# Patient Record
Sex: FEMALE | Race: WHITE
[De-identification: names, ages, dates, MRNs, and addresses within clinical notes are randomized per-mention and may not be internally consistent; named-entity substitution may affect disease eponyms.]

---

## 2017-07-23 ENCOUNTER — TRANSCRIPTION ENCOUNTER (OUTPATIENT)
Age: 77
End: 2017-07-23

## 2017-10-01 ENCOUNTER — EMERGENCY (EMERGENCY)
Facility: HOSPITAL | Age: 77
LOS: 0 days | Discharge: HOME | End: 2017-10-01

## 2017-10-01 DIAGNOSIS — S30.1XXA CONTUSION OF ABDOMINAL WALL, INITIAL ENCOUNTER: ICD-10-CM

## 2017-10-01 DIAGNOSIS — Z79.899 OTHER LONG TERM (CURRENT) DRUG THERAPY: ICD-10-CM

## 2017-10-01 DIAGNOSIS — S70.01XA CONTUSION OF RIGHT HIP, INITIAL ENCOUNTER: ICD-10-CM

## 2017-10-01 DIAGNOSIS — S20.211A CONTUSION OF RIGHT FRONT WALL OF THORAX, INITIAL ENCOUNTER: ICD-10-CM
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
## 2017-10-01 DIAGNOSIS — I10 ESSENTIAL (PRIMARY) HYPERTENSION: ICD-10-CM

## 2017-10-01 DIAGNOSIS — K41.30 UNILATERAL FEMORAL HERNIA, WITH OBSTRUCTION, WITHOUT GANGRENE, NOT SPECIFIED AS RECURRENT: ICD-10-CM
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
## 2017-10-01 DIAGNOSIS — R07.81 PLEURODYNIA: ICD-10-CM
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
## 2018-10-29 ENCOUNTER — EMERGENCY (EMERGENCY)
Facility: HOSPITAL | Age: 78
LOS: 0 days | Discharge: HOME | End: 2018-10-29
Attending: EMERGENCY MEDICINE | Admitting: EMERGENCY MEDICINE

## 2018-10-29 VITALS
WEIGHT: 207.01 LBS | SYSTOLIC BLOOD PRESSURE: 165 MMHG | DIASTOLIC BLOOD PRESSURE: 79 MMHG | OXYGEN SATURATION: 94 % | HEIGHT: 68 IN | HEART RATE: 83 BPM | TEMPERATURE: 97 F | RESPIRATION RATE: 18 BRPM

## 2018-10-29 DIAGNOSIS — K57.92 DIVERTICULITIS OF INTESTINE, PART UNSPECIFIED, WITHOUT PERFORATION OR ABSCESS WITHOUT BLEEDING: ICD-10-CM

## 2018-10-29 DIAGNOSIS — Z90.710 ACQUIRED ABSENCE OF BOTH CERVIX AND UTERUS: ICD-10-CM

## 2018-10-29 DIAGNOSIS — N39.0 URINARY TRACT INFECTION, SITE NOT SPECIFIED: ICD-10-CM

## 2018-10-29 DIAGNOSIS — I10 ESSENTIAL (PRIMARY) HYPERTENSION: ICD-10-CM

## 2018-10-29 DIAGNOSIS — F41.9 ANXIETY DISORDER, UNSPECIFIED: ICD-10-CM

## 2018-10-29 DIAGNOSIS — Z98.890 OTHER SPECIFIED POSTPROCEDURAL STATES: ICD-10-CM

## 2018-10-29 DIAGNOSIS — R10.32 LEFT LOWER QUADRANT PAIN: ICD-10-CM

## 2018-10-29 DIAGNOSIS — Z90.49 ACQUIRED ABSENCE OF OTHER SPECIFIED PARTS OF DIGESTIVE TRACT: ICD-10-CM

## 2018-10-29 LAB
ALBUMIN SERPL ELPH-MCNC: 4 G/DL — SIGNIFICANT CHANGE UP (ref 3.5–5.2)
ALP SERPL-CCNC: 81 U/L — SIGNIFICANT CHANGE UP (ref 30–115)
ALT FLD-CCNC: 14 U/L — SIGNIFICANT CHANGE UP (ref 0–41)
ANION GAP SERPL CALC-SCNC: 11 MMOL/L — SIGNIFICANT CHANGE UP (ref 7–14)
APPEARANCE UR: ABNORMAL
APTT BLD: 32.5 SEC — SIGNIFICANT CHANGE UP (ref 27–39.2)
AST SERPL-CCNC: 26 U/L — SIGNIFICANT CHANGE UP (ref 0–41)
BASOPHILS # BLD AUTO: 0.07 K/UL — SIGNIFICANT CHANGE UP (ref 0–0.2)
BASOPHILS NFR BLD AUTO: 0.7 % — SIGNIFICANT CHANGE UP (ref 0–1)
BILIRUB SERPL-MCNC: 0.5 MG/DL — SIGNIFICANT CHANGE UP (ref 0.2–1.2)
BILIRUB UR-MCNC: NEGATIVE — SIGNIFICANT CHANGE UP
BUN SERPL-MCNC: 16 MG/DL — SIGNIFICANT CHANGE UP (ref 10–20)
CALCIUM SERPL-MCNC: 9.7 MG/DL — SIGNIFICANT CHANGE UP (ref 8.5–10.1)
CHLORIDE SERPL-SCNC: 94 MMOL/L — LOW (ref 98–110)
CO2 SERPL-SCNC: 28 MMOL/L — SIGNIFICANT CHANGE UP (ref 17–32)
COLOR SPEC: YELLOW — SIGNIFICANT CHANGE UP
CREAT SERPL-MCNC: 0.9 MG/DL — SIGNIFICANT CHANGE UP (ref 0.7–1.5)
DIFF PNL FLD: NEGATIVE — SIGNIFICANT CHANGE UP
EOSINOPHIL # BLD AUTO: 0.08 K/UL — SIGNIFICANT CHANGE UP (ref 0–0.7)
EOSINOPHIL NFR BLD AUTO: 0.8 % — SIGNIFICANT CHANGE UP (ref 0–8)
GLUCOSE SERPL-MCNC: 128 MG/DL — HIGH (ref 70–99)
GLUCOSE UR QL: NEGATIVE MG/DL — SIGNIFICANT CHANGE UP
HCT VFR BLD CALC: 50 % — HIGH (ref 37–47)
HGB BLD-MCNC: 16.6 G/DL — HIGH (ref 12–16)
IMM GRANULOCYTES NFR BLD AUTO: 0.6 % — HIGH (ref 0.1–0.3)
INR BLD: 1.09 RATIO — SIGNIFICANT CHANGE UP (ref 0.65–1.3)
KETONES UR-MCNC: NEGATIVE — SIGNIFICANT CHANGE UP
LEUKOCYTE ESTERASE UR-ACNC: ABNORMAL
LIDOCAIN IGE QN: 19 U/L — SIGNIFICANT CHANGE UP (ref 7–60)
LYMPHOCYTES # BLD AUTO: 1.14 K/UL — LOW (ref 1.2–3.4)
LYMPHOCYTES # BLD AUTO: 11 % — LOW (ref 20.5–51.1)
MCHC RBC-ENTMCNC: 30 PG — SIGNIFICANT CHANGE UP (ref 27–31)
MCHC RBC-ENTMCNC: 33.2 G/DL — SIGNIFICANT CHANGE UP (ref 32–37)
MCV RBC AUTO: 90.4 FL — SIGNIFICANT CHANGE UP (ref 81–99)
MONOCYTES # BLD AUTO: 0.67 K/UL — HIGH (ref 0.1–0.6)
MONOCYTES NFR BLD AUTO: 6.4 % — SIGNIFICANT CHANGE UP (ref 1.7–9.3)
NEUTROPHILS # BLD AUTO: 8.38 K/UL — HIGH (ref 1.4–6.5)
NEUTROPHILS NFR BLD AUTO: 80.5 % — HIGH (ref 42.2–75.2)
NITRITE UR-MCNC: POSITIVE
PH UR: 8 — SIGNIFICANT CHANGE UP (ref 5–8)
PLATELET # BLD AUTO: 324 K/UL — SIGNIFICANT CHANGE UP (ref 130–400)
POTASSIUM SERPL-MCNC: 3.9 MMOL/L — SIGNIFICANT CHANGE UP (ref 3.5–5)
POTASSIUM SERPL-SCNC: 3.9 MMOL/L — SIGNIFICANT CHANGE UP (ref 3.5–5)
PROT SERPL-MCNC: 7.3 G/DL — SIGNIFICANT CHANGE UP (ref 6–8)
PROT UR-MCNC: ABNORMAL MG/DL
PROTHROM AB SERPL-ACNC: 12.5 SEC — SIGNIFICANT CHANGE UP (ref 9.95–12.87)
RBC # BLD: 5.53 M/UL — HIGH (ref 4.2–5.4)
RBC # FLD: 13.5 % — SIGNIFICANT CHANGE UP (ref 11.5–14.5)
SODIUM SERPL-SCNC: 133 MMOL/L — LOW (ref 135–146)
SP GR SPEC: 1.01 — SIGNIFICANT CHANGE UP (ref 1.01–1.03)
TYPE + AB SCN PNL BLD: SIGNIFICANT CHANGE UP
UROBILINOGEN FLD QL: 0.2 MG/DL — SIGNIFICANT CHANGE UP (ref 0.2–0.2)
WBC # BLD: 10.4 K/UL — SIGNIFICANT CHANGE UP (ref 4.8–10.8)
WBC # FLD AUTO: 10.4 K/UL — SIGNIFICANT CHANGE UP (ref 4.8–10.8)

## 2018-10-29 RX ORDER — METRONIDAZOLE 500 MG
1 TABLET ORAL
Qty: 21 | Refills: 0
Start: 2018-10-29 | End: 2018-11-04

## 2018-10-29 RX ORDER — HYDROCORTISONE 1 %
1 OINTMENT (GRAM) TOPICAL
Qty: 30 | Refills: 0
Start: 2018-10-29 | End: 2018-11-02

## 2018-10-29 RX ORDER — MOXIFLOXACIN HYDROCHLORIDE TABLETS, 400 MG 400 MG/1
1 TABLET, FILM COATED ORAL
Qty: 14 | Refills: 0
Start: 2018-10-29 | End: 2018-11-04

## 2018-10-29 NOTE — ED ADULT NURSE NOTE - OBJECTIVE STATEMENT
Patient present to ED with complains of lower abdomen pain with rectal bleeding for about 1 week, patient has hx of hemorrhoids, patient states that bleeding is worse yesterday, denies nausea, vomiting, chest pain, and SOB.

## 2018-10-29 NOTE — ED PROVIDER NOTE - PHYSICAL EXAMINATION
Vital signs reviewed  GENERAL: Patient well appearing, NAD  HEAD: NCAT  EYES: Anicteric  ENT: MMM  NECK: Supple, non tender  RESPIRATORY: Normal respiratory effort. CTA B/L. No wheezing, rales, rhonchi  CARDIOVASCULAR: Regular rate and rhythm. Normal S1/S2. No murmurs, rubs or gallops  ABDOMEN: Soft. Nondistended. Mild LLQ TTP. No guarding or rebound. No CVA tenderness  RECTAL: External hemorrhoids, mild tenderness, nonthrombosed. Dark red intermixed with brown stool, no melena.   MUSCULOSKELETAL/EXTREMITIES: Brisk cap refill. 2+ radial pulses. No leg edema  SKIN:  Warm and dry  NEURO: AAOx3. No gross FND.  PSYCHIATRIC: Cooperative. Affect appropriate

## 2018-10-29 NOTE — ED PROVIDER NOTE - CONDUCTED A DETAILED DISCUSSION WITH PATIENT AND/OR GUARDIAN REGARDING, MDM
radiology results/need for outpatient follow-up/lab results radiology results/need for outpatient follow-up/return to ED if symptoms worsen, persist or questions arise/lab results

## 2018-10-29 NOTE — ED PROVIDER NOTE - OBJECTIVE STATEMENT
76yo F with PMHx hemorrhoids, HTN, arthritis, anxiety, appendectomy, hysterectomy, B/L hernia repairs, p/w rectal bleeding wirsening over several weeks and intermittent cramping lower abdominal pain x1 week. Pt states initially began as small amount of blood in toilet paper but now having blood in toilet bowl. 76yo F with PMHx hemorrhoids, HTN, arthritis, anxiety, appendectomy, hysterectomy, B/L hernia repairs, p/w rectal bleeding/pain worsening over several weeks and intermittent cramping LLQ abdominal pain x1 week. Pt states initially began as small amount of blood in toilet paper but now having blood in toilet bowl. +increased straining. Assoc with nausea. Denies fever, vomiting, diarrhea. Denies dysuria, hematuria, urinary frequency/urgency. Denies SOB/lightheadedness. Denies headache, CP, cough, leg swelling.

## 2018-10-29 NOTE — ED PROVIDER NOTE - ATTENDING CONTRIBUTION TO CARE
78 yo F hxof htn, hemorroids, now with rectal pain from hemorroids noted blood today.  additionally, has suprapubic pressure. no assoc n/v/fever.  vss, nontoxic, well appearing, pink conj, anicteric, MMM, neck supple, CTAB, RRR, equal radial pulses bilat, abd soft/nt/nd, no cva tend. no calves tend, no edema, no fnd. no rashes.  a/p: labs, imaging, reassess

## 2018-10-29 NOTE — ED PROVIDER NOTE - CARE PROVIDER_API CALL
Malcolm Dawson), Gastroenterology; Internal Medicine  [address] [place] [address]  Phone: [phone]  Fax: [phone]    Francisco Javier Bryant), Gastroenterology; Internal Medicine  [address] [place] [id number]  Phone: [phone]  Fax: [phone]

## 2018-10-29 NOTE — ED ADULT TRIAGE NOTE - CCCP TRG CHIEF CMPLNT
January 26, 2018    Patient: Gerson Skaggs   Date of Visit: 1/26/2018       To Whom It May Concern:    Carlita High was seen and treated in our emergency department on 1/26/2018. He should not return to school until 1/29/18.     If you have any questio bloody stools/abdominal pain

## 2018-10-29 NOTE — SBIRT NOTE. - NSSBIRTSERVICES_GEN_A_ED_FT
Provided SBIRT services: Full Screen Negative    Positive reinforcement provided given patient currently within healthy guidelines. Education materials reviewed and given to patient.    AUDIT Score: 4   DAST-10 Score: 0  Duration = 5 Minutes

## 2018-10-29 NOTE — ED PROVIDER NOTE - NS ED ROS FT
Constitutional: No fever  Eyes:  No visual changes  ENMT:  No sore throat, neck pain  Cardiac:  No chest pain  Respiratory:  No cough, SOB  GI:  No vomiting, diarrhea. +nausea, abdominal pain, rectal bleeding/pain  :  No dysuria, hematuria, frequency, urgency  MS:  No back pain  Neuro:  No headache or lightheadedness  Skin:  No skin rash  Endocrine: No history of thyroid disease or diabetes  Except as documented in the HPI,  all other systems are negative.

## 2019-08-07 ENCOUNTER — TRANSCRIPTION ENCOUNTER (OUTPATIENT)
Age: 79
End: 2019-08-07

## 2020-07-20 ENCOUNTER — TRANSCRIPTION ENCOUNTER (OUTPATIENT)
Age: 80
End: 2020-07-20

## 2020-07-27 PROBLEM — M19.90 UNSPECIFIED OSTEOARTHRITIS, UNSPECIFIED SITE: Chronic | Status: ACTIVE | Noted: 2018-10-29

## 2020-07-27 PROBLEM — F41.9 ANXIETY DISORDER, UNSPECIFIED: Chronic | Status: ACTIVE | Noted: 2018-10-29

## 2020-07-28 ENCOUNTER — APPOINTMENT (OUTPATIENT)
Dept: NEUROLOGY | Facility: CLINIC | Age: 80
End: 2020-07-28

## 2020-07-28 ENCOUNTER — TRANSCRIPTION ENCOUNTER (OUTPATIENT)
Age: 80
End: 2020-07-28

## 2020-07-28 NOTE — HISTORY OF PRESENT ILLNESS
[Time Spent: ___ minutes] : I have spent [unfilled] minutes with the patient on the telephone [FreeTextEntry1] : ***THIS PATIENT WAS SCHEDULED YESTERDAY\par PATIENT DID NOT LOG ON FOR TELEHEALTH VISIT AFTER MULTIPLE EMAIL ATTEMPTS\par IN ADDITION THE PHONE NUMBER LISTED WAS INCORRECT AND THEREFORE HAD NO WAY TO CONTACT HER OTHER THAN THE GIVEN EMAIL. ANOTHER EMAIL WAS ALSO SENT TO HER ALERTING HER TO HRE APPT. AFTER 3 PM TODAY. NO RESPONSE WAS RECEIVED.\par \par THIS VISIT WILL BE CANCELLED AND RESCHEDULED

## 2020-09-26 ENCOUNTER — TRANSCRIPTION ENCOUNTER (OUTPATIENT)
Age: 80
End: 2020-09-26

## 2020-12-02 ENCOUNTER — APPOINTMENT (OUTPATIENT)
Dept: NEUROLOGY | Facility: CLINIC | Age: 80
End: 2020-12-02

## 2020-12-28 ENCOUNTER — LABORATORY RESULT (OUTPATIENT)
Age: 80
End: 2020-12-28

## 2020-12-28 ENCOUNTER — APPOINTMENT (OUTPATIENT)
Dept: UROGYNECOLOGY | Facility: CLINIC | Age: 80
End: 2020-12-28
Payer: MEDICARE

## 2020-12-28 VITALS
DIASTOLIC BLOOD PRESSURE: 84 MMHG | HEIGHT: 68 IN | HEART RATE: 93 BPM | SYSTOLIC BLOOD PRESSURE: 155 MMHG | WEIGHT: 175 LBS | BODY MASS INDEX: 26.52 KG/M2

## 2020-12-28 DIAGNOSIS — Z87.440 PERSONAL HISTORY OF URINARY (TRACT) INFECTIONS: ICD-10-CM

## 2020-12-28 DIAGNOSIS — Z82.49 FAMILY HISTORY OF ISCHEMIC HEART DISEASE AND OTHER DISEASES OF THE CIRCULATORY SYSTEM: ICD-10-CM

## 2020-12-28 DIAGNOSIS — F41.9 ANXIETY DISORDER, UNSPECIFIED: ICD-10-CM

## 2020-12-28 DIAGNOSIS — Z78.9 OTHER SPECIFIED HEALTH STATUS: ICD-10-CM

## 2020-12-28 DIAGNOSIS — N39.490 OVERFLOW INCONTINENCE: ICD-10-CM

## 2020-12-28 DIAGNOSIS — M19.90 UNSPECIFIED OSTEOARTHRITIS, UNSPECIFIED SITE: ICD-10-CM

## 2020-12-28 LAB
BILIRUB UR QL STRIP: NEGATIVE
CLARITY UR: CLEAR
COLLECTION METHOD: NORMAL
GLUCOSE UR-MCNC: NEGATIVE
HCG UR QL: 2 EU/DL
HGB UR QL STRIP.AUTO: 50
KETONES UR-MCNC: NEGATIVE
LEUKOCYTE ESTERASE UR QL STRIP: 75
NITRITE UR QL STRIP: NEGATIVE
PH UR STRIP: 6
PROT UR STRIP-MCNC: 100
SP GR UR STRIP: 1.02

## 2020-12-28 PROCEDURE — 51701 INSERT BLADDER CATHETER: CPT

## 2020-12-28 PROCEDURE — 81003 URINALYSIS AUTO W/O SCOPE: CPT | Mod: QW

## 2020-12-28 PROCEDURE — 99205 OFFICE O/P NEW HI 60 MIN: CPT

## 2020-12-28 RX ORDER — AZELASTINE HYDROCHLORIDE 137 UG/1
0.1 SPRAY, METERED NASAL
Qty: 30 | Refills: 0 | Status: ACTIVE | COMMUNITY
Start: 2020-02-12

## 2020-12-28 RX ORDER — MELOXICAM 15 MG/1
15 TABLET ORAL
Qty: 30 | Refills: 0 | Status: ACTIVE | COMMUNITY
Start: 2020-06-06

## 2020-12-28 RX ORDER — AMLODIPINE BESYLATE 10 MG/1
10 TABLET ORAL
Qty: 30 | Refills: 0 | Status: ACTIVE | COMMUNITY
Start: 2019-10-06

## 2020-12-28 RX ORDER — CONJUGATED ESTROGENS 0.62 MG/G
0.62 CREAM VAGINAL
Qty: 30 | Refills: 0 | Status: ACTIVE | COMMUNITY
Start: 2019-12-10

## 2020-12-28 RX ORDER — HYDROCHLOROTHIAZIDE 25 MG/1
25 TABLET ORAL
Qty: 30 | Refills: 0 | Status: ACTIVE | COMMUNITY
Start: 2020-07-10

## 2020-12-28 RX ORDER — DIAZEPAM 10 MG/1
10 TABLET ORAL
Qty: 90 | Refills: 0 | Status: ACTIVE | COMMUNITY
Start: 2020-07-22

## 2020-12-29 LAB
APPEARANCE: ABNORMAL
BILIRUBIN URINE: NEGATIVE
BLOOD URINE: NORMAL
COLOR: YELLOW
GLUCOSE QUALITATIVE U: NEGATIVE
KETONES URINE: NEGATIVE
LEUKOCYTE ESTERASE URINE: ABNORMAL
NITRITE URINE: NEGATIVE
PH URINE: 6.5
PROTEIN URINE: ABNORMAL
SPECIFIC GRAVITY URINE: 1.02
UROBILINOGEN URINE: ABNORMAL

## 2021-01-03 LAB — BACTERIA UR CULT: ABNORMAL

## 2021-01-05 ENCOUNTER — NON-APPOINTMENT (OUTPATIENT)
Age: 81
End: 2021-01-05

## 2021-01-06 ENCOUNTER — NON-APPOINTMENT (OUTPATIENT)
Age: 81
End: 2021-01-06

## 2021-01-09 NOTE — HISTORY OF PRESENT ILLNESS
[FreeTextEntry1] : \par Pt with pelvic floor dysfunction here for urogynecologic evaluation. She describes: \par Referring provider: Dr Vicki Young\par PCP: Dr Karly Thomason\par \par Chief PFD: urinary frequency, dysuria\par \par UTI symptoms: dysuria, no change in urination, no hematuria, increase of confusion, increase of hand tremor and lack of balance\par 9/26/20: klebsiella R amp R ceftriaxone I macrobid R bactrim R tobramycin\par 8/7/19 e coli pansensitive\par \par Records reviewed from gyn:\par 10/16/20: urinalysis: nitrate negative, wbc >60/hpf,rbc 3-10/hpf,moderate bacteria, no urine culture\par \par Pelvic organ prolapse: s/p ovarian cystectomy, s/p MARSHA//BSO, s/p appendectomy, s/p femoral hernia repair x2, +bulge, for a couple of months, non worsening, no splinting\par Stress urinary incontinence: sometimes\par Overactive bladder syndrome: frequency, urgency, urge incontinence daily, incontinence without warning daily, past 4 years. has a tremor, but does not want to see a neurologist, no prior treatment. No glaucoma.\par Voiding dysfunction: yes Incomplete bladder emptying, yes hesitancy \par Lower urinary tract/vaginal symptoms: as above UTIs per year, no hematuria, yes dysuria\par Fecal incontinence: no\par Defecatory dysfunction: sausage stools\par Sexual dysfunction: not sexually active\par Pelvic pain: no\par Vaginal dryness: using premarin cream for the past year, uses 3 times a week\par \par Her pelvic floor symptoms are significantly bothersome and negatively impacting her quality of life. \par \par

## 2021-01-09 NOTE — DISCUSSION/SUMMARY
[FreeTextEntry1] : \par History of  UTI-\par Advised the patient that recurrent UTIs are defined as having 3 or more positive urine culture in 1 year or 2 or more in 6 months, which she has not had. Advised to call the office if she feels like she has an infection so that we can arrange testing of her urine. If she keeps getting infections then I will recommend a workup of further evaluation of her kidneys and bladder and further prevention treatment including estrogen vaginal cream and daily antibiotic suppression. The patient voiced understanding and agrees with the plan.\par \par Incomplete bladder emptying-\par Advised the patient that this can be due to an acute UTI or prolapse or other etiologies. Will send her urine to rule out infectious etiology and perform a pessary fitting for prolapse management. If she still does not empty well then I will recommend further workup with urodynamics (with reduction). Discussed with the patient that we are concerned about incomplete bladder emptying because it can cause recurrent UTI and can cause kidney damage. The patient voiced understanding.\par \par Overflow incontinence-\par Advised that the urinary incontinence can be secondary to incomplete bladder emptying. We will discuss further management options for urinary incontinence If the patient continue to have incontinence after resolvement of incomplete bladder emptying.\par \par \par \par

## 2021-01-09 NOTE — PHYSICAL EXAM
[Chaperone Present] : A chaperone was present in the examining room during all aspects of the physical examination [FreeTextEntry1] : Void: 50  cc\par PVR: 130  cc\par Urethra was prepped in sterile fashion and then a sterile catheter was used by me to drain the bladder.\par \par GH: 3.5    pB: 3.5  TVL: 6.5   C: -6   D: N/A  Aa: +1 Ba: +1  Ap: -2  Bp: -2\par  \par Well healed incision:  inguina bilaterally, Pfannenstiel\par normal perineal sensation\par normal perineal reflexes\par negative cough stress test\par positive atrophy\par bilateral levator ani spasm, no tenderness\par no urethral tenderness\par no bladder tenderness\par no cuff tenderness\par surgically absent uterus and cervix\par

## 2021-01-09 NOTE — COUNSELING
[FreeTextEntry1] : \par Please start the cipro (antibiotics) twice a day for 7 days\par \par We will notify you of the urine results. Once you are done with the antibiotics we will schedule you for a follow up visit to see if you empty your bladder better without an infection and to do the pessary fitting\par \par We called your gynecology's office to get the urine results. Once I review it, I will then determine if you need further evaluation of your kidneys and bladder\par \par Please apply the desitin cream to the outside labia twice a day for the irritation\par \par Please continue to apply the premarin cream to the vagina\par \par Please call the office if you feel like  you have an infection so that we can arrange testing of your urine\par \par We will schedule the follow up based on the urine results

## 2021-01-22 ENCOUNTER — NON-APPOINTMENT (OUTPATIENT)
Age: 81
End: 2021-01-22

## 2021-01-22 RX ORDER — CIPROFLOXACIN HYDROCHLORIDE 500 MG/1
500 TABLET, FILM COATED ORAL
Qty: 14 | Refills: 0 | Status: DISCONTINUED | COMMUNITY
Start: 2020-12-28 | End: 2021-01-22

## 2021-02-01 ENCOUNTER — NON-APPOINTMENT (OUTPATIENT)
Age: 81
End: 2021-02-01

## 2021-02-01 ENCOUNTER — APPOINTMENT (OUTPATIENT)
Dept: UROGYNECOLOGY | Facility: CLINIC | Age: 81
End: 2021-02-01

## 2021-02-11 ENCOUNTER — APPOINTMENT (OUTPATIENT)
Dept: UROGYNECOLOGY | Facility: CLINIC | Age: 81
End: 2021-02-11
Payer: MEDICARE

## 2021-02-11 ENCOUNTER — LABORATORY RESULT (OUTPATIENT)
Age: 81
End: 2021-02-11

## 2021-02-11 ENCOUNTER — APPOINTMENT (OUTPATIENT)
Dept: UROGYNECOLOGY | Facility: CLINIC | Age: 81
End: 2021-02-11

## 2021-02-11 ENCOUNTER — RESULT CHARGE (OUTPATIENT)
Age: 81
End: 2021-02-11

## 2021-02-11 VITALS
HEIGHT: 68 IN | DIASTOLIC BLOOD PRESSURE: 72 MMHG | WEIGHT: 175 LBS | SYSTOLIC BLOOD PRESSURE: 125 MMHG | BODY MASS INDEX: 26.52 KG/M2 | HEART RATE: 82 BPM

## 2021-02-11 PROCEDURE — 99213 OFFICE O/P EST LOW 20 MIN: CPT | Mod: 25

## 2021-02-11 PROCEDURE — 57160 INSERT PESSARY/OTHER DEVICE: CPT

## 2021-02-11 NOTE — PHYSICAL EXAM
[Chaperone Present] : A chaperone was present in the examining room during all aspects of the physical examination [No Acute Distress] : in no acute distress [Well developed] : well developed [Well Nourished] : ~L well nourished [FreeTextEntry1] : Urethra was prepped in sterile fashion and then a sterile catheter was used by me to drain the bladder.\par void: 150cc\par PVR: 140cc

## 2021-02-11 NOTE — COUNSELING
[FreeTextEntry1] : We will contact you if the urine results are abnormal.\par \par If you have another urine infection , we will treat it and repeat post void residual after completion of the treatment. \par \par If there's no infection, we will schedule for you to return and repeat post void residual with the pessary in place (ring and support #3)\par \par Please follow up for your scheduled cystoscopy with Dr Gudino on 3/11/21

## 2021-02-11 NOTE — HISTORY OF PRESENT ILLNESS
[FreeTextEntry1] : Patient is here for pessary fitting. GH:3.5  TVL:6.5\par Last seen 12/28/2020 as a new pt with urinary frequency, dysuria.\par \par \par UTI symptoms: dysuria, no change in urination, no hematuria, increase of confusion, increase of hand tremor and lack of balance\par 9/26/20: klebsiella R amp R ceftriaxone I macrobid R bactrim R tobramycin\par 8/7/19 e coli pansensitive\par \par Records reviewed from gyn:\par 10/16/20: urinalysis: nitrate negative, wbc >60/hpf,rbc 3-10/hpf,moderate bacteria, no urine culture\par \par s/p ovarian cystectomy, s/p MARSHA//BSO, s/p appendectomy, s/p femoral hernia repair x2\par no glaucoma\par Has a tumor but did not want to see neurologist\par \par Occasional stress incontinence\par Not sexually active\par history of incomplete bladder emptying without reduction. PVR: 130cc\par \par Premarin cream for atrophy\par \par Today pt is here with her daughter. Has occasional burning with urination. Feels the bulge in the vaginal area, sometimes it bothers her. Feels that she's not emptying the bladder well. \par

## 2021-02-11 NOTE — DISCUSSION/SUMMARY
[FreeTextEntry1] : Recurrent UTIs\par Urine culture obtained.\par Will follow up.\par Will treat accordingly if necessary\par If +UTI, will treat and repeat PVR. If no UTI, will come back and do PVR with pessary in place. \par \par Pessary fitting done today \par Cystocele\par Ring and support #3 placed without difficulty. Remained in place with Valsalva, coughing, and ambulating. \par The patient tolerated all fittings well.\par Will order if the pessary is needed to NUSRAT\par \par Incomplete blader emptying\par Will repeat PVR after urine culture is back\par \par Atrophic Vaginitis:\par Advised to continue to apply a pea size amount of the cream to the opening of the vagina three nights per week.\par

## 2021-02-12 LAB
APPEARANCE: ABNORMAL
BILIRUB UR QL STRIP: NEGATIVE
BILIRUBIN URINE: NEGATIVE
BLOOD URINE: ABNORMAL
CLARITY UR: CLEAR
COLLECTION METHOD: NORMAL
COLOR: YELLOW
GLUCOSE QUALITATIVE U: NEGATIVE
GLUCOSE UR-MCNC: NEGATIVE
HCG UR QL: 0.2 EU/DL
HGB UR QL STRIP.AUTO: NORMAL
KETONES UR-MCNC: NEGATIVE
KETONES URINE: NEGATIVE
LEUKOCYTE ESTERASE UR QL STRIP: NORMAL
LEUKOCYTE ESTERASE URINE: ABNORMAL
NITRITE UR QL STRIP: NEGATIVE
NITRITE URINE: NEGATIVE
PH UR STRIP: 6
PH URINE: 6
PROT UR STRIP-MCNC: NORMAL
PROTEIN URINE: NORMAL
SP GR UR STRIP: 1.02
SPECIFIC GRAVITY URINE: 1.02
UROBILINOGEN URINE: NORMAL

## 2021-02-13 LAB — BACTERIA UR CULT: NORMAL

## 2021-02-17 ENCOUNTER — OUTPATIENT (OUTPATIENT)
Dept: OUTPATIENT SERVICES | Facility: HOSPITAL | Age: 81
LOS: 1 days | Discharge: HOME | End: 2021-02-17

## 2021-02-17 ENCOUNTER — APPOINTMENT (OUTPATIENT)
Dept: UROGYNECOLOGY | Facility: CLINIC | Age: 81
End: 2021-02-17
Payer: MEDICARE

## 2021-02-17 VITALS
HEIGHT: 68 IN | DIASTOLIC BLOOD PRESSURE: 82 MMHG | SYSTOLIC BLOOD PRESSURE: 148 MMHG | WEIGHT: 175 LBS | BODY MASS INDEX: 26.52 KG/M2

## 2021-02-17 DIAGNOSIS — N39.0 URINARY TRACT INFECTION, SITE NOT SPECIFIED: ICD-10-CM

## 2021-02-17 DIAGNOSIS — N81.11 CYSTOCELE, MIDLINE: ICD-10-CM

## 2021-02-17 DIAGNOSIS — R33.9 RETENTION OF URINE, UNSPECIFIED: ICD-10-CM

## 2021-02-17 DIAGNOSIS — N95.2 POSTMENOPAUSAL ATROPHIC VAGINITIS: ICD-10-CM

## 2021-02-17 PROCEDURE — 51701 INSERT BLADDER CATHETER: CPT

## 2021-02-17 PROCEDURE — 99215 OFFICE O/P EST HI 40 MIN: CPT | Mod: 25

## 2021-02-17 PROCEDURE — G2212 PROLONG OUTPT/OFFICE VIS: CPT

## 2021-02-17 NOTE — HISTORY OF PRESENT ILLNESS
[FreeTextEntry1] : Patient is here for pessary placement for cystocele and PVR check for NUSRAT\par Last seen 2/11 for pessary fitting. # ring and support #3\par \par UTI symptoms: dysuria, no change in urination, no hematuria, increase of confusion, increase of hand tremor and lack of balance\par 9/26/20: klebsiella R amp R ceftriaxone I macrobid R bactrim R tobramycin\par 8/7/19 e coli pansensitive\par \par Records reviewed from gyn:\par 10/16/20: urinalysis: nitrate negative, wbc >60/hpf,rbc 3-10/hpf,moderate bacteria, no urine culture\par \par s/p ovarian cystectomy, s/p MARSHA//BSO, s/p appendectomy, s/p femoral hernia repair x2\par no glaucoma\par Has a tumor but did not want to see neurologist\par \par Occasional stress incontinence\par Not sexually active\par history of incomplete bladder emptying without reduction. PVR: 130cc\par \par +Recurrent UTIs (diagnosed 12/28/2020)\par 12/28/20: +UTI, >100K E Coli, >100K Enterococcus faecalis, pan sensitive, treated with Cipro. \par On Macrobid 100mg QD suppression x 3 months\par 1/19/21: Cr: 1.17. Confirmed with pt's PCP Dr Germán Brandt that it is stable. \par 1/22/21: CT scan: shows mild wall thickening in the left dome, referred to Dr Gudino for further evaluation and cysto. appt 3/11/21\par \par Premarin cream for atrophy\par \par Patient is doing well and has no complaints today. \par

## 2021-02-17 NOTE — PHYSICAL EXAM
[Chaperone Present] : A chaperone was present in the examining room during all aspects of the physical examination [No Acute Distress] : in no acute distress [Well developed] : well developed [Well Nourished] : ~L well nourished [FreeTextEntry1] : Urethra was prepped in sterile fashion and then a sterile catheter was used by me to drain the bladder.\par void: 100cc\par PVR: 170cc (with pessary)

## 2021-02-17 NOTE — COUNSELING
[FreeTextEntry1] : Please call the office if you have any issues with vaginal pain, vaginal bleeding, difficulty urinating or having a bowel movement or if the pessary falls out so that we can arrange another size pessary.\par \par Please continue to apply a pea size amount of the cream to the opening of the vagina three nights per week.\par \par Please follow up for pessary maintenance in 3 weeks for pessary check and PVR check with JESUS Ridley\par

## 2021-02-17 NOTE — DISCUSSION/SUMMARY
[FreeTextEntry1] : Cystocele\par Ring and support #3  placed. The patient tolerated this well. PVR performed with the pessary, PVR: 170cc\par \par Incomplete bladder emptying\par Will repeat PVR check at next visit in 3 weeks with the pessary in place for few weeks. If still not emptying well, will recommend UDS with reduction to find etiology of NUSRAT. UDS d/w pt and daughter, written material provided for review\par \par Spoke to pt and daughter at length the importance of doing UDS to find out the etiology so that it can be adressed and treated. Advised that if pt continues to have NUSRAT, it can lead to continued UTIs and kidney damage. Advised that if they decide to do nothing, we will recommend kidney sonogram eery 6 months to rule out hydronephrosis. Pt and daughter verbalized understanding and will think about UDS.\par \par Atrophic Vaginitis:\par Advised to continue to apply a pea size amount of the cream to the opening of the vagina three nights per week.\par \par Recurrent UTIs\par Cont Macrobid 100mg\par \par F/u 3 weeks for pessary check and PVR check

## 2021-02-19 ENCOUNTER — EMERGENCY (EMERGENCY)
Facility: HOSPITAL | Age: 81
LOS: 0 days | Discharge: HOME | End: 2021-02-19
Attending: EMERGENCY MEDICINE | Admitting: EMERGENCY MEDICINE
Payer: MEDICARE

## 2021-02-19 VITALS
WEIGHT: 169.98 LBS | DIASTOLIC BLOOD PRESSURE: 79 MMHG | SYSTOLIC BLOOD PRESSURE: 138 MMHG | RESPIRATION RATE: 20 BRPM | HEART RATE: 106 BPM | TEMPERATURE: 98 F | OXYGEN SATURATION: 95 % | HEIGHT: 68 IN

## 2021-02-19 DIAGNOSIS — F41.9 ANXIETY DISORDER, UNSPECIFIED: ICD-10-CM

## 2021-02-19 DIAGNOSIS — S81.812A LACERATION WITHOUT FOREIGN BODY, LEFT LOWER LEG, INITIAL ENCOUNTER: ICD-10-CM
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
## 2021-02-19 DIAGNOSIS — Z79.899 OTHER LONG TERM (CURRENT) DRUG THERAPY: ICD-10-CM

## 2021-02-19 DIAGNOSIS — I10 ESSENTIAL (PRIMARY) HYPERTENSION: ICD-10-CM

## 2021-02-19 PROCEDURE — 99283 EMERGENCY DEPT VISIT LOW MDM: CPT | Mod: 25,GC

## 2021-02-19 PROCEDURE — 12002 RPR S/N/AX/GEN/TRNK2.6-7.5CM: CPT | Mod: GC

## 2021-02-19 PROCEDURE — 73590 X-RAY EXAM OF LOWER LEG: CPT | Mod: 26,LT

## 2021-02-19 NOTE — ED PROVIDER NOTE - PHYSICAL EXAMINATION
Vital Signs: I have reviewed the initial vital signs.  Constitutional: well-nourished, appears stated age, no acute distress.  HEENT: Airway patent, moist MM, no erythema/swelling/deformity of oral structures. EOMI, PERRLA.  CV: regular rate, regular rhythm, well-perfused extremities, 2+ b/l DP and radial pulses equal.  Lungs: BCTA, no increased WOB.  ABD: NTND, no guarding or rebound, no pulsatile mass, no hernias.   MSK: Left leg demonstrates bruising over the anterior lower leg with a 7 cm semi-circular laceration. Distal pulses are palpable and intact, strength and sensation intact bilaterally.   INTEG: Skin warm, dry, no rash.  NEURO: A&Ox3, moving all extremities, normal speech  PSYCH: Calm, cooperative, normal affect and interaction.

## 2021-02-19 NOTE — ED PROVIDER NOTE - NSFOLLOWUPINSTRUCTIONS_ED_ALL_ED_FT
Please keep the wound area dry for the first 2-3 days, and cover with vaseline or bacitracin once a day. Please return to a doctor in 14 days to have the stitches removed.     Laceration    A laceration is a cut that goes through all of the layers of the skin and into the tissue that is right under the skin. Some lacerations heal on their own. Others need to be closed with skin adhesive strips, skin glue, stitches (sutures), or staples. Proper laceration care minimizes the risk of infection and helps the laceration to heal better.  If non-absorbable stitches or staples have been placed, they must be taken out within the time frame instructed by your healthcare provider.    SEEK IMMEDIATE MEDICAL CARE IF YOU HAVE ANY OF THE FOLLOWING SYMPTOMS: swelling around the wound, worsening pain, drainage from the wound, red streaking going away from your wound, inability to move finger or toe near the laceration, or discoloration of skin near the laceration.

## 2021-02-19 NOTE — ED ADULT NURSE NOTE - NSIMPLEMENTINTERV_GEN_ALL_ED
Implemented All Fall with Harm Risk Interventions:  [place] to call system. Call bell, personal items and telephone within reach. Instruct patient to call for assistance. Room bathroom lighting operational. Non-slip footwear when patient is off stretcher. Physically safe environment: no spills, clutter or unnecessary equipment. Stretcher in lowest position, wheels locked, appropriate side rails in place. Provide visual cue, wrist band, yellow gown, etc. Monitor gait and stability. Monitor for mental status changes and reorient to person, place, and time. Review medications for side effects contributing to fall risk. Reinforce activity limits and safety measures with patient and family. Provide visual clues: red socks.

## 2021-02-19 NOTE — ED PROVIDER NOTE - PATIENT PORTAL LINK FT
You can access the FollowMyHealth Patient Portal offered by [facility] by registering at the following website: [email or web address]. By joining [facility]’s FollowMyHealth portal, you will also be able to view your health information using other applications (apps) compatible with our system.

## 2021-02-19 NOTE — ED PROVIDER NOTE - OBJECTIVE STATEMENT
80F presents with fall from bed. patient notes she was trying to get into bed with a stepstool, missed her footing and then fell onto the ground, denies head trauma/loc, now has left lower leg pain and a laceration to the leg. No A/C, tetanus is uptodate. Patient noted to be on cipro for UTI.

## 2021-02-19 NOTE — ED PROVIDER NOTE - NSFOLLOWUPCLINICS_GEN_ALL_ED_FT
[facility]  Medicine  [address] [place], [place]   Phone: [phone]  Fax:   Follow Up Time: Routine

## 2021-02-19 NOTE — ED PROVIDER NOTE - ATTENDING CONTRIBUTION TO CARE
I personally evaluated patient. I agree with the findings and plan with all documentation on chart except as documented  in my note.    81 y/o F PMHx HTN, Arthritis, Anxiety , Mild Dementia who presents with leg laceration with trying to get into bed. Patient using a step tool to get in bed and fell and her leg got caught. + Laceration to leg. No fall to the gorund or head trauma. patient denies any otyher pain or LOC. SOn reports patient has been acting completely normally past few days and only recent change was she had a pessary placed (which fell out pretty immediately) and is on PO antibiotics for UTI.    On exam, VS reviewed. GCS at baseline (14-15). Gross neuro exam normal and gait at baseline.  Patient n/v intact with Full ROM and full motor strength with no signs of any serious injury. No signs of tendon injury on direct examination. Tetanus up-to-date.  Laceration repaired as described.  Wound care discussed in detail. Signs of infection discussed. Timing and return for suture removal discussed. Medications administered and prescribed/OTC home meds discussed.  All questions and concerns from patient or family addressed. Understanding of instructions verbalized.

## 2021-02-19 NOTE — ED ADULT NURSE NOTE - OBJECTIVE STATEMENT
pt presents with left lower extremity laceration s/p a trip and fall over a step stool getting on to the bed . Fall was unwitnessed. pt denies head trauma or loc. Bilateral lower extremities swelling notes. pt states legs always been swollen. pt denies any other complaint at this time

## 2021-02-19 NOTE — ED PROVIDER NOTE - CLINICAL SUMMARY MEDICAL DECISION MAKING FREE TEXT BOX
81 y/o F PMHx HTN, Arthritis, Anxiety , Mild Dementia who presents with leg laceration with trying to get into bed. Patient using a step tool to get in bed and fell and her leg got caught. + Laceration to leg. No fall to the gorund or head trauma. patient denies any otyher pain or LOC. SOn reports patient has been acting completely normally past few days and only recent change was she had a pessary placed (which fell out pretty immediately) and is on PO antibiotics for UTI.    On exam, VS reviewed. GCS at baseline (14-15). Gross neuro exam normal and gait at baseline.  Patient n/v intact with Full ROM and full motor strength with no signs of any serious injury. No signs of tendon injury on direct examination. Tetanus up-to-date.  Laceration repaired as described.  Wound care discussed in detail. Signs of infection discussed. Timing and return for suture removal discussed. Medications administered and prescribed/OTC home meds discussed.  All questions and concerns from patient or family addressed. Understanding of instructions verbalized.

## 2021-02-24 DIAGNOSIS — R33.9 RETENTION OF URINE, UNSPECIFIED: ICD-10-CM

## 2021-02-24 DIAGNOSIS — N95.2 POSTMENOPAUSAL ATROPHIC VAGINITIS: ICD-10-CM

## 2021-02-24 DIAGNOSIS — N81.11 CYSTOCELE, MIDLINE: ICD-10-CM

## 2021-03-03 ENCOUNTER — TRANSCRIPTION ENCOUNTER (OUTPATIENT)
Age: 81
End: 2021-03-03

## 2021-03-18 ENCOUNTER — APPOINTMENT (OUTPATIENT)
Dept: UROGYNECOLOGY | Facility: CLINIC | Age: 81
End: 2021-03-18

## 2021-03-24 ENCOUNTER — APPOINTMENT (OUTPATIENT)
Dept: UROGYNECOLOGY | Facility: CLINIC | Age: 81
End: 2021-03-24

## 2021-04-01 ENCOUNTER — RX RENEWAL (OUTPATIENT)
Age: 81
End: 2021-04-01

## 2021-04-13 ENCOUNTER — APPOINTMENT (OUTPATIENT)
Dept: UROLOGY | Facility: CLINIC | Age: 81
End: 2021-04-13

## 2021-04-29 ENCOUNTER — RX RENEWAL (OUTPATIENT)
Age: 81
End: 2021-04-29

## 2021-05-17 RX ORDER — NITROFURANTOIN (MONOHYDRATE/MACROCRYSTALS) 25; 75 MG/1; MG/1
100 CAPSULE ORAL
Qty: 30 | Refills: 2 | Status: ACTIVE | COMMUNITY
Start: 2021-01-05 | End: 1900-01-01

## 2021-07-05 ENCOUNTER — TRANSCRIPTION ENCOUNTER (OUTPATIENT)
Age: 81
End: 2021-07-05

## 2021-08-01 ENCOUNTER — EMERGENCY (EMERGENCY)
Facility: HOSPITAL | Age: 81
LOS: 0 days | Discharge: HOME | End: 2021-08-01
Attending: EMERGENCY MEDICINE | Admitting: EMERGENCY MEDICINE
Payer: MEDICARE

## 2021-08-01 ENCOUNTER — TRANSCRIPTION ENCOUNTER (OUTPATIENT)
Age: 81
End: 2021-08-01

## 2021-08-01 VITALS
HEART RATE: 89 BPM | OXYGEN SATURATION: 95 % | TEMPERATURE: 97 F | RESPIRATION RATE: 18 BRPM | SYSTOLIC BLOOD PRESSURE: 176 MMHG | WEIGHT: 169.98 LBS | DIASTOLIC BLOOD PRESSURE: 82 MMHG | HEIGHT: 68 IN

## 2021-08-01 DIAGNOSIS — Z87.39 PERSONAL HISTORY OF OTHER DISEASES OF THE MUSCULOSKELETAL SYSTEM AND CONNECTIVE TISSUE: ICD-10-CM

## 2021-08-01 DIAGNOSIS — F41.9 ANXIETY DISORDER, UNSPECIFIED: ICD-10-CM

## 2021-08-01 DIAGNOSIS — R60.0 LOCALIZED EDEMA: ICD-10-CM

## 2021-08-01 DIAGNOSIS — L03.116 CELLULITIS OF LEFT LOWER LIMB: ICD-10-CM

## 2021-08-01 DIAGNOSIS — I10 ESSENTIAL (PRIMARY) HYPERTENSION: ICD-10-CM

## 2021-08-01 DIAGNOSIS — M79.89 OTHER SPECIFIED SOFT TISSUE DISORDERS: ICD-10-CM

## 2021-08-01 LAB
ALBUMIN SERPL ELPH-MCNC: 4.7 G/DL — SIGNIFICANT CHANGE UP (ref 3.5–5.2)
ALP SERPL-CCNC: 93 U/L — SIGNIFICANT CHANGE UP (ref 30–115)
ALT FLD-CCNC: 10 U/L — SIGNIFICANT CHANGE UP (ref 0–41)
ANION GAP SERPL CALC-SCNC: 14 MMOL/L — SIGNIFICANT CHANGE UP (ref 7–14)
AST SERPL-CCNC: 20 U/L — SIGNIFICANT CHANGE UP (ref 0–41)
BASOPHILS # BLD AUTO: 0.08 K/UL — SIGNIFICANT CHANGE UP (ref 0–0.2)
BASOPHILS NFR BLD AUTO: 0.6 % — SIGNIFICANT CHANGE UP (ref 0–1)
BILIRUB SERPL-MCNC: 0.7 MG/DL — SIGNIFICANT CHANGE UP (ref 0.2–1.2)
BUN SERPL-MCNC: 23 MG/DL — HIGH (ref 10–20)
CALCIUM SERPL-MCNC: 10.2 MG/DL — HIGH (ref 8.5–10.1)
CHLORIDE SERPL-SCNC: 99 MMOL/L — SIGNIFICANT CHANGE UP (ref 98–110)
CO2 SERPL-SCNC: 27 MMOL/L — SIGNIFICANT CHANGE UP (ref 17–32)
CREAT SERPL-MCNC: 0.9 MG/DL — SIGNIFICANT CHANGE UP (ref 0.7–1.5)
EOSINOPHIL # BLD AUTO: 0.04 K/UL — SIGNIFICANT CHANGE UP (ref 0–0.7)
EOSINOPHIL NFR BLD AUTO: 0.3 % — SIGNIFICANT CHANGE UP (ref 0–8)
GLUCOSE SERPL-MCNC: 113 MG/DL — HIGH (ref 70–99)
HCT VFR BLD CALC: 52 % — HIGH (ref 37–47)
HGB BLD-MCNC: 17.7 G/DL — HIGH (ref 12–16)
IMM GRANULOCYTES NFR BLD AUTO: 0.7 % — HIGH (ref 0.1–0.3)
LYMPHOCYTES # BLD AUTO: 1.15 K/UL — LOW (ref 1.2–3.4)
LYMPHOCYTES # BLD AUTO: 8.2 % — LOW (ref 20.5–51.1)
MCHC RBC-ENTMCNC: 31.9 PG — HIGH (ref 27–31)
MCHC RBC-ENTMCNC: 34 G/DL — SIGNIFICANT CHANGE UP (ref 32–37)
MCV RBC AUTO: 93.7 FL — SIGNIFICANT CHANGE UP (ref 81–99)
MONOCYTES # BLD AUTO: 0.86 K/UL — HIGH (ref 0.1–0.6)
MONOCYTES NFR BLD AUTO: 6.2 % — SIGNIFICANT CHANGE UP (ref 1.7–9.3)
NEUTROPHILS # BLD AUTO: 11.75 K/UL — HIGH (ref 1.4–6.5)
NEUTROPHILS NFR BLD AUTO: 84 % — HIGH (ref 42.2–75.2)
NRBC # BLD: 0 /100 WBCS — SIGNIFICANT CHANGE UP (ref 0–0)
PLATELET # BLD AUTO: 293 K/UL — SIGNIFICANT CHANGE UP (ref 130–400)
POTASSIUM SERPL-MCNC: 3.4 MMOL/L — LOW (ref 3.5–5)
POTASSIUM SERPL-SCNC: 3.4 MMOL/L — LOW (ref 3.5–5)
PROT SERPL-MCNC: 7.3 G/DL — SIGNIFICANT CHANGE UP (ref 6–8)
RBC # BLD: 5.55 M/UL — HIGH (ref 4.2–5.4)
RBC # FLD: 13.2 % — SIGNIFICANT CHANGE UP (ref 11.5–14.5)
SODIUM SERPL-SCNC: 140 MMOL/L — SIGNIFICANT CHANGE UP (ref 135–146)
WBC # BLD: 13.98 K/UL — HIGH (ref 4.8–10.8)
WBC # FLD AUTO: 13.98 K/UL — HIGH (ref 4.8–10.8)

## 2021-08-01 PROCEDURE — 93971 EXTREMITY STUDY: CPT | Mod: 26,LT

## 2021-08-01 PROCEDURE — 99284 EMERGENCY DEPT VISIT MOD MDM: CPT

## 2021-08-01 RX ORDER — CEPHALEXIN 500 MG
1 CAPSULE ORAL
Qty: 40 | Refills: 0
Start: 2021-08-01 | End: 2021-08-10

## 2021-08-01 NOTE — ED PROVIDER NOTE - ATTENDING CONTRIBUTION TO CARE
80yoF with h/o HTN, anxiety, edema to b/l legs, presents with worsening edema to the LLE since May after she had a fall and lac repair in February, but now with dorsal foot discoloration. Denies fever, chills, CP, SOB, cough, trauma, and all other symptoms. On exam, afebrile, hemodynamically stable, saturating well, NAD, well appearing, sitting comfortably in bed, no WOB, head NCAT, EOMI grossly, anicteric, MMM, no JVD, RRR, nml S1/S2, no m/r/g, lungs CTAB, no w/r/r, abd soft, NT, ND, nml BS, no rebound or guarding, AAO, CN's 3-12 grossly intact, HICKS spontaneously, LLE 1+ pitting edema, erythema to L dorsal foot with no induration/fluctuance/crepitance/POOP, skin warm, well perfused. No e/o PAD or compartment syndrome. Character low suspicion for DVT and US ________. Pt appears to have had edema ever since lac to area. Is u/l and no e/o systemic fluid overload. Appearance of cellulitis, no e/o nec fasc. 80yoF with h/o HTN, anxiety, edema to b/l legs, presents with worsening edema to the LLE since May after she had a fall and lac repair in February, but now with dorsal foot discoloration. Denies fever, chills, CP, SOB, cough, trauma, and all other symptoms. On exam, afebrile, hemodynamically stable, saturating well, NAD, well appearing, sitting comfortably in bed, no WOB, head NCAT, EOMI grossly, anicteric, MMM, no JVD, RRR, nml S1/S2, no m/r/g, lungs CTAB, no w/r/r, abd soft, NT, ND, nml BS, no rebound or guarding, AAO, CN's 3-12 grossly intact, HICKS spontaneously, LLE 1+ pitting edema, erythema to L dorsal foot with no induration/fluctuance/crepitance/POOP, skin warm, well perfused. No e/o PAD or compartment syndrome. Character low suspicion for DVT and US negative per vascular tech. Pt appears to have had edema ever since lac to area. Is u/l and no e/o systemic fluid overload. Appearance of cellulitis, no e/o nec fasc. Patient is well appearing, NAD, afebrile, hemodynamically stable. Any available tests and studies were discussed with patient and daughter. Discharged with abx, instructions in further symptomatic care, return precautions, and need for PMD f/u.

## 2021-08-01 NOTE — ED PROVIDER NOTE - PATIENT PORTAL LINK FT
You can access the FollowMyHealth Patient Portal offered by [facility] by registering at the following website: [email or web address]. By joining [facility]’s FollowMyHealth portal, you will also be able to view your health information using other applications (apps) compatible with our system.

## 2021-08-01 NOTE — ED PROVIDER NOTE - OBJECTIVE STATEMENT
81 yo female, pmh of brittney, htn, presents to ed for left lower leg swelling, mild, redness, several weeks, no specific pain or radiation. denies fever, chills, cp, sob, numbness, tingling, trauma.

## 2021-08-01 NOTE — ED ADULT NURSE NOTE - NSIMPLEMENTINTERV_GEN_ALL_ED
Implemented All Fall with Harm Risk Interventions:  [place] to call system. Call bell, personal items and telephone within reach. Instruct patient to call for assistance. Room bathroom lighting operational. Non-slip footwear when patient is off stretcher. Physically safe environment: no spills, clutter or unnecessary equipment. Stretcher in lowest position, wheels locked, appropriate side rails in place. Provide visual cue, wrist band, yellow gown, etc. Monitor gait and stability. Monitor for mental status changes and reorient to person, place, and time. Review medications for side effects contributing to fall risk. Reinforce activity limits and safety measures with patient and family. Provide visual clues: red socks.

## 2021-08-01 NOTE — ED ADULT NURSE NOTE - CHIEF COMPLAINT QUOTE
Sent from [facility] for Sonogram of left lower extremity to R/O DVT. As per family's left lower leg started swelling 2 days ago, worsened today. Pt denies pain. (+) difficulty ambulating.

## 2021-08-01 NOTE — ED PROVIDER NOTE - PHYSICAL EXAMINATION
Physical Exam    Vital Signs: I have reviewed the initial vital signs.  Constitutional: well-nourished, appears stated age, no acute distress  Eyes: Conjunctiva pink, Sclera clear  Cardiovascular: S1 and S2, regular rate, regular rhythm, well-perfused extremities, radial pulses equal and 2+, pedal pulses 2+ and equal   Respiratory: unlabored respiratory effort, clear to auscultation bilaterally no wheezing, rales and rhonchi  Gastrointestinal: soft, non-tender abdomen, no pulsatile mass, normal bowl sounds  Musculoskeletal: supple neck, no lower extremity edema, no midline tenderness, left lower leg with mild swelling and redness to isolated anterior tibia   Integumentary: warm, dry, no rash  Neurologic: awake, alert, nvi

## 2021-08-01 NOTE — ED ADULT TRIAGE NOTE - CHIEF COMPLAINT QUOTE
Sent from [facility] for Sonogram of left lower extremity to R/O DVT. As per family's left lower leg started swelling 2 days ago, worsened today. Pt denies pain. Sent from [facility] for Sonogram of left lower extremity to R/O DVT. As per family's left lower leg started swelling 2 days ago, worsened today. Pt denies pain. (+) difficulty ambulating.

## 2021-08-01 NOTE — ED PROVIDER NOTE - CLINICAL SUMMARY MEDICAL DECISION MAKING FREE TEXT BOX
80yoF with h/o HTN, anxiety, edema to b/l legs, presents with worsening edema to the LLE since May after she had a fall and lac repair in February, but now with dorsal foot discoloration. Denies fever, chills, CP, SOB, cough, trauma, and all other symptoms. On exam, afebrile, hemodynamically stable, saturating well, NAD, well appearing, sitting comfortably in bed, no WOB, head NCAT, EOMI grossly, anicteric, MMM, no JVD, RRR, nml S1/S2, no m/r/g, lungs CTAB, no w/r/r, abd soft, NT, ND, nml BS, no rebound or guarding, AAO, CN's 3-12 grossly intact, HICKS spontaneously, LLE 1+ pitting edema, erythema to L dorsal foot with no induration/fluctuance/crepitance/POOP, skin warm, well perfused. No e/o PAD or compartment syndrome. Character low suspicion for DVT and US negative per vascular tech. Pt appears to have had edema ever since lac to area. Is u/l and no e/o systemic fluid overload. Appearance of cellulitis, no e/o nec fasc. Patient is well appearing, NAD, afebrile, hemodynamically stable. Any available tests and studies were discussed with patient and daughter. Discharged with abx, instructions in further symptomatic care, return precautions, and need for PMD f/u.

## 2021-08-09 ENCOUNTER — RX RENEWAL (OUTPATIENT)
Age: 81
End: 2021-08-09

## 2021-11-02 ENCOUNTER — TRANSCRIPTION ENCOUNTER (OUTPATIENT)
Age: 81
End: 2021-11-02

## 2021-12-14 ENCOUNTER — TRANSCRIPTION ENCOUNTER (OUTPATIENT)
Age: 81
End: 2021-12-14

## 2021-12-15 ENCOUNTER — OUTPATIENT (OUTPATIENT)
Dept: INPATIENT UNIT | Facility: HOSPITAL | Age: 81
LOS: 1 days | Discharge: HOME | End: 2021-12-15

## 2021-12-15 ENCOUNTER — APPOINTMENT (OUTPATIENT)
Age: 81
End: 2021-12-15

## 2021-12-15 VITALS
HEART RATE: 62 BPM | DIASTOLIC BLOOD PRESSURE: 84 MMHG | SYSTOLIC BLOOD PRESSURE: 144 MMHG | OXYGEN SATURATION: 98 % | TEMPERATURE: 99 F | RESPIRATION RATE: 18 BRPM

## 2021-12-15 VITALS
SYSTOLIC BLOOD PRESSURE: 153 MMHG | HEART RATE: 71 BPM | DIASTOLIC BLOOD PRESSURE: 84 MMHG | TEMPERATURE: 98 F | OXYGEN SATURATION: 95 % | RESPIRATION RATE: 18 BRPM

## 2021-12-15 DIAGNOSIS — U07.1 COVID-19: ICD-10-CM

## 2021-12-15 RX ORDER — SODIUM CHLORIDE 9 MG/ML
250 INJECTION INTRAMUSCULAR; INTRAVENOUS; SUBCUTANEOUS
Refills: 0 | Status: COMPLETED | OUTPATIENT
Start: 2021-12-15 | End: 2021-12-15

## 2021-12-15 RX ADMIN — SODIUM CHLORIDE 310 MILLILITER(S): 9 INJECTION INTRAMUSCULAR; INTRAVENOUS; SUBCUTANEOUS at 14:30

## 2021-12-15 NOTE — CHART NOTE - NSCHARTNOTEFT_GEN_A_CORE
Medicine Progress Note    Patient is a 81y old  Female who presents with a chief complaint of covid 19 infection and to receive monoclonal antibody infusion casirivimab/Imdevimab. pt tested positive 2 days ago and has been having cough, fevers, and generalized weakness. pt has not been vaccinated for covid.    PAST MEDICAL & SURGICAL HISTORY:  HTN (hypertension)  DM  HLD  Anxiety  Arthritis    Home Medications:  unknown    MEDICATIONS  (STANDING):  casirivimab/imdevimab in sodium chloride 0.9% (EUA) IVPB 100 milliLiter(s) IV Intermittent once  sodium chloride 0.9%. 250 milliLiter(s) (310 mL/Hr) IV Continuous <Continuous>    PHYSICAL EXAM:  Vital Signs Last 24 Hrs  T(C): --  T(F): --  HR: --  BP: --  BP(mean): --  RR: --  SpO2: --    CONSTITUTIONAL: NAD, well-developed, well-groomed  RESPIRATORY: Normal respiratory effort; lungs are clear to auscultation bilaterally  CARDIOVASCULAR: Regular rate and rhythm, normal S1 and S2, no murmur/rub/gallop; No lower extremity edema; Peripheral pulses are 2+ bilaterally  PSYCH: A+O to person, place, and time; affect appropriate  NEUROLOGY: CN 2-12 are intact and symmetric; no gross sensory deficits   SKIN: No rashes; no palpable lesions    Plan:  I have reviewed the Casirivimab/Imdevimab Emergency Use Authorization (EUA) and I have provided the patient or patient's caregiver with the following information:  1. FDA has authorized emergency use of Casirivimab/Imdevimab which is not an FDA approved biological agent.  2. The patient or patient’s caregiver has the option to accept or refuse administration of Casirivimab/Imdevimab.  3. The significant known and potential risks and benefits of Casirivimab/Imdevimab and the extent to which such risks and benefits are unknown.  4. Information on available alternative treatments and risks and benefits of those alternatives.  Informed consent was obtained. Answered all of patient's questions and concerns to their satisfaction. Patient verbalized understanding.  Monitor VS per protocol.  Insert peripheral IV.  Infuse NSS 25/mL IV continuously.  Administer casirivimab/imdevimab IV over 1 hour.  Observe patient for 1 hour post infusion.    Disposition:  Patient tolerated infusion well, denies complaints of chest pain, shortness of breath, dizziness or palpitations. Discharge instructions given and fact sheet included.  Instructed patient to contact the call center or their PMD if they develop side effects at home.  Instructed the patient to call 911 and go to the emergency room if they develop symptoms of a severe allergic reaction. Patient verbalized understanding.  RN educated patient on the proper use of the incentive spirometer and the patient displayed return demonstration.  VSS and RN removed IV successfully.  Patient was discharged home in [facility]. Medicine Progress Note    Patient is a 81y old  Female who presents with a chief complaint of covid 19 infection and to receive monoclonal antibody infusion casirivimab/Imdevimab. pt tested positive 5 days ago and has been having cough, congestion and generalized weakness. pt has been vaccinated against covid with Moderna in feb 2021.    PAST MEDICAL & SURGICAL HISTORY:  HTN (hypertension)  DM  HLD  Anxiety  Arthritis  early dementia    Home Medications:  norvac  diazepam  gabapentin    MEDICATIONS  (STANDING):  casirivimab/imdevimab in sodium chloride 0.9% (EUA) IVPB 100 milliLiter(s) IV Intermittent once  sodium chloride 0.9%. 250 milliLiter(s) (310 mL/Hr) IV Continuous <Continuous>    PHYSICAL EXAM:  Vital Signs Last 24 Hrs  T(C): --  T(F): --  HR: --  BP: --  BP(mean): --  RR: --  SpO2: --    CONSTITUTIONAL: NAD, well-developed, well-groomed  RESPIRATORY: Normal respiratory effort; lungs are clear to auscultation bilaterally  CARDIOVASCULAR: Regular rate and rhythm, normal S1 and S2, no murmur/rub/gallop; No lower extremity edema; Peripheral pulses are 2+ bilaterally  PSYCH: A+O to person, place, and time; affect appropriate  NEUROLOGY: CN 2-12 are intact and symmetric; no gross sensory deficits   SKIN: No rashes; no palpable lesions    Plan:  I have reviewed the Casirivimab/Imdevimab Emergency Use Authorization (EUA) and I have provided the patient or patient's caregiver with the following information:  1. FDA has authorized emergency use of Casirivimab/Imdevimab which is not an FDA approved biological agent.  2. The patient or patient’s caregiver has the option to accept or refuse administration of Casirivimab/Imdevimab.  3. The significant known and potential risks and benefits of Casirivimab/Imdevimab and the extent to which such risks and benefits are unknown.  4. Information on available alternative treatments and risks and benefits of those alternatives.  Informed consent was obtained. Answered all of patient's questions and concerns to their satisfaction. Patient verbalized understanding.  Monitor VS per protocol.  Insert peripheral IV.  Infuse NSS 25/mL IV continuously.  Administer casirivimab/imdevimab IV over 1 hour.  Observe patient for 1 hour post infusion.    Disposition:  Patient tolerated infusion well, denies complaints of chest pain, shortness of breath, dizziness or palpitations. Discharge instructions given and fact sheet included.  Instructed patient to contact the call center or their PMD if they develop side effects at home.  Instructed the patient to call 911 and go to the emergency room if they develop symptoms of a severe allergic reaction. Patient verbalized understanding.  RN educated patient on the proper use of the incentive spirometer and the patient displayed return demonstration.  VSS and RN removed IV successfully.  Patient was discharged home in [facility]. Medicine Progress Note    Patient is a 81y old  Female who presents with a chief complaint of covid 19 infection and to receive monoclonal antibody infusion casirivimab/Imdevimab. pt tested positive 5 days ago and has been having cough, congestion and generalized weakness. pt has been vaccinated against covid with Moderna in feb 2021.    PAST MEDICAL & SURGICAL HISTORY:  HTN (hypertension)  DM  HLD  Anxiety  Arthritis  early dementia    Home Medications:  norvac  diazepam  gabapentin    MEDICATIONS  (STANDING):  casirivimab/imdevimab in sodium chloride 0.9% (EUA) IVPB 100 milliLiter(s) IV Intermittent once  sodium chloride 0.9%. 250 milliLiter(s) (310 mL/Hr) IV Continuous <Continuous>    PHYSICAL EXAM:  Vital Signs Last 24 Hrs  T(C): 36.7 (15 Dec 2021 14:30), Max: 37.4 (15 Dec 2021 13:10)  T(F): 98.1 (15 Dec 2021 14:30), Max: 99.4 (15 Dec 2021 13:10)  HR: 71 (15 Dec 2021 14:30) (62 - 71)  BP: 153/84 (15 Dec 2021 14:30) (144/84 - 155/83)  BP(mean): --  RR: 18 (15 Dec 2021 14:30) (18 - 18)  SpO2: 95% (15 Dec 2021 14:30) (95% - 98%)    CONSTITUTIONAL: NAD, well-developed, well-groomed  RESPIRATORY: Normal respiratory effort; lungs are clear to auscultation bilaterally  CARDIOVASCULAR: Regular rate and rhythm, normal S1 and S2, no murmur/rub/gallop; No lower extremity edema; Peripheral pulses are 2+ bilaterally  PSYCH: A+O to person, place, and time; affect appropriate  NEUROLOGY: CN 2-12 are intact and symmetric; no gross sensory deficits   SKIN: No rashes; no palpable lesions    Plan:  I have reviewed the Casirivimab/Imdevimab Emergency Use Authorization (EUA) and I have provided the patient or patient's caregiver with the following information:  1. FDA has authorized emergency use of Casirivimab/Imdevimab which is not an FDA approved biological agent.  2. The patient or patient’s caregiver has the option to accept or refuse administration of Casirivimab/Imdevimab.  3. The significant known and potential risks and benefits of Casirivimab/Imdevimab and the extent to which such risks and benefits are unknown.  4. Information on available alternative treatments and risks and benefits of those alternatives.  Informed consent was obtained. Answered all of patient's questions and concerns to their satisfaction. Patient verbalized understanding.  Monitor VS per protocol.  Insert peripheral IV.  Infuse NSS 25/mL IV continuously.  Administer casirivimab/imdevimab IV over 1 hour.  Observe patient for 1 hour post infusion.    Disposition:  Patient tolerated infusion well, denies complaints of chest pain, shortness of breath, dizziness or palpitations. Discharge instructions given and fact sheet included.  Instructed patient to contact the call center or their PMD if they develop side effects at home.  Instructed the patient to call 911 and go to the emergency room if they develop symptoms of a severe allergic reaction. Patient verbalized understanding.  RN educated patient on the proper use of the incentive spirometer and the patient displayed return demonstration.  VSS and RN removed IV successfully.  Patient was discharged home in [facility].

## 2021-12-16 ENCOUNTER — TRANSCRIPTION ENCOUNTER (OUTPATIENT)
Age: 81
End: 2021-12-16

## 2021-12-17 ENCOUNTER — TRANSCRIPTION ENCOUNTER (OUTPATIENT)
Age: 81
End: 2021-12-17

## 2021-12-19 ENCOUNTER — INPATIENT (INPATIENT)
Facility: HOSPITAL | Age: 81
LOS: 3 days | Discharge: HOME HEALTH | End: 2021-12-23
Attending: STUDENT IN AN ORGANIZED HEALTH CARE EDUCATION/TRAINING PROGRAM | Admitting: STUDENT IN AN ORGANIZED HEALTH CARE EDUCATION/TRAINING PROGRAM
Payer: MEDICARE

## 2021-12-19 VITALS
OXYGEN SATURATION: 95 % | SYSTOLIC BLOOD PRESSURE: 155 MMHG | HEIGHT: 68 IN | DIASTOLIC BLOOD PRESSURE: 72 MMHG | WEIGHT: 160.06 LBS | RESPIRATION RATE: 20 BRPM | HEART RATE: 84 BPM | TEMPERATURE: 98 F

## 2021-12-19 LAB
ALBUMIN SERPL ELPH-MCNC: 4.3 G/DL — SIGNIFICANT CHANGE UP (ref 3.5–5.2)
ALP SERPL-CCNC: 88 U/L — SIGNIFICANT CHANGE UP (ref 30–115)
ALT FLD-CCNC: 7 U/L — SIGNIFICANT CHANGE UP (ref 0–41)
ANION GAP SERPL CALC-SCNC: 19 MMOL/L — HIGH (ref 7–14)
APPEARANCE UR: ABNORMAL
AST SERPL-CCNC: 18 U/L — SIGNIFICANT CHANGE UP (ref 0–41)
BACTERIA # UR AUTO: ABNORMAL
BASOPHILS # BLD AUTO: 0.04 K/UL — SIGNIFICANT CHANGE UP (ref 0–0.2)
BASOPHILS NFR BLD AUTO: 0.6 % — SIGNIFICANT CHANGE UP (ref 0–1)
BILIRUB SERPL-MCNC: 0.4 MG/DL — SIGNIFICANT CHANGE UP (ref 0.2–1.2)
BILIRUB UR-MCNC: NEGATIVE — SIGNIFICANT CHANGE UP
BUN SERPL-MCNC: 18 MG/DL — SIGNIFICANT CHANGE UP (ref 10–20)
CALCIUM SERPL-MCNC: 9.5 MG/DL — SIGNIFICANT CHANGE UP (ref 8.5–10.1)
CHLORIDE SERPL-SCNC: 101 MMOL/L — SIGNIFICANT CHANGE UP (ref 98–110)
CO2 SERPL-SCNC: 24 MMOL/L — SIGNIFICANT CHANGE UP (ref 17–32)
COLOR SPEC: YELLOW — SIGNIFICANT CHANGE UP
CREAT SERPL-MCNC: 1 MG/DL — SIGNIFICANT CHANGE UP (ref 0.7–1.5)
D DIMER BLD IA.RAPID-MCNC: 248 NG/ML DDU — HIGH (ref 0–230)
DIFF PNL FLD: NEGATIVE — SIGNIFICANT CHANGE UP
EOSINOPHIL # BLD AUTO: 0.04 K/UL — SIGNIFICANT CHANGE UP (ref 0–0.7)
EOSINOPHIL NFR BLD AUTO: 0.6 % — SIGNIFICANT CHANGE UP (ref 0–8)
EPI CELLS # UR: 1 /HPF — SIGNIFICANT CHANGE UP (ref 0–5)
GLUCOSE SERPL-MCNC: 99 MG/DL — SIGNIFICANT CHANGE UP (ref 70–99)
GLUCOSE UR QL: NEGATIVE — SIGNIFICANT CHANGE UP
HCT VFR BLD CALC: 50.6 % — HIGH (ref 37–47)
HGB BLD-MCNC: 17 G/DL — HIGH (ref 12–16)
HYALINE CASTS # UR AUTO: 0 /LPF — SIGNIFICANT CHANGE UP (ref 0–7)
IMM GRANULOCYTES NFR BLD AUTO: 0.7 % — HIGH (ref 0.1–0.3)
KETONES UR-MCNC: NEGATIVE — SIGNIFICANT CHANGE UP
LEUKOCYTE ESTERASE UR-ACNC: ABNORMAL
LYMPHOCYTES # BLD AUTO: 0.79 K/UL — LOW (ref 1.2–3.4)
LYMPHOCYTES # BLD AUTO: 11.8 % — LOW (ref 20.5–51.1)
MCHC RBC-ENTMCNC: 31.1 PG — HIGH (ref 27–31)
MCHC RBC-ENTMCNC: 33.6 G/DL — SIGNIFICANT CHANGE UP (ref 32–37)
MCV RBC AUTO: 92.5 FL — SIGNIFICANT CHANGE UP (ref 81–99)
MONOCYTES # BLD AUTO: 0.77 K/UL — HIGH (ref 0.1–0.6)
MONOCYTES NFR BLD AUTO: 11.5 % — HIGH (ref 1.7–9.3)
NEUTROPHILS # BLD AUTO: 5.01 K/UL — SIGNIFICANT CHANGE UP (ref 1.4–6.5)
NEUTROPHILS NFR BLD AUTO: 74.8 % — SIGNIFICANT CHANGE UP (ref 42.2–75.2)
NITRITE UR-MCNC: POSITIVE
NRBC # BLD: 0 /100 WBCS — SIGNIFICANT CHANGE UP (ref 0–0)
PH UR: 6.5 — SIGNIFICANT CHANGE UP (ref 5–8)
PLATELET # BLD AUTO: 238 K/UL — SIGNIFICANT CHANGE UP (ref 130–400)
POTASSIUM SERPL-MCNC: 3.6 MMOL/L — SIGNIFICANT CHANGE UP (ref 3.5–5)
POTASSIUM SERPL-SCNC: 3.6 MMOL/L — SIGNIFICANT CHANGE UP (ref 3.5–5)
PROT SERPL-MCNC: 6.7 G/DL — SIGNIFICANT CHANGE UP (ref 6–8)
PROT UR-MCNC: SIGNIFICANT CHANGE UP
RBC # BLD: 5.47 M/UL — HIGH (ref 4.2–5.4)
RBC # FLD: 13.1 % — SIGNIFICANT CHANGE UP (ref 11.5–14.5)
RBC CASTS # UR COMP ASSIST: 2 /HPF — SIGNIFICANT CHANGE UP (ref 0–4)
SARS-COV-2 RNA SPEC QL NAA+PROBE: DETECTED
SODIUM SERPL-SCNC: 144 MMOL/L — SIGNIFICANT CHANGE UP (ref 135–146)
SP GR SPEC: 1.01 — SIGNIFICANT CHANGE UP (ref 1.01–1.03)
UROBILINOGEN FLD QL: SIGNIFICANT CHANGE UP
WBC # BLD: 6.7 K/UL — SIGNIFICANT CHANGE UP (ref 4.8–10.8)
WBC # FLD AUTO: 6.7 K/UL — SIGNIFICANT CHANGE UP (ref 4.8–10.8)
WBC UR QL: 181 /HPF — HIGH (ref 0–5)

## 2021-12-19 PROCEDURE — 93010 ELECTROCARDIOGRAM REPORT: CPT

## 2021-12-19 PROCEDURE — 99285 EMERGENCY DEPT VISIT HI MDM: CPT | Mod: CS

## 2021-12-19 PROCEDURE — 99223 1ST HOSP IP/OBS HIGH 75: CPT

## 2021-12-19 PROCEDURE — 70450 CT HEAD/BRAIN W/O DYE: CPT | Mod: 26,MA

## 2021-12-19 PROCEDURE — 71045 X-RAY EXAM CHEST 1 VIEW: CPT | Mod: 26

## 2021-12-19 RX ORDER — QUETIAPINE FUMARATE 200 MG/1
25 TABLET, FILM COATED ORAL AT BEDTIME
Refills: 0 | Status: DISCONTINUED | OUTPATIENT
Start: 2021-12-19 | End: 2021-12-19

## 2021-12-19 RX ORDER — ENOXAPARIN SODIUM 100 MG/ML
40 INJECTION SUBCUTANEOUS AT BEDTIME
Refills: 0 | Status: DISCONTINUED | OUTPATIENT
Start: 2021-12-19 | End: 2021-12-20

## 2021-12-19 RX ORDER — FUROSEMIDE 40 MG
40 TABLET ORAL DAILY
Refills: 0 | Status: DISCONTINUED | OUTPATIENT
Start: 2021-12-19 | End: 2021-12-20

## 2021-12-19 RX ORDER — AMLODIPINE BESYLATE 2.5 MG/1
10 TABLET ORAL AT BEDTIME
Refills: 0 | Status: DISCONTINUED | OUTPATIENT
Start: 2021-12-19 | End: 2021-12-23

## 2021-12-19 RX ORDER — CHLORHEXIDINE GLUCONATE 213 G/1000ML
1 SOLUTION TOPICAL
Refills: 0 | Status: DISCONTINUED | OUTPATIENT
Start: 2021-12-19 | End: 2021-12-23

## 2021-12-19 RX ORDER — LORATADINE 10 MG/1
10 TABLET ORAL DAILY
Refills: 0 | Status: DISCONTINUED | OUTPATIENT
Start: 2021-12-19 | End: 2021-12-23

## 2021-12-19 RX ORDER — QUETIAPINE FUMARATE 200 MG/1
25 TABLET, FILM COATED ORAL AT BEDTIME
Refills: 0 | Status: DISCONTINUED | OUTPATIENT
Start: 2021-12-19 | End: 2021-12-23

## 2021-12-19 RX ORDER — CEFTRIAXONE 500 MG/1
1000 INJECTION, POWDER, FOR SOLUTION INTRAMUSCULAR; INTRAVENOUS ONCE
Refills: 0 | Status: COMPLETED | OUTPATIENT
Start: 2021-12-19 | End: 2021-12-19

## 2021-12-19 RX ORDER — DIAZEPAM 5 MG
5 TABLET ORAL AT BEDTIME
Refills: 0 | Status: DISCONTINUED | OUTPATIENT
Start: 2021-12-19 | End: 2021-12-23

## 2021-12-19 RX ORDER — GABAPENTIN 400 MG/1
100 CAPSULE ORAL THREE TIMES A DAY
Refills: 0 | Status: DISCONTINUED | OUTPATIENT
Start: 2021-12-19 | End: 2021-12-19

## 2021-12-19 RX ORDER — CEFTRIAXONE 500 MG/1
1000 INJECTION, POWDER, FOR SOLUTION INTRAMUSCULAR; INTRAVENOUS EVERY 24 HOURS
Refills: 0 | Status: COMPLETED | OUTPATIENT
Start: 2021-12-20 | End: 2021-12-22

## 2021-12-19 RX ORDER — ACETAMINOPHEN 500 MG
650 TABLET ORAL EVERY 6 HOURS
Refills: 0 | Status: DISCONTINUED | OUTPATIENT
Start: 2021-12-19 | End: 2021-12-23

## 2021-12-19 RX ORDER — GUAIFENESIN/DEXTROMETHORPHAN 600MG-30MG
10 TABLET, EXTENDED RELEASE 12 HR ORAL EVERY 6 HOURS
Refills: 0 | Status: DISCONTINUED | OUTPATIENT
Start: 2021-12-19 | End: 2021-12-23

## 2021-12-19 RX ORDER — DIAZEPAM 5 MG
10 TABLET ORAL AT BEDTIME
Refills: 0 | Status: DISCONTINUED | OUTPATIENT
Start: 2021-12-19 | End: 2021-12-19

## 2021-12-19 RX ADMIN — CEFTRIAXONE 100 MILLIGRAM(S): 500 INJECTION, POWDER, FOR SOLUTION INTRAMUSCULAR; INTRAVENOUS at 17:10

## 2021-12-19 RX ADMIN — AMLODIPINE BESYLATE 10 MILLIGRAM(S): 2.5 TABLET ORAL at 22:39

## 2021-12-19 RX ADMIN — ENOXAPARIN SODIUM 40 MILLIGRAM(S): 100 INJECTION SUBCUTANEOUS at 22:39

## 2021-12-19 NOTE — H&P ADULT - NSHPPHYSICALEXAM_GEN_ALL_CORE
PHYSICAL EXAM:  GENERAL: fidgety, anxious, wants to get out of bed   HEAD: Atraumatic, Normocephalic  EYES: EOMI, conjunctiva and sclera clear  NECK: Supple, No JVD, Normal thyroid  HEART: Regular rate and rhythm;   RESPIRATORY: CTA B/L, No W/R/R  ABDOMEN: Soft, Nontender, Nondistended;   NEUROLOGY: A&Ox3, no gross neurological deficits   EXTREMITIES: No clubbing, cyanosis, or edema, multiple bruises PHYSICAL EXAM:  GENERAL: fidgety, anxious, wants to get out of bed   HEAD: Atraumatic, Normocephalic  EYES: EOMI, conjunctiva and sclera clear  NECK: Supple, No JVD, Normal thyroid  HEART: Regular rate and rhythm;   RESPIRATORY: CTA B/L, No W/R/R  ABDOMEN: Soft, Nontender, Nondistended;   NEUROLOGY: A&Ox1, no gross neurological deficits   EXTREMITIES: No clubbing, cyanosis, or edema, multiple bruises

## 2021-12-19 NOTE — H&P ADULT - ATTENDING COMMENTS
81 yr old female with hx of suspected Lewy body dementia (AAO x1-2 at baseline), tremors, hallucinations, paranoia and anxiety with worsening dementia over the past few months) HTN, hysterectomy, recurrent UTIs admitted for worsening mental status.    # Metabolic encephalopathy likely secondary to progressions of dementia vs UTI  - CT head (12.19.21): No evidence of acute transcortical infarct, acute intracranial hemorrhage, or mass effect. Near complete opacification with multifocal areas of calcifications in the right sphenoid sinus. Right sphenoid sinus walls are not thickened or sclerotic. Correlate clinically to exclude fungal sinusitis.  - UA positive   - treat prophylactically with Rocephin  - start Seroquel 25mg qhs   - hold diazepam for now   - f/u UCX   - check b12, folate, TSH  - neurology consult     # Asymptomatic COVID   - s/p monoclonal antibodies on 12/15  - 98% on RA  - CXR: no acute cardiopul disease noted  - isolation precaution     # HTN  - c/w amlodipine    # DVT ppx  - start Lovenox     # DASH diet    # Ambulate as tolerated  - fall precaution     # Full code 81 yr old female with hx of suspected Lewy body dementia (AAO x1-2 at baseline), tremors, hallucinations, paranoia and anxiety with worsening dementia over the past few months) HTN, hysterectomy, recurrent UTIs admitted for worsening mental status.    GENERAL: appear weak   HEAD:  Atraumatic, Normocephalic  EYES: EOMI, Sclera White   NECK: Supple, No JVD  CHEST/LUNG: clear b/l breath sounds; No wheezing, rhonchi, or crackles  HEART: Regular rate and rhythm; s1, s2, No murmurs, rubs, or gallops  ABDOMEN: Soft, Nontender, Nondistended; Bowel sounds present, No rebound or guarding noted   EXTREMITIES:  No lower extremity edema or calf tenderness to palpation.  No clubbing or cyanosis  PSYCH: AAOx1, pulling on sheets   NEUROLOGY: unable to assess  SKIN: No rashes or lesions    # Metabolic encephalopathy likely secondary to progressions of dementia vs UTI  - CT head (12.19.21): No evidence of acute transcortical infarct, acute intracranial hemorrhage, or mass effect. Near complete opacification with multifocal areas of calcifications in the right sphenoid sinus. Right sphenoid sinus walls are not thickened or sclerotic. Correlate clinically to exclude fungal sinusitis.  - UA positive   - treat prophylactically with Rocephin  - start Seroquel 25mg qhs   - hold diazepam for now   - f/u UCX   - check b12, folate, TSH  - neurology consult     # Asymptomatic COVID   - s/p monoclonal antibodies on 12/15  - 98% on RA  - CXR: no acute cardiopul disease noted  - isolation precaution     # HTN  - c/w amlodipine    # DVT ppx  - start Lovenox     # DASH diet    # Ambulate as tolerated  - fall precaution     # Full code

## 2021-12-19 NOTE — ED PROVIDER NOTE - ATTENDING CONTRIBUTION TO CARE
81y F with PMHx of suspected Lewy body dementia (AAO x1-2 at baseline, tremors, hallucinations, paranoia and anxiety with worsening dementia over the past few months) HTN, hysterectomy, recurrent UTIs brought in by son for worsening mental status. She lives at home with her son and daughter who tested positive for COVID 19 on 12/10 and 12/8 respectively, pt did not have any symptoms except inability to sleep at night even with diazepam and night terrors. She first tested positive for COVID on 12/14. Pt received monoclonal antibodies on 12/15. Pt complained of abdominal and back pain to her daughter.  PE:  agree with above.  A/P:  COVID/AMS, r/o sepsis.  Labs, Meds, Imaging and reassess.

## 2021-12-19 NOTE — ED ADULT NURSE NOTE - OBJECTIVE STATEMENT
Pt presented with c/o AMS. Pt states she was positive for covid weeks ago and had the MAB infusion a couple days ago. Pt states since the MAB infusion she has felt "foggy" and having difficulty sleeping. Denies n/v/fever/cough/sob/cp.

## 2021-12-19 NOTE — ED PROVIDER NOTE - CARE PLAN
1 Principal Discharge DX:	COVID  Secondary Diagnosis:	UTI (urinary tract infection)  Secondary Diagnosis:	Sinusitis

## 2021-12-19 NOTE — ED PROVIDER NOTE - NS ED ROS FT
Constitutional: (-) fever  Eyes/ENT: (-) blurry vision, (-) epistaxis  Cardiovascular: (-) chest pain, (-) syncope  Respiratory: (-) cough, (-) shortness of breath  Gastrointestinal: (-) vomiting, (-) diarrhea  Gu: (-) dysuria, (-) hematuria  Musculoskeletal: (-) neck pain, (-) back pain, (-) joint pain  Integumentary: (-) rash, (-) edema  Neurological: (+) ams, (-) headache  Allergic/Immunologic: (-) pruritus

## 2021-12-19 NOTE — ED PROVIDER NOTE - OBJECTIVE STATEMENT
81y F pmh arthritis, anxiety, HTN, dementia presents for eval of ams. Pt is COVID (+) x5 days since having multiple episodes of confusion, worse at night, no relieving factors. As per son pt yells at night and becomes combative. Pt receiving MAB 12/15. Now pt presents endorses generalized fatigue and difficulty sleeping.

## 2021-12-19 NOTE — H&P ADULT - NSHPSOCIALHISTORY_GEN_ALL_CORE
lives at home with family lives at home with family    Substance Use (street drugs): ( x ) never used  (  ) other:  Tobacco Usage:  ( x  ) never smoked   (   ) former smoker   (   ) current smoker  (     ) pack year  Alcohol Usage: None

## 2021-12-19 NOTE — H&P ADULT - ASSESSMENT
81y F with PMHx of suspected Lewy body dementia (AAO x1-2 at baseline, tremors, hallucinations, paranoia and anxiety with worsening dementia over the past few months) HTN, hysterectomy, recurrent UTIs brought in by son for worsening mental status.    #COVID-19 infection   Vaccinated with Moderna in Feb 2021  First tested positive 12/14  Ddimer 248  F/u CRP, procal, ferritin   F/u US LE   F/u ID consult   Monitor pulse ox     #HTN  c/w amlodipine 10mg daily     #UTI   c/w ceftriaxone 1g daily   f/u UCx     #Dementia   currently AAO x1   c/w diazepam 10mg daily  can give up to 30mg a day as needed  pt was recently started on gabapentin 100mg TID, per the family patient seemed more anxious after taking it, holding for now.   Frequent reorientation, minimize blood draws, maintain normal sleep cycle.     Family would like us to update PCP Dr. Tsai, please f/u in the morning.     #Misc  - DVT Prophylaxis: lovenox  - GI Prophylaxis: not indicated  - Diet: Soft  - Activity: AAT 81y F with PMHx of suspected Lewy body dementia (AAO x1-2 at baseline, tremors, hallucinations, paranoia and anxiety with worsening dementia over the past few months) HTN, hysterectomy, recurrent UTIs brought in by son for worsening mental status.    #COVID-19 infection   Vaccinated with Moderna in Feb 2021  First tested positive 12/14  Ddimer 248  F/u CRP, procal, ferritin   F/u US LE   F/u ID consult   Monitor pulse ox     #HTN  c/w amlodipine 10mg daily     #UTI   c/w ceftriaxone 1g daily   f/u UCx     #Dementia   currently AAO x1   c/w diazepam 10mg daily  can give up to 30mg a day as needed  pt was recently started on gabapentin 100mg TID, per the family patient seemed more anxious after taking it, holding for now.   Frequent reorientation, minimize blood draws, maintain normal sleep cycle.   Neurology consult     Family would like us to update PCP Dr. Tsai, please f/u in the morning.     #Misc  - DVT Prophylaxis: lovenox  - GI Prophylaxis: not indicated  - Diet: Soft  - Activity: AAT

## 2021-12-19 NOTE — ED ADULT NURSE NOTE - CHIEF COMPLAINT QUOTE
Pt is covid + x 5 days, c/o ams x few days, pt aox4 in triage, please call son Trey for info [phone], pt has dementia

## 2021-12-19 NOTE — H&P ADULT - NSHPLABSRESULTS_GEN_ALL_CORE
17.0   6.70  )-----------( 238      ( 19 Dec 2021 13:41 )             50.6       12    144  |  101  |  18  ----------------------------<  99  3.6   |  24  |  1.0    Ca    9.5      19 Dec 2021 13:41    TPro  6.7  /  Alb  4.3  /  TBili  0.4  /  DBili  x   /  AST  18  /  ALT  7   /  AlkPhos  88                Urinalysis Basic - ( 19 Dec 2021 14:14 )    Color: Yellow / Appearance: Slightly Turbid / S.011 / pH: x  Gluc: x / Ketone: Negative  / Bili: Negative / Urobili: <2 mg/dL   Blood: x / Protein: Trace / Nitrite: Positive   Leuk Esterase: Large / RBC: 2 /HPF /  /HPF   Sq Epi: x / Non Sq Epi: 1 /HPF / Bacteria: Many            Lactate Trend            CAPILLARY BLOOD GLUCOSE

## 2021-12-19 NOTE — ED ADULT TRIAGE NOTE - CHIEF COMPLAINT QUOTE
Pt is covid + x 5 days,c/o ams x few days, pt aox4 in triage Pt is covid + x 5 days, c/o ams x few days, pt aox4 in triage, please call son Trey for info [phone], pt has dementia

## 2021-12-19 NOTE — ED PROVIDER NOTE - CLINICAL SUMMARY MEDICAL DECISION MAKING FREE TEXT BOX
In ED VS within normal limits, CXR shows no acute cardiopulmonary disease, CTH shows Near complete opacification with multifocal areas of calcifications in R sphenoid sinus. UA +, received ceftriaxone 1g.  Stable for floor.  ADMIT

## 2021-12-19 NOTE — ED ADULT TRIAGE NOTE - BP NONINVASIVE SYSTOLIC (MM HG)
EDINSON CALLING FROM [facility] -[phone].  THEY DID GET REFERRALS FOR ECHO AND STRESS, BUT   NEED REFERRAL FOR COROTID DUPLEX.  PT HAS APPT TOMORROW 1-22-19 11 AM.   PLEASE FAX THAT TO HER FAX # [phone]   155

## 2021-12-19 NOTE — H&P ADULT - HISTORY OF PRESENT ILLNESS
81y F with PMHx of suspected Lewy body dementia (AAO x1-2 at baseline, tremors, hallucinations, paranoia and anxiety with worsening dementia over the past few months) HTN, hysterectomy, recurrent UTIs brought in by son for worsening mental status. She lives at home with her son and daughter who tested positive on 12/10 and 12/8 respectively, pt did not have any symptoms except inability to sleep at night even with diazepam and night terrors. She first tested positive for COVID on 12/14. Pt received monoclonal antibodies on 12/15. Pt complained of abdominal and back pain to her daughter.     In ED VS within normal limits, CXR shows no acute cardiopulmonary disease, CTH shows Near complete opacification with multifocal areas of calcifications in R sphenoid sinus. UA +, received ceftriaxone 1g.

## 2021-12-19 NOTE — PATIENT PROFILE ADULT - FALL HARM RISK - HARM RISK INTERVENTIONS
Assistance with ambulation/Assistance OOB with selected safe patient handling equipment/Communicate Risk of Fall with Harm to all staff/Discuss with provider need for PT consult/Monitor for mental status changes/Monitor gait and stability/Move patient closer to nurses' station/Provide patient with walking aids - walker, cane, crutches/Reinforce activity limits and safety measures with patient and family/Reorient to person, place and time as needed/Review medications for side effects contributing to fall risk/Sit up slowly, dangle for a short time, stand at bedside before walking/Tailored Fall Risk Interventions/Toileting schedule using arm’s reach rule for commode and bathroom/Use of alarms - bed, chair and/or voice tab/Visual Cue: Yellow wristband and red socks/Enhanced supervision for Behavioral Health patients ONLY/Bed in lowest position, wheels locked, appropriate side rails in place/Call bell, personal items and telephone in reach/Instruct patient to call for assistance before getting out of bed or chair/Non-slip footwear when patient is out of bed/[place] to call system/Physically safe environment - no spills, clutter or unnecessary equipment/Purposeful Proactive Rounding/Room/bathroom lighting operational, light cord in reach

## 2021-12-20 LAB
ALBUMIN SERPL ELPH-MCNC: 4.4 G/DL — SIGNIFICANT CHANGE UP (ref 3.5–5.2)
ALP SERPL-CCNC: 94 U/L — SIGNIFICANT CHANGE UP (ref 30–115)
ALT FLD-CCNC: 9 U/L — SIGNIFICANT CHANGE UP (ref 0–41)
ANION GAP SERPL CALC-SCNC: 24 MMOL/L — HIGH (ref 7–14)
AST SERPL-CCNC: 30 U/L — SIGNIFICANT CHANGE UP (ref 0–41)
BILIRUB SERPL-MCNC: 0.7 MG/DL — SIGNIFICANT CHANGE UP (ref 0.2–1.2)
BUN SERPL-MCNC: 13 MG/DL — SIGNIFICANT CHANGE UP (ref 10–20)
CALCIUM SERPL-MCNC: 9.7 MG/DL — SIGNIFICANT CHANGE UP (ref 8.5–10.1)
CHLORIDE SERPL-SCNC: 99 MMOL/L — SIGNIFICANT CHANGE UP (ref 98–110)
CO2 SERPL-SCNC: 21 MMOL/L — SIGNIFICANT CHANGE UP (ref 17–32)
CREAT SERPL-MCNC: 0.8 MG/DL — SIGNIFICANT CHANGE UP (ref 0.7–1.5)
CRP SERPL-MCNC: 13 MG/L — HIGH
FERRITIN SERPL-MCNC: 249 NG/ML — HIGH (ref 15–150)
GLUCOSE SERPL-MCNC: 128 MG/DL — HIGH (ref 70–99)
HCT VFR BLD CALC: 53.4 % — HIGH (ref 37–47)
HGB BLD-MCNC: 18.3 G/DL — HIGH (ref 12–16)
MCHC RBC-ENTMCNC: 31.2 PG — HIGH (ref 27–31)
MCHC RBC-ENTMCNC: 34.3 G/DL — SIGNIFICANT CHANGE UP (ref 32–37)
MCV RBC AUTO: 91 FL — SIGNIFICANT CHANGE UP (ref 81–99)
NRBC # BLD: 0 /100 WBCS — SIGNIFICANT CHANGE UP (ref 0–0)
PLATELET # BLD AUTO: 303 K/UL — SIGNIFICANT CHANGE UP (ref 130–400)
POTASSIUM SERPL-MCNC: 3.1 MMOL/L — LOW (ref 3.5–5)
POTASSIUM SERPL-SCNC: 3.1 MMOL/L — LOW (ref 3.5–5)
PROCALCITONIN SERPL-MCNC: 0.06 NG/ML — SIGNIFICANT CHANGE UP (ref 0.02–0.1)
PROT SERPL-MCNC: 7.1 G/DL — SIGNIFICANT CHANGE UP (ref 6–8)
RBC # BLD: 5.87 M/UL — HIGH (ref 4.2–5.4)
RBC # FLD: 12.8 % — SIGNIFICANT CHANGE UP (ref 11.5–14.5)
SODIUM SERPL-SCNC: 144 MMOL/L — SIGNIFICANT CHANGE UP (ref 135–146)
TSH SERPL-MCNC: 1.15 UIU/ML — SIGNIFICANT CHANGE UP (ref 0.27–4.2)
WBC # BLD: 10.39 K/UL — SIGNIFICANT CHANGE UP (ref 4.8–10.8)
WBC # FLD AUTO: 10.39 K/UL — SIGNIFICANT CHANGE UP (ref 4.8–10.8)

## 2021-12-20 PROCEDURE — 99232 SBSQ HOSP IP/OBS MODERATE 35: CPT | Mod: GC

## 2021-12-20 PROCEDURE — 99221 1ST HOSP IP/OBS SF/LOW 40: CPT

## 2021-12-20 RX ORDER — POTASSIUM CHLORIDE 20 MEQ
20 PACKET (EA) ORAL
Refills: 0 | Status: COMPLETED | OUTPATIENT
Start: 2021-12-20 | End: 2021-12-20

## 2021-12-20 RX ADMIN — LORATADINE 10 MILLIGRAM(S): 10 TABLET ORAL at 11:05

## 2021-12-20 RX ADMIN — Medication 40 MILLIGRAM(S): at 06:12

## 2021-12-20 RX ADMIN — CEFTRIAXONE 100 MILLIGRAM(S): 500 INJECTION, POWDER, FOR SOLUTION INTRAMUSCULAR; INTRAVENOUS at 07:55

## 2021-12-20 RX ADMIN — QUETIAPINE FUMARATE 25 MILLIGRAM(S): 200 TABLET, FILM COATED ORAL at 21:10

## 2021-12-20 RX ADMIN — AMLODIPINE BESYLATE 10 MILLIGRAM(S): 2.5 TABLET ORAL at 21:09

## 2021-12-20 RX ADMIN — Medication 20 MILLIEQUIVALENT(S): at 16:44

## 2021-12-20 RX ADMIN — Medication 20 MILLIEQUIVALENT(S): at 18:02

## 2021-12-20 RX ADMIN — CHLORHEXIDINE GLUCONATE 1 APPLICATION(S): 213 SOLUTION TOPICAL at 06:13

## 2021-12-20 NOTE — PHYSICAL THERAPY INITIAL EVALUATION ADULT - GENERAL OBSERVATIONS, REHAB EVAL
Chart reviewed, pt spoke with RN, states pt was combative earlier this am, but not at this time. Pt encountered supine, NAD, pleasant at first, however became agitated and aggravated within few minutes of PT IE. Unsafe to attempt amb at this time due to poor judgement, poor command following & decr standing balance. While seated at eob pt continuously attempts getting off bed and wouldn't let PT assist/ hold her at any times. pts R LE slides as she attempts sit-stands. PT assisted pt back to supine for safety reasons and placed rock n roll back, +bed alarm, pt calmed after few minutes, covering RN made aware.

## 2021-12-20 NOTE — PHYSICAL THERAPY INITIAL EVALUATION ADULT - PLANNED THERAPY INTERVENTIONS, PT EVAL
[facility]   [address] [place] [address]  [place] [address]  Phone: [phone]  Fax: [phone]              Galileo Torrez  1941    Encounter Date: 11/9/2017    Vicki Jordan M.D.          PROGRESS NOTE:  No notes on file      No Recipients              
bed mobility training/gait training/transfer training

## 2021-12-20 NOTE — CONSULT NOTE ADULT - ATTENDING COMMENTS
I have personally seen and examined this patient.  I have fully participated in the care of this patient.  I have reviewed all pertinent clinical information, including history, physical exam, plan and note. Patient is currently admitted for UTI and Covid. Likely worsening mental status superimposed to underlying DLB. Even of her dementia is getting worse is part of disease nature. Recommend to continue current medical management and have patient follow up as an outpatient.  I have reviewed all pertinent clinical information and reviewed all relevant imaging and diagnostic studies personally.  Recommendations as above.  Agree with above assessment except as noted.

## 2021-12-20 NOTE — PHYSICAL THERAPY INITIAL EVALUATION ADULT - PERTINENT HX OF CURRENT PROBLEM, REHAB EVAL
81y F with PMHx of suspected Lewy body dementia (AAO x1-2 at baseline, tremors, hallucinations, paranoia and anxiety with worsening dementia over the past few months) HTN, hysterectomy, recurrent UTIs brought in by son for worsening mental status. Admitted with COVID-19

## 2021-12-20 NOTE — PROGRESS NOTE ADULT - ATTENDING COMMENTS
81y F with PMHx of suspected Lewy body dementia (AAO x1-2 at baseline, tremors, hallucinations, paranoia and anxiety with worsening dementia over the past few months) HTN, hysterectomy, recurrent UTIs brought in by son for worsening mental status.     on exam: she is awake, confused, frail, lungs clear, heart: regular rhythm, abdomen soft, ext scattered bruises   []COVID-19 infection   []UTI   []Elevated Hgb and HTC   []Hypokalemia   []scattered skin discoloration bruises    []Hx of HTN   Vaccinated with Moderna in Feb 2021,   reported that Pt received monoclonal antibodies on 12/15.   call hematology for r/o polycythemia   check INR/aptt will hold lovenox today   platlet 303m d dimer 248   consider calling ID   Monitor respiratory status and pulse ox   repleat K and monitor   c/w amlodipine 10mg daily   c/w ceftriaxone 1g daily   f/u UCx      Neurology consult appreciated      please update PCP Dr. Tsai

## 2021-12-20 NOTE — PROGRESS NOTE ADULT - ASSESSMENT
81y F with PMHx of suspected Lewy body dementia (AAO x1-2 at baseline, tremors, hallucinations, paranoia and anxiety with worsening dementia over the past few months) HTN, hysterectomy, recurrent UTIs brought in by son for worsening mental status.    #COVID-19 infection   -Vaccinated with Moderna in Feb 2021  -First tested positive 12/14  -Ddimer 248  -F/u CRP, procal, ferritin   -F/u US LE   -Monitor pulse ox   - Hold home lasix     #HTN  c/w amlodipine 10mg daily     #UTI   -c/w ceftriaxone 1g daily   -f/u UCx     #Dementia   -currently AAO x1   -c/w diazepam 10mg daily  -can give up to 30mg a day as needed  -pt was recently started on gabapentin 100mg TID, per the family patient seemed more anxious after taking it, holding for now.   -Frequent reorientation, minimize blood draws, maintain normal sleep cycle.   -Neurology consult f/u attendings note    #Misc  - DVT Prophylaxis: lovenox  - GI Prophylaxis: not indicated  - Diet: Soft  - Activity: AAT

## 2021-12-21 LAB
ALBUMIN SERPL ELPH-MCNC: 4.3 G/DL — SIGNIFICANT CHANGE UP (ref 3.5–5.2)
ALP SERPL-CCNC: 91 U/L — SIGNIFICANT CHANGE UP (ref 30–115)
ALT FLD-CCNC: 9 U/L — SIGNIFICANT CHANGE UP (ref 0–41)
ANION GAP SERPL CALC-SCNC: 21 MMOL/L — HIGH (ref 7–14)
APTT BLD: 29.7 SEC — SIGNIFICANT CHANGE UP (ref 27–39.2)
AST SERPL-CCNC: 27 U/L — SIGNIFICANT CHANGE UP (ref 0–41)
BASOPHILS # BLD AUTO: 0.03 K/UL — SIGNIFICANT CHANGE UP (ref 0–0.2)
BASOPHILS NFR BLD AUTO: 0.3 % — SIGNIFICANT CHANGE UP (ref 0–1)
BILIRUB SERPL-MCNC: 0.8 MG/DL — SIGNIFICANT CHANGE UP (ref 0.2–1.2)
BUN SERPL-MCNC: 21 MG/DL — HIGH (ref 10–20)
CALCIUM SERPL-MCNC: 9.8 MG/DL — SIGNIFICANT CHANGE UP (ref 8.5–10.1)
CHLORIDE SERPL-SCNC: 104 MMOL/L — SIGNIFICANT CHANGE UP (ref 98–110)
CO2 SERPL-SCNC: 24 MMOL/L — SIGNIFICANT CHANGE UP (ref 17–32)
CREAT SERPL-MCNC: 0.9 MG/DL — SIGNIFICANT CHANGE UP (ref 0.7–1.5)
EOSINOPHIL # BLD AUTO: 0.03 K/UL — SIGNIFICANT CHANGE UP (ref 0–0.7)
EOSINOPHIL NFR BLD AUTO: 0.3 % — SIGNIFICANT CHANGE UP (ref 0–8)
FOLATE SERPL-MCNC: 14.6 NG/ML — SIGNIFICANT CHANGE UP
GLUCOSE SERPL-MCNC: 107 MG/DL — HIGH (ref 70–99)
HCT VFR BLD CALC: 53.8 % — HIGH (ref 37–47)
HGB BLD-MCNC: 18.1 G/DL — HIGH (ref 12–16)
IMM GRANULOCYTES NFR BLD AUTO: 0.5 % — HIGH (ref 0.1–0.3)
INR BLD: 1.05 RATIO — SIGNIFICANT CHANGE UP (ref 0.65–1.3)
LYMPHOCYTES # BLD AUTO: 0.99 K/UL — LOW (ref 1.2–3.4)
LYMPHOCYTES # BLD AUTO: 8.9 % — LOW (ref 20.5–51.1)
MAGNESIUM SERPL-MCNC: 2 MG/DL — SIGNIFICANT CHANGE UP (ref 1.8–2.4)
MCHC RBC-ENTMCNC: 31.2 PG — HIGH (ref 27–31)
MCHC RBC-ENTMCNC: 33.6 G/DL — SIGNIFICANT CHANGE UP (ref 32–37)
MCV RBC AUTO: 92.6 FL — SIGNIFICANT CHANGE UP (ref 81–99)
MONOCYTES # BLD AUTO: 1.09 K/UL — HIGH (ref 0.1–0.6)
MONOCYTES NFR BLD AUTO: 9.8 % — HIGH (ref 1.7–9.3)
NEUTROPHILS # BLD AUTO: 8.97 K/UL — HIGH (ref 1.4–6.5)
NEUTROPHILS NFR BLD AUTO: 80.2 % — HIGH (ref 42.2–75.2)
NRBC # BLD: 0 /100 WBCS — SIGNIFICANT CHANGE UP (ref 0–0)
PLATELET # BLD AUTO: 291 K/UL — SIGNIFICANT CHANGE UP (ref 130–400)
POTASSIUM SERPL-MCNC: 3.2 MMOL/L — LOW (ref 3.5–5)
POTASSIUM SERPL-SCNC: 3.2 MMOL/L — LOW (ref 3.5–5)
PROT SERPL-MCNC: 6.8 G/DL — SIGNIFICANT CHANGE UP (ref 6–8)
PROTHROM AB SERPL-ACNC: 12.1 SEC — SIGNIFICANT CHANGE UP (ref 9.95–12.87)
RBC # BLD: 5.81 M/UL — HIGH (ref 4.2–5.4)
RBC # FLD: 13.1 % — SIGNIFICANT CHANGE UP (ref 11.5–14.5)
SODIUM SERPL-SCNC: 149 MMOL/L — HIGH (ref 135–146)
VIT B12 SERPL-MCNC: 318 PG/ML — SIGNIFICANT CHANGE UP (ref 232–1245)
WBC # BLD: 11.17 K/UL — HIGH (ref 4.8–10.8)
WBC # FLD AUTO: 11.17 K/UL — HIGH (ref 4.8–10.8)

## 2021-12-21 PROCEDURE — 99232 SBSQ HOSP IP/OBS MODERATE 35: CPT | Mod: GC

## 2021-12-21 RX ORDER — SODIUM CHLORIDE 9 MG/ML
1000 INJECTION INTRAMUSCULAR; INTRAVENOUS; SUBCUTANEOUS
Refills: 0 | Status: DISCONTINUED | OUTPATIENT
Start: 2021-12-21 | End: 2021-12-23

## 2021-12-21 RX ORDER — POTASSIUM CHLORIDE 20 MEQ
20 PACKET (EA) ORAL
Refills: 0 | Status: COMPLETED | OUTPATIENT
Start: 2021-12-21 | End: 2021-12-21

## 2021-12-21 RX ORDER — ENOXAPARIN SODIUM 100 MG/ML
40 INJECTION SUBCUTANEOUS AT BEDTIME
Refills: 0 | Status: DISCONTINUED | OUTPATIENT
Start: 2021-12-21 | End: 2021-12-23

## 2021-12-21 RX ORDER — SODIUM CHLORIDE 9 MG/ML
500 INJECTION, SOLUTION INTRAVENOUS ONCE
Refills: 0 | Status: COMPLETED | OUTPATIENT
Start: 2021-12-21 | End: 2021-12-21

## 2021-12-21 RX ADMIN — CHLORHEXIDINE GLUCONATE 1 APPLICATION(S): 213 SOLUTION TOPICAL at 05:23

## 2021-12-21 RX ADMIN — LORATADINE 10 MILLIGRAM(S): 10 TABLET ORAL at 11:12

## 2021-12-21 RX ADMIN — ENOXAPARIN SODIUM 40 MILLIGRAM(S): 100 INJECTION SUBCUTANEOUS at 21:42

## 2021-12-21 RX ADMIN — AMLODIPINE BESYLATE 10 MILLIGRAM(S): 2.5 TABLET ORAL at 21:41

## 2021-12-21 RX ADMIN — SODIUM CHLORIDE 70 MILLILITER(S): 9 INJECTION INTRAMUSCULAR; INTRAVENOUS; SUBCUTANEOUS at 11:57

## 2021-12-21 RX ADMIN — SODIUM CHLORIDE 1000 MILLILITER(S): 9 INJECTION, SOLUTION INTRAVENOUS at 11:12

## 2021-12-21 RX ADMIN — QUETIAPINE FUMARATE 25 MILLIGRAM(S): 200 TABLET, FILM COATED ORAL at 21:41

## 2021-12-21 RX ADMIN — Medication 20 MILLIEQUIVALENT(S): at 13:29

## 2021-12-21 RX ADMIN — CEFTRIAXONE 100 MILLIGRAM(S): 500 INJECTION, POWDER, FOR SOLUTION INTRAMUSCULAR; INTRAVENOUS at 06:21

## 2021-12-21 RX ADMIN — Medication 20 MILLIEQUIVALENT(S): at 14:13

## 2021-12-21 NOTE — PROGRESS NOTE ADULT - ASSESSMENT
81y F with PMHx of suspected Lewy body dementia (AAO x1-2 at baseline, tremors, hallucinations, paranoia and anxiety with worsening dementia over the past few months) HTN, hysterectomy, recurrent UTIs brought in by son for worsening mental status.    #COVID-19 infection   -Vaccinated with Moderna in Feb 2021  -First tested positive 12/14  -Ddimer 248  - CRP 13  - procal f/u  - ferritin 249   - F/u US LE   - Monitor pulse ox   - Hold home lasix     # High Hg and Multiple bruises  - Hg has been consistently high since 2018  - Hg this Am 18.3  - Multiple bruises , per pts son "Trey" it has been like this for the past 2-3 years  - F/u PT/INR  - restart lovenox ppx dose  -started on IV fluids today    #Hypokalemia  -persistently hypokalemic  -K this am 3.2, was repleted , will check @ 4 pm    #HTN  c/w amlodipine 10mg daily     #UTI   -c/w ceftriaxone 1g daily   -f/u UCx   - prelim shows gram negative rods    # suspected lewy body Dementia   -currently AAO x1   -c/w diazepam 10mg daily  -can give up to 30mg a day as needed  -pt was recently started on gabapentin 100mg TID, per the family patient seemed more anxious after taking it, holding for now.   -Frequent reorientation, minimize blood draws, maintain normal sleep cycle.   -Neurology consult f/u attendings note  -F/u eeg      #Misc  - DVT Prophylaxis: lovenox  - GI Prophylaxis: not indicated  - Diet: Soft  - Activity: AAT

## 2021-12-21 NOTE — PROGRESS NOTE ADULT - ATTENDING COMMENTS
81y F with PMHx of suspected Lewy body dementia (AAO x1-2 at baseline, tremors, hallucinations, paranoia and anxiety with worsening dementia over the past few months) HTN, hysterectomy, recurrent UTIs brought in by son for worsening mental status.     she stated that she feels better, I spoke with her daughter over the phone at bedside as she was talking to her mom, as per the daughter her mother has been having easy bruising and scattered bruises    for long time with her very fragile skin. As per the daughter they are not aware of the elevated Hgb - I explained to her that if persistent she needs hematology outpatient work up.    on exam: she is awake, confused, frail, lungs clear, heart: regular rhythm, abdomen soft, ext scattered bruises   []COVID-19 infection   []UTI   []Elevated Hgb and HTC   []Hypokalemia  []Hypernatremia and elevated BUN likely dehydration    []scattered skin discoloration bruises    []Hx of HTN   Vaccinated with Moderna in Feb 2021,   reported that Pt received monoclonal antibodies on 12/15.   resumed lovenox  today for normal INR/aptt    platelet 303m d dimer 248   ID consult appreciated   Give IVF today for dehydration picture on the blood work     Monitor respiratory status and pulse ox   replace K and monitor   c/w amlodipine 10mg daily   c/w ceftriaxone 1g daily   f/u UCx      Neurology consult appreciated    DVT ppx/ GI Ppx     Attending hand off:   follow up urine culture, bolus and IVF given today , could be discharged if labs and K improved, consider Hem consult outpatient for r/o Polycythemia if Hgb remained high after today; s fluid  she lives with her son

## 2021-12-22 LAB
-  AMIKACIN: SIGNIFICANT CHANGE UP
-  AMOXICILLIN/CLAVULANIC ACID: SIGNIFICANT CHANGE UP
-  AMPICILLIN/SULBACTAM: SIGNIFICANT CHANGE UP
-  AMPICILLIN: SIGNIFICANT CHANGE UP
-  AZTREONAM: SIGNIFICANT CHANGE UP
-  CEFAZOLIN: SIGNIFICANT CHANGE UP
-  CEFEPIME: SIGNIFICANT CHANGE UP
-  CEFOXITIN: SIGNIFICANT CHANGE UP
-  CEFTRIAXONE: SIGNIFICANT CHANGE UP
-  CIPROFLOXACIN: SIGNIFICANT CHANGE UP
-  ERTAPENEM: SIGNIFICANT CHANGE UP
-  GENTAMICIN: SIGNIFICANT CHANGE UP
-  IMIPENEM: SIGNIFICANT CHANGE UP
-  LEVOFLOXACIN: SIGNIFICANT CHANGE UP
-  MEROPENEM: SIGNIFICANT CHANGE UP
-  NITROFURANTOIN: SIGNIFICANT CHANGE UP
-  PIPERACILLIN/TAZOBACTAM: SIGNIFICANT CHANGE UP
-  TIGECYCLINE: SIGNIFICANT CHANGE UP
-  TOBRAMYCIN: SIGNIFICANT CHANGE UP
-  TRIMETHOPRIM/SULFAMETHOXAZOLE: SIGNIFICANT CHANGE UP
ALBUMIN SERPL ELPH-MCNC: 3.9 G/DL — SIGNIFICANT CHANGE UP (ref 3.5–5.2)
ALP SERPL-CCNC: 88 U/L — SIGNIFICANT CHANGE UP (ref 30–115)
ALT FLD-CCNC: 8 U/L — SIGNIFICANT CHANGE UP (ref 0–41)
ANION GAP SERPL CALC-SCNC: 19 MMOL/L — HIGH (ref 7–14)
ANION GAP SERPL CALC-SCNC: 22 MMOL/L — HIGH (ref 7–14)
AST SERPL-CCNC: 23 U/L — SIGNIFICANT CHANGE UP (ref 0–41)
BASOPHILS # BLD AUTO: 0.03 K/UL — SIGNIFICANT CHANGE UP (ref 0–0.2)
BASOPHILS NFR BLD AUTO: 0.3 % — SIGNIFICANT CHANGE UP (ref 0–1)
BILIRUB SERPL-MCNC: 0.9 MG/DL — SIGNIFICANT CHANGE UP (ref 0.2–1.2)
BUN SERPL-MCNC: 20 MG/DL — SIGNIFICANT CHANGE UP (ref 10–20)
BUN SERPL-MCNC: 21 MG/DL — HIGH (ref 10–20)
CALCIUM SERPL-MCNC: 9.1 MG/DL — SIGNIFICANT CHANGE UP (ref 8.5–10.1)
CALCIUM SERPL-MCNC: 9.3 MG/DL — SIGNIFICANT CHANGE UP (ref 8.5–10.1)
CHLORIDE SERPL-SCNC: 100 MMOL/L — SIGNIFICANT CHANGE UP (ref 98–110)
CHLORIDE SERPL-SCNC: 98 MMOL/L — SIGNIFICANT CHANGE UP (ref 98–110)
CO2 SERPL-SCNC: 17 MMOL/L — SIGNIFICANT CHANGE UP (ref 17–32)
CO2 SERPL-SCNC: 21 MMOL/L — SIGNIFICANT CHANGE UP (ref 17–32)
CREAT SERPL-MCNC: 0.7 MG/DL — SIGNIFICANT CHANGE UP (ref 0.7–1.5)
CREAT SERPL-MCNC: 0.9 MG/DL — SIGNIFICANT CHANGE UP (ref 0.7–1.5)
CULTURE RESULTS: SIGNIFICANT CHANGE UP
EOSINOPHIL # BLD AUTO: 0.03 K/UL — SIGNIFICANT CHANGE UP (ref 0–0.7)
EOSINOPHIL NFR BLD AUTO: 0.3 % — SIGNIFICANT CHANGE UP (ref 0–8)
GLUCOSE SERPL-MCNC: 111 MG/DL — HIGH (ref 70–99)
GLUCOSE SERPL-MCNC: 118 MG/DL — HIGH (ref 70–99)
HCT VFR BLD CALC: 50.4 % — HIGH (ref 37–47)
HGB BLD-MCNC: 17.3 G/DL — HIGH (ref 12–16)
IMM GRANULOCYTES NFR BLD AUTO: 0.7 % — HIGH (ref 0.1–0.3)
LYMPHOCYTES # BLD AUTO: 0.85 K/UL — LOW (ref 1.2–3.4)
LYMPHOCYTES # BLD AUTO: 7.2 % — LOW (ref 20.5–51.1)
MAGNESIUM SERPL-MCNC: 2 MG/DL — SIGNIFICANT CHANGE UP (ref 1.8–2.4)
MCHC RBC-ENTMCNC: 31.3 PG — HIGH (ref 27–31)
MCHC RBC-ENTMCNC: 34.3 G/DL — SIGNIFICANT CHANGE UP (ref 32–37)
MCV RBC AUTO: 91.3 FL — SIGNIFICANT CHANGE UP (ref 81–99)
METHOD TYPE: SIGNIFICANT CHANGE UP
MONOCYTES # BLD AUTO: 1 K/UL — HIGH (ref 0.1–0.6)
MONOCYTES NFR BLD AUTO: 8.4 % — SIGNIFICANT CHANGE UP (ref 1.7–9.3)
NEUTROPHILS # BLD AUTO: 9.89 K/UL — HIGH (ref 1.4–6.5)
NEUTROPHILS NFR BLD AUTO: 83.1 % — HIGH (ref 42.2–75.2)
NRBC # BLD: 0 /100 WBCS — SIGNIFICANT CHANGE UP (ref 0–0)
ORGANISM # SPEC MICROSCOPIC CNT: SIGNIFICANT CHANGE UP
ORGANISM # SPEC MICROSCOPIC CNT: SIGNIFICANT CHANGE UP
PLATELET # BLD AUTO: 301 K/UL — SIGNIFICANT CHANGE UP (ref 130–400)
POTASSIUM SERPL-MCNC: 3.3 MMOL/L — LOW (ref 3.5–5)
POTASSIUM SERPL-MCNC: 4.7 MMOL/L — SIGNIFICANT CHANGE UP (ref 3.5–5)
POTASSIUM SERPL-SCNC: 3.3 MMOL/L — LOW (ref 3.5–5)
POTASSIUM SERPL-SCNC: 4.7 MMOL/L — SIGNIFICANT CHANGE UP (ref 3.5–5)
PROT SERPL-MCNC: 6.3 G/DL — SIGNIFICANT CHANGE UP (ref 6–8)
RBC # BLD: 5.52 M/UL — HIGH (ref 4.2–5.4)
RBC # FLD: 12.9 % — SIGNIFICANT CHANGE UP (ref 11.5–14.5)
SODIUM SERPL-SCNC: 134 MMOL/L — LOW (ref 135–146)
SODIUM SERPL-SCNC: 143 MMOL/L — SIGNIFICANT CHANGE UP (ref 135–146)
SPECIMEN SOURCE: SIGNIFICANT CHANGE UP
WBC # BLD: 11.88 K/UL — HIGH (ref 4.8–10.8)
WBC # FLD AUTO: 11.88 K/UL — HIGH (ref 4.8–10.8)

## 2021-12-22 PROCEDURE — 95816 EEG AWAKE AND DROWSY: CPT | Mod: 26

## 2021-12-22 PROCEDURE — 99233 SBSQ HOSP IP/OBS HIGH 50: CPT

## 2021-12-22 PROCEDURE — 93970 EXTREMITY STUDY: CPT | Mod: 26

## 2021-12-22 RX ORDER — CEFTRIAXONE 500 MG/1
1000 INJECTION, POWDER, FOR SOLUTION INTRAMUSCULAR; INTRAVENOUS EVERY 24 HOURS
Refills: 0 | Status: DISCONTINUED | OUTPATIENT
Start: 2021-12-22 | End: 2021-12-23

## 2021-12-22 RX ORDER — POTASSIUM CHLORIDE 20 MEQ
20 PACKET (EA) ORAL
Refills: 0 | Status: COMPLETED | OUTPATIENT
Start: 2021-12-22 | End: 2021-12-22

## 2021-12-22 RX ADMIN — CHLORHEXIDINE GLUCONATE 1 APPLICATION(S): 213 SOLUTION TOPICAL at 05:26

## 2021-12-22 RX ADMIN — AMLODIPINE BESYLATE 10 MILLIGRAM(S): 2.5 TABLET ORAL at 21:31

## 2021-12-22 RX ADMIN — LORATADINE 10 MILLIGRAM(S): 10 TABLET ORAL at 11:57

## 2021-12-22 RX ADMIN — Medication 20 MILLIEQUIVALENT(S): at 13:18

## 2021-12-22 RX ADMIN — CEFTRIAXONE 100 MILLIGRAM(S): 500 INJECTION, POWDER, FOR SOLUTION INTRAMUSCULAR; INTRAVENOUS at 11:57

## 2021-12-22 RX ADMIN — QUETIAPINE FUMARATE 25 MILLIGRAM(S): 200 TABLET, FILM COATED ORAL at 21:31

## 2021-12-22 RX ADMIN — ENOXAPARIN SODIUM 40 MILLIGRAM(S): 100 INJECTION SUBCUTANEOUS at 21:32

## 2021-12-22 RX ADMIN — Medication 20 MILLIEQUIVALENT(S): at 11:57

## 2021-12-22 RX ADMIN — CEFTRIAXONE 100 MILLIGRAM(S): 500 INJECTION, POWDER, FOR SOLUTION INTRAMUSCULAR; INTRAVENOUS at 06:01

## 2021-12-22 NOTE — CONSULT NOTE ADULT - SUBJECTIVE AND OBJECTIVE BOX
ZAIRE YOAN  81y, Female  Allergy: No Known Allergies      CHIEF COMPLAINT: worsening confusion (21 Dec 2021 11:34)      LOS  2d    HPI:  81y F with PMHx of suspected Lewy body dementia (AAO x1-2 at baseline, tremors, hallucinations, paranoia and anxiety with worsening dementia over the past few months) HTN, hysterectomy, recurrent UTIs brought in by son for worsening mental status. She lives at home with her son and daughter who tested positive on 12/10 and 12/8 respectively, pt did not have any symptoms except inability to sleep at night even with diazepam and night terrors. She first tested positive for COVID on 12/14. Pt received monoclonal antibodies on 12/15. Pt complained of abdominal and back pain to her daughter.     In ED VS within normal limits, CXR shows no acute cardiopulmonary disease, CTH shows Near complete opacification with multifocal areas of calcifications in R sphenoid sinus. UA +, received ceftriaxone 1g.    (19 Dec 2021 20:33)      INFECTIOUS DISEASE HISTORY:  History as above.   Tested positive for COVID.   She is s/p monoclnal ab 12/15  Prseented with worsening mental status     PAST MEDICAL & SURGICAL HISTORY:  HTN (hypertension)    Anxiety    Arthritis    No significant past surgical history        FAMILY HISTORY      SOCIAL HISTORY  Social History:  lives at home with family    Substance Use (street drugs): ( x ) never used  (  ) other:  Tobacco Usage:  ( x  ) never smoked   (   ) former smoker   (   ) current smoker  (     ) pack year  Alcohol Usage: None (19 Dec 2021 20:33)        ROS  General: Denies rigors, nightsweats  HEENT: Denies headache, rhinorrhea, sore throat, eye pain  CV: Denies CP, palpitations  PULM: Denies wheezing, hemoptysis  GI: Denies hematemesis, hematochezia, melena  : Denies discharge, hematuria  MSK: Denies arthralgias, myalgias  SKIN: Denies rash, lesions  NEURO: Denies paresthesias, weakness  PSYCH: Denies depression, anxiety    VITALS:  T(F): 97.6, Max: 98.2 (12-20-21 @ 21:03)  HR: 86  BP: 145/74  RR: 18Vital Signs Last 24 Hrs  T(C): 36.4 (21 Dec 2021 13:38), Max: 36.8 (20 Dec 2021 21:03)  T(F): 97.6 (21 Dec 2021 13:38), Max: 98.2 (20 Dec 2021 21:03)  HR: 86 (21 Dec 2021 13:38) (86 - 92)  BP: 145/74 (21 Dec 2021 13:38) (133/84 - 145/74)  BP(mean): --  RR: 18 (20 Dec 2021 21:03) (18 - 18)  SpO2: 96% (21 Dec 2021 13:38) (94% - 96%)    PHYSICAL EXAM:  Gen: NAD, resting in bed  HEENT: Normocephalic, atraumatic  Neck: supple, no lymphadenopathy  CV: Regular rate & regular rhythm  Lungs: decreased BS at bases, no fremitus  Abdomen: Soft, BS present  Ext: Warm, well perfused  Neuro: non focal, awake  Skin: no rash, no erythema  Lines: no phlebitis    TESTS & MEASUREMENTS:                        18.1   11.17 )-----------( 291      ( 21 Dec 2021 04:30 )             53.8     12-21    149<H>  |  104  |  21<H>  ----------------------------<  107<H>  3.2<L>   |  24  |  0.9    Ca    9.8      21 Dec 2021 04:30  Mg     2.0     12-21    TPro  6.8  /  Alb  4.3  /  TBili  0.8  /  DBili  x   /  AST  27  /  ALT  9   /  AlkPhos  91  12-21    eGFR if Non African American: 60 mL/min/1.73M2 (12-21-21 @ 04:30)  eGFR if African American: 70 mL/min/1.73M2 (12-21-21 @ 04:30)    LIVER FUNCTIONS - ( 21 Dec 2021 04:30 )  Alb: 4.3 g/dL / Pro: 6.8 g/dL / ALK PHOS: 91 U/L / ALT: 9 U/L / AST: 27 U/L / GGT: x               Culture - Urine (collected 12-19-21 @ 14:14)  Source: Clean Catch Clean Catch (Midstream)  Preliminary Report (12-20-21 @ 22:26):    >100,000 CFU/ml Gram Negative Rods            INFECTIOUS DISEASES TESTING  Procalcitonin, Serum: 0.06 ng/mL (12-19-21 @ 13:41)  COVID-19 PCR: Detected (12-19-21 @ 12:41)      RADIOLOGY & ADDITIONAL TESTS:  I have personally reviewed the last Chest xray  CXR  Xray Chest 1 View- PORTABLE-Urgent:   ACC: [id number] EXAM:  XR CHEST PORTABLE URGENT 1V                          PROCEDURE DATE:  12/19/2021          INTERPRETATION:  Clinical History / Reason for exam: Shortness of breath,   fever.    Comparison : Chest radiograph November 10, 2014.    Technique/Positioning: Frontal portable, low lung volumes.    Findings:    Support devices: None.    Cardiac/mediastinum/hilum: Aorta is ectatic    Lung parenchyma/Pleura: Dependent atelectasis    Skeleton/soft tissues: Hiatal hernia. Degenerative changes of the   shoulders.    Impression:    Dependent atelectasis with low lung volumes.        --- End of Report ---            LISBET LR MD; Attending Interventional Radiologist  This document has been electronically signed. Dec 20 2021  7:43AM (12-19-21 @ 14:55)      CT      CARDIOLOGY TESTING  12 Lead ECG:   Ventricular Rate 88 BPM    Atrial Rate 88 BPM    P-R Interval 142 ms    QRS Duration 86 ms    Q-T Interval 340 ms    QTC Calculation(Bazett) 411 ms    P Axis 62 degrees    R Axis 61 degrees    T Axis -6 degrees    Diagnosis Line Sinus rhythm with Premature atrial complexes with Aberrant conduction  Nonspecific ST abnormality  Abnormal ECG    Confirmed by Ivet Desai MDfer ([id number]) on 12/20/2021 5:17:28 PM (12-19-21 @ 14:19)      MEDICATIONS  amLODIPine   Tablet 10 Oral at bedtime  cefTRIAXone   IVPB 1000 IV Intermittent every 24 hours  chlorhexidine 4% Liquid 1 Topical <User Schedule>  enoxaparin Injectable 40 SubCutaneous at bedtime  loratadine 10 Oral daily  QUEtiapine 25 Oral at bedtime  sodium chloride 0.9%. 1000 IV Continuous <Continuous>      Weight  Weight (kg): 72.6 (12-19-21 @ 12:08)    ANTIBIOTICS:  cefTRIAXone   IVPB 1000 milliGRAM(s) IV Intermittent every 24 hours      ALLERGIES:  No Known Allergies      
CC:HPI: HPI:  81y F with PMHx of suspected Lewy body dementia (AAO x1-2 at baseline, tremors, hallucinations, paranoia and anxiety with worsening dementia over the past few months) HTN, hysterectomy, recurrent UTIs brought in by son for worsening mental status. She lives at home with her son and daughter who tested positive on 12/10 and 12/8 respectively, pt did not have any symptoms except inability to sleep at night even with diazepam and night terrors. She first tested positive for COVID on 12/14. Pt received monoclonal antibodies on 12/15. Pt complained of abdominal and back pain to her daughter.     In ED VS within normal limits, CXR shows no acute cardiopulmonary disease, CTH shows Near complete opacification with multifocal areas of calcifications in R sphenoid sinus. UA +, received ceftriaxone 1g.    (19 Dec 2021 20:33)        Home Medications:  Amlodipine 10 mg oral tablet: 1 tab(s) orally once a day (19 Dec 2021 20:45)  Claritin 10 mg oral tablet: 1 tab(s) orally once a day (19 Dec 2021 20:46)  Diazepam 10 mg oral tablet: 1 tab(s) orally once a day (at bedtime) (19 Dec 2021 20:42)  Gabapentin 100 mg oral tablet: 1 tab(s) orally 3 times a day (19 Dec 2021 20:43)  Lasix 40 mg oral tablet: 1 tab(s) orally once a day (19 Dec 2021 20:43)        Social History  Unable to obtain info      Neuro Exam:  Orientation: oriented to self and month. Poor attention , trying to get out of bed frequently during interview. Following 1 step command occasionally  Cranial Nerves: grossly intact  Motor: Moving all 4 exts to antigravity              Ue tremors noted             Cogwheeling b/l UE  Sensory exam: responds to pain all 4 exts   Coordination:. not following commands   Gait: unable            Allergies    No Known Allergies    Intolerances      MEDICATIONS  (STANDING):  amLODIPine   Tablet 10 milliGRAM(s) Oral at bedtime  cefTRIAXone   IVPB 1000 milliGRAM(s) IV Intermittent every 24 hours  chlorhexidine 4% Liquid 1 Application(s) Topical <User Schedule>  enoxaparin Injectable 40 milliGRAM(s) SubCutaneous at bedtime  furosemide    Tablet 40 milliGRAM(s) Oral daily  loratadine 10 milliGRAM(s) Oral daily  QUEtiapine 25 milliGRAM(s) Oral at bedtime    MEDICATIONS  (PRN):  acetaminophen     Tablet .. 650 milliGRAM(s) Oral every 6 hours PRN Temp greater or equal to 38C (100.4F), Mild Pain (1 - 3)  diazepam    Tablet 5 milliGRAM(s) Oral at bedtime PRN anxiety  guaifenesin/dextromethorphan Oral Liquid 10 milliLiter(s) Oral every 6 hours PRN Cough      LABS:                        17.0   6.70  )-----------( 238      ( 19 Dec 2021 13:41 )             50.6     12-19    144  |  101  |  18  ----------------------------<  99  3.6   |  24  |  1.0    Ca    9.5      19 Dec 2021 13:41    TPro  6.7  /  Alb  4.3  /  TBili  0.4  /  DBili  x   /  AST  18  /  ALT  7   /  AlkPhos  88  12-19      COVID-19 PCR: Detected  Urine Microscopic-Add On (NC) (12.19.21 @ 14:14)   Bacteria: Many   Epithelial Cells: 1 /HPF   Red Blood Cell - Urine: 2 /HPF   White Blood Cell - Urine: 181 /HPF   Hyaline Casts: 0 /LPF     Urinalysis (12.19.21 @ 14:14)   pH Urine: 6.5   Glucose Qualitative, Urine: Negative   Blood, Urine: Negative   Color: Yellow   Urine Appearance: Slightly Turbid   Bilirubin: Negative   Ketone - Urine: Negative   Specific Gravity: 1.011   Protein, Urine: Trace   Urobilinogen: <2 mg/dL   Nitrite: Positive   Leukocyte Esterase Concentration: Large         Neuro Imaging:  < from: CT Head No Cont (12.19.21 @ 13:59) >  FINDINGS:  There is no CT evidence of acute transcortical infarct. Age-related   involutionalchanges and chronic microvascular ischemic changes.    There is no hydrocephalus, mass effect, or acute intracranial hemorrhage.   No extra-axial collection. Basal cisterns are patent.    Near complete opacification with multifocal areas of calcification in the   right sphenoid sinus. Right sphenoid sinus walls are not thickened or   sclerotic. The visualized paranasal sinuses and mastoid air cells are   otherwise clear.    The calvarium is intact.    IMPRESSION:  No evidence of acute transcortical infarct, acute intracranial   hemorrhage, or mass effect.  Near complete opacification with multifocal areas of calcifications in   the right sphenoid sinus. Right sphenoid sinus walls are not thickened or   sclerotic. Correlate clinically to exclude fungal sinusitis.        --- End of Report ---          LIZETTE STATON DO; Resident Radiologist  This document has been electronically signed.  LEONELA JOHNSON MD; Attending Radiologist  This document has been electronically signed. Dec 19 2021  2:45PM    < end of copied text >    EEG:     Echo:   Carotid Doppler: N/A  Telemetry:             
  YOAN TAI  81y, Female  Allergy: No Known Allergies      All historical available data reviewed.    HPI:  81y F with PMHx of suspected Lewy body dementia (AAO x1-2 at baseline, tremors, hallucinations, paranoia and anxiety with worsening dementia over the past few months) HTN, hysterectomy, recurrent UTIs brought in by son for worsening mental status. She lives at home with her son and daughter who tested positive on 12/10 and 12/8 respectively, pt did not have any symptoms except inability to sleep at night even with diazepam and night terrors. She first tested positive for COVID on 12/14. Pt received monoclonal antibodies on 12/15. Pt complained of abdominal and back pain to her daughter.     In ED VS within normal limits, CXR shows no acute cardiopulmonary disease, CTH shows Near complete opacification with multifocal areas of calcifications in R sphenoid sinus. UA +, received ceftriaxone 1g.    (19 Dec 2021 20:33)    ID called for possible UTI and COVID-19     FAMILY HISTORY:    PAST MEDICAL & SURGICAL HISTORY:  HTN (hypertension)    Anxiety    Arthritis    No significant past surgical history          VITALS:  T(F): 97.7, Max: 98.2 (12-21-21 @ 21:16)  HR: 90  BP: 144/70  RR: 18Vital Signs Last 24 Hrs  T(C): 36.5 (22 Dec 2021 04:55), Max: 36.8 (21 Dec 2021 21:16)  T(F): 97.7 (22 Dec 2021 04:55), Max: 98.2 (21 Dec 2021 21:16)  HR: 90 (22 Dec 2021 04:55) (86 - 90)  BP: 144/70 (22 Dec 2021 04:55) (144/70 - 156/75)  BP(mean): --  RR: 18 (22 Dec 2021 04:55) (18 - 18)  SpO2: 95% (22 Dec 2021 04:55) (91% - 96%)    TESTS & MEASUREMENTS:                        18.1   11.17 )-----------( 291      ( 21 Dec 2021 04:30 )             53.8     12-21    134<L>  |  98  |  21<H>  ----------------------------<  111<H>  4.7   |  17  |  0.9    Ca    9.1      21 Dec 2021 16:00  Mg     2.0     12-21    TPro  6.8  /  Alb  4.3  /  TBili  0.8  /  DBili  x   /  AST  27  /  ALT  9   /  AlkPhos  91  12-21    LIVER FUNCTIONS - ( 21 Dec 2021 04:30 )  Alb: 4.3 g/dL / Pro: 6.8 g/dL / ALK PHOS: 91 U/L / ALT: 9 U/L / AST: 27 U/L / GGT: x             Culture - Urine (collected 12-19-21 @ 14:14)  Source: Clean Catch Clean Catch (Midstream)  Preliminary Report (12-21-21 @ 17:45):    >100,000 CFU/ml Escherichia coli            RADIOLOGY & ADDITIONAL TESTS:  Personal review of radiological diagnostics performed  Echo and EKG results noted when applicable.     MEDICATIONS:  acetaminophen     Tablet .. 650 milliGRAM(s) Oral every 6 hours PRN  amLODIPine   Tablet 10 milliGRAM(s) Oral at bedtime  chlorhexidine 4% Liquid 1 Application(s) Topical <User Schedule>  diazepam    Tablet 5 milliGRAM(s) Oral at bedtime PRN  enoxaparin Injectable 40 milliGRAM(s) SubCutaneous at bedtime  guaifenesin/dextromethorphan Oral Liquid 10 milliLiter(s) Oral every 6 hours PRN  loratadine 10 milliGRAM(s) Oral daily  QUEtiapine 25 milliGRAM(s) Oral at bedtime  sodium chloride 0.9%. 1000 milliLiter(s) IV Continuous <Continuous>      ANTIBIOTICS:

## 2021-12-22 NOTE — PROGRESS NOTE ADULT - ASSESSMENT
81y F with PMHx of suspected Lewy body dementia (AAO x1-2 at baseline, tremors, hallucinations, paranoia and anxiety with worsening dementia over the past few months) HTN, hysterectomy, recurrent UTIs brought in by son for worsening mental status.    #COVID-19 infection   -Vaccinated with Moderna in Feb 2021  -First tested positive 12/14  -Ddimer 248  - CRP 13  - procal 0.06  - ferritin 249   - Monitor pulse ox   - Hold home lasix     # High Hg and Multiple bruises  - Hg has been consistently high since 2018  - Hg this Am 18.3  - Multiple bruises , per pts son "Trey" it has been like this for the past 2-3 years  - restart lovenox ppx dose  -started on IV fluids today  - will f/u as outpt with hematology    #Hypokalemia  -persistently hypokalemic  -K this am 3.3, was repleted , will check @ 4 pm    #HTN  c/w amlodipine 10mg daily     #UTI   -completed ceftriaxone 1g daily x 3 days  - f/u UCx   - prelim shows e.coli, f/u sensitivities    # suspected lewy body Dementia   -currently AAO x1   -c/w diazepam   -can give up to 30mg a day as needed  -pt was recently started on gabapentin 100mg TID, per the family patient seemed more anxious after taking it, holding for now.   -Frequent reorientation, minimize blood draws, maintain normal sleep cycle. =  -Eeg normal      #Misc  - DVT Prophylaxis: lovenox  - GI Prophylaxis: not indicated  - Diet: Soft  - Activity: AAT   81y F with PMHx of suspected Lewy body dementia (AAO x1-2 at baseline, tremors, hallucinations, paranoia and anxiety with worsening dementia over the past few months) HTN, hysterectomy, recurrent UTIs brought in by son for worsening mental status.    #COVID-19 infection   -Vaccinated with Moderna in Feb 2021  -First tested positive 12/14  -Ddimer 248  - CRP 13  - procal 0.06  - ferritin 249   - Monitor pulse ox   - Hold home lasix     # High Hg and Multiple bruises  - Hg has been consistently high since 2018  - Hg this Am 18.3  - Multiple bruises , per pts son "Trey" it has been like this for the past 2-3 years  - restart lovenox ppx dose  -started on IV fluids today  - will f/u as outpt with hematology    #Hypokalemia  -persistently hypokalemic  -K this am 3.3, was repleted , will check @ 4 pm    #HTN  c/w amlodipine 10mg daily     #UTI   -c/w ceftriaxone 1g daily x 3 days  - f/u UCx   - prelim shows e.coli, f/u sensitivities    # suspected lewy body Dementia   -currently AAO x1   -c/w diazepam   -can give up to 30mg a day as needed  -pt was recently started on gabapentin 100mg TID, per the family patient seemed more anxious after taking it, holding for now.   -Frequent reorientation, minimize blood draws, maintain normal sleep cycle. =  -Eeg normal      #Misc  - DVT Prophylaxis: lovenox  - GI Prophylaxis: not indicated  - Diet: Soft  - Activity: AAT

## 2021-12-22 NOTE — CONSULT NOTE ADULT - TIME BILLING
I have personally seen and examined this patient.    I have reviewed all pertinent clinical information and reviewed all relevant imaging and diagnostic studies personally.   I counseled the patient about diagnostic testing and treatment plan. All questions were answered.   I discussed recommendations with the primary team.
Consult
Counseled staff about diagnostic testing and treatment plan. All questions answered.

## 2021-12-22 NOTE — CONSULT NOTE ADULT - ASSESSMENT
81y F with PMHx of suspected Lewy body dementia (AAO x1-2 at baseline, tremors, hallucinations, paranoia and anxiety with worsening dementia over the past few months) HTN, hysterectomy, recurrent UTIs brought in by son for worsening mental status. + UTI, + COVID (Vaccinated with Moderna in Feb 2021). Called to evaluate patient for worsening dementia. CTH negative for acute findings.       #H/o Lewy body dementia, Worsening mental status could be in the setting of current UTI and covid infection R/O seizure      Plan  Medical management of Infections  REEG  Check vitamin b12, folate, hbaic and TSH .  Neuro attending note will follow    
81y F with PMHx of suspected Lewy body dementia (AAO x1-2 at baseline, tremors, hallucinations, paranoia and anxiety with worsening dementia over the past few months) HTN, hysterectomy, recurrent UTIs brought in by son for worsening mental status. She lives at home with her son and daughter who tested positive on 12/10 and 12/8 respectively, pt did not have any symptoms except inability to sleep at night even with diazepam and night terrors. She first tested positive for COVID on 12/14. Pt received monoclonal antibodies on 12/15. Pt complained of abdominal and back pain to her daughter.   CXR shows no acute cardiopulmonary disease  CTH shows Near complete opacification with multifocal areas of calcifications in R sphenoid sinus.    IMPRESSION;  COVID with mild illness- pt has physiological symptoms ( weakness ) with no SOB and with a normal CXR   Pt is inbetween the early viral replicative phase and the late immune mediated phase based on the onset of symptoms.  No significant elevation of the inflammatory markers  procalcitonin 0.06  , not suggestive of a bacterial PNA.  Ferritin 249  CRP 13  Ddimers 248  12/15 S/p Monoclonals    Acute pyelonephritis : unclear but does have pyuria with mild leucocytosis. Hx not reliable  Will treat as presumptive pyelonephritis for now    RECOMMENDATIONS;  .02  No steroids  F/u UCx  Rocephin 1 gm iv q24h  Off loading to prevent pressure sores and preventive measures to avoid aspiration   
ASSESSMENT  81y F with PMHx of suspected Lewy body dementia (AAO x1-2 at baseline, tremors, hallucinations, paranoia and anxiety with worsening dementia over the past few months) HTN, hysterectomy, recurrent UTIs brought in by son for worsening mental status.    IMPRESSION  #Metabolic encephalopathy   #UTI - Urine Cx GNR -- did report pain with urination on admission  #Dementia   #Obesity BMI (kg/m2): 24.3, 24.4  #DM   #Abx allergy: NKDA      RECOMMENDATIONS  - unclear if true UTI -- patient poor historian, but did report pain with urination on admission   - continue ceftriaxone 1g daily   - follow-up GNR in urine   - would plan 5 days for simple cystitis   - premarin cream BID for prophylaxis   - supportive care for COVID     Please call or message on Microsoft Teams if with any questions.  Spectra [id number]

## 2021-12-22 NOTE — PROGRESS NOTE ADULT - ASSESSMENT
81y F with PMHx of suspected Lewy body dementia (AAO x1-2 at baseline, tremors, hallucinations, paranoia and anxiety with worsening dementia over the past few months) HTN, hysterectomy, recurrent UTIs brought in by son for worsening mental status.    #COVID-19 infection   -Vaccinated with Moderna in Feb 2021  -First tested positive 12/14  -Ddimer 248  - CRP 13  - procal 0.06  - ferritin 249   - Monitor pulse ox     # High Hg and Multiple bruises  - Hg has been consistently high since 2018  - Hg this Am 18.3  - Multiple bruises , per pts son "Trey" it has been like this for the past 2-3 years  - restart lovenox ppx dose  -started on IV fluids today  - will f/u as outpt with hematology    #Hypokalemia  -persistently hypokalemic  -K this am 3.3, was repleted , will check @ 4 pm    #HTN  c/w amlodipine 10mg daily     #UTI   -c/w ceftriaxone 1g daily x 3 days  - UCx  >> E. Coli >> sensitivity pending.     # suspected lewy body Dementia   -pt was recently started on gabapentin 100mg TID, per the family patient seemed more anxious after taking it, holding for now.   -Frequent reorientation, minimize blood draws, maintain normal sleep cycle. =  -Eeg normal    #Misc  - DVT Prophylaxis: lovenox  - GI Prophylaxis: not indicated  - Diet: Soft  - Activity: AAT    #Progress Note Handoff  Pending (specify):  Urine C/S, PT  Disposition: Home_

## 2021-12-23 ENCOUNTER — TRANSCRIPTION ENCOUNTER (OUTPATIENT)
Age: 81
End: 2021-12-23

## 2021-12-23 VITALS
DIASTOLIC BLOOD PRESSURE: 66 MMHG | TEMPERATURE: 98 F | OXYGEN SATURATION: 95 % | SYSTOLIC BLOOD PRESSURE: 140 MMHG | HEART RATE: 81 BPM | RESPIRATION RATE: 18 BRPM

## 2021-12-23 LAB
ALBUMIN SERPL ELPH-MCNC: 3.7 G/DL — SIGNIFICANT CHANGE UP (ref 3.5–5.2)
ALP SERPL-CCNC: 87 U/L — SIGNIFICANT CHANGE UP (ref 30–115)
ALT FLD-CCNC: 10 U/L — SIGNIFICANT CHANGE UP (ref 0–41)
ANION GAP SERPL CALC-SCNC: 17 MMOL/L — HIGH (ref 7–14)
AST SERPL-CCNC: 25 U/L — SIGNIFICANT CHANGE UP (ref 0–41)
BASOPHILS # BLD AUTO: 0.07 K/UL — SIGNIFICANT CHANGE UP (ref 0–0.2)
BASOPHILS NFR BLD AUTO: 0.7 % — SIGNIFICANT CHANGE UP (ref 0–1)
BILIRUB SERPL-MCNC: 1.1 MG/DL — SIGNIFICANT CHANGE UP (ref 0.2–1.2)
BUN SERPL-MCNC: 16 MG/DL — SIGNIFICANT CHANGE UP (ref 10–20)
CALCIUM SERPL-MCNC: 9.1 MG/DL — SIGNIFICANT CHANGE UP (ref 8.5–10.1)
CHLORIDE SERPL-SCNC: 103 MMOL/L — SIGNIFICANT CHANGE UP (ref 98–110)
CO2 SERPL-SCNC: 20 MMOL/L — SIGNIFICANT CHANGE UP (ref 17–32)
CREAT SERPL-MCNC: 0.7 MG/DL — SIGNIFICANT CHANGE UP (ref 0.7–1.5)
EOSINOPHIL # BLD AUTO: 0.07 K/UL — SIGNIFICANT CHANGE UP (ref 0–0.7)
EOSINOPHIL NFR BLD AUTO: 0.7 % — SIGNIFICANT CHANGE UP (ref 0–8)
GLUCOSE SERPL-MCNC: 83 MG/DL — SIGNIFICANT CHANGE UP (ref 70–99)
HCT VFR BLD CALC: 49 % — HIGH (ref 37–47)
HGB BLD-MCNC: 16.3 G/DL — HIGH (ref 12–16)
IMM GRANULOCYTES NFR BLD AUTO: 0.9 % — HIGH (ref 0.1–0.3)
LYMPHOCYTES # BLD AUTO: 0.85 K/UL — LOW (ref 1.2–3.4)
LYMPHOCYTES # BLD AUTO: 7.9 % — LOW (ref 20.5–51.1)
MAGNESIUM SERPL-MCNC: 2 MG/DL — SIGNIFICANT CHANGE UP (ref 1.8–2.4)
MCHC RBC-ENTMCNC: 31.2 PG — HIGH (ref 27–31)
MCHC RBC-ENTMCNC: 33.3 G/DL — SIGNIFICANT CHANGE UP (ref 32–37)
MCV RBC AUTO: 93.9 FL — SIGNIFICANT CHANGE UP (ref 81–99)
MONOCYTES # BLD AUTO: 0.99 K/UL — HIGH (ref 0.1–0.6)
MONOCYTES NFR BLD AUTO: 9.2 % — SIGNIFICANT CHANGE UP (ref 1.7–9.3)
NEUTROPHILS # BLD AUTO: 8.63 K/UL — HIGH (ref 1.4–6.5)
NEUTROPHILS NFR BLD AUTO: 80.6 % — HIGH (ref 42.2–75.2)
NRBC # BLD: 0 /100 WBCS — SIGNIFICANT CHANGE UP (ref 0–0)
PLATELET # BLD AUTO: 272 K/UL — SIGNIFICANT CHANGE UP (ref 130–400)
POTASSIUM SERPL-MCNC: 3.1 MMOL/L — LOW (ref 3.5–5)
POTASSIUM SERPL-SCNC: 3.1 MMOL/L — LOW (ref 3.5–5)
PROT SERPL-MCNC: 6 G/DL — SIGNIFICANT CHANGE UP (ref 6–8)
RBC # BLD: 5.22 M/UL — SIGNIFICANT CHANGE UP (ref 4.2–5.4)
RBC # FLD: 12.9 % — SIGNIFICANT CHANGE UP (ref 11.5–14.5)
SODIUM SERPL-SCNC: 140 MMOL/L — SIGNIFICANT CHANGE UP (ref 135–146)
WBC # BLD: 10.71 K/UL — SIGNIFICANT CHANGE UP (ref 4.8–10.8)
WBC # FLD AUTO: 10.71 K/UL — SIGNIFICANT CHANGE UP (ref 4.8–10.8)

## 2021-12-23 PROCEDURE — 99233 SBSQ HOSP IP/OBS HIGH 50: CPT

## 2021-12-23 RX ORDER — BACITRACIN AND POLYMYXIN B SULFATE 500; 10000 [USP'U]/G; [USP'U]/G
1 OINTMENT TOPICAL THREE TIMES A DAY
Refills: 0 | Status: DISCONTINUED | OUTPATIENT
Start: 2021-12-23 | End: 2021-12-23

## 2021-12-23 RX ORDER — CEFPODOXIME PROXETIL 100 MG
1 TABLET ORAL
Qty: 14 | Refills: 0
Start: 2021-12-23 | End: 2021-12-29

## 2021-12-23 RX ORDER — GABAPENTIN 400 MG/1
1 CAPSULE ORAL
Qty: 0 | Refills: 0 | DISCHARGE

## 2021-12-23 RX ORDER — BACITRACIN AND POLYMYXIN B SULFATE 500; 10000 [USP'U]/G; [USP'U]/G
1 OINTMENT TOPICAL
Qty: 14 | Refills: 0
Start: 2021-12-23 | End: 2022-01-05

## 2021-12-23 RX ORDER — POTASSIUM CHLORIDE 20 MEQ
20 PACKET (EA) ORAL
Refills: 0 | Status: COMPLETED | OUTPATIENT
Start: 2021-12-23 | End: 2021-12-23

## 2021-12-23 RX ORDER — QUETIAPINE FUMARATE 200 MG/1
1 TABLET, FILM COATED ORAL
Qty: 30 | Refills: 0
Start: 2021-12-23 | End: 2022-01-21

## 2021-12-23 RX ADMIN — LORATADINE 10 MILLIGRAM(S): 10 TABLET ORAL at 11:47

## 2021-12-23 RX ADMIN — CEFTRIAXONE 100 MILLIGRAM(S): 500 INJECTION, POWDER, FOR SOLUTION INTRAMUSCULAR; INTRAVENOUS at 11:46

## 2021-12-23 RX ADMIN — Medication 20 MILLIEQUIVALENT(S): at 11:47

## 2021-12-23 RX ADMIN — Medication 20 MILLIEQUIVALENT(S): at 17:08

## 2021-12-23 RX ADMIN — SODIUM CHLORIDE 70 MILLILITER(S): 9 INJECTION INTRAMUSCULAR; INTRAVENOUS; SUBCUTANEOUS at 11:11

## 2021-12-23 RX ADMIN — SODIUM CHLORIDE 70 MILLILITER(S): 9 INJECTION INTRAMUSCULAR; INTRAVENOUS; SUBCUTANEOUS at 02:35

## 2021-12-23 RX ADMIN — Medication 20 MILLIEQUIVALENT(S): at 13:04

## 2021-12-23 RX ADMIN — CHLORHEXIDINE GLUCONATE 1 APPLICATION(S): 213 SOLUTION TOPICAL at 05:58

## 2021-12-23 RX ADMIN — Medication 650 MILLIGRAM(S): at 13:31

## 2021-12-23 NOTE — DISCHARGE NOTE PROVIDER - NSDCCPCAREPLAN_GEN_ALL_CORE_FT
PRINCIPAL DISCHARGE DIAGNOSIS  Diagnosis: Acute UTI  Assessment and Plan of Treatment:   You were noted either on arrival or during this hospitalization to have a Urinary Tract Infection. You may have already been treated and completed the antibiotics, please refer to the list of medications present on your discharge paperwork. If you notice that there are antibiotics listed, these may be to treat your infection, be sure to complete taking the full course, whether you have symptoms or not, as prescribed.  While taking antibiotics, you may benefit from taking a probiotic such as florastore to help to try and prevent an infectious type of diarrhea known as C Diff. If you notice that you begin having severe watery diarrhea, more than 4-5 episodes a day, please see your Primary Care Doctor or come to the ER to have your stool tested for this infection.   It is not necessary to repeat a urine test to see if the infection is gone, it is assumed that after treatment it should have resolved. However, if you continue to have symptoms, please see your Primary Care doctor or return to the ER.        SECONDARY DISCHARGE DIAGNOSES  Diagnosis: 2019 novel coronavirus disease (COVID-19)  Assessment and Plan of Treatment: Coronavirus disease 2019 (COVID-19) is a respiratory illness  that can spread from person to person. The virus that causes  COVID-19 is a novel coronavirus that was first identified during  an investigation into an outbreak in [place], [place].  The virus that causes COVID-19 probably emerged from an  animal source, but is now spreading from person to person.  The virus is thought to spread mainly between people who  are in close contact with one another (within about 6 feet)  through respiratory droplets produced when an infected  person coughs or sneezes. It also may be possible that a person  can get COVID-19 by touching a surface or object that has  the virus on it and then touching their own mouth, nose, or  possibly their eyes, but this is not thought to be the main  way the virus spreads.  Please stay home and avoid contact with others for at least a week after symptoms resolve and follow government guidelines.   Patients with COVID-19 have had mild to severe respiratory  illness with symptoms of  • fever  • cough  • shortness of breath  People can help protect themselves from respiratory illness with  everyday preventive actions.    • Avoid close contact with people who are sick.  • Avoid touching your eyes, nose, and mouth with  unwashed hands.  • Wash your hands often with soap and water for at least 20   seconds. Use an alcohol-based hand  that contains at  least 60% alcohol if soap and water are not available.   Stay home when you are sick.  • Cover your cough or sneeze with a tissue, then throw the  tissue in the trash.  • Clean and disinfect frequently touched objects  and surfaces.  Call 911 and inform them you are covid positive before you decide to go to the emergency room if you have chest pain, difficulty breathing, high fevers, worsening of your symptoms, feel unwell, or have nausea and vomiting.

## 2021-12-23 NOTE — PROGRESS NOTE ADULT - REASON FOR ADMISSION
worsening confusion

## 2021-12-23 NOTE — PROGRESS NOTE ADULT - ASSESSMENT
81y F with PMHx of suspected Lewy body dementia (AAO x1-2 at baseline, tremors, hallucinations, paranoia and anxiety with worsening dementia over the past few months) HTN, hysterectomy, recurrent UTIs brought in by son for worsening mental status.    #COVID-19 infection   -Vaccinated with Moderna in Feb 2021  -First tested positive 12/14  -Ddimer 248  - CRP 13  - procal 0.06  - ferritin 249   - Monitor pulse ox     # High Hg and Multiple bruises  - Hg has been consistently high since 2018  - Hg this Am 18.3  - Multiple bruises , per pts son "Trey" it has been like this for the past 2-3 years  - restart lovenox ppx dose  -started on IV fluids today  - will f/u as outpt with hematology    #Hypokalemia  -persistently hypokalemic  -K this am 3.3, was repleted , will check @ 4 pm    #HTN  c/w amlodipine 10mg daily     #UTI   -c/w ceftriaxone 1g daily x 3 days  - UCx  >> E. Coli >> pan sensitive.    Will switch to po.     # suspected lewy body Dementia   -pt was recently started on gabapentin 100mg TID, per the family patient seemed more anxious after taking it, holding for now.   -Frequent reorientation, minimize blood draws, maintain normal sleep cycle. =  -Eeg normal    #Misc  - DVT Prophylaxis: lovenox  - GI Prophylaxis: not indicated  - Diet: Soft  - Activity: AAT    D/C planning.   #Progress Note Handoff  Pending (specify):   Disposition: Home_ with [facility]

## 2021-12-23 NOTE — DISCHARGE NOTE PROVIDER - HOSPITAL COURSE
81y F with PMHx of suspected Lewy body dementia (AAO x1-2 at baseline, tremors, hallucinations, paranoia and anxiety with worsening dementia over the past few months) HTN, hysterectomy, recurrent UTIs brought in by son for worsening mental status. She lives at home with her son and daughter who tested positive on 12/10 and 12/8 respectively, pt did not have any symptoms except inability to sleep at night even with diazepam and night terrors. She first tested positive for COVID on 12/14. Pt received monoclonal antibodies on 12/15. Pt complained of abdominal and back pain to her daughter.     In ED VS within normal limits, CXR shows no acute cardiopulmonary disease, CTH shows Near complete opacification with multifocal areas of calcifications in R sphenoid sinus. UA +, received ceftriaxone 1g.    81y F with PMHx of suspected Lewy body dementia (AAO x1-2 at baseline, tremors, hallucinations, paranoia and anxiety with worsening dementia over the past few months) HTN, hysterectomy, recurrent UTIs brought in by son for worsening mental status. She lives at home with her son and daughter who tested positive on 12/10 and 12/8 respectively, pt did not have any symptoms except inability to sleep at night even with diazepam and night terrors. She first tested positive for COVID on 12/14. Pt received monoclonal antibodies on 12/15. Pt complained of abdominal and back pain to her daughter.     In ED VS within normal limits, CXR shows no acute cardiopulmonary disease, CTH shows Near complete opacification with multifocal areas of calcifications in R sphenoid sinus. UA +, received ceftriaxone 1g.     Pt was admitted to the medicine floor. She was saturating to 95% on RA. A UA was done which showed UTI. She was started on appropriate ABx and Urine cx were sent which showed Ecoli. The bacteria was found sensitive to Abx. ID was consulted and they prescribed the ABX and switched to Po on DC. Pt was not very well oriented when she came in. She was confused  AAox1. Today the pt was alert and oriented AAO x 3. She didn't require oxygen . She has multiple bruises on her bosy which acc to the family is lester she had since past 2-3 years, She will be sent home on Po Abx and an ointment for her skin.a CXr was done which showed atelectasis with reduced lung volumes. An EEg was ordered which was found to be normal.

## 2021-12-23 NOTE — DISCHARGE NOTE NURSING/CASE MANAGEMENT/SOCIAL WORK - PATIENT PORTAL LINK FT
You can access the FollowMyHealth Patient Portal offered by [facility] by registering at the following website: [email or web address]. By joining [facility]’s FollowMyHealth portal, you will also be able to view your health information using other applications (apps) compatible with our system.

## 2021-12-23 NOTE — DISCHARGE NOTE PROVIDER - NSDCMRMEDTOKEN_GEN_ALL_CORE_FT
amLODIPine 10 mg oral tablet: 1 tab(s) orally once a day  Claritin 10 mg oral tablet: 1 tab(s) orally once a day  diazePAM 10 mg oral tablet: 1 tab(s) orally once a day (at bedtime)  gabapentin 100 mg oral tablet: 1 tab(s) orally 3 times a day  Lasix 40 mg oral tablet: 1 tab(s) orally once a day   amLODIPine 10 mg oral tablet: 1 tab(s) orally once a day  bacitracin-polymyxin B 500 units-10,000 units/g topical ointment: 1 application topically 3 times a day  Claritin 10 mg oral tablet: 1 tab(s) orally once a day  diazePAM 10 mg oral tablet: 1 tab(s) orally once a day (at bedtime)  gabapentin 100 mg oral tablet: 1 tab(s) orally 3 times a day  Lasix 40 mg oral tablet: 1 tab(s) orally once a day   amLODIPine 10 mg oral tablet: 1 tab(s) orally once a day  bacitracin-polymyxin B 500 units-10,000 units/g topical ointment: 1 application topically 3 times a day  cefpodoxime 100 mg oral tablet: 1 tab(s) orally 2 times a day   till 12/30/21  Claritin 10 mg oral tablet: 1 tab(s) orally once a day  diazePAM 10 mg oral tablet: 1 tab(s) orally once a day (at bedtime)  gabapentin 100 mg oral tablet: 1 tab(s) orally 3 times a day  Lasix 40 mg oral tablet: 1 tab(s) orally once a day   amLODIPine 10 mg oral tablet: 1 tab(s) orally once a day  bacitracin-polymyxin B 500 units-10,000 units/g topical ointment: 1 application topically 3 times a day  cefpodoxime 100 mg oral tablet: 1 tab(s) orally 2 times a day   till 12/30/21  Claritin 10 mg oral tablet: 1 tab(s) orally once a day  diazePAM 10 mg oral tablet: 1 tab(s) orally once a day (at bedtime)  gabapentin 100 mg oral tablet: 1 tab(s) orally 3 times a day  Lasix 40 mg oral tablet: 1 tab(s) orally once a day  SEROquel 25 mg oral tablet: 1 tab(s) orally once a day (at bedtime)

## 2021-12-23 NOTE — PROGRESS NOTE ADULT - ASSESSMENT
81y F with PMHx of suspected Lewy body dementia (AAO x1-2 at baseline, tremors, hallucinations, paranoia and anxiety with worsening dementia over the past few months) HTN, hysterectomy, recurrent UTIs brought in by son for worsening mental status.    #COVID-19 infection   -Vaccinated with Moderna in Feb 2021  -First tested positive 12/14  -Ddimer 248  - CRP 13  - procal 0.06  - ferritin 249   - Monitor pulse ox   - Hold home lasix     # High Hg and Multiple bruises  - Hg has been consistently high since 2018  - Hg this Am 18.3  - Multiple bruises , per pts son "Trey" it has been like this for the past 2-3 years  - restart lovenox ppx dose  -started on IV fluids today  - will f/u as outpt with hematology    #Hypokalemia  -persistently hypokalemic  -K this am 3.1, was repleted     #HTN  c/w amlodipine 10mg daily     #UTI   -c/w ceftriaxone 1g daily x 3 days  - f/u UCx  shows e.coli, sensitive to rocephin  - Id reccs: p/o vantin for 7 days on dc    # suspected lewy body Dementia   -currently AAO x3 today  -c/w diazepam   -can give up to 30mg a day as needed  -pt was recently started on gabapentin 100mg TID, per the family patient seemed more anxious after taking it, holding for now.   -Frequent reorientation, minimize blood draws, maintain normal sleep cycle  -Eeg normal      #Misc  - DVT Prophylaxis: lovenox  - GI Prophylaxis: not indicated  - Diet: Soft  - Activity: AAT

## 2021-12-23 NOTE — PROGRESS NOTE ADULT - ASSESSMENT
· Assessment	  81y F with PMHx of suspected Lewy body dementia (AAO x1-2 at baseline, tremors, hallucinations, paranoia and anxiety with worsening dementia over the past few months) HTN, hysterectomy, recurrent UTIs brought in by son for worsening mental status. She lives at home with her son and daughter who tested positive on 12/10 and 12/8 respectively, pt did not have any symptoms except inability to sleep at night even with diazepam and night terrors. She first tested positive for COVID on 12/14. Pt received monoclonal antibodies on 12/15. Pt complained of abdominal and back pain to her daughter.   CXR shows no acute cardiopulmonary disease  CTH shows Near complete opacification with multifocal areas of calcifications in R sphenoid sinus.    IMPRESSION;  COVID with mild illness- pt has physiological symptoms ( weakness ) with no SOB and with a normal CXR   Pt is inbetween the early viral replicative phase and the late immune mediated phase based on the onset of symptoms.  No significant elevation of the inflammatory markers  procalcitonin 0.06  , not suggestive of a bacterial PNA.  Ferritin 249  CRP 13  Ddimers 248  12/15 S/p Monoclonals    Acute pyelonephritis : unclear but does have pyuria with mild leucocytosis. Hx not reliable  Will treat as presumptive pyelonephritis for now    RECOMMENDATIONS;  Target SpO2 92 % to 96 %   No steroids  Rocephin 1 gm iv q24h  Could change to po vantin 100 mg q12h for a total of 7 days  Off loading to prevent pressure sores and preventive measures to avoid aspiration   Please do not hesitate to recall ID if any questions arise either through [email or web address] or through [email or web address] teams

## 2021-12-23 NOTE — PROGRESS NOTE ADULT - SUBJECTIVE AND OBJECTIVE BOX
YOAN TAI  81y  Female      Patient is a 81y old  Female who presents with a chief complaint of worsening confusion.      INTERVAL HPI/OVERNIGHT EVENTS:      ******************************* REVIEW OF SYSTEMS:**********************************************    All other review of systems negative    *********************** VITALS ******************************************    T(F): 100.8 (12-23-21 @ 13:36)  HR: 80 (12-23-21 @ 08:08) (80 - 88)  BP: 133/69 (12-23-21 @ 05:08) (133/69 - 158/77)  RR: 18 (12-23-21 @ 05:08) (18 - 18)  SpO2: 93% (12-23-21 @ 08:08) (93% - 94%)            ******************************** PHYSICAL EXAM:**************************************************  GENERAL: NAD    PSYCH: no agitation, baseline mentation  HEENT:     NERVOUS SYSTEM:  Alert & Oriented X3,  PULMONARY: KAI, CTA    CARDIOVASCULAR: S1S2 RRR    GI: Soft, NT, ND; BS present.    EXTREMITIES:  2+ Peripheral Pulses, No clubbing, cyanosis, or edema    LYMPH: No lymphadenopathy noted    SKIN: No rashes or lesions      **************************** LABS *******************************************************                          16.3   10.71 )-----------( 272      ( 23 Dec 2021 04:30 )             49.0     12-23    140  |  103  |  16  ----------------------------<  83  3.1<L>   |  20  |  0.7    Ca    9.1      23 Dec 2021 04:30  Mg     2.0     12-23    TPro  6.0  /  Alb  3.7  /  TBili  1.1  /  DBili  x   /  AST  25  /  ALT  10  /  AlkPhos  87  12-23          Lactate Trend        CAPILLARY BLOOD GLUCOSE              **************************Active Medications *******************************************  No Known Allergies      acetaminophen     Tablet .. 650 milliGRAM(s) Oral every 6 hours PRN  amLODIPine   Tablet 10 milliGRAM(s) Oral at bedtime  bacitracin/polymyxin B Ointment 1 Application(s) Topical three times a day  cefTRIAXone   IVPB 1000 milliGRAM(s) IV Intermittent every 24 hours  chlorhexidine 4% Liquid 1 Application(s) Topical <User Schedule>  diazepam    Tablet 5 milliGRAM(s) Oral at bedtime PRN  enoxaparin Injectable 40 milliGRAM(s) SubCutaneous at bedtime  guaifenesin/dextromethorphan Oral Liquid 10 milliLiter(s) Oral every 6 hours PRN  loratadine 10 milliGRAM(s) Oral daily  potassium chloride   Powder 20 milliEquivalent(s) Oral every 2 hours  QUEtiapine 25 milliGRAM(s) Oral at bedtime  sodium chloride 0.9%. 1000 milliLiter(s) IV Continuous <Continuous>      ***************************************************  RADIOLOGY & ADDITIONAL TESTS:    Imaging Personally Reviewed:  [ ] YES  [ ] NO    HEALTH ISSUES - PROBLEM Dx:      
  YOAN TAI  81y  Female      Patient is a 81y old  Female who presents with a chief complaint of worsening confusion.      INTERVAL HPI/OVERNIGHT EVENTS:      ******************************* REVIEW OF SYSTEMS:*********************************************    All other review of systems negative    *********************** VITALS ******************************************    T(F): 97.8 (12-22-21 @ 13:19)  HR: 94 (12-22-21 @ 13:19) (86 - 94)  BP: 158/81 (12-22-21 @ 13:19) (144/70 - 158/81)  RR: 18 (12-22-21 @ 04:55) (18 - 18)  SpO2: 96% (12-22-21 @ 13:19) (91% - 96%)            ******************************** PHYSICAL EXAM:**************************************************  GENERAL: NAD    PSYCH: no agitation, ? baseline mentation  HEENT:     NERVOUS SYSTEM:  Alert & Oriented  x 1-2  PULMONARY: KAI, CTA    CARDIOVASCULAR: S1S2 RRR    GI: Soft, NT, ND; BS present.    EXTREMITIES:  2+ Peripheral Pulses, No clubbing, cyanosis, or edema    LYMPH: No lymphadenopathy noted    SKIN: No rashes or lesions      **************************** LABS *******************************************************                          17.3   11.88 )-----------( 301      ( 22 Dec 2021 04:30 )             50.4     12-22    143  |  100  |  20  ----------------------------<  118<H>  3.3<L>   |  21  |  0.7    Ca    9.3      22 Dec 2021 04:30  Mg     2.0     12-22    TPro  6.3  /  Alb  3.9  /  TBili  0.9  /  DBili  x   /  AST  23  /  ALT  8   /  AlkPhos  88  12-22        PT/INR - ( 21 Dec 2021 04:30 )   PT: 12.10 sec;   INR: 1.05 ratio         PTT - ( 21 Dec 2021 04:30 )  PTT:29.7 sec  Lactate Trend        CAPILLARY BLOOD GLUCOSE              **************************Active Medications *******************************************  No Known Allergies      acetaminophen     Tablet .. 650 milliGRAM(s) Oral every 6 hours PRN  amLODIPine   Tablet 10 milliGRAM(s) Oral at bedtime  cefTRIAXone   IVPB 1000 milliGRAM(s) IV Intermittent every 24 hours  chlorhexidine 4% Liquid 1 Application(s) Topical <User Schedule>  diazepam    Tablet 5 milliGRAM(s) Oral at bedtime PRN  enoxaparin Injectable 40 milliGRAM(s) SubCutaneous at bedtime  guaifenesin/dextromethorphan Oral Liquid 10 milliLiter(s) Oral every 6 hours PRN  loratadine 10 milliGRAM(s) Oral daily  QUEtiapine 25 milliGRAM(s) Oral at bedtime  sodium chloride 0.9%. 1000 milliLiter(s) IV Continuous <Continuous>      ***************************************************  RADIOLOGY & ADDITIONAL TESTS:    Imaging Personally Reviewed:  [ ] YES  [ ] NO    HEALTH ISSUES - PROBLEM Dx:      
YOAN TAI 81y Female  MRN#: [id number]     Hospital Day: 2d    Pt is currently admitted with the primary diagnosis of  COVID UTI SINUSITIS        SUBJECTIVE     Overnight events  None      Subjective complaints  Pt was seen this morning lying in bed. Pt appeared confused and was not answering questions appropriately.                                              ----------------------------------------------------------  OBJECTIVE  PAST MEDICAL & SURGICAL HISTORY  HTN (hypertension)    Anxiety    Arthritis    No significant past surgical history                                              -----------------------------------------------------------  ALLERGIES:  No Known Allergies                                            ------------------------------------------------------------    HOME MEDICATIONS  Home Medications:  amLODIPine 10 mg oral tablet: 1 tab(s) orally once a day (19 Dec 2021 20:45)  Claritin 10 mg oral tablet: 1 tab(s) orally once a day (19 Dec 2021 20:46)  diazePAM 10 mg oral tablet: 1 tab(s) orally once a day (at bedtime) (19 Dec 2021 20:42)  gabapentin 100 mg oral tablet: 1 tab(s) orally 3 times a day (19 Dec 2021 20:43)  Lasix 40 mg oral tablet: 1 tab(s) orally once a day (19 Dec 2021 20:43)                           MEDICATIONS:  STANDING MEDICATIONS  amLODIPine   Tablet 10 milliGRAM(s) Oral at bedtime  cefTRIAXone   IVPB 1000 milliGRAM(s) IV Intermittent every 24 hours  chlorhexidine 4% Liquid 1 Application(s) Topical <User Schedule>  loratadine 10 milliGRAM(s) Oral daily  potassium chloride   Powder 20 milliEquivalent(s) Oral every 2 hours  QUEtiapine 25 milliGRAM(s) Oral at bedtime  sodium chloride 0.9%. 1000 milliLiter(s) IV Continuous <Continuous>    PRN MEDICATIONS  acetaminophen     Tablet .. 650 milliGRAM(s) Oral every 6 hours PRN  diazepam    Tablet 5 milliGRAM(s) Oral at bedtime PRN  guaifenesin/dextromethorphan Oral Liquid 10 milliLiter(s) Oral every 6 hours PRN                                            ------------------------------------------------------------  VITAL SIGNS: Last 24 Hours  T(C): 36.8 (20 Dec 2021 21:03), Max: 36.8 (20 Dec 2021 21:03)  T(F): 98.2 (20 Dec 2021 21:03), Max: 98.2 (20 Dec 2021 21:03)  HR: 92 (20 Dec 2021 21:03) (92 - 99)  BP: 133/84 (20 Dec 2021 21:03) (133/84 - 166/78)  BP(mean): --  RR: 18 (20 Dec 2021 21:03) (18 - 18)  SpO2: 94% (20 Dec 2021 21:03) (93% - 94%)                                             --------------------------------------------------------------  LABS:                        18.1   11.17 )-----------( 291      ( 21 Dec 2021 04:30 )             53.8     12    149<H>  |  104  |  21<H>  ----------------------------<  107<H>  3.2<L>   |  24  |  0.9    Ca    9.8      21 Dec 2021 04:30  Mg     2.0     12    TPro  6.8  /  Alb  4.3  /  TBili  0.8  /  DBili  x   /  AST  27  /  ALT  9   /  AlkPhos  91  12    PT/INR - ( 21 Dec 2021 04:30 )   PT: 12.10 sec;   INR: 1.05 ratio         PTT - ( 21 Dec 2021 04:30 )  PTT:29.7 sec  Urinalysis Basic - ( 19 Dec 2021 14:14 )    Color: Yellow / Appearance: Slightly Turbid / S.011 / pH: x  Gluc: x / Ketone: Negative  / Bili: Negative / Urobili: <2 mg/dL   Blood: x / Protein: Trace / Nitrite: Positive   Leuk Esterase: Large / RBC: 2 /HPF /  /HPF   Sq Epi: x / Non Sq Epi: 1 /HPF / Bacteria: Many              Culture - Urine (collected 19 Dec 2021 14:14)  Source: Clean Catch Clean Catch (Midstream)  Preliminary Report (20 Dec 2021 22:26):    >100,000 CFU/ml Gram Negative Rods                                                    -------------------------------------------------------------  RADIOLOGY:                                              --------------------------------------------------------------    PHYSICAL EXAM:  GENERAL: NAD, lying in bed  HEAD:  Atraumatic, Normocephalic  EYES: EOMI, PERRLA, conjunctiva and sclera clear  ENT: Moist mucous membranes  NECK: Supple, No JVD  CHEST/LUNG: Clear to auscultation bilaterally; No rales, rhonchi, wheezing, or rubs. Unlabored respirations  HEART: regular rate and rhythm; No murmurs, rubs, or gallops  ABDOMEN: Bowel sounds present; Soft, Nontender, Nondistended.    EXTREMITIES: Multiple bruises all over the body  NERVOUS SYSTEM:  Alert & Oriented X1, speech clear. No focal deficits   SKIN: Multiple bruisis and echymosis all over the body                                           --------------------------------------------------------------            
YOAN TAI 81y Female  MRN#: [id number]     Hospital Day: 4d    Pt is currently admitted with the primary diagnosis of  COVID UTI SINUSITIS        SUBJECTIVE     Overnight events  none      Subjective complaints  No active complaints                                            ----------------------------------------------------------  OBJECTIVE  PAST MEDICAL & SURGICAL HISTORY  HTN (hypertension)    Anxiety    Arthritis    No significant past surgical history                                              -----------------------------------------------------------  ALLERGIES:  No Known Allergies                                            ------------------------------------------------------------    HOME MEDICATIONS  Home Medications:  amLODIPine 10 mg oral tablet: 1 tab(s) orally once a day (19 Dec 2021 20:45)  Claritin 10 mg oral tablet: 1 tab(s) orally once a day (19 Dec 2021 20:46)  diazePAM 10 mg oral tablet: 1 tab(s) orally once a day (at bedtime) (19 Dec 2021 20:42)  gabapentin 100 mg oral tablet: 1 tab(s) orally 3 times a day (19 Dec 2021 20:43)  Lasix 40 mg oral tablet: 1 tab(s) orally once a day (19 Dec 2021 20:43)                           MEDICATIONS:  STANDING MEDICATIONS  amLODIPine   Tablet 10 milliGRAM(s) Oral at bedtime  bacitracin/polymyxin B Ointment 1 Application(s) Topical three times a day  cefTRIAXone   IVPB 1000 milliGRAM(s) IV Intermittent every 24 hours  chlorhexidine 4% Liquid 1 Application(s) Topical <User Schedule>  enoxaparin Injectable 40 milliGRAM(s) SubCutaneous at bedtime  loratadine 10 milliGRAM(s) Oral daily  potassium chloride   Powder 20 milliEquivalent(s) Oral every 2 hours  QUEtiapine 25 milliGRAM(s) Oral at bedtime  sodium chloride 0.9%. 1000 milliLiter(s) IV Continuous <Continuous>    PRN MEDICATIONS  acetaminophen     Tablet .. 650 milliGRAM(s) Oral every 6 hours PRN  diazepam    Tablet 5 milliGRAM(s) Oral at bedtime PRN  guaifenesin/dextromethorphan Oral Liquid 10 milliLiter(s) Oral every 6 hours PRN                                            ------------------------------------------------------------  VITAL SIGNS: Last 24 Hours  T(C): 36.7 (23 Dec 2021 05:08), Max: 36.7 (23 Dec 2021 05:08)  T(F): 98 (23 Dec 2021 05:08), Max: 98 (23 Dec 2021 05:08)  HR: 80 (23 Dec 2021 08:08) (80 - 94)  BP: 133/69 (23 Dec 2021 05:08) (133/69 - 158/81)  BP(mean): --  RR: 18 (23 Dec 2021 05:08) (18 - 18)  SpO2: 93% (23 Dec 2021 08:08) (93% - 96%)                                             --------------------------------------------------------------  LABS:                        16.3   10.71 )-----------( 272      ( 23 Dec 2021 04:30 )             49.0     12-23    140  |  103  |  16  ----------------------------<  83  3.1<L>   |  20  |  0.7    Ca    9.1      23 Dec 2021 04:30  Mg     2.0     12-23    TPro  6.0  /  Alb  3.7  /  TBili  1.1  /  DBili  x   /  AST  25  /  ALT  10  /  AlkPhos  87  12-23                                                              -------------------------------------------------------------  RADIOLOGY:  < from: [facility] Duplex Lower Ext Vein Scan, Dav[place] (12.22.21 @ 11:37) >  No evidence of deep venous thrombosis in either lower extremity.    < end of copied text >                                            --------------------------------------------------------------    PHYSICAL EXAM:  GENERAL: NAD, lying in bed comfortably  HEAD:  Atraumatic, Normocephalic  EYES: EOMI, PERRLA, conjunctiva and sclera clear  ENT: Moist mucous membranes  NECK: Supple, No JVD  CHEST/LUNG: Clear to auscultation bilaterally; No rales, rhonchi, wheezing, or rubs. Unlabored respirations  HEART: regular rate and rhythm; No murmurs, rubs, or gallops  ABDOMEN: Bowel sounds present; Soft, Nontender, Nondistended.    EXTREMITIES: + Peripheral Pulses, brisk capillary refill. No clubbing, cyanosis, or edema  NERVOUS SYSTEM:  Alert & Oriented X3, speech clear. No focal deficits   SKIN: No rashes or lesions                                           --------------------------------------------------------------          
YOAN TAI 81y Female  MRN#: [id number]     Hospital Day: 3d    Pt is currently admitted with the primary diagnosis of  COVID UTI SINUSITIS        SUBJECTIVE     Overnight events  None      Subjective complaints  Pt still confused.                                              ----------------------------------------------------------  OBJECTIVE  PAST MEDICAL & SURGICAL HISTORY  HTN (hypertension)    Anxiety    Arthritis    No significant past surgical history                                              -----------------------------------------------------------  ALLERGIES:  No Known Allergies                                            ------------------------------------------------------------    HOME MEDICATIONS  Home Medications:  amLODIPine 10 mg oral tablet: 1 tab(s) orally once a day (19 Dec 2021 20:45)  Claritin 10 mg oral tablet: 1 tab(s) orally once a day (19 Dec 2021 20:46)  diazePAM 10 mg oral tablet: 1 tab(s) orally once a day (at bedtime) (19 Dec 2021 20:42)  gabapentin 100 mg oral tablet: 1 tab(s) orally 3 times a day (19 Dec 2021 20:43)  Lasix 40 mg oral tablet: 1 tab(s) orally once a day (19 Dec 2021 20:43)                           MEDICATIONS:  STANDING MEDICATIONS  amLODIPine   Tablet 10 milliGRAM(s) Oral at bedtime  cefTRIAXone   IVPB 1000 milliGRAM(s) IV Intermittent every 24 hours  chlorhexidine 4% Liquid 1 Application(s) Topical <User Schedule>  enoxaparin Injectable 40 milliGRAM(s) SubCutaneous at bedtime  loratadine 10 milliGRAM(s) Oral daily  potassium chloride    Tablet ER 20 milliEquivalent(s) Oral every 2 hours  QUEtiapine 25 milliGRAM(s) Oral at bedtime  sodium chloride 0.9%. 1000 milliLiter(s) IV Continuous <Continuous>    PRN MEDICATIONS  acetaminophen     Tablet .. 650 milliGRAM(s) Oral every 6 hours PRN  diazepam    Tablet 5 milliGRAM(s) Oral at bedtime PRN  guaifenesin/dextromethorphan Oral Liquid 10 milliLiter(s) Oral every 6 hours PRN                                            ------------------------------------------------------------  VITAL SIGNS: Last 24 Hours  T(C): 36.5 (22 Dec 2021 04:55), Max: 36.8 (21 Dec 2021 21:16)  T(F): 97.7 (22 Dec 2021 04:55), Max: 98.2 (21 Dec 2021 21:16)  HR: 90 (22 Dec 2021 04:55) (86 - 90)  BP: 144/70 (22 Dec 2021 04:55) (144/70 - 156/75)  BP(mean): --  RR: 18 (22 Dec 2021 04:55) (18 - 18)  SpO2: 95% (22 Dec 2021 04:55) (91% - 96%)                                             --------------------------------------------------------------  LABS:                        17.3   11.88 )-----------( 301      ( 22 Dec 2021 04:30 )             50.4     12-22    143  |  100  |  20  ----------------------------<  118<H>  3.3<L>   |  21  |  0.7    Ca    9.3      22 Dec 2021 04:30  Mg     2.0     12-22    TPro  6.3  /  Alb  3.9  /  TBili  0.9  /  DBili  x   /  AST  23  /  ALT  8   /  AlkPhos  88  12-22    PT/INR - ( 21 Dec 2021 04:30 )   PT: 12.10 sec;   INR: 1.05 ratio         PTT - ( 21 Dec 2021 04:30 )  PTT:29.7 sec            Culture - Urine (collected 19 Dec 2021 14:14)  Source: Clean Catch Clean Catch (Midstream)  Preliminary Report (21 Dec 2021 17:45):    >100,000 CFU/ml Escherichia coli                                                    -------------------------------------------------------------  RADIOLOGY:                                            --------------------------------------------------------------    PHYSICAL EXAM:  GENERAL: NAD, lying in bed comfortably  HEAD:  Atraumatic, Normocephalic  EYES: EOMI, PERRLA, conjunctiva and sclera clear  ENT: Moist mucous membranes  NECK: Supple, No JVD  CHEST/LUNG: Clear to auscultation bilaterally; No rales, rhonchi, wheezing, or rubs. Unlabored respirations  HEART: regular rate and rhythm; No murmurs, rubs, or gallops  ABDOMEN: Bowel sounds present; Soft, Nontender, Nondistended.    EXTREMITIES: Multiple bruises all over the body  NERVOUS SYSTEM:  Alert & Oriented X3, speech clear. No focal deficits   SKIN: Multiple bruises all over the body                                           --------------------------------------------------------------          
YOAN TAI 81y Female  MRN#: [id number]     Hospital Day: 1d    Pt is currently admitted with the primary diagnosis of  COVID UTI SINUSITIS        SUBJECTIVE     Overnight events  None      Subjective complaints  Pt was AAOx0 and very confused. Unable to answer appropriately to questions.                                              ----------------------------------------------------------  OBJECTIVE  PAST MEDICAL & SURGICAL HISTORY  HTN (hypertension)    Anxiety    Arthritis    No significant past surgical history                                              -----------------------------------------------------------  ALLERGIES:  No Known Allergies                                            ------------------------------------------------------------    HOME MEDICATIONS  Home Medications:  amLODIPine 10 mg oral tablet: 1 tab(s) orally once a day (19 Dec 2021 20:45)  Claritin 10 mg oral tablet: 1 tab(s) orally once a day (19 Dec 2021 20:46)  diazePAM 10 mg oral tablet: 1 tab(s) orally once a day (at bedtime) (19 Dec 2021 20:42)  gabapentin 100 mg oral tablet: 1 tab(s) orally 3 times a day (19 Dec 2021 20:43)  Lasix 40 mg oral tablet: 1 tab(s) orally once a day (19 Dec 2021 20:43)                           MEDICATIONS:  STANDING MEDICATIONS  amLODIPine   Tablet 10 milliGRAM(s) Oral at bedtime  cefTRIAXone   IVPB 1000 milliGRAM(s) IV Intermittent every 24 hours  chlorhexidine 4% Liquid 1 Application(s) Topical <User Schedule>  enoxaparin Injectable 40 milliGRAM(s) SubCutaneous at bedtime  loratadine 10 milliGRAM(s) Oral daily  QUEtiapine 25 milliGRAM(s) Oral at bedtime    PRN MEDICATIONS  acetaminophen     Tablet .. 650 milliGRAM(s) Oral every 6 hours PRN  diazepam    Tablet 5 milliGRAM(s) Oral at bedtime PRN  guaifenesin/dextromethorphan Oral Liquid 10 milliLiter(s) Oral every 6 hours PRN                                            ------------------------------------------------------------  VITAL SIGNS: Last 24 Hours  T(C): 35.9 (20 Dec 2021 12:20), Max: 36.7 (19 Dec 2021 18:49)  T(F): 96.6 (20 Dec 2021 12:20), Max: 98 (19 Dec 2021 18:49)  HR: 99 (20 Dec 2021 12:20) (80 - 109)  BP: 166/78 (20 Dec 2021 12:20) (145/80 - 166/78)  BP(mean): --  RR: 18 (20 Dec 2021 12:20) (18 - 18)  SpO2: 93% (20 Dec 2021 12:20) (93% - 99%)                                             --------------------------------------------------------------  LABS:                        18.3   10.39 )-----------( 303      ( 20 Dec 2021 12:52 )             53.4         144  |  101  |  18  ----------------------------<  99  3.6   |  24  |  1.0    Ca    9.5      19 Dec 2021 13:41    TPro  6.7  /  Alb  4.3  /  TBili  0.4  /  DBili  x   /  AST  18  /  ALT  7   /  AlkPhos  88        Urinalysis Basic - ( 19 Dec 2021 14:14 )    Color: Yellow / Appearance: Slightly Turbid / S.011 / pH: x  Gluc: x / Ketone: Negative  / Bili: Negative / Urobili: <2 mg/dL   Blood: x / Protein: Trace / Nitrite: Positive   Leuk Esterase: Large / RBC: 2 /HPF /  /HPF   Sq Epi: x / Non Sq Epi: 1 /HPF / Bacteria: Many                                                            -------------------------------------------------------------  RADIOLOGY:  < from: Xray Chest 1 View- PORTABLE-Urgent (21 @ 14:55) >  Dependent atelectasis with low lung volumes.    < end of copied text >                                              --------------------------------------------------------------    PHYSICAL EXAM:  GENERAL: fidgety, anxious, wants to get out of bed   HEAD: Atraumatic, Normocephalic  EYES: EOMI, conjunctiva and sclera clear  NECK: Supple, No JVD, Normal thyroid  HEART: Regular rate and rhythm;   RESPIRATORY: CTA B/L, No W/R/R  ABDOMEN: Soft, Nontender, Nondistended;   NEUROLOGY: A&Ox1, no gross neurological deficits   EXTREMITIES: No clubbing, cyanosis, or edema, multiple bruises                                           --------------------------------------------------------------             
  YOAN TAI  81y, Female    All available historical data reviewed    OVERNIGHT EVENTS:  no fevers  alert, does try to respond    ROS:  not reliable  no hill    VITALS:  T(F): 98, Max: 98 (12-23-21 @ 05:08)  HR: 80  BP: 133/69  RR: 18Vital Signs Last 24 Hrs  T(C): 36.7 (23 Dec 2021 05:08), Max: 36.7 (23 Dec 2021 05:08)  T(F): 98 (23 Dec 2021 05:08), Max: 98 (23 Dec 2021 05:08)  HR: 80 (23 Dec 2021 08:08) (80 - 94)  BP: 133/69 (23 Dec 2021 05:08) (133/69 - 158/81)  BP(mean): --  RR: 18 (23 Dec 2021 05:08) (18 - 18)  SpO2: 93% (23 Dec 2021 08:08) (93% - 96%)    TESTS & MEASUREMENTS:                        17.3   11.88 )-----------( 301      ( 22 Dec 2021 04:30 )             50.4     12-22    143  |  100  |  20  ----------------------------<  118<H>  3.3<L>   |  21  |  0.7    Ca    9.3      22 Dec 2021 04:30  Mg     2.0     12-22    TPro  6.3  /  Alb  3.9  /  TBili  0.9  /  DBili  x   /  AST  23  /  ALT  8   /  AlkPhos  88  12-22    LIVER FUNCTIONS - ( 22 Dec 2021 04:30 )  Alb: 3.9 g/dL / Pro: 6.3 g/dL / ALK PHOS: 88 U/L / ALT: 8 U/L / AST: 23 U/L / GGT: x             Culture - Urine (collected 12-19-21 @ 14:14)  Source: Clean Catch Clean Catch (Midstream)  Final Report (12-22-21 @ 16:41):    >100,000 CFU/ml Escherichia coli  Organism: Escherichia coli (12-22-21 @ 16:41)  Organism: Escherichia coli (12-22-21 @ 16:41)      -  Amikacin: S <=16      -  Amoxicillin/Clavulanic Acid: S <=8/4 Consider reserving for cystitis when ampicillin/sulbactam is resistant      -  Ampicillin: R >16 These ampicillin results predict results for amoxicillin      -  Ampicillin/Sulbactam: R >16/8 Enterobacter, Klebsiella aerogenes, Citrobacter, and Serratia may develop resistance during prolonged therapy (3-4 days)      -  Aztreonam: S <=4      -  Cefazolin: S <=2 ([id number]) For uncomplicated UTI with K. pneumoniae, E. coli, or P. mirablis: AYDEN <=16 is sensitive and AYDEN >=32 is resistant. This also predicts results for oral agents cefaclor, cefdinir, cefpodoxime, cefprozil, cefuroxime axetil, cephalexin and locarbef for uncomplicated UTI. Note that some isolates may be susceptible to these agents while testing resistant to cefazolin.      -  Cefepime: S <=2      -  Cefoxitin: S <=8      -  Ceftriaxone: S <=1 Enterobacter, Klebsiella aerogenes, Citrobacter, and Serratia may develop resistance during prolonged therapy      -  Ciprofloxacin: S <=0.25      -  Ertapenem: S <=0.5      -  Gentamicin: S <=2      -  Imipenem: S <=1      -  Levofloxacin: S <=0.5      -  Meropenem: S <=1      -  Nitrofurantoin: S <=32 Should not be used to treat pyelonephritis      -  Piperacillin/Tazobactam: S <=8      -  Tigecycline: S <=2      -  Tobramycin: S <=2      -  Trimethoprim/Sulfamethoxazole: S <=0.5/9.5      Method Type: [facility]            RADIOLOGY & ADDITIONAL TESTS:  Personal review of radiological diagnostics performed  Echo and EKG results noted when applicable.     MEDICATIONS:  acetaminophen     Tablet .. 650 milliGRAM(s) Oral every 6 hours PRN  amLODIPine   Tablet 10 milliGRAM(s) Oral at bedtime  bacitracin/polymyxin B Ointment 1 Application(s) Topical three times a day  cefTRIAXone   IVPB 1000 milliGRAM(s) IV Intermittent every 24 hours  chlorhexidine 4% Liquid 1 Application(s) Topical <User Schedule>  diazepam    Tablet 5 milliGRAM(s) Oral at bedtime PRN  enoxaparin Injectable 40 milliGRAM(s) SubCutaneous at bedtime  guaifenesin/dextromethorphan Oral Liquid 10 milliLiter(s) Oral every 6 hours PRN  loratadine 10 milliGRAM(s) Oral daily  QUEtiapine 25 milliGRAM(s) Oral at bedtime  sodium chloride 0.9%. 1000 milliLiter(s) IV Continuous <Continuous>      ANTIBIOTICS:  cefTRIAXone   IVPB 1000 milliGRAM(s) IV Intermittent every 24 hours

## 2021-12-23 NOTE — PROGRESS NOTE ADULT - PROVIDER SPECIALTY LIST ADULT
Internal Medicine
Hospitalist
Internal Medicine
Internal Medicine
Hospitalist
Internal Medicine
Infectious Disease

## 2021-12-23 NOTE — DISCHARGE NOTE NURSING/CASE MANAGEMENT/SOCIAL WORK - NSDCPEFALRISK_GEN_ALL_CORE
For information on Fall & Injury Prevention, visit: [email or web address] OR  [email or web address] OR  https://www.cdc.gov/steadi/patient.html

## 2021-12-23 NOTE — DISCHARGE NOTE PROVIDER - CARE PROVIDERS DIRECT ADDRESSES
,DirectAddress_Unknown,[email or web address]

## 2021-12-23 NOTE — DISCHARGE NOTE PROVIDER - CARE PROVIDER_API CALL
Jericho Calixto)  Infectious Disease; Internal Medicine  [address] [place] [address]  Phone: [phone]  Fax: [phone]  Follow Up Time:     Matt Lucas)  Hematology; Internal Medicine; Medical Oncology  [address] [place] [address]  Phone: [phone]  Fax: [phone]  Follow Up Time:

## 2022-01-02 DIAGNOSIS — F02.80 DEMENTIA IN OTHER DISEASES CLASSIFIED ELSEWHERE, UNSPECIFIED SEVERITY, WITHOUT BEHAVIORAL DISTURBANCE, PSYCHOTIC DISTURBANCE, MOOD DISTURBANCE, AND ANXIETY: ICD-10-CM

## 2022-01-02 DIAGNOSIS — G31.83 NEUROCOGNITIVE DISORDER WITH LEWY BODIES: ICD-10-CM
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
## 2022-01-02 DIAGNOSIS — E87.0 HYPEROSMOLALITY AND HYPERNATREMIA: ICD-10-CM
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
## 2022-01-02 DIAGNOSIS — I10 ESSENTIAL (PRIMARY) HYPERTENSION: ICD-10-CM

## 2022-01-02 DIAGNOSIS — J32.9 CHRONIC SINUSITIS, UNSPECIFIED: ICD-10-CM

## 2022-01-02 DIAGNOSIS — U07.1 COVID-19: ICD-10-CM

## 2022-01-02 DIAGNOSIS — N39.0 URINARY TRACT INFECTION, SITE NOT SPECIFIED: ICD-10-CM

## 2022-01-02 DIAGNOSIS — J98.11 ATELECTASIS: ICD-10-CM

## 2022-01-02 DIAGNOSIS — G93.41 METABOLIC ENCEPHALOPATHY: ICD-10-CM

## 2022-01-02 DIAGNOSIS — M19.90 UNSPECIFIED OSTEOARTHRITIS, UNSPECIFIED SITE: ICD-10-CM

## 2022-01-02 DIAGNOSIS — F41.9 ANXIETY DISORDER, UNSPECIFIED: ICD-10-CM

## 2022-01-02 DIAGNOSIS — Z78.1 PHYSICAL RESTRAINT STATUS: ICD-10-CM

## 2022-01-02 DIAGNOSIS — T14.8XXA OTHER INJURY OF UNSPECIFIED BODY REGION, INITIAL ENCOUNTER: ICD-10-CM

## 2022-01-02 DIAGNOSIS — B96.20 UNSPECIFIED ESCHERICHIA COLI [E. COLI] AS THE CAUSE OF DISEASES CLASSIFIED ELSEWHERE: ICD-10-CM

## 2022-01-02 DIAGNOSIS — E86.0 DEHYDRATION: ICD-10-CM
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
## 2022-01-02 DIAGNOSIS — E87.6 HYPOKALEMIA: ICD-10-CM

## 2022-02-15 ENCOUNTER — EMERGENCY (EMERGENCY)
Facility: HOSPITAL | Age: 82
LOS: 0 days | Discharge: HOME | End: 2022-02-15
Attending: EMERGENCY MEDICINE | Admitting: EMERGENCY MEDICINE
Payer: MEDICARE

## 2022-02-15 VITALS
OXYGEN SATURATION: 95 % | SYSTOLIC BLOOD PRESSURE: 141 MMHG | WEIGHT: 173.94 LBS | HEART RATE: 83 BPM | RESPIRATION RATE: 18 BRPM | DIASTOLIC BLOOD PRESSURE: 67 MMHG | TEMPERATURE: 98 F | HEIGHT: 68 IN

## 2022-02-15 VITALS
HEART RATE: 80 BPM | TEMPERATURE: 97 F | OXYGEN SATURATION: 95 % | SYSTOLIC BLOOD PRESSURE: 138 MMHG | RESPIRATION RATE: 20 BRPM

## 2022-02-15 DIAGNOSIS — R22.43 LOCALIZED SWELLING, MASS AND LUMP, LOWER LIMB, BILATERAL: ICD-10-CM
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
## 2022-02-15 DIAGNOSIS — I10 ESSENTIAL (PRIMARY) HYPERTENSION: ICD-10-CM

## 2022-02-15 DIAGNOSIS — R07.89 OTHER CHEST PAIN: ICD-10-CM

## 2022-02-15 LAB
ALBUMIN SERPL ELPH-MCNC: 4.4 G/DL — SIGNIFICANT CHANGE UP (ref 3.5–5.2)
ALP SERPL-CCNC: 107 U/L — SIGNIFICANT CHANGE UP (ref 30–115)
ALT FLD-CCNC: 15 U/L — SIGNIFICANT CHANGE UP (ref 0–41)
ANION GAP SERPL CALC-SCNC: 12 MMOL/L — SIGNIFICANT CHANGE UP (ref 7–14)
AST SERPL-CCNC: 27 U/L — SIGNIFICANT CHANGE UP (ref 0–41)
BASOPHILS # BLD AUTO: 0.07 K/UL — SIGNIFICANT CHANGE UP (ref 0–0.2)
BASOPHILS NFR BLD AUTO: 0.6 % — SIGNIFICANT CHANGE UP (ref 0–1)
BILIRUB SERPL-MCNC: 0.4 MG/DL — SIGNIFICANT CHANGE UP (ref 0.2–1.2)
BUN SERPL-MCNC: 18 MG/DL — SIGNIFICANT CHANGE UP (ref 10–20)
CALCIUM SERPL-MCNC: 9.6 MG/DL — SIGNIFICANT CHANGE UP (ref 8.5–10.1)
CHLORIDE SERPL-SCNC: 96 MMOL/L — LOW (ref 98–110)
CO2 SERPL-SCNC: 31 MMOL/L — SIGNIFICANT CHANGE UP (ref 17–32)
CREAT SERPL-MCNC: 0.9 MG/DL — SIGNIFICANT CHANGE UP (ref 0.7–1.5)
EOSINOPHIL # BLD AUTO: 0.06 K/UL — SIGNIFICANT CHANGE UP (ref 0–0.7)
EOSINOPHIL NFR BLD AUTO: 0.5 % — SIGNIFICANT CHANGE UP (ref 0–8)
GLUCOSE SERPL-MCNC: 89 MG/DL — SIGNIFICANT CHANGE UP (ref 70–99)
HCT VFR BLD CALC: 47.5 % — HIGH (ref 37–47)
HGB BLD-MCNC: 16.1 G/DL — HIGH (ref 12–16)
IMM GRANULOCYTES NFR BLD AUTO: 1 % — HIGH (ref 0.1–0.3)
LACTATE SERPL-SCNC: 1.3 MMOL/L — SIGNIFICANT CHANGE UP (ref 0.7–2)
LIDOCAIN IGE QN: 17 U/L — SIGNIFICANT CHANGE UP (ref 7–60)
LYMPHOCYTES # BLD AUTO: 0.98 K/UL — LOW (ref 1.2–3.4)
LYMPHOCYTES # BLD AUTO: 8.1 % — LOW (ref 20.5–51.1)
MCHC RBC-ENTMCNC: 31.3 PG — HIGH (ref 27–31)
MCHC RBC-ENTMCNC: 33.9 G/DL — SIGNIFICANT CHANGE UP (ref 32–37)
MCV RBC AUTO: 92.2 FL — SIGNIFICANT CHANGE UP (ref 81–99)
MONOCYTES # BLD AUTO: 1.01 K/UL — HIGH (ref 0.1–0.6)
MONOCYTES NFR BLD AUTO: 8.4 % — SIGNIFICANT CHANGE UP (ref 1.7–9.3)
NEUTROPHILS # BLD AUTO: 9.85 K/UL — HIGH (ref 1.4–6.5)
NEUTROPHILS NFR BLD AUTO: 81.4 % — HIGH (ref 42.2–75.2)
NRBC # BLD: 0 /100 WBCS — SIGNIFICANT CHANGE UP (ref 0–0)
NT-PROBNP SERPL-SCNC: 485 PG/ML — HIGH (ref 0–300)
PLATELET # BLD AUTO: 260 K/UL — SIGNIFICANT CHANGE UP (ref 130–400)
POTASSIUM SERPL-MCNC: 3.1 MMOL/L — LOW (ref 3.5–5)
POTASSIUM SERPL-SCNC: 3.1 MMOL/L — LOW (ref 3.5–5)
PROT SERPL-MCNC: 7.1 G/DL — SIGNIFICANT CHANGE UP (ref 6–8)
RBC # BLD: 5.15 M/UL — SIGNIFICANT CHANGE UP (ref 4.2–5.4)
RBC # FLD: 13.5 % — SIGNIFICANT CHANGE UP (ref 11.5–14.5)
SODIUM SERPL-SCNC: 139 MMOL/L — SIGNIFICANT CHANGE UP (ref 135–146)
TROPONIN T SERPL-MCNC: <0.01 NG/ML — SIGNIFICANT CHANGE UP
WBC # BLD: 12.09 K/UL — HIGH (ref 4.8–10.8)
WBC # FLD AUTO: 12.09 K/UL — HIGH (ref 4.8–10.8)

## 2022-02-15 PROCEDURE — 74177 CT ABD & PELVIS W/CONTRAST: CPT | Mod: 26,MA

## 2022-02-15 PROCEDURE — 76705 ECHO EXAM OF ABDOMEN: CPT | Mod: 26

## 2022-02-15 PROCEDURE — 71260 CT THORAX DX C+: CPT | Mod: 26,MA

## 2022-02-15 PROCEDURE — 71045 X-RAY EXAM CHEST 1 VIEW: CPT | Mod: 26

## 2022-02-15 PROCEDURE — 99285 EMERGENCY DEPT VISIT HI MDM: CPT | Mod: 25,GC

## 2022-02-15 PROCEDURE — 93308 TTE F-UP OR LMTD: CPT | Mod: 26

## 2022-02-15 PROCEDURE — 72170 X-RAY EXAM OF PELVIS: CPT | Mod: 26

## 2022-02-15 PROCEDURE — 70450 CT HEAD/BRAIN W/O DYE: CPT | Mod: 26,MA

## 2022-02-15 PROCEDURE — 93010 ELECTROCARDIOGRAM REPORT: CPT

## 2022-02-15 PROCEDURE — 72125 CT NECK SPINE W/O DYE: CPT | Mod: 26,MA

## 2022-02-15 RX ORDER — POTASSIUM CHLORIDE 20 MEQ
40 PACKET (EA) ORAL ONCE
Refills: 0 | Status: DISCONTINUED | OUTPATIENT
Start: 2022-02-15 | End: 2022-02-15

## 2022-02-15 RX ORDER — TETANUS AND DIPHTHERIA TOXOIDS ADSORBED 2; 2 [LF]/.5ML; [LF]/.5ML
0.5 INJECTION INTRAMUSCULAR ONCE
Refills: 0 | Status: COMPLETED | OUTPATIENT
Start: 2022-02-15 | End: 2022-02-15

## 2022-02-15 RX ADMIN — TETANUS AND DIPHTHERIA TOXOIDS ADSORBED 0.5 MILLILITER(S): 2; 2 INJECTION INTRAMUSCULAR at 14:49

## 2022-02-15 RX ADMIN — Medication 40 MILLIEQUIVALENT(S): at 14:50

## 2022-02-15 NOTE — ED ADULT TRIAGE NOTE - HISTORY OF COVID-19 VACCINATION
Yes Island Pedicle Flap-Requiring Vessel Identification Text: The defect edges were debeveled with a #15 scalpel blade.  Given the location of the defect, shape of the defect and the proximity to free margins an island pedicle advancement flap was deemed most appropriate.  Using a sterile surgical marker, an appropriate advancement flap was drawn, based on the axial vessel mentioned above, incorporating the defect, outlining the appropriate donor tissue and placing the expected incisions within the relaxed skin tension lines where possible.    The area thus outlined was incised deep to adipose tissue with a #15 scalpel blade.  The skin margins were undermined to an appropriate distance in all directions around the primary defect and laterally outward around the island pedicle utilizing iris scissors.  There was minimal undermining beneath the pedicle flap.

## 2022-02-15 NOTE — ED PROVIDER NOTE - PATIENT PORTAL LINK FT
You can access the FollowMyHealth Patient Portal offered by [facility] by registering at the following website: [email or web address]. By joining [email or web address]’s FollowMyHealth portal, you will also be able to view your health information using other applications (apps) compatible with our system.

## 2022-02-15 NOTE — ED PROVIDER NOTE - OBJECTIVE STATEMENT
81-year-old female history of hypertension, arthritis, Lewy body dementia, anxiety, status post fall 2 days ago at home landed on her right side with abrasion to her right elbow, this yesterday developed positional chest pain/right side torso pain especially with taking deep breath and making certain movements.  Seen by PA from PMDs office who recommended going to emergency department for further evaluation.  Chest pain is not associated with diaphoresis or vomiting.  Able to range her right elbow without difficulty.  Positive head trauma but no LOC.  Ambulatory since the event 2 days ago.  Positive chronic lower extremity swelling and multiple areas of ecchymosis.  Patient's son at bedside and providing further history in detail.

## 2022-02-15 NOTE — ED PROVIDER NOTE - PROVIDER TOKENS
PROVIDER:[TOKEN:[[id number]],FOLLOWUP:[Routine]],PROVIDER:[TOKEN:[[id number]],FOLLOWUP:[1-3 Days]]

## 2022-02-15 NOTE — ED ADULT NURSE NOTE - OBJECTIVE STATEMENT
Pt reports mid sternal chest pain with right sided torso pain starting two days ago after a fall. Pain has small laceration to the right elbow due to the fall. Pain does not radiate

## 2022-02-15 NOTE — ED PROVIDER NOTE - CARE PROVIDER_API CALL
Leny Desai)  Cardiovascular Disease; Internal Medicine  [address] [place] [address]. [address]  [place] [address]  Phone: [phone]  Fax: [phone]  Follow Up Time: Routine    Deborah Barnett ()  Family Medicine  [address] [place] [address] [address]  [place] [address]  Phone: [phone]  Fax: [phone]  Follow Up Time: 1-3 Days

## 2022-02-15 NOTE — ED PROVIDER NOTE - PROGRESS NOTE DETAILS
Discussed all results with patient. Pt feels well, is comfortable with dc. F/u with PMD. Will also give Cardio contact regarding LE edema.

## 2022-02-15 NOTE — ED PROVIDER NOTE - PHYSICAL EXAMINATION
VITAL SIGNS: I have reviewed nursing notes and confirm.  CONSTITUTIONAL: non-toxic, well appearing, + airway intact, GCS 15  SKIN: Positive skin abrasion to the right elbow, (+) Multiple areas of ecchymosis BLE, as per son chronic,  EYES: PERRL, EOMI,  ENT:  tongue midline,  NECK: Supple; no cervical-thoracic-lumbar spine tenderness  CARD: RRR, equal radial pulses bilaterally 2+  RESP: Bilateral crackles at the bases, no respiratory distress, no crepitus over chest wall  ABD: Soft, non-tender, non-distended  PELVIS: stable  EXT: Normal ROM x4. no bony tenderness, equal strength bilaterally  NEURO: Alert, oriented. CN2-12 intact, equal strength bilaterally  PSYCH: Cooperative, appropriate.

## 2022-02-15 NOTE — ED ADULT NURSE NOTE - NSIMPLEMENTINTERV_GEN_ALL_ED
Implemented All Fall Risk Interventions:  [place] to call system. Call bell, personal items and telephone within reach. Instruct patient to call for assistance. Room bathroom lighting operational. Non-slip footwear when patient is off stretcher. Physically safe environment: no spills, clutter or unnecessary equipment. Stretcher in lowest position, wheels locked, appropriate side rails in place. Provide visual cue, wrist band, yellow gown, etc. Monitor gait and stability. Monitor for mental status changes and reorient to person, place, and time. Review medications for side effects contributing to fall risk. Reinforce activity limits and safety measures with patient and family.

## 2022-02-15 NOTE — ED PROVIDER NOTE - NS ED ROS FT
Review of Systems    Constitutional: (-) fever  Cardiovascular: (+) chest pain, (-) syncope  Respiratory: (-) cough, (-) shortness of breath  Gastrointestinal: (-) vomiting, (-) diarrhea, (-) abdominal pain  Musculoskeletal: (-) neck pain, (-) back pain, (-) joint pain  Integumentary: (+) rash, (+) edema  Neurological: (-) headache, (-) altered mental status    Except as documented in the HPI, all other systems are negative.

## 2022-02-23 ENCOUNTER — APPOINTMENT (OUTPATIENT)
Dept: CARDIOLOGY | Facility: CLINIC | Age: 82
End: 2022-02-23
Payer: MEDICARE

## 2022-02-23 VITALS
RESPIRATION RATE: 16 BRPM | BODY MASS INDEX: 25.16 KG/M2 | HEIGHT: 68 IN | WEIGHT: 166 LBS | DIASTOLIC BLOOD PRESSURE: 72 MMHG | SYSTOLIC BLOOD PRESSURE: 130 MMHG | TEMPERATURE: 98.2 F

## 2022-02-23 DIAGNOSIS — U07.1 COVID-19: ICD-10-CM

## 2022-02-23 DIAGNOSIS — I25.10 ATHEROSCLEROTIC HEART DISEASE OF NATIVE CORONARY ARTERY W/OUT ANGINA PECTORIS: ICD-10-CM

## 2022-02-23 DIAGNOSIS — E78.5 HYPERLIPIDEMIA, UNSPECIFIED: ICD-10-CM

## 2022-02-23 DIAGNOSIS — R25.1 TREMOR, UNSPECIFIED: ICD-10-CM

## 2022-02-23 DIAGNOSIS — Z00.00 ENCOUNTER FOR GENERAL ADULT MEDICAL EXAMINATION W/OUT ABNORMAL FINDINGS: ICD-10-CM

## 2022-02-23 DIAGNOSIS — I10 ESSENTIAL (PRIMARY) HYPERTENSION: ICD-10-CM

## 2022-02-23 DIAGNOSIS — R94.31 ABNORMAL ELECTROCARDIOGRAM [ECG] [EKG]: ICD-10-CM

## 2022-02-23 DIAGNOSIS — R60.0 LOCALIZED EDEMA: ICD-10-CM

## 2022-02-23 PROCEDURE — 99204 OFFICE O/P NEW MOD 45 MIN: CPT | Mod: CS

## 2022-02-23 PROCEDURE — 99213 OFFICE O/P EST LOW 20 MIN: CPT

## 2022-02-23 PROCEDURE — 93000 ELECTROCARDIOGRAM COMPLETE: CPT

## 2022-02-23 RX ORDER — DOXYCYCLINE HYCLATE 100 MG/1
100 CAPSULE ORAL
Qty: 14 | Refills: 0 | Status: ACTIVE | COMMUNITY
Start: 2021-11-07

## 2022-02-23 RX ORDER — QUETIAPINE FUMARATE 25 MG/1
25 TABLET ORAL
Qty: 30 | Refills: 0 | Status: ACTIVE | COMMUNITY
Start: 2021-12-23

## 2022-02-23 RX ORDER — CEPHALEXIN 500 MG/1
500 CAPSULE ORAL
Qty: 40 | Refills: 0 | Status: ACTIVE | COMMUNITY
Start: 2021-08-13

## 2022-02-23 RX ORDER — SERTRALINE 25 MG/1
25 TABLET, FILM COATED ORAL
Qty: 90 | Refills: 0 | Status: ACTIVE | COMMUNITY
Start: 2022-02-07

## 2022-02-23 RX ORDER — SERTRALINE HYDROCHLORIDE 50 MG/1
50 TABLET, FILM COATED ORAL
Qty: 90 | Refills: 0 | Status: ACTIVE | COMMUNITY
Start: 2022-02-07

## 2022-02-23 RX ORDER — GABAPENTIN 100 MG/1
100 CAPSULE ORAL
Qty: 270 | Refills: 0 | Status: ACTIVE | COMMUNITY
Start: 2022-02-07

## 2022-02-23 RX ORDER — FUROSEMIDE 40 MG/1
40 TABLET ORAL
Qty: 8 | Refills: 0 | Status: ACTIVE | COMMUNITY
Start: 2021-08-13

## 2022-02-23 RX ORDER — CEFPODOXIME PROXETIL 100 MG/1
100 TABLET, FILM COATED ORAL
Qty: 14 | Refills: 0 | Status: ACTIVE | COMMUNITY
Start: 2021-12-23

## 2022-02-23 RX ORDER — SERTRALINE HYDROCHLORIDE 100 MG/1
100 TABLET, FILM COATED ORAL
Qty: 30 | Refills: 0 | Status: ACTIVE | COMMUNITY
Start: 2022-02-15

## 2022-02-23 RX ORDER — MUPIROCIN 20 MG/G
2 OINTMENT TOPICAL
Qty: 15 | Refills: 0 | Status: ACTIVE | COMMUNITY
Start: 2022-02-15

## 2022-02-23 RX ORDER — ERGOCALCIFEROL 1.25 MG/1
1.25 MG CAPSULE, LIQUID FILLED ORAL
Qty: 12 | Refills: 0 | Status: ACTIVE | COMMUNITY
Start: 2022-01-17

## 2022-02-23 NOTE — HISTORY OF PRESENT ILLNESS
[FreeTextEntry1] : Lewy Body dementia\par Hypertension\par Anxiety disorder \par COVID-19 infection in 12/21\par Tremor\par Hyperlipidemia not on any medications\par Coronary calcifications\par Inferior lateral ST changes which may represent coronary  artery disease

## 2022-02-23 NOTE — DISCUSSION/SUMMARY
[FreeTextEntry1] : Patient did not wish to undergo any cardiac testing ie pharmacologic stress testing or CCTA.\par I did advise a 2D echo doppler to assess her LV systolic function. \par Patient's daughter stated that its difficult to have her mother go for testing due to her inability to ambulate and return for outpatient  stress testing or having an echo doppler.\par They will discuss the matter further at home.\par The patient was also not agreeable to treat her elevated lipid levels, she is not on lipid lowering therapy and should be given her prior lab results and CT findings.\par Maintain a low fat, low cholesterol diet. The patient will be seeing Dr. Lozano who was kind enough the evaluate the patient's lower extremity edema and exclude any vascular issues. It appears that this issue is of more concern to the patient and her family.

## 2022-02-23 NOTE — ASSESSMENT
[FreeTextEntry1] : ASHD\par Coronary calcifications\par Hypertension\par Hyperlipidemia\par History of recent COVID-19 infection\par R/O Vascular disease

## 2022-02-23 NOTE — REVIEW OF SYSTEMS
[Tremor] : a tremor was seen [Memory Lapses Or Loss] : memory lapses or loss [Anxiety] : anxiety [Easy Bruising] : a tendency for easy bruising [Negative] : Heme/Lymph [FreeTextEntry9] : Inability to ambulate

## 2022-02-23 NOTE — REASON FOR VISIT
[FreeTextEntry1] : Patient presents for an initial Cardiology evaluation after a recent ER visit and being told that she has an abnormal 12 lead EKG. They are also here because of concerns regarding isolated lower extremity edema.  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 A CT of her abdomen and pelvis revealed atherosclerotic calcifications of her aorta and coronary arteries. The patient made it clear that she does not wish to undergo any pharmacologic stress testing nor any other cardiac testing which would complicate excluding coronary artery disease as a possible cause of her abnormal 12 lead EKG.

## 2022-02-23 NOTE — PHYSICAL EXAM
[Frail] : frail [Normal Conjunctiva] : normal conjunctiva [Normal Venous Pressure] : normal venous pressure [Normal S1, S2] : normal S1, S2 [No Rub] : no rub [No Respiratory Distress] : no respiratory distress  [Soft] : abdomen soft [de-identified] : No rales, no crackles [de-identified] : Inability to ambulate independently [de-identified] : Left lower extremity edema [de-identified] : Multiple ecchymotic areas [de-identified] : Tremor [de-identified] : Evidence for mild cognitive impairment, Questionable history of Lewy Body dementia, possible lacunar infarcts

## 2022-02-25 PROBLEM — R60.0 EDEMA OF LEFT LOWER EXTREMITY: Status: ACTIVE | Noted: 2022-02-23

## 2022-02-25 NOTE — REASON FOR VISIT
[Initial Visit] : an initial visit for [Leg swelling] : leg swelling [Family Member] : family member [Formal Caregiver] : formal caregiver

## 2022-02-25 NOTE — PHYSICAL EXAM
[Well Developed] : well developed [Well Nourished] : well nourished [No Acute Distress] : no acute distress [No Carotid Bruit] : no carotid bruit [Normal S1, S2] : normal S1, S2 [No Murmur] : no murmur [No Rub] : no rub [No Gallop] : no gallop [Clear Lung Fields] : clear lung fields [Good Air Entry] : good air entry [Soft] : abdomen soft [Moves all extremities] : moves all extremities [No Focal Deficits] : no focal deficits [Normal Speech] : normal speech [No ulcers] : no ulcers [No varicosities] : no varicosities [Trace] : Right: trace [1+] : Left:1+ [Calf] : negative on the calf [Absent] : Left Lower Extremity: absent [Present] : Left Lower Extremity: present [Right Foot] : Right Foot: negative [Left Foot] : Left Foot: positive

## 2022-02-25 NOTE — REVIEW OF SYSTEMS
[Fever] : no fever [Headache] : no headache [Chills] : no chills [SOB] : no shortness of breath [Dyspnea on exertion] : not dyspnea during exertion [Chest Discomfort] : no chest discomfort [Lower Ext Edema] : lower extremity edema [Leg Claudication] : no intermittent leg claudication [Palpitations] : no palpitations [Orthopnea] : no orthopnea [PND] : no PND [Syncope] : no syncope [Abdominal Pain] : no abdominal pain [Nausea] : no nausea [Joint Pain] : no joint pain [Rash] : no rash [Dizziness] : no dizziness [Memory Lapses Or Loss] : memory lapses or loss

## 2022-02-25 NOTE — ASSESSMENT
[FreeTextEntry1] : Assessment:\par #L>R leg swelling\par - Positive Stemmer's\par - Exam consistent with lymphedema\par \par Plan:\par 1. Initiate conservative therapies for edema:\par - Elevate legs\par - Apply a non-petrolatum based moisturizer before bedtime\par 2.  Venous duplex to rule out DVT\par 3.  Evaluate for other causes of edema\par - Echocardiogram\par 4.  Consider cessation of medications that may exacerbate edema\par - Amlodipine\par - Gabapentin/Pregabalin\par 5.  Return in 2 months.\par

## 2022-02-25 NOTE — HISTORY OF PRESENT ILLNESS
[FreeTextEntry1] : 81F with Lewy body dementia, hysterectomy, L sided hernia repair x2 and recent fall who is here for evaluation of chronic L leg swelling. \par \par Accompanied by daughter, Lavern, and home health aid who provide majority of history. \par \par Swelling has been occurring for years. Occasionally uncomfortable, but no pain. Swelling somewhat improves with leg elevation. No skin breakdown, ulcer or weeping. No history of DVT/PE. Patient unable to get don compression garments. \par \par Patient is very anxious today to be in the office. She has trouble leaving her home. \par \par

## 2022-04-21 ENCOUNTER — APPOINTMENT (OUTPATIENT)
Dept: CARDIOLOGY | Facility: CLINIC | Age: 82
End: 2022-04-21

## 2022-06-23 ENCOUNTER — APPOINTMENT (OUTPATIENT)
Dept: CARDIOLOGY | Facility: CLINIC | Age: 82
End: 2022-06-23

## 2022-07-20 ENCOUNTER — EMERGENCY (EMERGENCY)
Facility: HOSPITAL | Age: 82
LOS: 0 days | Discharge: HOME | End: 2022-07-21
Attending: EMERGENCY MEDICINE | Admitting: EMERGENCY MEDICINE

## 2022-07-20 VITALS
DIASTOLIC BLOOD PRESSURE: 77 MMHG | SYSTOLIC BLOOD PRESSURE: 125 MMHG | HEIGHT: 68 IN | OXYGEN SATURATION: 95 % | TEMPERATURE: 98 F | RESPIRATION RATE: 20 BRPM | WEIGHT: 149.91 LBS | HEART RATE: 88 BPM

## 2022-07-20 DIAGNOSIS — G31.83 NEUROCOGNITIVE DISORDER WITH LEWY BODIES: ICD-10-CM

## 2022-07-20 DIAGNOSIS — E87.6 HYPOKALEMIA: ICD-10-CM

## 2022-07-20 DIAGNOSIS — N39.0 URINARY TRACT INFECTION, SITE NOT SPECIFIED: ICD-10-CM

## 2022-07-20 DIAGNOSIS — R53.1 WEAKNESS: ICD-10-CM

## 2022-07-20 DIAGNOSIS — R74.8 ABNORMAL LEVELS OF OTHER SERUM ENZYMES: ICD-10-CM

## 2022-07-20 DIAGNOSIS — I10 ESSENTIAL (PRIMARY) HYPERTENSION: ICD-10-CM

## 2022-07-20 DIAGNOSIS — F41.9 ANXIETY DISORDER, UNSPECIFIED: ICD-10-CM

## 2022-07-20 DIAGNOSIS — Z20.822 CONTACT WITH AND (SUSPECTED) EXPOSURE TO COVID-19: ICD-10-CM

## 2022-07-20 DIAGNOSIS — M19.90 UNSPECIFIED OSTEOARTHRITIS, UNSPECIFIED SITE: ICD-10-CM

## 2022-07-20 LAB
ALBUMIN SERPL ELPH-MCNC: 3.9 G/DL — SIGNIFICANT CHANGE UP (ref 3.5–5.2)
ALP SERPL-CCNC: 83 U/L — SIGNIFICANT CHANGE UP (ref 30–115)
ALT FLD-CCNC: <5 U/L — SIGNIFICANT CHANGE UP (ref 0–41)
ANION GAP SERPL CALC-SCNC: 14 MMOL/L — SIGNIFICANT CHANGE UP (ref 7–14)
APPEARANCE UR: ABNORMAL
AST SERPL-CCNC: 18 U/L — SIGNIFICANT CHANGE UP (ref 0–41)
BASE EXCESS BLDV CALC-SCNC: 5.2 MMOL/L — HIGH (ref -2–3)
BASOPHILS # BLD AUTO: 0.06 K/UL — SIGNIFICANT CHANGE UP (ref 0–0.2)
BASOPHILS NFR BLD AUTO: 0.4 % — SIGNIFICANT CHANGE UP (ref 0–1)
BILIRUB SERPL-MCNC: 0.2 MG/DL — SIGNIFICANT CHANGE UP (ref 0.2–1.2)
BILIRUB UR-MCNC: NEGATIVE — SIGNIFICANT CHANGE UP
BUN SERPL-MCNC: 18 MG/DL — SIGNIFICANT CHANGE UP (ref 10–20)
CA-I SERPL-SCNC: 1.26 MMOL/L — SIGNIFICANT CHANGE UP (ref 1.15–1.33)
CALCIUM SERPL-MCNC: 9.7 MG/DL — SIGNIFICANT CHANGE UP (ref 8.5–10.1)
CHLORIDE SERPL-SCNC: 103 MMOL/L — SIGNIFICANT CHANGE UP (ref 98–110)
CO2 SERPL-SCNC: 25 MMOL/L — SIGNIFICANT CHANGE UP (ref 17–32)
COLOR SPEC: YELLOW — SIGNIFICANT CHANGE UP
CREAT SERPL-MCNC: 1 MG/DL — SIGNIFICANT CHANGE UP (ref 0.7–1.5)
DIFF PNL FLD: NEGATIVE — SIGNIFICANT CHANGE UP
EGFR: 57 ML/MIN/1.73M2 — LOW
EOSINOPHIL # BLD AUTO: 0.06 K/UL — SIGNIFICANT CHANGE UP (ref 0–0.7)
EOSINOPHIL NFR BLD AUTO: 0.4 % — SIGNIFICANT CHANGE UP (ref 0–8)
GAS PNL BLDV: 137 MMOL/L — SIGNIFICANT CHANGE UP (ref 136–145)
GAS PNL BLDV: SIGNIFICANT CHANGE UP
GAS PNL BLDV: SIGNIFICANT CHANGE UP
GLUCOSE SERPL-MCNC: 105 MG/DL — HIGH (ref 70–99)
GLUCOSE UR QL: SIGNIFICANT CHANGE UP
HCO3 BLDV-SCNC: 31 MMOL/L — HIGH (ref 22–29)
HCT VFR BLD CALC: 45.3 % — SIGNIFICANT CHANGE UP (ref 37–47)
HCT VFR BLDA CALC: 47 % — SIGNIFICANT CHANGE UP (ref 39–51)
HGB BLD CALC-MCNC: 15.6 G/DL — SIGNIFICANT CHANGE UP (ref 12.6–17.4)
HGB BLD-MCNC: 15.1 G/DL — SIGNIFICANT CHANGE UP (ref 12–16)
IMM GRANULOCYTES NFR BLD AUTO: 0.8 % — HIGH (ref 0.1–0.3)
KETONES UR-MCNC: SIGNIFICANT CHANGE UP
LACTATE BLDV-MCNC: 1.1 MMOL/L — SIGNIFICANT CHANGE UP (ref 0.5–2)
LEUKOCYTE ESTERASE UR-ACNC: ABNORMAL
LYMPHOCYTES # BLD AUTO: 1 K/UL — LOW (ref 1.2–3.4)
LYMPHOCYTES # BLD AUTO: 7.4 % — LOW (ref 20.5–51.1)
MCHC RBC-ENTMCNC: 30.1 PG — SIGNIFICANT CHANGE UP (ref 27–31)
MCHC RBC-ENTMCNC: 33.3 G/DL — SIGNIFICANT CHANGE UP (ref 32–37)
MCV RBC AUTO: 90.2 FL — SIGNIFICANT CHANGE UP (ref 81–99)
MONOCYTES # BLD AUTO: 1.02 K/UL — HIGH (ref 0.1–0.6)
MONOCYTES NFR BLD AUTO: 7.5 % — SIGNIFICANT CHANGE UP (ref 1.7–9.3)
NEUTROPHILS # BLD AUTO: 11.35 K/UL — HIGH (ref 1.4–6.5)
NEUTROPHILS NFR BLD AUTO: 83.5 % — HIGH (ref 42.2–75.2)
NITRITE UR-MCNC: NEGATIVE — SIGNIFICANT CHANGE UP
NRBC # BLD: 0 /100 WBCS — SIGNIFICANT CHANGE UP (ref 0–0)
PCO2 BLDV: 49 MMHG — HIGH (ref 39–42)
PH BLDV: 7.41 — SIGNIFICANT CHANGE UP (ref 7.32–7.43)
PH UR: 6 — SIGNIFICANT CHANGE UP (ref 5–8)
PLATELET # BLD AUTO: 278 K/UL — SIGNIFICANT CHANGE UP (ref 130–400)
PO2 BLDV: 26 MMHG — SIGNIFICANT CHANGE UP
POTASSIUM BLDV-SCNC: 3 MMOL/L — LOW (ref 3.5–5.1)
POTASSIUM SERPL-MCNC: 3.2 MMOL/L — LOW (ref 3.5–5)
POTASSIUM SERPL-SCNC: 3.2 MMOL/L — LOW (ref 3.5–5)
PROT SERPL-MCNC: 6.7 G/DL — SIGNIFICANT CHANGE UP (ref 6–8)
PROT UR-MCNC: ABNORMAL
RBC # BLD: 5.02 M/UL — SIGNIFICANT CHANGE UP (ref 4.2–5.4)
RBC # FLD: 14.7 % — HIGH (ref 11.5–14.5)
SAO2 % BLDV: 44.7 % — SIGNIFICANT CHANGE UP
SODIUM SERPL-SCNC: 142 MMOL/L — SIGNIFICANT CHANGE UP (ref 135–146)
SP GR SPEC: 1.03 — SIGNIFICANT CHANGE UP (ref 1.01–1.03)
TROPONIN T SERPL-MCNC: 0.02 NG/ML — HIGH
UROBILINOGEN FLD QL: SIGNIFICANT CHANGE UP
WBC # BLD: 13.6 K/UL — HIGH (ref 4.8–10.8)
WBC # FLD AUTO: 13.6 K/UL — HIGH (ref 4.8–10.8)

## 2022-07-20 PROCEDURE — 71045 X-RAY EXAM CHEST 1 VIEW: CPT | Mod: 26

## 2022-07-20 PROCEDURE — 99284 EMERGENCY DEPT VISIT MOD MDM: CPT

## 2022-07-20 PROCEDURE — 70450 CT HEAD/BRAIN W/O DYE: CPT | Mod: 26,MA

## 2022-07-20 PROCEDURE — 93010 ELECTROCARDIOGRAM REPORT: CPT

## 2022-07-20 NOTE — ED PROVIDER NOTE - PATIENT PORTAL LINK FT
You can access the FollowMyHealth Patient Portal offered by [facility] by registering at the following website: [email or web address]. By joining [employer]’s FollowMyHealth portal, you will also be able to view your health information using other applications (apps) compatible with our system.

## 2022-07-20 NOTE — ED ADULT TRIAGE NOTE - CHIEF COMPLAINT QUOTE
pt biba from home, family sts pt has had left sided facial droop x 36 hours with drooling and chest pain that started today pt biba from home, family sts pt has had left sided facial droop x 36 hours with drooling and chest pain that started today; pt otherwise at normal baseline as per daughter

## 2022-07-20 NOTE — ED PROVIDER NOTE - CLINICAL SUMMARY MEDICAL DECISION MAKING FREE TEXT BOX
gen weakness - ED w/u sig for hypokalemia (known h/o on oral suppl), Tn 0.02, equivocal urine w/h/o chronic utis will tx empirically - pt advised of rec for admission for further mgmt, repeat Tn, requested to leave ama - Risks of same explained to pt incl. possible disability/death. Pt verbalized understanding of same - all results d/w pt & children @ bedside & copies given, rec close op pcp, cardio f/u

## 2022-07-20 NOTE — ED PROVIDER NOTE - PHYSICAL EXAMINATION
PE:  frail elderly f, nad  skin warm, dry, multiple skin tears  ncat  neck supple  rrr nl s1s2 no mrg  ctab no wrr  abd soft ntnd no palpable masses no rgr  back non-tender no cvat  ext +LE edema bl, L=R, dpi  neuro aaox3, cn 2-12 intact bl no nystagmus or facial droop, 5/5 strength x 4 no drift, gross sensation intact, finger-nose nl, gait nl, romberg neg

## 2022-07-20 NOTE — ED PROVIDER NOTE - CARE PROVIDER_API CALL
Deborah Barnett (DO)  Family Medicine  [address] [facility] [address] [facility] [address]  [place] [address]  Phone: [phone]  Fax: [phone]  Established Patient  Follow Up Time: Urgent    Mainor Ocampo)  Cardiovascular Disease; Interventional Cardiology  [address] [place] [address] [address]  [place] [address]  Phone: [phone]  Fax: [phone]  Follow Up Time: Urgent

## 2022-07-20 NOTE — ED ADULT NURSE NOTE - CHIEF COMPLAINT QUOTE
pt biba from home, family sts pt has had left sided facial droop x 36 hours with drooling and chest pain that started today; pt otherwise at normal baseline as per daughter

## 2022-07-20 NOTE — ED PROVIDER NOTE - PROVIDER TOKENS
PROVIDER:[TOKEN:[[id number]],FOLLOWUP:[Urgent],ESTABLISHEDPATIENT:[T]],PROVIDER:[TOKEN:[[id number]],FOLLOWUP:[Urgent]]

## 2022-07-20 NOTE — ED ADULT NURSE NOTE - INTERVENTIONS DEFINITIONS
[place] to call system/Call bell, personal items and telephone within reach/Instruct patient to call for assistance/Non-slip footwear when patient is off stretcher/Physically safe environment: no spills, clutter or unnecessary equipment/Stretcher in lowest position, wheels locked, appropriate side rails in place/Monitor gait and stability/Reinforce activity limits and safety measures with patient and family

## 2022-07-20 NOTE — ED PROVIDER NOTE - OBJECTIVE STATEMENT
81F pmh LBD, htn, hypokalemia, chronic UTIs, oa, anxiety, on lasix 40 mg daily p/w gen weakness. Per daughter @ bedside who lives with pt, she began to c/o feeling "unwell" ~6:45pm, no specific complaints, pt denies any CP just states she didn't feel well & wanted to see a physician. Daughter states that pt has been progressively weak x 1 week, felt like she was leaning more twd L yest since yest, accomp by drooling. Also rpts sev weeks of loose stools, just completed course of cipro/flagyl prescribed empirically by PCP (stool studies were neg per daughter). Pt AAOX3 during interview & denies any specific complaints. No HA, vision changes, cp/sob/garcia, nv, abd pain, flank pain, LUTSx, malodorous urine, rash. No falls or HD. Lives w/both children & has daily HHA & weekly home PT. PCP Ericka.

## 2022-07-20 NOTE — ED PROVIDER NOTE - CARE PLAN
1 Principal Discharge DX:	Generalized weakness  Secondary Diagnosis:	UTI (urinary tract infection)  Secondary Diagnosis:	Elevated troponin

## 2022-07-20 NOTE — ED ADULT NURSE NOTE - OBJECTIVE STATEMENT
Pt bought in by family for weakness and left facial droop, symptoms began 36 hours ago. Family reports new medication: Risperidone started one week ago and currently on antibiotics for diarrhea- as per patient and family- stool continues to be "mushy". No facial droop noted on assessment. Pt bruises and skin tears easily, skin tear to right lower arm and hematoma to left lower arm. Pt has private aide at home, ambulates with walker + 1 heavy assist as per family.

## 2022-07-20 NOTE — ED ADULT NURSE NOTE - TEMPLATE LIST FOR HEAD TO TOE ASSESSMENT
What Is The Reason For Today's Visit?: Full Body Skin Examination
What Is The Reason For Today's Visit? (Being Monitored For X): concerning skin lesions on an annual basis
General

## 2022-07-21 VITALS
OXYGEN SATURATION: 96 % | DIASTOLIC BLOOD PRESSURE: 70 MMHG | TEMPERATURE: 97 F | SYSTOLIC BLOOD PRESSURE: 135 MMHG | HEART RATE: 95 BPM | RESPIRATION RATE: 16 BRPM

## 2022-07-21 LAB
BACTERIA # UR AUTO: NEGATIVE — SIGNIFICANT CHANGE UP
COMMENT - URINE: SIGNIFICANT CHANGE UP
EPI CELLS # UR: 12 /HPF — HIGH (ref 0–5)
HYALINE CASTS # UR AUTO: 14 /LPF — HIGH (ref 0–7)
NT-PROBNP SERPL-SCNC: 376 PG/ML — HIGH (ref 0–300)
RBC CASTS # UR COMP ASSIST: 5 /HPF — HIGH (ref 0–4)
SARS-COV-2 RNA SPEC QL NAA+PROBE: SIGNIFICANT CHANGE UP
WBC UR QL: 18 /HPF — HIGH (ref 0–5)

## 2022-07-21 RX ORDER — CEFTRIAXONE 500 MG/1
1000 INJECTION, POWDER, FOR SOLUTION INTRAMUSCULAR; INTRAVENOUS ONCE
Refills: 0 | Status: COMPLETED | OUTPATIENT
Start: 2022-07-21 | End: 2022-07-21

## 2022-07-21 RX ADMIN — CEFTRIAXONE 100 MILLIGRAM(S): 500 INJECTION, POWDER, FOR SOLUTION INTRAMUSCULAR; INTRAVENOUS at 02:06

## 2022-07-25 ENCOUNTER — INPATIENT (INPATIENT)
Facility: HOSPITAL | Age: 82
LOS: 5 days | Discharge: NURSING, REHAB OR LONG-TERM CARE | End: 2022-07-31
Attending: HOSPITALIST | Admitting: HOSPITALIST

## 2022-07-25 VITALS
HEIGHT: 68 IN | HEART RATE: 72 BPM | WEIGHT: 160.06 LBS | SYSTOLIC BLOOD PRESSURE: 141 MMHG | RESPIRATION RATE: 18 BRPM | DIASTOLIC BLOOD PRESSURE: 86 MMHG | OXYGEN SATURATION: 99 % | TEMPERATURE: 98 F

## 2022-07-25 DIAGNOSIS — E87.6 HYPOKALEMIA: ICD-10-CM

## 2022-07-25 DIAGNOSIS — Z87.891 PERSONAL HISTORY OF NICOTINE DEPENDENCE: ICD-10-CM

## 2022-07-25 DIAGNOSIS — G31.83 NEUROCOGNITIVE DISORDER WITH LEWY BODIES: ICD-10-CM

## 2022-07-25 DIAGNOSIS — I08.1 RHEUMATIC DISORDERS OF BOTH MITRAL AND TRICUSPID VALVES: ICD-10-CM

## 2022-07-25 DIAGNOSIS — F32.A DEPRESSION, UNSPECIFIED: ICD-10-CM

## 2022-07-25 DIAGNOSIS — I49.3 VENTRICULAR PREMATURE DEPOLARIZATION: ICD-10-CM

## 2022-07-25 DIAGNOSIS — I25.10 ATHEROSCLEROTIC HEART DISEASE OF NATIVE CORONARY ARTERY WITHOUT ANGINA PECTORIS: ICD-10-CM

## 2022-07-25 DIAGNOSIS — I10 ESSENTIAL (PRIMARY) HYPERTENSION: ICD-10-CM

## 2022-07-25 DIAGNOSIS — R77.8 OTHER SPECIFIED ABNORMALITIES OF PLASMA PROTEINS: ICD-10-CM

## 2022-07-25 DIAGNOSIS — I48.0 PAROXYSMAL ATRIAL FIBRILLATION: ICD-10-CM

## 2022-07-25 DIAGNOSIS — R33.9 RETENTION OF URINE, UNSPECIFIED: ICD-10-CM

## 2022-07-25 DIAGNOSIS — I27.20 PULMONARY HYPERTENSION, UNSPECIFIED: ICD-10-CM

## 2022-07-25 DIAGNOSIS — Z87.440 PERSONAL HISTORY OF URINARY (TRACT) INFECTIONS: ICD-10-CM

## 2022-07-25 DIAGNOSIS — F41.9 ANXIETY DISORDER, UNSPECIFIED: ICD-10-CM

## 2022-07-25 DIAGNOSIS — F02.80 DEMENTIA IN OTHER DISEASES CLASSIFIED ELSEWHERE, UNSPECIFIED SEVERITY, WITHOUT BEHAVIORAL DISTURBANCE, PSYCHOTIC DISTURBANCE, MOOD DISTURBANCE, AND ANXIETY: ICD-10-CM

## 2022-07-25 DIAGNOSIS — S60.519A ABRASION OF UNSPECIFIED HAND, INITIAL ENCOUNTER: ICD-10-CM
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
## 2022-07-25 DIAGNOSIS — K52.1 TOXIC GASTROENTERITIS AND COLITIS: ICD-10-CM

## 2022-07-25 DIAGNOSIS — T50.1X5A ADVERSE EFFECT OF LOOP [HIGH-CEILING] DIURETICS, INITIAL ENCOUNTER: ICD-10-CM
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
## 2022-07-25 DIAGNOSIS — T47.1X5A ADVERSE EFFECT OF OTHER ANTACIDS AND ANTI-GASTRIC-SECRETION DRUGS, INITIAL ENCOUNTER: ICD-10-CM

## 2022-07-25 DIAGNOSIS — T36.95XA ADVERSE EFFECT OF UNSPECIFIED SYSTEMIC ANTIBIOTIC, INITIAL ENCOUNTER: ICD-10-CM

## 2022-07-25 DIAGNOSIS — N30.00 ACUTE CYSTITIS WITHOUT HEMATURIA: ICD-10-CM

## 2022-07-25 LAB
ALBUMIN SERPL ELPH-MCNC: 3.7 G/DL — SIGNIFICANT CHANGE UP (ref 3.5–5.2)
ALP SERPL-CCNC: 76 U/L — SIGNIFICANT CHANGE UP (ref 30–115)
ALT FLD-CCNC: <5 U/L — SIGNIFICANT CHANGE UP (ref 0–41)
ANION GAP SERPL CALC-SCNC: 10 MMOL/L — SIGNIFICANT CHANGE UP (ref 7–14)
AST SERPL-CCNC: 21 U/L — SIGNIFICANT CHANGE UP (ref 0–41)
BASOPHILS # BLD AUTO: 0.07 K/UL — SIGNIFICANT CHANGE UP (ref 0–0.2)
BASOPHILS NFR BLD AUTO: 0.5 % — SIGNIFICANT CHANGE UP (ref 0–1)
BILIRUB SERPL-MCNC: 0.3 MG/DL — SIGNIFICANT CHANGE UP (ref 0.2–1.2)
BUN SERPL-MCNC: 17 MG/DL — SIGNIFICANT CHANGE UP (ref 10–20)
CALCIUM SERPL-MCNC: 9.5 MG/DL — SIGNIFICANT CHANGE UP (ref 8.5–10.1)
CHLORIDE SERPL-SCNC: 100 MMOL/L — SIGNIFICANT CHANGE UP (ref 98–110)
CO2 SERPL-SCNC: 30 MMOL/L — SIGNIFICANT CHANGE UP (ref 17–32)
CREAT SERPL-MCNC: 0.9 MG/DL — SIGNIFICANT CHANGE UP (ref 0.7–1.5)
EGFR: 65 ML/MIN/1.73M2 — SIGNIFICANT CHANGE UP
EOSINOPHIL # BLD AUTO: 0.14 K/UL — SIGNIFICANT CHANGE UP (ref 0–0.7)
EOSINOPHIL NFR BLD AUTO: 1.1 % — SIGNIFICANT CHANGE UP (ref 0–8)
GLUCOSE SERPL-MCNC: 95 MG/DL — SIGNIFICANT CHANGE UP (ref 70–99)
HCT VFR BLD CALC: 43.5 % — SIGNIFICANT CHANGE UP (ref 37–47)
HGB BLD-MCNC: 14.5 G/DL — SIGNIFICANT CHANGE UP (ref 12–16)
IMM GRANULOCYTES NFR BLD AUTO: 0.6 % — HIGH (ref 0.1–0.3)
LYMPHOCYTES # BLD AUTO: 0.81 K/UL — LOW (ref 1.2–3.4)
LYMPHOCYTES # BLD AUTO: 6.2 % — LOW (ref 20.5–51.1)
MCHC RBC-ENTMCNC: 30.3 PG — SIGNIFICANT CHANGE UP (ref 27–31)
MCHC RBC-ENTMCNC: 33.3 G/DL — SIGNIFICANT CHANGE UP (ref 32–37)
MCV RBC AUTO: 90.8 FL — SIGNIFICANT CHANGE UP (ref 81–99)
MONOCYTES # BLD AUTO: 0.88 K/UL — HIGH (ref 0.1–0.6)
MONOCYTES NFR BLD AUTO: 6.8 % — SIGNIFICANT CHANGE UP (ref 1.7–9.3)
NEUTROPHILS # BLD AUTO: 11.02 K/UL — HIGH (ref 1.4–6.5)
NEUTROPHILS NFR BLD AUTO: 84.8 % — HIGH (ref 42.2–75.2)
NRBC # BLD: 0 /100 WBCS — SIGNIFICANT CHANGE UP (ref 0–0)
PLATELET # BLD AUTO: 254 K/UL — SIGNIFICANT CHANGE UP (ref 130–400)
POTASSIUM SERPL-MCNC: 3.6 MMOL/L — SIGNIFICANT CHANGE UP (ref 3.5–5)
POTASSIUM SERPL-SCNC: 3.6 MMOL/L — SIGNIFICANT CHANGE UP (ref 3.5–5)
PROT SERPL-MCNC: 6.2 G/DL — SIGNIFICANT CHANGE UP (ref 6–8)
RBC # BLD: 4.79 M/UL — SIGNIFICANT CHANGE UP (ref 4.2–5.4)
RBC # FLD: 14.6 % — HIGH (ref 11.5–14.5)
SODIUM SERPL-SCNC: 140 MMOL/L — SIGNIFICANT CHANGE UP (ref 135–146)
TROPONIN T SERPL-MCNC: 0.06 NG/ML — CRITICAL HIGH
WBC # BLD: 13 K/UL — HIGH (ref 4.8–10.8)
WBC # FLD AUTO: 13 K/UL — HIGH (ref 4.8–10.8)

## 2022-07-25 PROCEDURE — 71045 X-RAY EXAM CHEST 1 VIEW: CPT | Mod: 26

## 2022-07-25 PROCEDURE — 99285 EMERGENCY DEPT VISIT HI MDM: CPT | Mod: FS

## 2022-07-25 PROCEDURE — 93010 ELECTROCARDIOGRAM REPORT: CPT

## 2022-07-25 RX ORDER — CLONAZEPAM 1 MG
0.5 TABLET ORAL ONCE
Refills: 0 | Status: DISCONTINUED | OUTPATIENT
Start: 2022-07-25 | End: 2022-07-25

## 2022-07-25 RX ORDER — GABAPENTIN 400 MG/1
600 CAPSULE ORAL ONCE
Refills: 0 | Status: COMPLETED | OUTPATIENT
Start: 2022-07-25 | End: 2022-07-25

## 2022-07-25 RX ORDER — ASPIRIN/CALCIUM CARB/MAGNESIUM 324 MG
325 TABLET ORAL ONCE
Refills: 0 | Status: COMPLETED | OUTPATIENT
Start: 2022-07-25 | End: 2022-07-25

## 2022-07-25 RX ADMIN — Medication 0.5 MILLIGRAM(S): at 22:57

## 2022-07-25 RX ADMIN — GABAPENTIN 600 MILLIGRAM(S): 400 CAPSULE ORAL at 22:57

## 2022-07-25 RX ADMIN — Medication 325 MILLIGRAM(S): at 22:57

## 2022-07-25 NOTE — ED POST DISCHARGE NOTE - DETAILS
Patient unavailable, spoke with son Fransisco Joseph regarding results, will send abx rx to her pharmacy. They will  rx

## 2022-07-25 NOTE — ED ADULT NURSE NOTE - OBJECTIVE STATEMENT
Patient presents to ED with abnormal troponin level per recent blood work. Patient is AxO2 at baseline with PMH of dementia. Patient denying chest pain, SOB.

## 2022-07-25 NOTE — ED PROVIDER NOTE - NS ED ATTENDING STATEMENT MOD
This was a shared visit with the CHERYL. I reviewed and verified the documentation and independently performed the documented:

## 2022-07-25 NOTE — ED PROVIDER NOTE - OBJECTIVE STATEMENT
81-year-old female presents complaining of abnormal lab work from PCP. Patient was seen in this ED for 4 days ago evaluated as a stroke code found to have electrolyte abnormalities, initially plan was to meet patient with patient refused and that patient went home. As per son patient has been in usual state of health with no complaints otherwise at her baseline, went to PCP who sent outpatient lab work states that he found her potassium to be low as well as her troponin to be elevated. Patient denies headache neck pain chest pain shortness of breath abdominal pain nausea vomiting diarrhea dizziness lightheadedness or syncope. Eating and drinking normally

## 2022-07-25 NOTE — ED PROVIDER NOTE - CLINICAL SUMMARY MEDICAL DECISION MAKING FREE TEXT BOX
82 yo woman was sent in by PMD for elevated troponin.  Patient was recently inpatient with stroke workup and had elevated troponin at that time, but left AMA.  On follow up had repeat testing that was also elevated.  In ED now asyptomatic.  Will admit to tele for cardio eval and recommendations.

## 2022-07-26 LAB
ALBUMIN SERPL ELPH-MCNC: 3.7 G/DL — SIGNIFICANT CHANGE UP (ref 3.5–5.2)
ALP SERPL-CCNC: 76 U/L — SIGNIFICANT CHANGE UP (ref 30–115)
ALT FLD-CCNC: 5 U/L — SIGNIFICANT CHANGE UP (ref 0–41)
ANION GAP SERPL CALC-SCNC: 15 MMOL/L — HIGH (ref 7–14)
ANION GAP SERPL CALC-SCNC: 16 MMOL/L — HIGH (ref 7–14)
APTT BLD: 29.9 SEC — SIGNIFICANT CHANGE UP (ref 27–39.2)
AST SERPL-CCNC: 21 U/L — SIGNIFICANT CHANGE UP (ref 0–41)
BASOPHILS # BLD AUTO: 0.05 K/UL — SIGNIFICANT CHANGE UP (ref 0–0.2)
BASOPHILS NFR BLD AUTO: 0.5 % — SIGNIFICANT CHANGE UP (ref 0–1)
BILIRUB SERPL-MCNC: 0.5 MG/DL — SIGNIFICANT CHANGE UP (ref 0.2–1.2)
BUN SERPL-MCNC: 13 MG/DL — SIGNIFICANT CHANGE UP (ref 10–20)
BUN SERPL-MCNC: 13 MG/DL — SIGNIFICANT CHANGE UP (ref 10–20)
CALCIUM SERPL-MCNC: 9.7 MG/DL — SIGNIFICANT CHANGE UP (ref 8.5–10.1)
CALCIUM SERPL-MCNC: 9.7 MG/DL — SIGNIFICANT CHANGE UP (ref 8.5–10.1)
CHLORIDE SERPL-SCNC: 95 MMOL/L — LOW (ref 98–110)
CHLORIDE SERPL-SCNC: 99 MMOL/L — SIGNIFICANT CHANGE UP (ref 98–110)
CO2 SERPL-SCNC: 26 MMOL/L — SIGNIFICANT CHANGE UP (ref 17–32)
CO2 SERPL-SCNC: 29 MMOL/L — SIGNIFICANT CHANGE UP (ref 17–32)
CREAT SERPL-MCNC: 0.8 MG/DL — SIGNIFICANT CHANGE UP (ref 0.7–1.5)
CREAT SERPL-MCNC: 0.8 MG/DL — SIGNIFICANT CHANGE UP (ref 0.7–1.5)
EGFR: 74 ML/MIN/1.73M2 — SIGNIFICANT CHANGE UP
EGFR: 74 ML/MIN/1.73M2 — SIGNIFICANT CHANGE UP
EOSINOPHIL # BLD AUTO: 0.13 K/UL — SIGNIFICANT CHANGE UP (ref 0–0.7)
EOSINOPHIL NFR BLD AUTO: 1.3 % — SIGNIFICANT CHANGE UP (ref 0–8)
GLUCOSE SERPL-MCNC: 84 MG/DL — SIGNIFICANT CHANGE UP (ref 70–99)
GLUCOSE SERPL-MCNC: 84 MG/DL — SIGNIFICANT CHANGE UP (ref 70–99)
HCT VFR BLD CALC: 45.2 % — SIGNIFICANT CHANGE UP (ref 37–47)
HGB BLD-MCNC: 14.9 G/DL — SIGNIFICANT CHANGE UP (ref 12–16)
IMM GRANULOCYTES NFR BLD AUTO: 0.4 % — HIGH (ref 0.1–0.3)
INR BLD: 1.04 RATIO — SIGNIFICANT CHANGE UP (ref 0.65–1.3)
LYMPHOCYTES # BLD AUTO: 0.68 K/UL — LOW (ref 1.2–3.4)
LYMPHOCYTES # BLD AUTO: 6.7 % — LOW (ref 20.5–51.1)
MAGNESIUM SERPL-MCNC: 2.2 MG/DL — SIGNIFICANT CHANGE UP (ref 1.8–2.4)
MCHC RBC-ENTMCNC: 30.3 PG — SIGNIFICANT CHANGE UP (ref 27–31)
MCHC RBC-ENTMCNC: 33 G/DL — SIGNIFICANT CHANGE UP (ref 32–37)
MCV RBC AUTO: 92.1 FL — SIGNIFICANT CHANGE UP (ref 81–99)
MONOCYTES # BLD AUTO: 0.75 K/UL — HIGH (ref 0.1–0.6)
MONOCYTES NFR BLD AUTO: 7.4 % — SIGNIFICANT CHANGE UP (ref 1.7–9.3)
NEUTROPHILS # BLD AUTO: 8.43 K/UL — HIGH (ref 1.4–6.5)
NEUTROPHILS NFR BLD AUTO: 83.7 % — HIGH (ref 42.2–75.2)
NRBC # BLD: 0 /100 WBCS — SIGNIFICANT CHANGE UP (ref 0–0)
PLATELET # BLD AUTO: 248 K/UL — SIGNIFICANT CHANGE UP (ref 130–400)
POTASSIUM SERPL-MCNC: 3.8 MMOL/L — SIGNIFICANT CHANGE UP (ref 3.5–5)
POTASSIUM SERPL-MCNC: 3.9 MMOL/L — SIGNIFICANT CHANGE UP (ref 3.5–5)
POTASSIUM SERPL-SCNC: 3.8 MMOL/L — SIGNIFICANT CHANGE UP (ref 3.5–5)
POTASSIUM SERPL-SCNC: 3.9 MMOL/L — SIGNIFICANT CHANGE UP (ref 3.5–5)
PROT SERPL-MCNC: 6.6 G/DL — SIGNIFICANT CHANGE UP (ref 6–8)
PROTHROM AB SERPL-ACNC: 11.9 SEC — SIGNIFICANT CHANGE UP (ref 9.95–12.87)
RBC # BLD: 4.91 M/UL — SIGNIFICANT CHANGE UP (ref 4.2–5.4)
RBC # FLD: 14.6 % — HIGH (ref 11.5–14.5)
SARS-COV-2 RNA SPEC QL NAA+PROBE: SIGNIFICANT CHANGE UP
SODIUM SERPL-SCNC: 139 MMOL/L — SIGNIFICANT CHANGE UP (ref 135–146)
SODIUM SERPL-SCNC: 141 MMOL/L — SIGNIFICANT CHANGE UP (ref 135–146)
TROPONIN T SERPL-MCNC: 0.05 NG/ML — CRITICAL HIGH
TROPONIN T SERPL-MCNC: 0.05 NG/ML — CRITICAL HIGH
TROPONIN T SERPL-MCNC: 0.06 NG/ML — CRITICAL HIGH
WBC # BLD: 10.08 K/UL — SIGNIFICANT CHANGE UP (ref 4.8–10.8)
WBC # FLD AUTO: 10.08 K/UL — SIGNIFICANT CHANGE UP (ref 4.8–10.8)

## 2022-07-26 PROCEDURE — 93010 ELECTROCARDIOGRAM REPORT: CPT

## 2022-07-26 PROCEDURE — 99223 1ST HOSP IP/OBS HIGH 75: CPT

## 2022-07-26 RX ORDER — AMPICILLIN SODIUM AND SULBACTAM SODIUM 250; 125 MG/ML; MG/ML
3 INJECTION, POWDER, FOR SUSPENSION INTRAMUSCULAR; INTRAVENOUS EVERY 6 HOURS
Refills: 0 | Status: DISCONTINUED | OUTPATIENT
Start: 2022-07-26 | End: 2022-07-29

## 2022-07-26 RX ORDER — ENOXAPARIN SODIUM 100 MG/ML
40 INJECTION SUBCUTANEOUS EVERY 24 HOURS
Refills: 0 | Status: DISCONTINUED | OUTPATIENT
Start: 2022-07-26 | End: 2022-07-29

## 2022-07-26 RX ORDER — CLONAZEPAM 1 MG
0.5 TABLET ORAL
Refills: 0 | Status: DISCONTINUED | OUTPATIENT
Start: 2022-07-26 | End: 2022-07-31

## 2022-07-26 RX ORDER — FUROSEMIDE 40 MG
40 TABLET ORAL DAILY
Refills: 0 | Status: DISCONTINUED | OUTPATIENT
Start: 2022-07-26 | End: 2022-07-27

## 2022-07-26 RX ORDER — AMLODIPINE BESYLATE 2.5 MG/1
10 TABLET ORAL DAILY
Refills: 0 | Status: DISCONTINUED | OUTPATIENT
Start: 2022-07-26 | End: 2022-07-31

## 2022-07-26 RX ORDER — SERTRALINE 25 MG/1
100 TABLET, FILM COATED ORAL
Refills: 0 | Status: DISCONTINUED | OUTPATIENT
Start: 2022-07-26 | End: 2022-07-31

## 2022-07-26 RX ORDER — HYDROCHLOROTHIAZIDE 25 MG
25 TABLET ORAL DAILY
Refills: 0 | Status: DISCONTINUED | OUTPATIENT
Start: 2022-07-26 | End: 2022-07-27

## 2022-07-26 RX ORDER — GABAPENTIN 400 MG/1
300 CAPSULE ORAL AT BEDTIME
Refills: 0 | Status: DISCONTINUED | OUTPATIENT
Start: 2022-07-26 | End: 2022-07-31

## 2022-07-26 RX ORDER — POTASSIUM CHLORIDE 20 MEQ
20 PACKET (EA) ORAL
Refills: 0 | Status: COMPLETED | OUTPATIENT
Start: 2022-07-26 | End: 2022-07-26

## 2022-07-26 RX ORDER — DIAZEPAM 5 MG
1 TABLET ORAL
Qty: 0 | Refills: 0 | DISCHARGE

## 2022-07-26 RX ORDER — LORATADINE 10 MG/1
1 TABLET ORAL
Qty: 0 | Refills: 0 | DISCHARGE

## 2022-07-26 RX ORDER — ACETAMINOPHEN 500 MG
650 TABLET ORAL EVERY 6 HOURS
Refills: 0 | Status: DISCONTINUED | OUTPATIENT
Start: 2022-07-26 | End: 2022-07-31

## 2022-07-26 RX ORDER — POTASSIUM CHLORIDE 20 MEQ
20 PACKET (EA) ORAL ONCE
Refills: 0 | Status: DISCONTINUED | OUTPATIENT
Start: 2022-07-26 | End: 2022-07-30

## 2022-07-26 RX ADMIN — Medication 25 MILLIGRAM(S): at 06:04

## 2022-07-26 RX ADMIN — AMPICILLIN SODIUM AND SULBACTAM SODIUM 200 GRAM(S): 250; 125 INJECTION, POWDER, FOR SUSPENSION INTRAMUSCULAR; INTRAVENOUS at 23:14

## 2022-07-26 RX ADMIN — Medication 50 MILLIEQUIVALENT(S): at 11:09

## 2022-07-26 RX ADMIN — SERTRALINE 100 MILLIGRAM(S): 25 TABLET, FILM COATED ORAL at 18:32

## 2022-07-26 RX ADMIN — GABAPENTIN 300 MILLIGRAM(S): 400 CAPSULE ORAL at 22:19

## 2022-07-26 RX ADMIN — Medication 40 MILLIGRAM(S): at 06:05

## 2022-07-26 RX ADMIN — AMPICILLIN SODIUM AND SULBACTAM SODIUM 200 GRAM(S): 250; 125 INJECTION, POWDER, FOR SUSPENSION INTRAMUSCULAR; INTRAVENOUS at 18:31

## 2022-07-26 RX ADMIN — Medication 50 MILLIEQUIVALENT(S): at 06:46

## 2022-07-26 RX ADMIN — SERTRALINE 100 MILLIGRAM(S): 25 TABLET, FILM COATED ORAL at 06:07

## 2022-07-26 RX ADMIN — ENOXAPARIN SODIUM 40 MILLIGRAM(S): 100 INJECTION SUBCUTANEOUS at 11:09

## 2022-07-26 RX ADMIN — Medication 0.5 MILLIGRAM(S): at 13:05

## 2022-07-26 RX ADMIN — AMLODIPINE BESYLATE 10 MILLIGRAM(S): 2.5 TABLET ORAL at 06:05

## 2022-07-26 NOTE — PATIENT PROFILE ADULT - FALL HARM RISK - HARM RISK INTERVENTIONS

## 2022-07-26 NOTE — H&P ADULT - NSHPLABSRESULTS_GEN_ALL_CORE
14.5   13.00 )-----------( 254      ( 25 Jul 2022 20:36 )             43.5   07-25    140  |  100  |  17  ----------------------------<  95  3.6   |  30  |  0.9    Ca    9.5      25 Jul 2022 20:36    TPro  6.2  /  Alb  3.7  /  TBili  0.3  /  DBili  x   /  AST  21  /  ALT  <5  /  AlkPhos  76  07-25

## 2022-07-26 NOTE — H&P ADULT - ATTENDING COMMENTS
#Elevated cardiac enzymes  no cp  ekg noted  cxr with interstitial opacities  check tte  trend  PT  #UTI  ucx enterococcus 7/20  start unasyn  bcx    #Progress Note Handoff:  Pending (specify):  Consults_________, Tests________, Test Results_______, Other___tte, trend ce, PT______  Family discussion:   Disposition: Home___/SNF___/Other________/Unknown at this time_____x___

## 2022-07-26 NOTE — H&P ADULT - HISTORY OF PRESENT ILLNESS
81y F presents with abnormal lab work from PCP.   Notably, patient was recently seen in ED 7/21 as a stroke code, after pt had facial droop. CT H was (-). Was found to have electrolyte abnormalities but left AMA.   Patient returned to PCP and found to be hypokalemic, and + troponins. Was sent to ED by her PCP.     ED Course:   Vitals: T 98.1, HR 72, /86, RR 18, 99% on RA   CXR 7/20: cardiomegaly and interstitial opacfications.   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   Notably, patient was recently seen in ED 7/21 as a stroke code, after pt had facial droop. CT H was (-). Was found to have electrolyte abnormalities but left AMA.   Patient returned to PCP and found to be hypokalemic, and + troponins. Was sent to ED by her PCP.     ED Course:   Vitals: T 98.1, HR 72, /86, RR 18, 99% on RA   CXR 7/20: cardiomegaly and interstitial opacifications   CT H 7/20: (-)   Labs: WBC 13K, Trops 0.02, 0.06, 0.06,   UA (+) for LE, WBC; Urine culture 7/20 (+) e. feceium     Patient admitted for workup of Altered Mental Status.        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   Notably, patient was recently seen in ED 7/21 as a stroke code, after pt had facial droop. CT H was (-). Was found to have electrolyte abnormalities but left AMA.   Patient returned to PCP and found to be hypokalemic, and + troponins. Was sent to ED by her PCP.   History obtained from patient and her aide. Patient is AxO x2, and is a poor historian. However she reports that she may have had some chest pain earlier. Per Aide bedside, patient was sent in by PCP for unknown abnormal lab values.     ED Course:   Vitals: T 98.1, HR 72, /86, RR 18, 99% on RA   CXR 7/20: cardiomegaly and interstitial opacifications   CT H 7/20: (-)   Labs: WBC 13K, Trops 0.02, 0.06, 0.06,   UA (+) for LE, WBC; Urine culture 7/20 (+) e. feceium     Patient admitted for workup of abnormal lab values.         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   Notably, patient was recently seen in ED 7/21 as a stroke code, after pt had facial droop. CT H was (-). Was found to have electrolyte abnormalities but left AMA.   Patient returned to PCP and found to be hypokalemic, and + troponins. Was sent to ED by her PCP.   History obtained from patient and her aide. Patient is AxO x2, and is a poor historian. However she reports that she may have had some chest pain earlier. Per Aide bedside, patient was sent in by PCP for unknown abnormal lab values. (high trops and low potassium)    ED Course:   Vitals: T 98.1, HR 72, /86, RR 18, 99% on RA   CXR 7/20: cardiomegaly and interstitial opacifications   CT H 7/20: (-)   Labs: WBC 13K, Trops 0.02, 0.06, 0.06,   UA (+) for LE, WBC; Urine culture 7/20 (+) e. feceium   EKG 7/25 noted, nonspecific T wave changes, PACs, prolonged QT, RVH     Patient admitted for workup of abnormal lab values.

## 2022-07-26 NOTE — H&P ADULT - NSHPREVIEWOFSYSTEMS_GEN_ALL_CORE
General:	Denies fevers, chills, headaches  ENT: Denies sore throat  Respiratory: Denies SOB, cough, sputum production  Cardiovascular: Denies chest pain, palpitations, chest wall tenderness  Gastrointestinal:	Denies constipation, diarrhea, abdominal pain  Admits to anal pain

## 2022-07-26 NOTE — H&P ADULT - NSHPPHYSICALEXAM_GEN_ALL_CORE
Constitutional: pt is comfortable in bed; no acute distress  HEENT: Full ROM in bilateral eyes; pupils symmetrical and equal in size; neck supple  Respiratory: (+) sounds in all lung fields; no crackles or wheezes appreciated  Cardiovascular: S1 S2 regular rate and rhythm; no murmurs or gallops appreciated  Gastrointestinal: abdomen is non-distended, non-tender to superficial and deep palpation  Extremities: extremities are non-edematous  Vascular: Warm and Well perfused UE and LE; no mottling or dusky skin   Neurological: AxO x3 to self, place and year  Skin: no rashes or ulcerations noted; no scaling present

## 2022-07-26 NOTE — H&P ADULT - ASSESSMENT
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   Notably, patient was recently seen in ED 7/21 as a stroke code, after pt had facial droop. CT H was (-). Was found to have electrolyte abnormalities but left AMA.   Patient returned to PCP and found to be hypokalemic, and + troponins. Was sent to ED by her PCP.   History obtained from patient and her aide. Patient is AxO x2, and is a poor historian. However she reports that she may have had some chest pain earlier. Per Aide bedside, patient was sent in by PCP for unknown abnormal lab values.     ED Course:   Vitals: T 98.1, HR 72, /86, RR 18, 99% on RA   CXR 7/20: cardiomegaly and interstitial opacifications   CT H 7/20: (-)   Labs: WBC 13K, Trops 0.02, 0.06, 0.06,   UA (+) for LE, WBC; Urine culture 7/20 (+) e. feceium     Patient admitted for workup of abnormal lab values.           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   Notably, patient was recently seen in ED 7/21 as a stroke code, after pt had facial droop. CT H was (-). Was found to have electrolyte abnormalities but left AMA.   Patient returned to PCP and found to be hypokalemic, and + troponins. Was sent to ED by her PCP.   Patient admitted for abnormal lab values (high trops and low potassium) on outpatient labs, and complaint of chest pain     # Atypical Chest Pain, possible NSTEMI   - Trops 0.02, 0.06, 0.06  - EKG 7/25 noted, nonspecific T wave changes, PACs, prolonged QT, RVH   - Now asymptomatic   - continue to trend troponins   - consider cardio consult in the AM     # Leukocytosis   - WBC 13k   - f/u repeat UA and Urine culture   - cultures from 7/20 are + for e. 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   Notably, patient was recently seen in ED 7/21 as a stroke code, after pt had facial droop. CT H was (-). Was found to have electrolyte abnormalities but left AMA.   Patient returned to PCP and found to be hypokalemic, and + troponins. Was sent to ED by her PCP.   Patient admitted for abnormal lab values (high trops and low potassium) on outpatient labs, and complaint of chest pain     # Atypical Chest Pain, possible NSTEMI   - Trops 0.02, 0.06, 0.06  - EKG 7/25 noted, nonspecific T wave changes, PACs, prolonged QT, RVH   - Now asymptomatic   - continue to trend troponins   - consider cardio consult in the AM     # Isolated Leukocytosis   - WBC 13k   - f/u repeat UA and Urine culture   - cultures from 7/20 are + for e. fecium   - pt asymptomatic, can consider abiox if UA returns +     # HTN   - c/w amlodipine 10mg qd   - c/w Lasix 40mg qd   - c/w HCTZ 25mg qd     # Suspect Depression   - c/w Sertraline 100mg bid   - c/w clonazepam 0.5mg BID PRN     # Hypokalemia, possibly 2/2 Lasix   - Potassium 3.2 on 7/20, and 3.6 hemolyzed 7/25   - c/w home med 20mg ER KCl qd   - Will supplement 40meq stat   - f/u repeat BMP     DVT ppx: Lovenox   Diet: Regular   Dispo: pending repeat labs, trops

## 2022-07-27 LAB
BASOPHILS # BLD AUTO: 0.05 K/UL — SIGNIFICANT CHANGE UP (ref 0–0.2)
BASOPHILS NFR BLD AUTO: 0.3 % — SIGNIFICANT CHANGE UP (ref 0–1)
EOSINOPHIL # BLD AUTO: 0.03 K/UL — SIGNIFICANT CHANGE UP (ref 0–0.7)
EOSINOPHIL NFR BLD AUTO: 0.2 % — SIGNIFICANT CHANGE UP (ref 0–8)
HCT VFR BLD CALC: 51 % — HIGH (ref 37–47)
HCT VFR BLD CALC: 52.4 % — HIGH (ref 37–47)
HGB BLD-MCNC: 17.6 G/DL — HIGH (ref 12–16)
HGB BLD-MCNC: 18.1 G/DL — HIGH (ref 12–16)
IMM GRANULOCYTES NFR BLD AUTO: 0.6 % — HIGH (ref 0.1–0.3)
LYMPHOCYTES # BLD AUTO: 0.77 K/UL — LOW (ref 1.2–3.4)
LYMPHOCYTES # BLD AUTO: 4.9 % — LOW (ref 20.5–51.1)
MCHC RBC-ENTMCNC: 30.4 PG — SIGNIFICANT CHANGE UP (ref 27–31)
MCHC RBC-ENTMCNC: 30.5 PG — SIGNIFICANT CHANGE UP (ref 27–31)
MCHC RBC-ENTMCNC: 34.5 G/DL — SIGNIFICANT CHANGE UP (ref 32–37)
MCHC RBC-ENTMCNC: 34.5 G/DL — SIGNIFICANT CHANGE UP (ref 32–37)
MCV RBC AUTO: 88.1 FL — SIGNIFICANT CHANGE UP (ref 81–99)
MCV RBC AUTO: 88.4 FL — SIGNIFICANT CHANGE UP (ref 81–99)
MONOCYTES # BLD AUTO: 1.15 K/UL — HIGH (ref 0.1–0.6)
MONOCYTES NFR BLD AUTO: 7.3 % — SIGNIFICANT CHANGE UP (ref 1.7–9.3)
NEUTROPHILS # BLD AUTO: 13.61 K/UL — HIGH (ref 1.4–6.5)
NEUTROPHILS NFR BLD AUTO: 86.7 % — HIGH (ref 42.2–75.2)
NRBC # BLD: 0 /100 WBCS — SIGNIFICANT CHANGE UP (ref 0–0)
NRBC # BLD: 0 /100 WBCS — SIGNIFICANT CHANGE UP (ref 0–0)
PLATELET # BLD AUTO: 328 K/UL — SIGNIFICANT CHANGE UP (ref 130–400)
PLATELET # BLD AUTO: 329 K/UL — SIGNIFICANT CHANGE UP (ref 130–400)
RBC # BLD: 5.77 M/UL — HIGH (ref 4.2–5.4)
RBC # BLD: 5.95 M/UL — HIGH (ref 4.2–5.4)
RBC # FLD: 14.2 % — SIGNIFICANT CHANGE UP (ref 11.5–14.5)
RBC # FLD: 14.2 % — SIGNIFICANT CHANGE UP (ref 11.5–14.5)
TROPONIN T SERPL-MCNC: 0.04 NG/ML — CRITICAL HIGH
WBC # BLD: 15.71 K/UL — HIGH (ref 4.8–10.8)
WBC # BLD: 16.42 K/UL — HIGH (ref 4.8–10.8)
WBC # FLD AUTO: 15.71 K/UL — HIGH (ref 4.8–10.8)
WBC # FLD AUTO: 16.42 K/UL — HIGH (ref 4.8–10.8)

## 2022-07-27 PROCEDURE — 93306 TTE W/DOPPLER COMPLETE: CPT | Mod: 26

## 2022-07-27 PROCEDURE — 99222 1ST HOSP IP/OBS MODERATE 55: CPT

## 2022-07-27 PROCEDURE — 99233 SBSQ HOSP IP/OBS HIGH 50: CPT

## 2022-07-27 PROCEDURE — 93010 ELECTROCARDIOGRAM REPORT: CPT

## 2022-07-27 RX ORDER — ASPIRIN/CALCIUM CARB/MAGNESIUM 324 MG
81 TABLET ORAL DAILY
Refills: 0 | Status: DISCONTINUED | OUTPATIENT
Start: 2022-07-27 | End: 2022-07-29

## 2022-07-27 RX ORDER — SODIUM CHLORIDE 9 MG/ML
1000 INJECTION INTRAMUSCULAR; INTRAVENOUS; SUBCUTANEOUS
Refills: 0 | Status: DISCONTINUED | OUTPATIENT
Start: 2022-07-27 | End: 2022-07-31

## 2022-07-27 RX ORDER — ATORVASTATIN CALCIUM 80 MG/1
40 TABLET, FILM COATED ORAL AT BEDTIME
Refills: 0 | Status: DISCONTINUED | OUTPATIENT
Start: 2022-07-27 | End: 2022-07-31

## 2022-07-27 RX ORDER — METOPROLOL TARTRATE 50 MG
5 TABLET ORAL ONCE
Refills: 0 | Status: COMPLETED | OUTPATIENT
Start: 2022-07-27 | End: 2022-07-27

## 2022-07-27 RX ORDER — METOPROLOL TARTRATE 50 MG
25 TABLET ORAL DAILY
Refills: 0 | Status: DISCONTINUED | OUTPATIENT
Start: 2022-07-27 | End: 2022-07-29

## 2022-07-27 RX ADMIN — AMPICILLIN SODIUM AND SULBACTAM SODIUM 200 GRAM(S): 250; 125 INJECTION, POWDER, FOR SUSPENSION INTRAMUSCULAR; INTRAVENOUS at 17:19

## 2022-07-27 RX ADMIN — Medication 650 MILLIGRAM(S): at 15:31

## 2022-07-27 RX ADMIN — Medication 5 MILLIGRAM(S): at 22:05

## 2022-07-27 RX ADMIN — Medication 40 MILLIGRAM(S): at 05:15

## 2022-07-27 RX ADMIN — Medication 81 MILLIGRAM(S): at 21:05

## 2022-07-27 RX ADMIN — AMLODIPINE BESYLATE 10 MILLIGRAM(S): 2.5 TABLET ORAL at 05:15

## 2022-07-27 RX ADMIN — ENOXAPARIN SODIUM 40 MILLIGRAM(S): 100 INJECTION SUBCUTANEOUS at 11:16

## 2022-07-27 RX ADMIN — Medication 0.5 MILLIGRAM(S): at 03:22

## 2022-07-27 RX ADMIN — Medication 25 MILLIGRAM(S): at 21:04

## 2022-07-27 RX ADMIN — AMPICILLIN SODIUM AND SULBACTAM SODIUM 200 GRAM(S): 250; 125 INJECTION, POWDER, FOR SUSPENSION INTRAMUSCULAR; INTRAVENOUS at 12:09

## 2022-07-27 RX ADMIN — SERTRALINE 100 MILLIGRAM(S): 25 TABLET, FILM COATED ORAL at 17:11

## 2022-07-27 RX ADMIN — AMPICILLIN SODIUM AND SULBACTAM SODIUM 200 GRAM(S): 250; 125 INJECTION, POWDER, FOR SUSPENSION INTRAMUSCULAR; INTRAVENOUS at 05:13

## 2022-07-27 RX ADMIN — GABAPENTIN 300 MILLIGRAM(S): 400 CAPSULE ORAL at 21:04

## 2022-07-27 RX ADMIN — SERTRALINE 100 MILLIGRAM(S): 25 TABLET, FILM COATED ORAL at 05:14

## 2022-07-27 RX ADMIN — ATORVASTATIN CALCIUM 40 MILLIGRAM(S): 80 TABLET, FILM COATED ORAL at 21:04

## 2022-07-27 RX ADMIN — SODIUM CHLORIDE 75 MILLILITER(S): 9 INJECTION INTRAMUSCULAR; INTRAVENOUS; SUBCUTANEOUS at 15:31

## 2022-07-27 RX ADMIN — Medication 650 MILLIGRAM(S): at 14:20

## 2022-07-27 RX ADMIN — Medication 25 MILLIGRAM(S): at 05:14

## 2022-07-27 NOTE — CONSULT NOTE ADULT - SUBJECTIVE AND OBJECTIVE BOX
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   Notably, patient was recently seen in ED  as a stroke code, after pt had facial droop. CT H was (-). Was found to have electrolyte abnormalities but left AMA.   Patient returned to PCP and found to be hypokalemic, and + troponins. Was sent to ED by her PCP.   History obtained from patient and her aide. Patient is AxO x2, and is a poor historian. However she reports that she may have had some chest pain earlier. Per Aide bedside, patient was sent in by PCP for unknown abnormal lab values. (high trops and low potassium)    ED Course:   Vitals: T 98.1, HR 72, /86, RR 18, 99% on RA   CXR : cardiomegaly and interstitial opacifications   CT H : (-)   Labs: WBC 13K, Trops 0.02, 0.06, 0.06,   UA (+) for LE, WBC; Urine culture  (+) e. feceium   EKG  noted, nonspecific T wave changes, PACs, prolonged QT, RVH     Patient admitted for workup of abnormal lab values.       (2022 02:28)    ---  cardio fellow additional notes:    Patient without active chest pain   ECG showed a Q wave in 1 lead (III)   ECHO was normal       PAST MEDICAL & SURGICAL HISTORY  HTN (hypertension)    Anxiety    Arthritis    No significant past surgical history        FAMILY HISTORY:  FAMILY HISTORY:  No pertinent family history in first degree relatives        SOCIAL HISTORY:  Social History:  no alcohol, smoking hx (2022 02:28)      ALLERGIES:  No Known Allergies      MEDICATIONS:  amLODIPine   Tablet 10 milliGRAM(s) Oral daily  ampicillin/sulbactam  IVPB 3 Gram(s) IV Intermittent every 6 hours  enoxaparin Injectable 40 milliGRAM(s) SubCutaneous every 24 hours  furosemide    Tablet 40 milliGRAM(s) Oral daily  gabapentin 300 milliGRAM(s) Oral at bedtime  hydrochlorothiazide 25 milliGRAM(s) Oral daily  potassium chloride    Tablet ER 20 milliEquivalent(s) Oral once  sertraline 100 milliGRAM(s) Oral two times a day    PRN:  acetaminophen     Tablet .. 650 milliGRAM(s) Oral every 6 hours PRN  clonazePAM  Tablet 0.5 milliGRAM(s) Oral two times a day PRN      HOME MEDICATIONS:  Home Medications:  amLODIPine 10 mg oral tablet: 1 tab(s) orally once a day (19 Dec 2021 20:45)  clonazePAM 0.5 mg oral tablet: 1 tab(s) orally 2 times a day, As Needed (2022 04:00)  gabapentin 300 mg oral capsule: 1 tab(s) orally once a day (at bedtime) (2022 04:01)  hydroCHLOROthiazide 25 mg oral tablet: 1 tab(s) orally once a day (2022 04:01)  Lasix 40 mg oral tablet: 1 tab(s) orally once a day (19 Dec 2021 20:43)  potassium chloride 20 mEq oral granule, extended release: 1 tab(s) orally once a day (2022 04:02)  sertraline 100 mg oral tablet: 1 tab(s) orally 2 times a day (2022 04:03)  Vitamin D3 1250 mcg (50,000 intl units) oral capsule: 1 cap(s) orally once a week (2022 04:04)      VITALS:   T(F): 97.9 ( @ 21:47), Max: 98.1 ( @ 19:08)  HR: 79 ( @ 05:10) (63 - 90)  BP: 139/74 ( @ 05:10) (122/59 - 144/82)  BP(mean): --  RR: 18 ( @ 21:47) (18 - 18)  SpO2: 95% ( @ 21:47) (95% - 99%)    I&O's Summary      REVIEW OF SYSTEMS:  CONSTITUTIONAL: No weakness, fevers or chills  HEENT: No visual changes, neck/ear pain  RESPIRATORY: No cough, sob  CARDIOVASCULAR: See HPI  GASTROINTESTINAL: No abdominal pain. No nausea, vomiting, diarrhea   GENITOURINARY: No dysuria, frequency or hematuria  NEUROLOGICAL: No new focal deficits  SKIN: No new rashes    PHYSICAL EXAM:  General: Not in distress.  Non-toxic appearing.   HEENT: EOMI  Cardio: regular, S1, S2, no murmur  Pulm: B/L BS.  No wheezing / crackles / rales  Abdomen: Soft, non-tender, non-distended. Normoactive bowel sounds  Extremities: No edema b/l le  Neuro: A&O x2. No focal deficits    LABS:                        14.9   10.08 )-----------( 248      ( 2022 08:39 )             45.2         139  |  95<L>  |  13  ----------------------------<  84  3.8   |  29  |  0.8    Ca    9.7      2022 13:22  Mg     2.2         TPro  6.6  /  Alb  3.7  /  TBili  0.5  /  DBili  x   /  AST  21  /  ALT  5   /  AlkPhos  76      PT/INR - ( 2022 08:39 )   PT: 11.90 sec;   INR: 1.04 ratio         PTT - ( 2022 08:39 )  PTT:29.9 sec  Troponin T, Serum: 0.05 ng/mL *HH* (22 @ 13:22)    CARDIAC MARKERS ( 2022 13:22 )  x     / 0.05 ng/mL / x     / x     / x      CARDIAC MARKERS ( 2022 08:39 )  x     / 0.05 ng/mL / x     / x     / x      CARDIAC MARKERS ( 2022 00:00 )  x     / 0.06 ng/mL / x     / x     / x      CARDIAC MARKERS ( 2022 20:36 )  x     / 0.06 ng/mL / x     / x     / x        Troponin trend:    COVID-19 PCR: NotDetec (2022 22:28)  COVID-19 PCR: NotDetec (2022 00:10)      RADIOLOGY:  -TTE:  < from: TTE Echo Complete w/o Contrast w/ Doppler (22 @ 08:28) >  Summary:   1. Normal global left ventricular systolic function.   2. Normal left atrial size.   3. Normal right atrial size.   4. Mild thickening and calcification of the anterior mitral valve   leaflet.   5. Moderate mitral annular calcification.   6. Trace mitral valve regurgitation.   7. Mild tricuspid regurgitation.   8. PSAP 35.    < end of copied text >      EC Lead ECG:   Ventricular Rate 81 BPM    Atrial Rate 81 BPM    P-R Interval 142 ms    QRS Duration 82 ms    Q-T Interval 392 ms    QTC Calculation(Bazett) 455 ms    P Axis 94 degrees    R Axis 124 degrees    T Axis 59 degrees    Diagnosis Line *** Suspect arm lead reversal, interpretation assumes no reversal  Sinus rhythm with occasional Premature ventricular complexes and Fusion  complexes  Right axis deviation  Possible Right ventricular hypertrophy  Nonspecific ST and T wave abnormality  Abnormal ECG    Confirmed by ZAYDA CHATTERJEE MD ([id number]) on 2022 8:28:58 PM ( @ 10:02)      TELEMETRY EVENTS:  No events    Outpt cardiologist:    HPI:    81y F with 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   Notably, patient was recently seen in ED  as a stroke code, after pt had facial droop. CT H was (-). Was found to have electrolyte abnormalities but left AMA.   Patient returned to PCP and found to be hypokalemic, and + troponins. Was sent to ED by her PCP.   History obtained from patient and her aide. Patient is AxO x2, and is a poor historian. However she reports that she may have had some chest pain earlier. Per Aide bedside, patient was sent in by PCP for unknown abnormal lab values. (high trops and low potassium)    ED Course:   Vitals: T 98.1, HR 72, /86, RR 18, 99% on RA   CXR : cardiomegaly and interstitial opacifications   CT H : (-)   Labs: WBC 13K, Trops 0.02, 0.06, 0.06,   UA (+) for LE, WBC; Urine culture  (+) e. feceium   EKG  noted, nonspecific T wave changes, PACs, prolonged QT, RVH     Patient admitted for workup of abnormal lab values.       (2022 02:28)    ---  cardio fellow additional notes:    Patient without active chest pain   ECG showed a Q wave in 1 lead (III)   ECHO was normal       PAST MEDICAL & SURGICAL HISTORY  HTN (hypertension)    Anxiety    Arthritis    No significant past surgical history        FAMILY HISTORY:  FAMILY HISTORY:  No pertinent family history in first degree relatives        SOCIAL HISTORY:  Social History:  no alcohol, smoking hx (2022 02:28)      ALLERGIES:  No Known Allergies      MEDICATIONS:  amLODIPine   Tablet 10 milliGRAM(s) Oral daily  ampicillin/sulbactam  IVPB 3 Gram(s) IV Intermittent every 6 hours  enoxaparin Injectable 40 milliGRAM(s) SubCutaneous every 24 hours  furosemide    Tablet 40 milliGRAM(s) Oral daily  gabapentin 300 milliGRAM(s) Oral at bedtime  hydrochlorothiazide 25 milliGRAM(s) Oral daily  potassium chloride    Tablet ER 20 milliEquivalent(s) Oral once  sertraline 100 milliGRAM(s) Oral two times a day    PRN:  acetaminophen     Tablet .. 650 milliGRAM(s) Oral every 6 hours PRN  clonazePAM  Tablet 0.5 milliGRAM(s) Oral two times a day PRN      HOME MEDICATIONS:  Home Medications:  amLODIPine 10 mg oral tablet: 1 tab(s) orally once a day (19 Dec 2021 20:45)  clonazePAM 0.5 mg oral tablet: 1 tab(s) orally 2 times a day, As Needed (2022 04:00)  gabapentin 300 mg oral capsule: 1 tab(s) orally once a day (at bedtime) (2022 04:01)  hydroCHLOROthiazide 25 mg oral tablet: 1 tab(s) orally once a day (2022 04:01)  Lasix 40 mg oral tablet: 1 tab(s) orally once a day (19 Dec 2021 20:43)  potassium chloride 20 mEq oral granule, extended release: 1 tab(s) orally once a day (2022 04:02)  sertraline 100 mg oral tablet: 1 tab(s) orally 2 times a day (2022 04:03)  Vitamin D3 1250 mcg (50,000 intl units) oral capsule: 1 cap(s) orally once a week (2022 04:04)      VITALS:   T(F): 97.9 ( @ 21:47), Max: 98.1 ( @ 19:08)  HR: 79 ( @ 05:10) (63 - 90)  BP: 139/74 ( @ 05:10) (122/59 - 144/82)  BP(mean): --  RR: 18 ( @ 21:47) (18 - 18)  SpO2: 95% ( @ 21:47) (95% - 99%)    I&O's Summary      REVIEW OF SYSTEMS:  CONSTITUTIONAL: No weakness, fevers or chills  HEENT: No visual changes, neck/ear pain  RESPIRATORY: No cough, sob  CARDIOVASCULAR: See HPI  GASTROINTESTINAL: No abdominal pain. No nausea, vomiting, diarrhea   GENITOURINARY: No dysuria, frequency or hematuria  NEUROLOGICAL: No new focal deficits  SKIN: No new rashes    PHYSICAL EXAM:  General: Not in distress.  Non-toxic appearing.   HEENT: EOMI  Cardio: regular, S1, S2, no murmur  Pulm: B/L BS.  No wheezing / crackles / rales  Abdomen: Soft, non-tender, non-distended. Normoactive bowel sounds  Extremities: No edema b/l le  Neuro: A&O x2. No focal deficits    LABS:                        14.9   10.08 )-----------( 248      ( 2022 08:39 )             45.2         139  |  95<L>  |  13  ----------------------------<  84  3.8   |  29  |  0.8    Ca    9.7      2022 13:22  Mg     2.2         TPro  6.6  /  Alb  3.7  /  TBili  0.5  /  DBili  x   /  AST  21  /  ALT  5   /  AlkPhos  76      PT/INR - ( 2022 08:39 )   PT: 11.90 sec;   INR: 1.04 ratio         PTT - ( 2022 08:39 )  PTT:29.9 sec  Troponin T, Serum: 0.05 ng/mL *HH* (22 @ 13:22)    CARDIAC MARKERS ( 2022 13:22 )  x     / 0.05 ng/mL / x     / x     / x      CARDIAC MARKERS ( 2022 08:39 )  x     / 0.05 ng/mL / x     / x     / x      CARDIAC MARKERS ( 2022 00:00 )  x     / 0.06 ng/mL / x     / x     / x      CARDIAC MARKERS ( 2022 20:36 )  x     / 0.06 ng/mL / x     / x     / x        Troponin trend:    COVID-19 PCR: NotDetec (2022 22:28)  COVID-19 PCR: NotDetec (2022 00:10)      RADIOLOGY:  -TTE:  < from: TTE Echo Complete w/o Contrast w/ Doppler (22 @ 08:28) >  Summary:   1. Normal global left ventricular systolic function.   2. Normal left atrial size.   3. Normal right atrial size.   4. Mild thickening and calcification of the anterior mitral valve   leaflet.   5. Moderate mitral annular calcification.   6. Trace mitral valve regurgitation.   7. Mild tricuspid regurgitation.   8. PSAP 35.    < end of copied text >      EC Lead ECG:   Ventricular Rate 81 BPM    Atrial Rate 81 BPM    P-R Interval 142 ms    QRS Duration 82 ms    Q-T Interval 392 ms    QTC Calculation(Bazett) 455 ms    P Axis 94 degrees    R Axis 124 degrees    T Axis 59 degrees    Diagnosis Line *** Suspect arm lead reversal, interpretation assumes no reversal  Sinus rhythm with occasional Premature ventricular complexes and Fusion  complexes  Right axis deviation  Possible Right ventricular hypertrophy  Nonspecific ST and T wave abnormality  Abnormal ECG    Confirmed by ZAYDA CHATTERJEE MD [id number]) on 2022 8:28:58 PM ( @ 10:02)      TELEMETRY EVENTS:  No events

## 2022-07-27 NOTE — CONSULT NOTE ADULT - ATTENDING COMMENTS
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   Notably, patient was recently seen in ED 7/21 as a stroke code, after pt had facial droop. CT H was (-). Was found to have electrolyte abnormalities but left AMA.   Patient returned to PCP and found to be hypokalemic, and + troponins. Was sent to ED by her PCP.     Mild elevation in troponin, likely demand ischemia  Underlying CAD  Add ASA 81 mg  Add b-blocker  Medical therapy for CAD  Not a candidate for cardiac cath  Supportive care  Consider GOC discussion.  Recall as needed.

## 2022-07-27 NOTE — PROGRESS NOTE ADULT - ASSESSMENT
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   Notably, patient was recently seen in ED 7/21 as a stroke code, after pt had facial droop. CT H was (-). Was found to have electrolyte abnormalities but left AMA.   Patient returned to PCP and found to be hypokalemic, and + troponins. Was sent to ED by her PCP.   Patient admitted for abnormal lab values (high trops and low potassium) on outpatient labs, and complaint of chest pain     # elevated trop, possible 2/2 ischemic demand 2/2 low potassium, r/o ACS   - Trops 0.02, 0.06, 0.06  - EKG 7/25 noted, nonspecific T wave changes, PACs, prolonged QT, RVH   - Now asymptomatic   - continue to trend troponins 0.06-->0.05-->0.05, stable   - echo wnl  - potassium stable, recheck labs today   - cardiology consulted    # Isolated Leukocytosis 2/2 UTI   - WBC 13k improved with unasyn, will dc on augmentin   - f/u repeat UA and Urine culture   - cultures from 7/20 are + for e. fecium   - pt asymptomatic, can consider abiox if UA returns +     # HTN   - c/w amlodipine 10mg qd   - c/w Lasix 40mg qd   - c/w HCTZ 25mg qd     # Suspect Depression   - c/w Sertraline 100mg bid   - c/w clonazepam 0.5mg BID PRN     # Hypokalemia, possibly 2/2 Lasix - resolved   - Potassium 3.2 on 7/20, and 3.6 hemolyzed 7/25   - c/w home med 20mg ER KCl qd   - Will supplement 40meq stat   - f/u repeat BMP this am    #Progress Note Handoff  Pending (specify): cardiology, placement, cardiac telemonitoring DC  Family discussion: spoke to daughter at bedside and states that she has a 24 hr aide, the aide is the pt son who is having surgery, pt will need placement   Disposition: placement, PT ordered   Anticipated date:  7/28-7/29

## 2022-07-27 NOTE — PROGRESS NOTE ADULT - SUBJECTIVE AND OBJECTIVE BOX
Pt seen and examined at bedside. No CP or SOB.      PAST MEDICAL & SURGICAL HISTORY:  HTN (hypertension)    Anxiety    Arthritis    No significant past surgical history        VITAL SIGNS (Last 24 hrs):  T(C): 36.6 (07-26-22 @ 21:47), Max: 36.6 (07-26-22 @ 21:47)  HR: 79 (07-27-22 @ 05:10) (79 - 90)  BP: 139/74 (07-27-22 @ 05:10) (139/74 - 144/82)  RR: 18 (07-26-22 @ 21:47) (18 - 18)  SpO2: 95% (07-26-22 @ 21:47) (95% - 95%)  Wt(kg): --  Daily     Daily     I&O's Summary      PHYSICAL EXAM:  GENERAL: NAD  HEAD:  Atraumatic, Normocephalic  EYES: EOMI, PERRLA, conjunctiva and sclera clear  NECK: Supple, No JVD  CHEST/LUNG: Clear to auscultation bilaterally; No wheeze  HEART: Regular rate and rhythm; No murmurs, rubs, or gallops  ABDOMEN: Soft, Nontender, Nondistended; Bowel sounds present  EXTREMITIES:  2+ Peripheral Pulses, No clubbing, cyanosis, or edema  PSYCH: Alert  NEUROLOGY: HICKS    Labs Reviewed  Spoke to patient in regards to abnormal labs.    CBC Full  -  ( 26 Jul 2022 08:39 )  WBC Count : 10.08 K/uL  Hemoglobin : 14.9 g/dL  Hematocrit : 45.2 %  Platelet Count - Automated : 248 K/uL  Mean Cell Volume : 92.1 fL  Mean Cell Hemoglobin : 30.3 pg  Mean Cell Hemoglobin Concentration : 33.0 g/dL  Auto Neutrophil # : 8.43 K/uL  Auto Lymphocyte # : 0.68 K/uL  Auto Monocyte # : 0.75 K/uL  Auto Eosinophil # : 0.13 K/uL  Auto Basophil # : 0.05 K/uL  Auto Neutrophil % : 83.7 %  Auto Lymphocyte % : 6.7 %  Auto Monocyte % : 7.4 %  Auto Eosinophil % : 1.3 %  Auto Basophil % : 0.5 %    BMP:    07-26 @ 13:22    Blood Urea Nitrogen - 13  Calcium - 9.7  Carbond Dioxide - 29  Chloride - 95  Creatinine - 0.8  Glucose - 84  Potassium - 3.8  Sodium - 139      Hemoglobin A1c -   PT/INR - ( 26 Jul 2022 08:39 )   PT: 11.90 sec;   INR: 1.04 ratio         PTT - ( 26 Jul 2022 08:39 )  PTT:29.9 sec  Urine Culture:        COVID Labs  CRP:      D-Dimer:            Imaging reviewed independently and reviewed read      < from: Xray Chest 1 View-PORTABLE IMMEDIATE (07.25.22 @ 21:50) >  Skeleton/soft tissues: Stable.    Impression: Diffuse interstitial opacities, unchanged.    < end of copied text >    MEDICATIONS  (STANDING):  amLODIPine   Tablet 10 milliGRAM(s) Oral daily  ampicillin/sulbactam  IVPB 3 Gram(s) IV Intermittent every 6 hours  enoxaparin Injectable 40 milliGRAM(s) SubCutaneous every 24 hours  furosemide    Tablet 40 milliGRAM(s) Oral daily  gabapentin 300 milliGRAM(s) Oral at bedtime  hydrochlorothiazide 25 milliGRAM(s) Oral daily  potassium chloride    Tablet ER 20 milliEquivalent(s) Oral once  sertraline 100 milliGRAM(s) Oral two times a day    MEDICATIONS  (PRN):  acetaminophen     Tablet .. 650 milliGRAM(s) Oral every 6 hours PRN Mild Pain (1 - 3)  clonazePAM  Tablet 0.5 milliGRAM(s) Oral two times a day PRN anxiety

## 2022-07-27 NOTE — CONSULT NOTE ADULT - ASSESSMENT
Impression   # Non cardiac chest pain   # Elevated troponins without trend - Possible underlying CAD     Recommendations  Impression   # Non cardiac chest pain   # Elevated troponins without trend - Possible underlying CAD   #     Recommendations  Impression   # Non cardiac chest pain   # Elevated troponins but stable - Possible underlying CAD   # Mild-Moderate pulm hypertension     Recommendations   - Recommend medical treatment with aspirin and low dose beta blocker and statin     Discussed with attending   Signature: Catalina SCHROEDER, 1st year Cardiology Fellow

## 2022-07-28 LAB
ALBUMIN SERPL ELPH-MCNC: 3.9 G/DL — SIGNIFICANT CHANGE UP (ref 3.5–5.2)
ALBUMIN SERPL ELPH-MCNC: 4 G/DL — SIGNIFICANT CHANGE UP (ref 3.5–5.2)
ALP SERPL-CCNC: 79 U/L — SIGNIFICANT CHANGE UP (ref 30–115)
ALP SERPL-CCNC: 80 U/L — SIGNIFICANT CHANGE UP (ref 30–115)
ALT FLD-CCNC: 6 U/L — SIGNIFICANT CHANGE UP (ref 0–41)
ALT FLD-CCNC: 6 U/L — SIGNIFICANT CHANGE UP (ref 0–41)
ANION GAP SERPL CALC-SCNC: 16 MMOL/L — HIGH (ref 7–14)
ANION GAP SERPL CALC-SCNC: 22 MMOL/L — HIGH (ref 7–14)
AST SERPL-CCNC: 21 U/L — SIGNIFICANT CHANGE UP (ref 0–41)
AST SERPL-CCNC: 29 U/L — SIGNIFICANT CHANGE UP (ref 0–41)
BILIRUB SERPL-MCNC: 0.6 MG/DL — SIGNIFICANT CHANGE UP (ref 0.2–1.2)
BILIRUB SERPL-MCNC: 0.7 MG/DL — SIGNIFICANT CHANGE UP (ref 0.2–1.2)
BUN SERPL-MCNC: 14 MG/DL — SIGNIFICANT CHANGE UP (ref 10–20)
BUN SERPL-MCNC: 18 MG/DL — SIGNIFICANT CHANGE UP (ref 10–20)
CALCIUM SERPL-MCNC: 9.7 MG/DL — SIGNIFICANT CHANGE UP (ref 8.5–10.1)
CALCIUM SERPL-MCNC: 9.8 MG/DL — SIGNIFICANT CHANGE UP (ref 8.5–10.1)
CHLORIDE SERPL-SCNC: 92 MMOL/L — LOW (ref 98–110)
CHLORIDE SERPL-SCNC: 93 MMOL/L — LOW (ref 98–110)
CO2 SERPL-SCNC: 23 MMOL/L — SIGNIFICANT CHANGE UP (ref 17–32)
CO2 SERPL-SCNC: 30 MMOL/L — SIGNIFICANT CHANGE UP (ref 17–32)
CREAT SERPL-MCNC: 0.9 MG/DL — SIGNIFICANT CHANGE UP (ref 0.7–1.5)
CREAT SERPL-MCNC: 0.9 MG/DL — SIGNIFICANT CHANGE UP (ref 0.7–1.5)
EGFR: 65 ML/MIN/1.73M2 — SIGNIFICANT CHANGE UP
EGFR: 65 ML/MIN/1.73M2 — SIGNIFICANT CHANGE UP
GLUCOSE SERPL-MCNC: 95 MG/DL — SIGNIFICANT CHANGE UP (ref 70–99)
GLUCOSE SERPL-MCNC: 99 MG/DL — SIGNIFICANT CHANGE UP (ref 70–99)
HCT VFR BLD CALC: 45.7 % — SIGNIFICANT CHANGE UP (ref 37–47)
HGB BLD-MCNC: 16.2 G/DL — HIGH (ref 12–16)
MAGNESIUM SERPL-MCNC: 1.9 MG/DL — SIGNIFICANT CHANGE UP (ref 1.8–2.4)
MAGNESIUM SERPL-MCNC: 2 MG/DL — SIGNIFICANT CHANGE UP (ref 1.8–2.4)
MCHC RBC-ENTMCNC: 31.6 PG — HIGH (ref 27–31)
MCHC RBC-ENTMCNC: 35.4 G/DL — SIGNIFICANT CHANGE UP (ref 32–37)
MCV RBC AUTO: 89.1 FL — SIGNIFICANT CHANGE UP (ref 81–99)
NRBC # BLD: 0 /100 WBCS — SIGNIFICANT CHANGE UP (ref 0–0)
PLATELET # BLD AUTO: 331 K/UL — SIGNIFICANT CHANGE UP (ref 130–400)
POTASSIUM SERPL-MCNC: 2.8 MMOL/L — LOW (ref 3.5–5)
POTASSIUM SERPL-MCNC: 2.8 MMOL/L — LOW (ref 3.5–5)
POTASSIUM SERPL-SCNC: 2.8 MMOL/L — LOW (ref 3.5–5)
POTASSIUM SERPL-SCNC: 2.8 MMOL/L — LOW (ref 3.5–5)
PROT SERPL-MCNC: 6.5 G/DL — SIGNIFICANT CHANGE UP (ref 6–8)
PROT SERPL-MCNC: 7.2 G/DL — SIGNIFICANT CHANGE UP (ref 6–8)
RBC # BLD: 5.13 M/UL — SIGNIFICANT CHANGE UP (ref 4.2–5.4)
RBC # FLD: 14.3 % — SIGNIFICANT CHANGE UP (ref 11.5–14.5)
SODIUM SERPL-SCNC: 137 MMOL/L — SIGNIFICANT CHANGE UP (ref 135–146)
SODIUM SERPL-SCNC: 139 MMOL/L — SIGNIFICANT CHANGE UP (ref 135–146)
TROPONIN T SERPL-MCNC: 0.03 NG/ML — CRITICAL HIGH
TSH SERPL-MCNC: 2.47 UIU/ML — SIGNIFICANT CHANGE UP (ref 0.27–4.2)
WBC # BLD: 16.55 K/UL — HIGH (ref 4.8–10.8)
WBC # FLD AUTO: 16.55 K/UL — HIGH (ref 4.8–10.8)

## 2022-07-28 PROCEDURE — 99232 SBSQ HOSP IP/OBS MODERATE 35: CPT

## 2022-07-28 RX ORDER — POTASSIUM CHLORIDE 20 MEQ
20 PACKET (EA) ORAL ONCE
Refills: 0 | Status: COMPLETED | OUTPATIENT
Start: 2022-07-28 | End: 2022-07-28

## 2022-07-28 RX ORDER — POTASSIUM CHLORIDE 20 MEQ
20 PACKET (EA) ORAL
Refills: 0 | Status: COMPLETED | OUTPATIENT
Start: 2022-07-28 | End: 2022-07-29

## 2022-07-28 RX ADMIN — AMLODIPINE BESYLATE 10 MILLIGRAM(S): 2.5 TABLET ORAL at 05:51

## 2022-07-28 RX ADMIN — Medication 650 MILLIGRAM(S): at 21:46

## 2022-07-28 RX ADMIN — Medication 650 MILLIGRAM(S): at 22:16

## 2022-07-28 RX ADMIN — Medication 81 MILLIGRAM(S): at 11:45

## 2022-07-28 RX ADMIN — ATORVASTATIN CALCIUM 40 MILLIGRAM(S): 80 TABLET, FILM COATED ORAL at 21:42

## 2022-07-28 RX ADMIN — GABAPENTIN 300 MILLIGRAM(S): 400 CAPSULE ORAL at 21:42

## 2022-07-28 RX ADMIN — ENOXAPARIN SODIUM 40 MILLIGRAM(S): 100 INJECTION SUBCUTANEOUS at 11:45

## 2022-07-28 RX ADMIN — AMPICILLIN SODIUM AND SULBACTAM SODIUM 200 GRAM(S): 250; 125 INJECTION, POWDER, FOR SUSPENSION INTRAMUSCULAR; INTRAVENOUS at 01:02

## 2022-07-28 RX ADMIN — SERTRALINE 100 MILLIGRAM(S): 25 TABLET, FILM COATED ORAL at 17:08

## 2022-07-28 RX ADMIN — Medication 50 MILLIEQUIVALENT(S): at 17:12

## 2022-07-28 RX ADMIN — Medication 25 MILLIGRAM(S): at 05:51

## 2022-07-28 RX ADMIN — AMPICILLIN SODIUM AND SULBACTAM SODIUM 200 GRAM(S): 250; 125 INJECTION, POWDER, FOR SUSPENSION INTRAMUSCULAR; INTRAVENOUS at 11:46

## 2022-07-28 RX ADMIN — AMPICILLIN SODIUM AND SULBACTAM SODIUM 200 GRAM(S): 250; 125 INJECTION, POWDER, FOR SUSPENSION INTRAMUSCULAR; INTRAVENOUS at 18:00

## 2022-07-28 RX ADMIN — AMPICILLIN SODIUM AND SULBACTAM SODIUM 200 GRAM(S): 250; 125 INJECTION, POWDER, FOR SUSPENSION INTRAMUSCULAR; INTRAVENOUS at 05:09

## 2022-07-28 RX ADMIN — Medication 0.5 MILLIGRAM(S): at 21:42

## 2022-07-28 RX ADMIN — Medication 50 MILLIEQUIVALENT(S): at 22:02

## 2022-07-28 RX ADMIN — Medication 20 MILLIEQUIVALENT(S): at 17:07

## 2022-07-28 RX ADMIN — SERTRALINE 100 MILLIGRAM(S): 25 TABLET, FILM COATED ORAL at 05:51

## 2022-07-28 NOTE — PROGRESS NOTE ADULT - SUBJECTIVE AND OBJECTIVE BOX
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   Notably, patient was recently seen in ED 7/21 as a stroke code, after pt had facial droop. CT H was (-). Was found to have electrolyte abnormalities but left AMA.   Patient returned to PCP and found to be hypokalemic, and + troponins. Was sent to ED by her PCP.   History obtained from patient and her aide. Patient is AxO x2, and is a poor historian. However she reports that she may have had some chest pain earlier. Per Aide bedside, patient was sent in by PCP for unknown abnormal lab values. (high trops and low potassium)    ED Course:   Vitals: T 98.1, HR 72, /86, RR 18, 99% on RA   CXR 7/20: cardiomegaly and interstitial opacifications   CT H 7/20: (-)   Labs: WBC 13K, Trops 0.02, 0.06, 0.06,   UA (+) for LE, WBC; Urine culture 7/20 (+) e. feceium   EKG 7/25 noted, nonspecific T wave changes, PACs, prolonged QT, RVH     Patient admitted for workup of abnormal lab values.         (26 Jul 2022 02:28)      SUBJECTIVE  Patient is a 81y old Female who presents with a chief complaint of elevated cardiac enzymes (27 Jul 2022 11:57)  Currently admitted to medicine with the primary diagnosis of Elevated troponin  patient is feeling well today no chest pain no SOB. She has lower abodminal pressure like pain.  she had 2 episodes of watery diarrhea      INTERVAL HPI AND OVERNIGHT EVENTS:  Patient was examined and seen at bedside. This morning she is resting comfortably in bed and reports no issues or overnight events.    REVIEW OF SYMPTOMS:  CONSTITUTIONAL: No weakness, fevers or chills; No headaches  EYES: No visual changes, eye pain, or discharge  ENT: No vertigo; No ear pain or change in hearing; No sore throat or difficulty swallowing  NECK: No pain or stiffness  RESPIRATORY: No cough, wheezing, or hemoptysis; No shortness of breath  CARDIOVASCULAR: No chest pain or palpitations  GASTROINTESTINAL: lower abdominal pressure like pain  GENITOURINARY: No dysuria, frequency or hematuria  MUSCULOSKELETAL: No joint pain, no muscle pain, no weakness  NEUROLOGICAL: + weakness. Dementia.  SKIN: ecchymosis over her upper extremities. (blood draws hematomas)    OBJECTIVE  PAST MEDICAL & SURGICAL HISTORY  HTN (hypertension)    Anxiety    Arthritis    No significant past surgical history      ALLERGIES:  No Known Allergies    MEDICATIONS:  STANDING MEDICATIONS  amLODIPine   Tablet 10 milliGRAM(s) Oral daily  ampicillin/sulbactam  IVPB 3 Gram(s) IV Intermittent every 6 hours  aspirin  chewable 81 milliGRAM(s) Oral daily  atorvastatin 40 milliGRAM(s) Oral at bedtime  enoxaparin Injectable 40 milliGRAM(s) SubCutaneous every 24 hours  gabapentin 300 milliGRAM(s) Oral at bedtime  metoprolol tartrate 25 milliGRAM(s) Oral daily  potassium chloride    Tablet ER 20 milliEquivalent(s) Oral once  sertraline 100 milliGRAM(s) Oral two times a day  sodium chloride 0.9%. 1000 milliLiter(s) IV Continuous <Continuous>    PRN MEDICATIONS  acetaminophen     Tablet .. 650 milliGRAM(s) Oral every 6 hours PRN  clonazePAM  Tablet 0.5 milliGRAM(s) Oral two times a day PRN      VITAL SIGNS: Last 24 Hours  T(C): 36.4 (28 Jul 2022 05:05), Max: 36.9 (27 Jul 2022 20:12)  T(F): 97.6 (28 Jul 2022 05:05), Max: 98.5 (27 Jul 2022 20:12)  HR: 68 (28 Jul 2022 05:05) (68 - 128)  BP: 142/67 (28 Jul 2022 05:05) (116/70 - 143/79)  BP(mean): --  RR: 18 (28 Jul 2022 08:27) (18 - 18)  SpO2: 96% (28 Jul 2022 08:27) (96% - 96%)    LABS:                        17.6   15.71 )-----------( 328      ( 27 Jul 2022 22:09 )             51.0     07-27    137  |  92<L>  |  14  ----------------------------<  99  2.8<L>   |  23  |  0.9    Ca    9.7      27 Jul 2022 22:09  Mg     1.9     07-27    TPro  7.2  /  Alb  3.9  /  TBili  0.6  /  DBili  x   /  AST  29  /  ALT  6   /  AlkPhos  80  07-27          Troponin T, Serum: 0.03 ng/mL *HH* (07-27-22 @ 22:09)  Troponin T, Serum: 0.04 ng/mL *HH* (07-27-22 @ 12:28)      CARDIAC MARKERS ( 27 Jul 2022 22:09 )  x     / 0.03 ng/mL / x     / x     / x      CARDIAC MARKERS ( 27 Jul 2022 12:28 )  x     / 0.04 ng/mL / x     / x     / x      CARDIAC MARKERS ( 26 Jul 2022 13:22 )  x     / 0.05 ng/mL / x     / x     / x          RADIOLOGY:  ACC: [id number] EXAM:  CT BRAIN                          PROCEDURE DATE:  07/20/2022      IMPRESSION:      No evidence of acute intracranial hemorrhage. Stable exam.    Stable extensive chronic microvascular changes and chronic infarcts.    ACC: [id number] EXAM:  XR CHEST PORTABLE IMMED 1V                          PROCEDURE DATE:  07/25/2022      Impression: Diffuse interstitial opacities, unchanged.    --- End of Report ---      PHYSICAL EXAM:  CONSTITUTIONAL: No acute distress, well-developed, well-groomed, AAOx3  HEAD: Atraumatic, normocephalic  EYES: EOM intact, PERRLA, conjunctiva and sclera clear  ENT: Supple, no masses, no thyromegaly, no bruits, no JVD; moist mucous membranes  PULMONARY: Clear to auscultation bilaterally; no wheezes, rales, or rhonchi  CARDIOVASCULAR: Regular rate and rhythm; no murmurs, rubs, or gallops  GASTROINTESTINAL: Soft, non-tender, non-distended; bowel sounds present  MUSCULOSKELETAL: 2+ peripheral pulses; no clubbing, no cyanosis, no edema  NEUROLOGY: non-focal  SKIN: No rashes or lesions; warm and dry    ASSESSMENT & PLAN  81y F with 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   Notably, patient was recently seen in ED 7/21 as a stroke code, after pt had facial droop. CT H was (-). Was found to have electrolyte abnormalities but left AMA.   Patient returned to PCP and found to be hypokalemic, and + troponins. Was sent to ED by her PCP.   Patient admitted for abnormal lab values (high trops and low potassium) on outpatient labs, and complaint of chest pain   the patient denies chest patient or SOB today.  However she ahd 2 episodes of watery diarrhea    # Atypical Chest Pain, possible NSTEMI   - Trops 0.02, 0.06, 0.06, 0.03 in 7/27  - EKG 7/25 noted, nonspecific T wave changes, PACs, prolonged QT, RVH   - Now asymptomatic   cardio consult appreciated    #new onset paroxysmal atrial fibrillation with RVR  1 episode was seen yesterday 11pm on the tele.   Cardio contacted  start AC?  TSH ordered    # diarrhea  f/u stool studies   could be side effect from amoxicillin  she also received unasyn  if another episode we send for cdiff    # Isolated Leukocytosis   - WBC today??  - f/u repeat UA and Urine culture   - cultures from 7/20 are + for e. 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   Notably, patient was recently seen in ED 7/21 as a stroke code, after pt had facial droop. CT H was (-). Was found to have electrolyte abnormalities but left AMA.   Patient returned to PCP and found to be hypokalemic, and + troponins. Was sent to ED by her PCP.   History obtained from patient and her aide. Patient is AxO x2, and is a poor historian. However she reports that she may have had some chest pain earlier. Per Aide bedside, patient was sent in by PCP for unknown abnormal lab values. (high trops and low potassium)    ED Course:   Vitals: T 98.1, HR 72, /86, RR 18, 99% on RA   CXR 7/20: cardiomegaly and interstitial opacifications   CT H 7/20: (-)   Labs: WBC 13K, Trops 0.02, 0.06, 0.06,   UA (+) for LE, WBC; Urine culture 7/20 (+) e. feceium   EKG 7/25 noted, nonspecific T wave changes, PACs, prolonged QT, RVH     Patient admitted for workup of abnormal lab values.         (26 Jul 2022 02:28)      SUBJECTIVE  Patient is a 81y old Female who presents with a chief complaint of elevated cardiac enzymes (27 Jul 2022 11:57)  Currently admitted to medicine with the primary diagnosis of Elevated troponin  patient is feeling well today no chest pain no SOB. She has lower abodminal pressure like pain.  she had 2 episodes of watery diarrhea      INTERVAL HPI AND OVERNIGHT EVENTS:  Patient was examined and seen at bedside. This morning she is resting comfortably in bed and reports no issues or overnight events.    REVIEW OF SYMPTOMS:  CONSTITUTIONAL: No weakness, fevers or chills; No headaches  EYES: No visual changes, eye pain, or discharge  ENT: No vertigo; No ear pain or change in hearing; No sore throat or difficulty swallowing  NECK: No pain or stiffness  RESPIRATORY: No cough, wheezing, or hemoptysis; No shortness of breath  CARDIOVASCULAR: No chest pain or palpitations  GASTROINTESTINAL: lower abdominal pressure like pain  GENITOURINARY: No dysuria, frequency or hematuria  MUSCULOSKELETAL: No joint pain, no muscle pain, no weakness  NEUROLOGICAL: + weakness. Dementia.  SKIN: ecchymosis over her upper extremities. (blood draws hematomas)    OBJECTIVE  PAST MEDICAL & SURGICAL HISTORY  HTN (hypertension)    Anxiety    Arthritis    No significant past surgical history      ALLERGIES:  No Known Allergies    MEDICATIONS:  STANDING MEDICATIONS  amLODIPine   Tablet 10 milliGRAM(s) Oral daily  ampicillin/sulbactam  IVPB 3 Gram(s) IV Intermittent every 6 hours  aspirin  chewable 81 milliGRAM(s) Oral daily  atorvastatin 40 milliGRAM(s) Oral at bedtime  enoxaparin Injectable 40 milliGRAM(s) SubCutaneous every 24 hours  gabapentin 300 milliGRAM(s) Oral at bedtime  metoprolol tartrate 25 milliGRAM(s) Oral daily  potassium chloride    Tablet ER 20 milliEquivalent(s) Oral once  sertraline 100 milliGRAM(s) Oral two times a day  sodium chloride 0.9%. 1000 milliLiter(s) IV Continuous <Continuous>    PRN MEDICATIONS  acetaminophen     Tablet .. 650 milliGRAM(s) Oral every 6 hours PRN  clonazePAM  Tablet 0.5 milliGRAM(s) Oral two times a day PRN      VITAL SIGNS: Last 24 Hours  T(C): 36.4 (28 Jul 2022 05:05), Max: 36.9 (27 Jul 2022 20:12)  T(F): 97.6 (28 Jul 2022 05:05), Max: 98.5 (27 Jul 2022 20:12)  HR: 68 (28 Jul 2022 05:05) (68 - 128)  BP: 142/67 (28 Jul 2022 05:05) (116/70 - 143/79)  BP(mean): --  RR: 18 (28 Jul 2022 08:27) (18 - 18)  SpO2: 96% (28 Jul 2022 08:27) (96% - 96%)    LABS:                        17.6   15.71 )-----------( 328      ( 27 Jul 2022 22:09 )             51.0     07-27    137  |  92<L>  |  14  ----------------------------<  99  2.8<L>   |  23  |  0.9    Ca    9.7      27 Jul 2022 22:09  Mg     1.9     07-27    TPro  7.2  /  Alb  3.9  /  TBili  0.6  /  DBili  x   /  AST  29  /  ALT  6   /  AlkPhos  80  07-27          Troponin T, Serum: 0.03 ng/mL *HH* (07-27-22 @ 22:09)  Troponin T, Serum: 0.04 ng/mL *HH* (07-27-22 @ 12:28)      CARDIAC MARKERS ( 27 Jul 2022 22:09 )  x     / 0.03 ng/mL / x     / x     / x      CARDIAC MARKERS ( 27 Jul 2022 12:28 )  x     / 0.04 ng/mL / x     / x     / x      CARDIAC MARKERS ( 26 Jul 2022 13:22 )  x     / 0.05 ng/mL / x     / x     / x          RADIOLOGY:  ACC: [id number] EXAM:  CT BRAIN                          PROCEDURE DATE:  07/20/2022      IMPRESSION:      No evidence of acute intracranial hemorrhage. Stable exam.    Stable extensive chronic microvascular changes and chronic infarcts.    ACC: [id number] EXAM:  XR CHEST PORTABLE IMMED 1V                          PROCEDURE DATE:  07/25/2022      Impression: Diffuse interstitial opacities, unchanged.    --- End of Report ---      PHYSICAL EXAM:  CONSTITUTIONAL: No acute distress, well-developed, well-groomed, AAOx3  HEAD: Atraumatic, normocephalic  EYES: EOM intact, PERRLA, conjunctiva and sclera clear  ENT: Supple, no masses, no thyromegaly, no bruits, no JVD; moist mucous membranes  PULMONARY: Clear to auscultation bilaterally; no wheezes, rales, or rhonchi  CARDIOVASCULAR: Regular rate and rhythm; no murmurs, rubs, or gallops  GASTROINTESTINAL: Soft, non-tender, non-distended; bowel sounds present  MUSCULOSKELETAL: 2+ peripheral pulses; no clubbing, no cyanosis, no edema  NEUROLOGY: non-focal  SKIN: No rashes or lesions; warm and dry    ASSESSMENT & PLAN  81y F with 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   Notably, patient was recently seen in ED 7/21 as a stroke code, after pt had facial droop. CT H was (-). Was found to have electrolyte abnormalities but left AMA.   Patient returned to PCP and found to be hypokalemic, and + troponins. Was sent to ED by her PCP.   Patient admitted for abnormal lab values (high trops and low potassium) on outpatient labs, and complaint of chest pain   the patient denies chest patient or SOB today.  However she ahd 2 episodes of watery diarrhea    # Atypical Chest Pain, possible NSTEMI   - Trops 0.02, 0.06, 0.06, 0.03 in 7/27  - EKG 7/25 noted, nonspecific T wave changes, PACs, prolonged QT, RVH   - Now asymptomatic   cardio consult appreciated    #new onset paroxysmal atrial fibrillation with RVR  1 episode was seen yesterday 11pm on the tele.   Cardio consult appreciated: CHADSASC 4 . Recommend medical treatment with aspirin and low dose beta  TSH pending    # diarrhea  f/u stool studies   could be side effect from amoxicillin  she also received unasyn  if another episode we send for cdiff    # Isolated Leukocytosis   - WBC today??  - f/u repeat UA and Urine culture   - cultures from 7/20 are + for e. 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   Notably, patient was recently seen in ED 7/21 as a stroke code, after pt had facial droop. CT H was (-). Was found to have electrolyte abnormalities but left AMA.   Patient returned to PCP and found to be hypokalemic, and + troponins. Was sent to ED by her PCP.   History obtained from patient and her aide. Patient is AxO x2, and is a poor historian. However she reports that she may have had some chest pain earlier. Per Aide bedside, patient was sent in by PCP for unknown abnormal lab values. (high trops and low potassium)    ED Course:   Vitals: T 98.1, HR 72, /86, RR 18, 99% on RA   CXR 7/20: cardiomegaly and interstitial opacifications   CT H 7/20: (-)   Labs: WBC 13K, Trops 0.02, 0.06, 0.06,   UA (+) for LE, WBC; Urine culture 7/20 (+) e. feceium   EKG 7/25 noted, nonspecific T wave changes, PACs, prolonged QT, RVH     Patient admitted for workup of abnormal lab values.         (26 Jul 2022 02:28)      SUBJECTIVE  Patient is a 81y old Female who presents with a chief complaint of elevated cardiac enzymes (27 Jul 2022 11:57)  Currently admitted to medicine with the primary diagnosis of Elevated troponin  patient is feeling well today no chest pain no SOB. She has lower abodminal pressure like pain.  she had 2 episodes of watery diarrhea      INTERVAL HPI AND OVERNIGHT EVENTS:  Patient was examined and seen at bedside. This morning she is resting comfortably in bed and reports no issues or overnight events.    REVIEW OF SYMPTOMS:  CONSTITUTIONAL: No weakness, fevers or chills; No headaches  EYES: No visual changes, eye pain, or discharge  ENT: No vertigo; No ear pain or change in hearing; No sore throat or difficulty swallowing  NECK: No pain or stiffness  RESPIRATORY: No cough, wheezing, or hemoptysis; No shortness of breath  CARDIOVASCULAR: No chest pain or palpitations  GASTROINTESTINAL: lower abdominal pressure like pain  GENITOURINARY: No dysuria, frequency or hematuria  MUSCULOSKELETAL: No joint pain, no muscle pain, no weakness  NEUROLOGICAL: + weakness. Dementia.  SKIN: ecchymosis over her upper extremities. (blood draws hematomas)    OBJECTIVE  PAST MEDICAL & SURGICAL HISTORY  HTN (hypertension)    Anxiety    Arthritis    No significant past surgical history      ALLERGIES:  No Known Allergies    MEDICATIONS:  STANDING MEDICATIONS  amLODIPine   Tablet 10 milliGRAM(s) Oral daily  ampicillin/sulbactam  IVPB 3 Gram(s) IV Intermittent every 6 hours  aspirin  chewable 81 milliGRAM(s) Oral daily  atorvastatin 40 milliGRAM(s) Oral at bedtime  enoxaparin Injectable 40 milliGRAM(s) SubCutaneous every 24 hours  gabapentin 300 milliGRAM(s) Oral at bedtime  metoprolol tartrate 25 milliGRAM(s) Oral daily  potassium chloride    Tablet ER 20 milliEquivalent(s) Oral once  sertraline 100 milliGRAM(s) Oral two times a day  sodium chloride 0.9%. 1000 milliLiter(s) IV Continuous <Continuous>    PRN MEDICATIONS  acetaminophen     Tablet .. 650 milliGRAM(s) Oral every 6 hours PRN  clonazePAM  Tablet 0.5 milliGRAM(s) Oral two times a day PRN      VITAL SIGNS: Last 24 Hours  T(C): 36.4 (28 Jul 2022 05:05), Max: 36.9 (27 Jul 2022 20:12)  T(F): 97.6 (28 Jul 2022 05:05), Max: 98.5 (27 Jul 2022 20:12)  HR: 68 (28 Jul 2022 05:05) (68 - 128)  BP: 142/67 (28 Jul 2022 05:05) (116/70 - 143/79)  BP(mean): --  RR: 18 (28 Jul 2022 08:27) (18 - 18)  SpO2: 96% (28 Jul 2022 08:27) (96% - 96%)    LABS:                        17.6   15.71 )-----------( 328      ( 27 Jul 2022 22:09 )             51.0     07-27    137  |  92<L>  |  14  ----------------------------<  99  2.8<L>   |  23  |  0.9    Ca    9.7      27 Jul 2022 22:09  Mg     1.9     07-27    TPro  7.2  /  Alb  3.9  /  TBili  0.6  /  DBili  x   /  AST  29  /  ALT  6   /  AlkPhos  80  07-27          Troponin T, Serum: 0.03 ng/mL *HH* (07-27-22 @ 22:09)  Troponin T, Serum: 0.04 ng/mL *HH* (07-27-22 @ 12:28)      CARDIAC MARKERS ( 27 Jul 2022 22:09 )  x     / 0.03 ng/mL / x     / x     / x      CARDIAC MARKERS ( 27 Jul 2022 12:28 )  x     / 0.04 ng/mL / x     / x     / x      CARDIAC MARKERS ( 26 Jul 2022 13:22 )  x     / 0.05 ng/mL / x     / x     / x          RADIOLOGY:  ACC: [id number] EXAM:  CT BRAIN                          PROCEDURE DATE:  07/20/2022      IMPRESSION:      No evidence of acute intracranial hemorrhage. Stable exam.    Stable extensive chronic microvascular changes and chronic infarcts.    ACC: [id number] EXAM:  XR CHEST PORTABLE IMMED 1V                          PROCEDURE DATE:  07/25/2022      Impression: Diffuse interstitial opacities, unchanged.    --- End of Report ---      PHYSICAL EXAM:  CONSTITUTIONAL: No acute distress, well-developed, well-groomed, AAOx3  HEAD: Atraumatic, normocephalic  EYES: EOM intact, PERRLA, conjunctiva and sclera clear  ENT: Supple, no masses, no thyromegaly, no bruits, no JVD; moist mucous membranes  PULMONARY: Clear to auscultation bilaterally; no wheezes, rales, or rhonchi  CARDIOVASCULAR: Regular rate and rhythm; no murmurs, rubs, or gallops  GASTROINTESTINAL: Soft, non-tender, non-distended; bowel sounds present  MUSCULOSKELETAL: 2+ peripheral pulses; no clubbing, no cyanosis, no edema  NEUROLOGY: non-focal  SKIN: No rashes or lesions; warm and dry    ASSESSMENT & PLAN  81y F with 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   Notably, patient was recently seen in ED 7/21 as a stroke code, after pt had facial droop. CT H was (-). Was found to have electrolyte abnormalities but left AMA.   Patient returned to PCP and found to be hypokalemic, and + troponins. Was sent to ED by her PCP.   Patient admitted for abnormal lab values (high trops and low potassium) on outpatient labs, and complaint of chest pain   the patient denies chest patient or SOB today.  However she ahd 2 episodes of watery diarrhea    # Atypical Chest Pain, possible NSTEMI   - Trops 0.02, 0.06, 0.06, 0.03 in 7/27  - EKG 7/25 noted, nonspecific T wave changes, PACs, prolonged QT, RVH   - Now asymptomatic   cardio consult appreciated    #new onset paroxysmal atrial fibrillation with RVR  1 episode was seen yesterday 11pm on the tele.   Cardio consult appreciated: CHADSASC 4 . Recommend c/w medical treatment with aspirin and low dose beta  TSH pending    # diarrhea  f/u stool studies   could be side effect from amoxicillin  she also received unasyn  if another episode we send for cdiff and isolate and get lactate    # Isolated Leukocytosis   - WBC today 16  - f/u repeat UA and Urine culture   - cultures from 7/20 are + for e. fecium   - pt asymptomatic, can consider abiox if UA returns +     #hand excoriations  f/u wound care consult    # HTN   - c/w amlodipine 10mg qd   - c/w Lasix 40mg qd   - c/w HCTZ 25mg qd     # Suspect Depression   - c/w Sertraline 100mg bid   - c/w clonazepam 0.5mg BID PRN     # Hypokalemia, possibly 2/2 Lasix   - supplemented with 20 oral and 20 IV x3   - f/u repeat BMP     DVT ppx: Lovenox   Diet: Regular

## 2022-07-28 NOTE — ADVANCED PRACTICE NURSE CONSULT - ASSESSMENT
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  Notably, patient was recently seen in ED 7/21 as a stroke code, after pt had facial droop. CT H was (-). Was found to have electrolyte abnormalities but left AMA.  Patient returned to PCP and found to be hypokalemic, and + troponins. Was sent to ED by her PCP.   History obtained from patient and her aide. Patient is AxO x2, and is a poor historian. However she reports that she may have had some chest pain earlier. Per Aide bedside, patient was sent in by PCP for unknown abnormal lab values. (high trops and low potassium)  ED Course:   Vitals: T 98.1, HR 72, /86, RR 18, 99% on RA; CXR 7/20: cardiomegaly and interstitial opacifications ; CT H 7/20: (-)  Labs: WBC 13K, Trops 0.02, 0.06, 0.06,  UA (+) for LE, WBC; Urine culture 7/20 (+) e. feceium   EKG 7/25 noted, nonspecific T wave changes, PACs, prolonged QT, RVH . Patient admitted for workup of abnormal lab values.    Currently admitted to medicine with primary diagnosis of elevated cardiac enzymes, today is hospital day-2d; on 3C, being managed for Elevated trop; Significant hypokalemia; Isolated Leukocytosis 2/2 UTI; HTN; Suspect Depression ; Suspected Lewy body dementia.    Received patient on 3C, laying in bed, turned to right side (pillow under left side), HOB elevated 30 degrees. Pt awake, A&Ox3, patient's son's fiance at bedside, both made aware of purpose of WOCN visit, agreeable to consult.   Multiple areas of ecchymosis throughout b/l upper & lower extremities, skin fragile, thin. Skin tears to BUEs & LLE- currently being managed with Xeroform dressing and Kerlex wrap dressing.       With assistance from covering KAREN Antonio, turned patient to right side for skin assessment.   Sacral, coccyx & buttock skin intact, no pressure injuries. Bilateral heels intact.     Patient mostly bedbound, limited mobility in bed. Incontinent of urine and stool. Ordered for regular diet, adequate intake as per reported Roddy score.   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  Notably, patient was recently seen in ED 7/21 as a stroke code, after pt had facial droop. CT H was (-). Was found to have electrolyte abnormalities but left AMA.  Patient returned to PCP and found to be hypokalemic, and + troponins. Was sent to ED by her PCP.   History obtained from patient and her aide. Patient is AxO x2, and is a poor historian. However she reports that she may have had some chest pain earlier. Per Aide bedside, patient was sent in by PCP for unknown abnormal lab values. (high trops and low potassium)  ED Course:   Vitals: T 98.1, HR 72, /86, RR 18, 99% on RA; CXR 7/20: cardiomegaly and interstitial opacifications ; CT H 7/20: (-)  Labs: WBC 13K, Trops 0.02, 0.06, 0.06,  UA (+) for LE, WBC; Urine culture 7/20 (+) e. feceium   EKG 7/25 noted, nonspecific T wave changes, PACs, prolonged QT, RVH . Patient admitted for workup of abnormal lab values.    Currently admitted to medicine with primary diagnosis of elevated cardiac enzymes, today is hospital day-2d; on 3C, being managed for Elevated trop; Significant hypokalemia; Isolated Leukocytosis 2/2 UTI; HTN; Suspect Depression ; Suspected Lewy body dementia.    Received patient on 3C, laying in bed, turned to right side (pillow under left side), HOB elevated 30 degrees. Pt awake, A&Ox3, patient's son's fiance at bedside, both made aware of purpose of WOCN visit, agreeable to consult.   Multiple areas of ecchymosis throughout b/l upper & lower extremities, skin fragile, thin. Skin tears to BUEs & RLE- currently being managed with Xeroform dressing and Kerlex wrap dressing.       With assistance from covering KAREN Antonio, turned patient to right side for skin assessment.   Sacral, coccyx & buttock skin intact, no pressure injuries. Bilateral heels intact.     Patient mostly bedbound, limited mobility in bed. Incontinent of urine and stool. Ordered for regular diet, adequate intake as per reported Roddy score.

## 2022-07-28 NOTE — PROGRESS NOTE ADULT - ASSESSMENT
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 CT H was (-). Was found to have electrolyte abnormalities but left AMA.     Elevated trop  - unclear etiology, could be due to Type II NSTEMI (demand ischemia)  - cardio eval appreciated--GDMT, no cath or other work up  - ECHO with mild regurg of mitral and tricuspid valves  - EKG 7/25 noted, nonspecific T wave changes, PACs, prolonged QT, RVH     Significant hypokalemia  - replenished overnight  - pending repeat labs  - keep K around 4, Mg around 2    Isolated Leukocytosis 2/2 UTI   - pending repeat CBC  - c/w unasyn  - follow up sensitivies    HTN   - c/w amlodipine 10mg qd   - c/w Lasix 40mg qd   - c/w HCTZ 25mg qd     Suspect Depression   Suspected Lewy body dementia  - c/w Sertraline 100mg bid   - c/w clonazepam 0.5mg BID PRN     #Progress Note Handoff  Pending (specify): cardiology, placement, cardiac telemonitoring DC  Family discussion: spoke to daughter via phone, aware of pending placement and labs.  Disposition: placement, PT ordered   Anticipated date:  7/29

## 2022-07-28 NOTE — PHYSICAL THERAPY INITIAL EVALUATION ADULT - ADDITIONAL COMMENTS
Daughter-in-law stated she they use a chair lift to get from first to second floor. Stated that pt lives with son, who is currently in hospital s/p hip replacement. He is unable to care for pt at this time.

## 2022-07-28 NOTE — PHYSICAL THERAPY INITIAL EVALUATION ADULT - LIVES WITH, PROFILE
Pt lives in private home with son, 2 floors. 2-3 CAMPOS , 1 flight upstairs with chair lift/children

## 2022-07-28 NOTE — PHYSICAL THERAPY INITIAL EVALUATION ADULT - PERTINENT HX OF CURRENT PROBLEM, REHAB EVAL
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 Pt was referred to PT for eval and tx.

## 2022-07-28 NOTE — PROGRESS NOTE ADULT - SUBJECTIVE AND OBJECTIVE BOX
CHIEF COMPLAINT:    Patient is a 81y old  Female who presents with a chief complaint of elevated cardiac enzymes    INTERVAL HPI/OVERNIGHT EVENTS:    Patient seen and examined at bedside. No acute overnight events occurred.    ROS: Denies chest pain, dizziness. All other systems are negative.    Medications:  Standing  amLODIPine   Tablet 10 milliGRAM(s) Oral daily  ampicillin/sulbactam  IVPB 3 Gram(s) IV Intermittent every 6 hours  aspirin  chewable 81 milliGRAM(s) Oral daily  atorvastatin 40 milliGRAM(s) Oral at bedtime  enoxaparin Injectable 40 milliGRAM(s) SubCutaneous every 24 hours  gabapentin 300 milliGRAM(s) Oral at bedtime  metoprolol tartrate 25 milliGRAM(s) Oral daily  potassium chloride    Tablet ER 20 milliEquivalent(s) Oral once  sertraline 100 milliGRAM(s) Oral two times a day  sodium chloride 0.9%. 1000 milliLiter(s) IV Continuous <Continuous>    PRN Meds  acetaminophen     Tablet .. 650 milliGRAM(s) Oral every 6 hours PRN  clonazePAM  Tablet 0.5 milliGRAM(s) Oral two times a day PRN        Vital Signs:    T(F): 97.6 (07-28-22 @ 05:05), Max: 98.5 (07-27-22 @ 20:12)  HR: 68 (07-28-22 @ 05:05) (68 - 128)  BP: 142/67 (07-28-22 @ 05:05) (116/70 - 143/79)  RR: 18 (07-28-22 @ 08:27) (18 - 18)  SpO2: 96% (07-28-22 @ 08:27) (96% - 96%)  I&O's Summary    27 Jul 2022 07:01  -  28 Jul 2022 07:00  --------------------------------------------------------  IN: 560 mL / OUT: 0 mL / NET: 560 mL    28 Jul 2022 07:01  -  28 Jul 2022 13:07  --------------------------------------------------------  IN: 370 mL / OUT: 3 mL / NET: 367 mL    PHYSICAL EXAM:  GENERAL:  NAD  SKIN: No rashes or lesions  HEENT: Atraumatic. Normocephalic. Anicteric  NECK:  No JVD.   PULMONARY: Clear to ausculation bilaterally. No wheezing. No rales  CVS: Normal S1, S2. Regular rate and rhythm. No murmurs.  ABDOMEN/GI: Soft, Nontender, Nondistended; Bowel sounds are present  EXTREMITIES:  No edema B/L LE.  NEUROLOGIC:  No motor deficit.  PSYCH: Alert & oriented x 3, normal affect      LABS:                        17.6   15.71 )-----------( 328      ( 27 Jul 2022 22:09 )             51.0     07-27    137  |  92<L>  |  14  ----------------------------<  99  2.8<L>   |  23  |  0.9    Ca    9.7      27 Jul 2022 22:09  Mg     1.9     07-27    TPro  7.2  /  Alb  3.9  /  TBili  0.6  /  DBili  x   /  AST  29  /  ALT  6   /  AlkPhos  80  07-27        Trop 0.03, CKMB --, CK --, 07-27-22 @ 22:09  Trop 0.04, CKMB --, CK --, 07-27-22 @ 12:28  Trop 0.05, CKMB --, CK --, 07-26-22 @ 13:22  Trop 0.05, CKMB --, CK --, 07-26-22 @ 08:39  Trop 0.06, CKMB --, CK --, 07-26-22 @ 00:00  Trop 0.06, CKMB --, CK --, 07-25-22 @ 20:36        RADIOLOGY & ADDITIONAL TESTS:  Imaging or report Personally Reviewed:  [ ] YES  [ ] NO -->no new images    Telemetry reviewed independently - NSR, no acute events  EKG reviewed independently -->no new EKGs    Consultant(s) Notes Reviewed:  [ ] YES  [ ] NO  Care Discussed with Consultants/Other Providers [ ] YES  [ ] NO    Case discussed with resident  Care discussed with pt

## 2022-07-28 NOTE — PHYSICAL THERAPY INITIAL EVALUATION ADULT - GENERAL OBSERVATIONS, REHAB EVAL
9:10am-9:52am Pt was encountered semireclined in bed in No apparent distress with daughter-in-law at b/s, +IV lock, +tele. Pt had resting tremors. Pt agreed to PT initial evaluation and tx.

## 2022-07-28 NOTE — ADVANCED PRACTICE NURSE CONSULT - RECOMMEDATIONS
1. Skin tears BUEs, LLE- Continue w/ current management- Cleanse w/ normal saline, gently pat dry. Apply non-adherent Xeroform dressing to provide non-adherent protective layer, maintain clean wound bed & promote moist wound healing. Cover w/ dry gauze & kerlex wrap dressing. Change dressings daily & prn for strike-through drainage or soiling.  -Avoid utilizing any adhesives/adhesive dressings to prevent further skin tearing/stripping given pt’s thin, fragile skin.  -Assess skin/wound qshift, report changes to primary provider.      Additional recs/PI Prevention:  -Continue turning/positioning patient from side-to-side q2h while in bed, q1h when/if OOB chair, or in accordance w/ pt's plan of care. Continue utilizing pillows to assist w/ turning/positioning. When/if OOB chair, utilize pillows or chair cushion to offload pressure.   -Offload heels from bed surface by applying offloading boots to BLEs.   -Continue applying Coloplast Farooq Protect moisture barrier cream to buttock and perineal area daily and prn after each incontinent episode.    -Continue utilizing one underpad underneath patient to contain incontinence episodes; change pad when saturated/soiled.   -If patient w/ consistent urinary incontinence & unable to be placed on bedpad/commode or ambulate to bathroom, consider utilizing female incontinence device/PrimaFit/PureWick (if patient candidate) to contain urinary incontinence.  -Continue nutrition consult for optimal wound healing & nutritional status.     Plan of Care: Covering KAREN Antonio at bedside & aware of above recs, will endorse to forest Gonzalez. Covering MD Wall (at [id number]) made aware of above recs. Signing off on patient, no further needs/recs from [facility] service at this time. Staff RN to perform routine skin/wound assessment and manage wound care. Questions or concerns or if wound worsens and reconsult needed, please contact [facility], [phone].   1. Skin tears BUEs, RLE- Continue w/ current management- Cleanse w/ normal saline, gently pat dry. Apply non-adherent Xeroform dressing to provide non-adherent protective layer, maintain clean wound bed & promote moist wound healing. Cover w/ dry gauze & kerlex wrap dressing. Change dressings daily & prn for strike-through drainage or soiling.  -Avoid utilizing any adhesives/adhesive dressings to prevent further skin tearing/stripping given pt’s thin, fragile skin.  -Assess skin/wound qshift, report changes to primary provider.      Additional recs/PI Prevention:  -Continue turning/positioning patient from side-to-side q2h while in bed, q1h when/if OOB chair, or in accordance w/ pt's plan of care. Continue utilizing pillows to assist w/ turning/positioning. When/if OOB chair, utilize pillows or chair cushion to offload pressure.   -Offload heels from bed surface by applying offloading boots to BLEs.   -Continue applying Coloplast Farooq Protect moisture barrier cream to buttock and perineal area daily and prn after each incontinent episode.    -Continue utilizing one underpad underneath patient to contain incontinence episodes; change pad when saturated/soiled.   -If patient w/ consistent urinary incontinence & unable to be placed on bedpad/commode or ambulate to bathroom, consider utilizing female incontinence device/PrimaFit/PureWick (if patient candidate) to contain urinary incontinence.  -Continue nutrition consult for optimal wound healing & nutritional status.     Plan of Care: Covering KAREN Antonio at bedside & aware of above recs, will endorse to forest Gonzalez. Covering MD Wall (at [id number]) made aware of above recs. Signing off on patient, no further needs/recs from [facility] service at this time. Staff RN to perform routine skin/wound assessment and manage wound care. Questions or concerns or if wound worsens and reconsult needed, please contact [facility], [phone].

## 2022-07-28 NOTE — PHYSICAL THERAPY INITIAL EVALUATION ADULT - LEVEL OF INDEPENDENCE: GAIT, REHAB EVAL
Attempted; pt unable to take step forward or laterally due to hesistance and weakness, despite encouragement./unable to perform

## 2022-07-28 NOTE — PROGRESS NOTE ADULT - ASSESSMENT
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   Notably, patient was recently seen in ED  as a stroke code, after pt had facial droop. CT H was (-). Was found to have electrolyte abnormalities but left AMA.   Patient returned to PCP and found to be hypokalemic, and + troponins. Was sent to ED by her PCP.   History obtained from patient and her aide. Patient is AxO x2, and is a poor historian. However she reports that she may have had some chest pain earlier. Per Aide bedside, patient was sent in by PCP for unknown abnormal lab values. (high trops and low potassium)    ED Course:   Vitals: T 98.1, HR 72, /86, RR 18, 99% on RA   CXR : cardiomegaly and interstitial opacifications   CT H : (-)   Labs: WBC 13K, Trops 0.02, 0.06, 0.06,   UA (+) for LE, WBC; Urine culture  (+) e. feceium   EKG  noted, nonspecific T wave changes, PACs, prolonged QT, RVH     Patient admitted for workup of abnormal lab values.         (2022 02:28)  ---  cardio fellow additional notes:    Cardiology consulted for episode of afib with RVR overnight   Currently back in sinus   Troponin stable     PAST MEDICAL & SURGICAL HISTORY  HTN (hypertension)    Anxiety    Arthritis    No significant past surgical history        FAMILY HISTORY:  FAMILY HISTORY:  No pertinent family history in first degree relatives        SOCIAL HISTORY:  Social History:  no alcohol, smoking hx (2022 02:28)      ALLERGIES:  No Known Allergies      MEDICATIONS:  amLODIPine   Tablet 10 milliGRAM(s) Oral daily  ampicillin/sulbactam  IVPB 3 Gram(s) IV Intermittent every 6 hours  aspirin  chewable 81 milliGRAM(s) Oral daily  atorvastatin 40 milliGRAM(s) Oral at bedtime  enoxaparin Injectable 40 milliGRAM(s) SubCutaneous every 24 hours  gabapentin 300 milliGRAM(s) Oral at bedtime  metoprolol tartrate 25 milliGRAM(s) Oral daily  potassium chloride    Tablet ER 20 milliEquivalent(s) Oral once  sertraline 100 milliGRAM(s) Oral two times a day  sodium chloride 0.9%. 1000 milliLiter(s) (75 mL/Hr) IV Continuous <Continuous>    PRN:  acetaminophen     Tablet .. 650 milliGRAM(s) Oral every 6 hours PRN  clonazePAM  Tablet 0.5 milliGRAM(s) Oral two times a day PRN      HOME MEDICATIONS:  Home Medications:  amLODIPine 10 mg oral tablet: 1 tab(s) orally once a day (19 Dec 2021 20:45)  clonazePAM 0.5 mg oral tablet: 1 tab(s) orally 2 times a day, As Needed (2022 04:00)  gabapentin 300 mg oral capsule: 1 tab(s) orally once a day (at bedtime) (2022 04:01)  hydroCHLOROthiazide 25 mg oral tablet: 1 tab(s) orally once a day (2022 04:01)  Lasix 40 mg oral tablet: 1 tab(s) orally once a day (19 Dec 2021 20:43)  potassium chloride 20 mEq oral granule, extended release: 1 tab(s) orally once a day (2022 04:02)  sertraline 100 mg oral tablet: 1 tab(s) orally 2 times a day (2022 04:03)  Vitamin D3 1250 mcg (50,000 intl units) oral capsule: 1 cap(s) orally once a week (2022 04:04)      VITALS:   T(F): 97.6 ( @ 05:05), Max: 98.5 ( @ 20:12)  HR: 68 ( @ 05:05) (63 - 128)  BP: 142/67 ( @ 05:05) (116/70 - 144/82)  BP(mean): --  RR: 18 ( @ 08:27) (18 - 18)  SpO2: 96% ( @ 08:27) (95% - 99%)    I&O's Summary    2022 07:01  -  2022 07:00  --------------------------------------------------------  IN: 560 mL / OUT: 0 mL / NET: 560 mL    2022 07:01  -  2022 12:25  --------------------------------------------------------  IN: 0 mL / OUT: 3 mL / NET: -3 mL        REVIEW OF SYSTEMS:  CONSTITUTIONAL: No weakness, fevers or chills  HEENT: No visual changes, neck/ear pain  RESPIRATORY: No cough, sob  CARDIOVASCULAR: See HPI  GASTROINTESTINAL: No abdominal pain. No nausea, vomiting, diarrhea   GENITOURINARY: No dysuria, frequency or hematuria  NEUROLOGICAL: No new focal deficits  SKIN: No new rashes    PHYSICAL EXAM:  General: Not in distress.  Non-toxic appearing.   HEENT: EOMI  Cardio: regular, S1, S2, no murmur  Pulm: B/L BS.  No wheezing / crackles / rales  Abdomen: Soft, non-tender, non-distended. Normoactive bowel sounds  Extremities: No edema b/l le  Neuro: A&O x2. No focal deficits    LABS:                        17.6   15.71 )-----------( 328      ( 2022 22:09 )             51.0         137  |  92<L>  |  14  ----------------------------<  99  2.8<L>   |  23  |  0.9    Ca    9.7      2022 22:09  Mg     1.9         TPro  7.2  /  Alb  3.9  /  TBili  0.6  /  DBili  x   /  AST  29  /  ALT  6   /  AlkPhos  80        Troponin T, Serum: 0.03 ng/mL *HH* (22 @ 22:09)  Troponin T, Serum: 0.04 ng/mL *HH* (22 @ 12:28)    CARDIAC MARKERS ( 2022 22:09 )  x     / 0.03 ng/mL / x     / x     / x      CARDIAC MARKERS ( 2022 12:28 )  x     / 0.04 ng/mL / x     / x     / x      CARDIAC MARKERS ( 2022 13:22 )  x     / 0.05 ng/mL / x     / x     / x            Troponin trend:        COVID-19 PCR: NotDetec (2022 22:28)  COVID-19 PCR: NotDetec (2022 00:10)      RADIOLOGY:  -CXR:  -TTE:    EC Lead ECG:   Ventricular Rate 135 BPM    Atrial Rate 326 BPM    P-R Interval 80 ms    QRS Duration 68 ms    Q-T Interval 274 ms    QTC Calculation(Bazett) 411 ms    P Axis -28 degrees    R Axis 1 degrees    T Axis -63 degrees    Diagnosis Line Atrial flutter with variable A-V block  Inferior infarct , age undetermined  ST & T wave abnormality, consider anterolateral ischemia  Abnormal ECG    Confirmed by Horacio Olguin ([id number]) on 2022 7:14:45 AM ( @ 21:14)      TELEMETRY EVENTS:    A fib with RVR      Impression   # Elevated troponins stable - likely demand ischemia Possible underlying CAD   # Mild-Moderate pulm hypertension   # New onset a fib with RVR - CHADSASC 4   # Worsening lewy body dementia     Recommendations   - Recommend medical treatment with aspirin and low dose beta    Discussed with attending   Signature: Catalina SCHROEDER, 1st year Cardiology Fellow     Outpt cardiologist:    HPI:  81y F with 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   Notably, patient was recently seen in ED  as a stroke code, after pt had facial droop. CT H was (-). Was found to have electrolyte abnormalities but left AMA.   Patient returned to PCP and found to be hypokalemic, and + troponins. Was sent to ED by her PCP.   History obtained from patient and her aide. Patient is AxO x2, and is a poor historian. However she reports that she may have had some chest pain earlier. Per Aide bedside, patient was sent in by PCP for unknown abnormal lab values. (high trops and low potassium)    ED Course:   Vitals: T 98.1, HR 72, /86, RR 18, 99% on RA   CXR : cardiomegaly and interstitial opacifications   CT H : (-)   Labs: WBC 13K, Trops 0.02, 0.06, 0.06,   UA (+) for LE, WBC; Urine culture  (+) e. feceium   EKG  noted, nonspecific T wave changes, PACs, prolonged QT, RVH     Patient admitted for workup of abnormal lab values.         (2022 02:28)  ---  cardio fellow additional notes:    Cardiology consulted for episode of afib with RVR overnight   Currently back in sinus   Troponin stable     PAST MEDICAL & SURGICAL HISTORY  HTN (hypertension)    Anxiety    Arthritis    No significant past surgical history        FAMILY HISTORY:  FAMILY HISTORY:  No pertinent family history in first degree relatives        SOCIAL HISTORY:  Social History:  no alcohol, smoking hx (2022 02:28)      ALLERGIES:  No Known Allergies      MEDICATIONS:  amLODIPine   Tablet 10 milliGRAM(s) Oral daily  ampicillin/sulbactam  IVPB 3 Gram(s) IV Intermittent every 6 hours  aspirin  chewable 81 milliGRAM(s) Oral daily  atorvastatin 40 milliGRAM(s) Oral at bedtime  enoxaparin Injectable 40 milliGRAM(s) SubCutaneous every 24 hours  gabapentin 300 milliGRAM(s) Oral at bedtime  metoprolol tartrate 25 milliGRAM(s) Oral daily  potassium chloride    Tablet ER 20 milliEquivalent(s) Oral once  sertraline 100 milliGRAM(s) Oral two times a day  sodium chloride 0.9%. 1000 milliLiter(s) (75 mL/Hr) IV Continuous <Continuous>    PRN:  acetaminophen     Tablet .. 650 milliGRAM(s) Oral every 6 hours PRN  clonazePAM  Tablet 0.5 milliGRAM(s) Oral two times a day PRN      HOME MEDICATIONS:  Home Medications:  amLODIPine 10 mg oral tablet: 1 tab(s) orally once a day (19 Dec 2021 20:45)  clonazePAM 0.5 mg oral tablet: 1 tab(s) orally 2 times a day, As Needed (2022 04:00)  gabapentin 300 mg oral capsule: 1 tab(s) orally once a day (at bedtime) (2022 04:01)  hydroCHLOROthiazide 25 mg oral tablet: 1 tab(s) orally once a day (2022 04:01)  Lasix 40 mg oral tablet: 1 tab(s) orally once a day (19 Dec 2021 20:43)  potassium chloride 20 mEq oral granule, extended release: 1 tab(s) orally once a day (2022 04:02)  sertraline 100 mg oral tablet: 1 tab(s) orally 2 times a day (2022 04:03)  Vitamin D3 1250 mcg (50,000 intl units) oral capsule: 1 cap(s) orally once a week (2022 04:04)      VITALS:   T(F): 97.6 ( @ 05:05), Max: 98.5 ( @ 20:12)  HR: 68 ( @ 05:05) (63 - 128)  BP: 142/67 ( @ 05:05) (116/70 - 144/82)  BP(mean): --  RR: 18 ( @ 08:27) (18 - 18)  SpO2: 96% ( @ 08:27) (95% - 99%)    I&O's Summary    2022 07:01  -  2022 07:00  --------------------------------------------------------  IN: 560 mL / OUT: 0 mL / NET: 560 mL    2022 07:01  -  2022 12:25  --------------------------------------------------------  IN: 0 mL / OUT: 3 mL / NET: -3 mL        REVIEW OF SYSTEMS:  CONSTITUTIONAL: No weakness, fevers or chills  HEENT: No visual changes, neck/ear pain  RESPIRATORY: No cough, sob  CARDIOVASCULAR: See HPI  GASTROINTESTINAL: No abdominal pain. No nausea, vomiting, diarrhea   GENITOURINARY: No dysuria, frequency or hematuria  NEUROLOGICAL: No new focal deficits  SKIN: No new rashes    PHYSICAL EXAM:  General: Not in distress.  Non-toxic appearing.   HEENT: EOMI  Cardio: regular, S1, S2, no murmur  Pulm: B/L BS.  No wheezing / crackles / rales  Abdomen: Soft, non-tender, non-distended. Normoactive bowel sounds  Extremities: No edema b/l le  Neuro: A&O x2. No focal deficits    LABS:                        17.6   15.71 )-----------( 328      ( 2022 22:09 )             51.0         137  |  92<L>  |  14  ----------------------------<  99  2.8<L>   |  23  |  0.9    Ca    9.7      2022 22:09  Mg     1.9         TPro  7.2  /  Alb  3.9  /  TBili  0.6  /  DBili  x   /  AST  29  /  ALT  6   /  AlkPhos  80        Troponin T, Serum: 0.03 ng/mL *HH* (22 @ 22:09)  Troponin T, Serum: 0.04 ng/mL *HH* (22 @ 12:28)    CARDIAC MARKERS ( 2022 22:09 )  x     / 0.03 ng/mL / x     / x     / x      CARDIAC MARKERS ( 2022 12:28 )  x     / 0.04 ng/mL / x     / x     / x      CARDIAC MARKERS ( 2022 13:22 )  x     / 0.05 ng/mL / x     / x     / x            Troponin trend:        COVID-19 PCR: NotDetec (2022 22:28)  COVID-19 PCR: NotDetec (2022 00:10)      RADIOLOGY:  -CXR:  -TTE:  < from: TTE Echo Complete w/o Contrast w/ Doppler (22 @ 08:28) >  Summary:   1. Normal global left ventricular systolic function.   2. Normal left atrial size.   3. Normal right atrial size.   4. Mild thickening and calcification of the anterior mitral valve   leaflet.   5. Moderate mitral annular calcification.   6. Trace mitral valve regurgitation.   7. Mild tricuspid regurgitation.   8. PSAP 35.    EC Lead ECG:   Ventricular Rate 135 BPM    Atrial Rate 326 BPM    P-R Interval 80 ms    QRS Duration 68 ms    Q-T Interval 274 ms    QTC Calculation(Bazett) 411 ms    P Axis -28 degrees    R Axis 1 degrees    T Axis -63 degrees    Diagnosis Line Atrial flutter with variable A-V block  Inferior infarct , age undetermined  ST & T wave abnormality, consider anterolateral ischemia  Abnormal ECG    Confirmed by Horacio Olguin ([id number]) on 2022 7:14:45 AM ( @ 21:14)      TELEMETRY EVENTS:    A fib with RVR  PVCs       Impression   # Elevated troponins stable - likely demand ischemia Possible underlying CAD   # Mild-Moderate pulm hypertension   # New onset a fib with RVR - CHADSASC 4 - Now back in NSR    # Worsening lewy body dementia   # Occasional PVC on tele     Recommendations   - Continue medical therapy with stain and beta blocker   - Can increase beta blocker for rate control and PVC suppression   - Can change aspirin to Eliquis   - Please correct electrolytes to keep Mg > 2.2 and K > 4  - Not a candidate for cath   - Consider GOC discussion     Will discuss with attending   Signature: Catalina SCHROEDER, 1st year Cardiology Fellow     Outpt cardiologist:    HPI:  81y F with 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   Notably, patient was recently seen in ED  as a stroke code, after pt had facial droop. CT H was (-). Was found to have electrolyte abnormalities but left AMA.   Patient returned to PCP and found to be hypokalemic, and + troponins. Was sent to ED by her PCP.   History obtained from patient and her aide. Patient is AxO x2, and is a poor historian. However she reports that she may have had some chest pain earlier. Per Aide bedside, patient was sent in by PCP for unknown abnormal lab values. (high trops and low potassium)    ED Course:   Vitals: T 98.1, HR 72, /86, RR 18, 99% on RA   CXR : cardiomegaly and interstitial opacifications   CT H : (-)   Labs: WBC 13K, Trops 0.02, 0.06, 0.06,   UA (+) for LE, WBC; Urine culture  (+) e. feceium   EKG  noted, nonspecific T wave changes, PACs, prolonged QT, RVH     Patient admitted for workup of abnormal lab values.         (2022 02:28)  ---  cardio fellow additional notes:    Cardiology consulted for episode of afib with RVR overnight   Currently back in sinus   Troponin stable     PAST MEDICAL & SURGICAL HISTORY  HTN (hypertension)    Anxiety    Arthritis    No significant past surgical history        FAMILY HISTORY:  FAMILY HISTORY:  No pertinent family history in first degree relatives        SOCIAL HISTORY:  Social History:  no alcohol, smoking hx (2022 02:28)      ALLERGIES:  No Known Allergies      MEDICATIONS:  amLODIPine   Tablet 10 milliGRAM(s) Oral daily  ampicillin/sulbactam  IVPB 3 Gram(s) IV Intermittent every 6 hours  aspirin  chewable 81 milliGRAM(s) Oral daily  atorvastatin 40 milliGRAM(s) Oral at bedtime  enoxaparin Injectable 40 milliGRAM(s) SubCutaneous every 24 hours  gabapentin 300 milliGRAM(s) Oral at bedtime  metoprolol tartrate 25 milliGRAM(s) Oral daily  potassium chloride    Tablet ER 20 milliEquivalent(s) Oral once  sertraline 100 milliGRAM(s) Oral two times a day  sodium chloride 0.9%. 1000 milliLiter(s) (75 mL/Hr) IV Continuous <Continuous>    PRN:  acetaminophen     Tablet .. 650 milliGRAM(s) Oral every 6 hours PRN  clonazePAM  Tablet 0.5 milliGRAM(s) Oral two times a day PRN      HOME MEDICATIONS:  Home Medications:  amLODIPine 10 mg oral tablet: 1 tab(s) orally once a day (19 Dec 2021 20:45)  clonazePAM 0.5 mg oral tablet: 1 tab(s) orally 2 times a day, As Needed (2022 04:00)  gabapentin 300 mg oral capsule: 1 tab(s) orally once a day (at bedtime) (2022 04:01)  hydroCHLOROthiazide 25 mg oral tablet: 1 tab(s) orally once a day (2022 04:01)  Lasix 40 mg oral tablet: 1 tab(s) orally once a day (19 Dec 2021 20:43)  potassium chloride 20 mEq oral granule, extended release: 1 tab(s) orally once a day (2022 04:02)  sertraline 100 mg oral tablet: 1 tab(s) orally 2 times a day (2022 04:03)  Vitamin D3 1250 mcg (50,000 intl units) oral capsule: 1 cap(s) orally once a week (2022 04:04)      VITALS:   T(F): 97.6 ( @ 05:05), Max: 98.5 ( @ 20:12)  HR: 68 ( @ 05:05) (63 - 128)  BP: 142/67 ( @ 05:05) (116/70 - 144/82)  BP(mean): --  RR: 18 ( @ 08:27) (18 - 18)  SpO2: 96% ( @ 08:27) (95% - 99%)    I&O's Summary    2022 07:01  -  2022 07:00  --------------------------------------------------------  IN: 560 mL / OUT: 0 mL / NET: 560 mL    2022 07:01  -  2022 12:25  --------------------------------------------------------  IN: 0 mL / OUT: 3 mL / NET: -3 mL        REVIEW OF SYSTEMS:  CONSTITUTIONAL: No weakness, fevers or chills  HEENT: No visual changes, neck/ear pain  RESPIRATORY: No cough, sob  CARDIOVASCULAR: See HPI  GASTROINTESTINAL: No abdominal pain. No nausea, vomiting, diarrhea   GENITOURINARY: No dysuria, frequency or hematuria  NEUROLOGICAL: No new focal deficits  SKIN: No new rashes    PHYSICAL EXAM:  General: Not in distress.  Non-toxic appearing.   HEENT: EOMI  Cardio: regular, S1, S2, no murmur  Pulm: B/L BS.  No wheezing / crackles / rales  Abdomen: Soft, non-tender, non-distended. Normoactive bowel sounds  Extremities: No edema b/l le  Neuro: A&O x2. No focal deficits    LABS:                        17.6   15.71 )-----------( 328      ( 2022 22:09 )             51.0         137  |  92<L>  |  14  ----------------------------<  99  2.8<L>   |  23  |  0.9    Ca    9.7      2022 22:09  Mg     1.9         TPro  7.2  /  Alb  3.9  /  TBili  0.6  /  DBili  x   /  AST  29  /  ALT  6   /  AlkPhos  80        Troponin T, Serum: 0.03 ng/mL *HH* (22 @ 22:09)  Troponin T, Serum: 0.04 ng/mL *HH* (22 @ 12:28)    CARDIAC MARKERS ( 2022 22:09 )  x     / 0.03 ng/mL / x     / x     / x      CARDIAC MARKERS ( 2022 12:28 )  x     / 0.04 ng/mL / x     / x     / x      CARDIAC MARKERS ( 2022 13:22 )  x     / 0.05 ng/mL / x     / x     / x            Troponin trend:        COVID-19 PCR: NotDetec (2022 22:28)  COVID-19 PCR: NotDetec (2022 00:10)      RADIOLOGY:  -CXR:  -TTE:  < from: TTE Echo Complete w/o Contrast w/ Doppler (22 @ 08:28) >  Summary:   1. Normal global left ventricular systolic function.   2. Normal left atrial size.   3. Normal right atrial size.   4. Mild thickening and calcification of the anterior mitral valve   leaflet.   5. Moderate mitral annular calcification.   6. Trace mitral valve regurgitation.   7. Mild tricuspid regurgitation.   8. PSAP 35.    EC Lead ECG:   Ventricular Rate 135 BPM    Atrial Rate 326 BPM    P-R Interval 80 ms    QRS Duration 68 ms    Q-T Interval 274 ms    QTC Calculation(Bazett) 411 ms    P Axis -28 degrees    R Axis 1 degrees    T Axis -63 degrees    Diagnosis Line Atrial flutter with variable A-V block  Inferior infarct , age undetermined  ST & T wave abnormality, consider anterolateral ischemia  Abnormal ECG    Confirmed by Horacio Olguin ([id number]) on 2022 7:14:45 AM ( @ 21:14)      TELEMETRY EVENTS:    A fib with RVR  PVCs       Impression   # Elevated troponins stable - likely demand ischemia Possible underlying CAD   # Mild-Moderate pulm hypertension   # New onset a fib with RVR - CHADSASC 4 - Now back in NSR s/p metoprolol IV x1   # Worsening lewy body dementia   # Occasional PVC on tele     Recommendations   - Continue medical therapy with stain and beta blocker   - Please change metoprolol tartrate to twice daily and can increase beta blocker for rate control and PVC suppression as long as SBP > 90 and HR > 60  - Can change aspirin to Eliquis   - Please correct electrolytes to keep Mg > 2.2 and K > 4  - Not a candidate for cath   - Consider GOC discussion     Will discuss with attending   Signature: Catalina SCHROEDER, 1st year Cardiology Fellow     Outpt cardiologist:    HPI:  81y F with 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   Notably, patient was recently seen in ED  as a stroke code, after pt had facial droop. CT H was (-). Was found to have electrolyte abnormalities but left AMA.   Patient returned to PCP and found to be hypokalemic, and + troponins. Was sent to ED by her PCP.   History obtained from patient and her aide. Patient is AxO x2, and is a poor historian. However she reports that she may have had some chest pain earlier. Per Aide bedside, patient was sent in by PCP for unknown abnormal lab values. (high trops and low potassium)    ED Course:   Vitals: T 98.1, HR 72, /86, RR 18, 99% on RA   CXR : cardiomegaly and interstitial opacifications   CT H : (-)   Labs: WBC 13K, Trops 0.02, 0.06, 0.06,   UA (+) for LE, WBC; Urine culture  (+) e. feceium   EKG  noted, nonspecific T wave changes, PACs, prolonged QT, RVH     Patient admitted for workup of abnormal lab values.         (2022 02:28)  ---  cardio fellow additional notes:    Cardiology consulted for episode of afib with RVR overnight   Currently back in sinus   Troponin stable     PAST MEDICAL & SURGICAL HISTORY  HTN (hypertension)    Anxiety    Arthritis    No significant past surgical history        FAMILY HISTORY:  FAMILY HISTORY:  No pertinent family history in first degree relatives        SOCIAL HISTORY:  Social History:  no alcohol, smoking hx (2022 02:28)      ALLERGIES:  No Known Allergies      MEDICATIONS:  amLODIPine   Tablet 10 milliGRAM(s) Oral daily  ampicillin/sulbactam  IVPB 3 Gram(s) IV Intermittent every 6 hours  aspirin  chewable 81 milliGRAM(s) Oral daily  atorvastatin 40 milliGRAM(s) Oral at bedtime  enoxaparin Injectable 40 milliGRAM(s) SubCutaneous every 24 hours  gabapentin 300 milliGRAM(s) Oral at bedtime  metoprolol tartrate 25 milliGRAM(s) Oral daily  potassium chloride    Tablet ER 20 milliEquivalent(s) Oral once  sertraline 100 milliGRAM(s) Oral two times a day  sodium chloride 0.9%. 1000 milliLiter(s) (75 mL/Hr) IV Continuous <Continuous>    PRN:  acetaminophen     Tablet .. 650 milliGRAM(s) Oral every 6 hours PRN  clonazePAM  Tablet 0.5 milliGRAM(s) Oral two times a day PRN      HOME MEDICATIONS:  Home Medications:  amLODIPine 10 mg oral tablet: 1 tab(s) orally once a day (19 Dec 2021 20:45)  clonazePAM 0.5 mg oral tablet: 1 tab(s) orally 2 times a day, As Needed (2022 04:00)  gabapentin 300 mg oral capsule: 1 tab(s) orally once a day (at bedtime) (2022 04:01)  hydroCHLOROthiazide 25 mg oral tablet: 1 tab(s) orally once a day (2022 04:01)  Lasix 40 mg oral tablet: 1 tab(s) orally once a day (19 Dec 2021 20:43)  potassium chloride 20 mEq oral granule, extended release: 1 tab(s) orally once a day (2022 04:02)  sertraline 100 mg oral tablet: 1 tab(s) orally 2 times a day (2022 04:03)  Vitamin D3 1250 mcg (50,000 intl units) oral capsule: 1 cap(s) orally once a week (2022 04:04)      VITALS:   T(F): 97.6 ( @ 05:05), Max: 98.5 ( @ 20:12)  HR: 68 ( @ 05:05) (63 - 128)  BP: 142/67 ( @ 05:05) (116/70 - 144/82)  BP(mean): --  RR: 18 ( @ 08:27) (18 - 18)  SpO2: 96% ( @ 08:27) (95% - 99%)    I&O's Summary    2022 07:01  -  2022 07:00  --------------------------------------------------------  IN: 560 mL / OUT: 0 mL / NET: 560 mL    2022 07:01  -  2022 12:25  --------------------------------------------------------  IN: 0 mL / OUT: 3 mL / NET: -3 mL        REVIEW OF SYSTEMS:  CONSTITUTIONAL: No weakness, fevers or chills  HEENT: No visual changes, neck/ear pain  RESPIRATORY: No cough, sob  CARDIOVASCULAR: See HPI  GASTROINTESTINAL: No abdominal pain. No nausea, vomiting, diarrhea   GENITOURINARY: No dysuria, frequency or hematuria  NEUROLOGICAL: No new focal deficits  SKIN: No new rashes    PHYSICAL EXAM:  General: Not in distress.  Non-toxic appearing.   HEENT: EOMI  Cardio: regular, S1, S2, no murmur  Pulm: B/L BS.  No wheezing / crackles / rales  Abdomen: Soft, non-tender, non-distended. Normoactive bowel sounds  Extremities: No edema b/l le  Neuro: A&O x2. No focal deficits    LABS:                        17.6   15.71 )-----------( 328      ( 2022 22:09 )             51.0         137  |  92<L>  |  14  ----------------------------<  99  2.8<L>   |  23  |  0.9    Ca    9.7      2022 22:09  Mg     1.9         TPro  7.2  /  Alb  3.9  /  TBili  0.6  /  DBili  x   /  AST  29  /  ALT  6   /  AlkPhos  80        Troponin T, Serum: 0.03 ng/mL *HH* (22 @ 22:09)  Troponin T, Serum: 0.04 ng/mL *HH* (22 @ 12:28)    CARDIAC MARKERS ( 2022 22:09 )  x     / 0.03 ng/mL / x     / x     / x      CARDIAC MARKERS ( 2022 12:28 )  x     / 0.04 ng/mL / x     / x     / x      CARDIAC MARKERS ( 2022 13:22 )  x     / 0.05 ng/mL / x     / x     / x            Troponin trend:        COVID-19 PCR: NotDetec (2022 22:28)  COVID-19 PCR: NotDetec (2022 00:10)      RADIOLOGY:  -CXR:  -TTE:  < from: TTE Echo Complete w/o Contrast w/ Doppler (22 @ 08:28) >  Summary:   1. Normal global left ventricular systolic function.   2. Normal left atrial size.   3. Normal right atrial size.   4. Mild thickening and calcification of the anterior mitral valve   leaflet.   5. Moderate mitral annular calcification.   6. Trace mitral valve regurgitation.   7. Mild tricuspid regurgitation.   8. PSAP 35.    EC Lead ECG:   Ventricular Rate 135 BPM    Atrial Rate 326 BPM    P-R Interval 80 ms    QRS Duration 68 ms    Q-T Interval 274 ms    QTC Calculation(Bazett) 411 ms    P Axis -28 degrees    R Axis 1 degrees    T Axis -63 degrees    Diagnosis Line Atrial flutter with variable A-V block  Inferior infarct , age undetermined  ST & T wave abnormality, consider anterolateral ischemia  Abnormal ECG    Confirmed by Horacio Olguin ([id number]) on 2022 7:14:45 AM ( @ 21:14)      TELEMETRY EVENTS:    A fib with RVR  PVCs       Impression   # Elevated troponins stable - likely demand ischemia Possible underlying CAD   # New onset a fib with RVR - CHADSASC 4 - Now back in NSR s/p metoprolol IV x1   # Worsening lewy body dementia   # Occasional PVC on tele     Recommendations   - Continue medical therapy with stain and beta blocker   - Please change metoprolol tartrate to twice daily and can increase beta blocker for rate control and PVC suppression as long as SBP > 90 and HR > 60  - Can change aspirin to Eliquis   - Please correct electrolytes to keep Mg > 2.2 and K > 4  - Not a candidate for cath   - Consider GOC discussion     Will discuss with attending   Signature: Catalina SCHROEDER, 1st year Cardiology Fellow     Outpt cardiologist:    HPI:  81y F with 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   Notably, patient was recently seen in ED  as a stroke code, after pt had facial droop. CT H was (-). Was found to have electrolyte abnormalities but left AMA.   Patient returned to PCP and found to be hypokalemic, and + troponins. Was sent to ED by her PCP.   History obtained from patient and her aide. Patient is AxO x2, and is a poor historian. However she reports that she may have had some chest pain earlier. Per Aide bedside, patient was sent in by PCP for unknown abnormal lab values. (high trops and low potassium)    ED Course:   Vitals: T 98.1, HR 72, /86, RR 18, 99% on RA   CXR : cardiomegaly and interstitial opacifications   CT H : (-)   Labs: WBC 13K, Trops 0.02, 0.06, 0.06,   UA (+) for LE, WBC; Urine culture  (+) e. feceium   EKG  noted, nonspecific T wave changes, PACs, prolonged QT, RVH     Patient admitted for workup of abnormal lab values.         (2022 02:28)  ---  cardio fellow additional notes:    Cardiology consulted for episode of afib with RVR overnight   Currently back in sinus   Troponin stable     PAST MEDICAL & SURGICAL HISTORY  HTN (hypertension)    Anxiety    Arthritis    No significant past surgical history        FAMILY HISTORY:  FAMILY HISTORY:  No pertinent family history in first degree relatives        SOCIAL HISTORY:  Social History:  no alcohol, smoking hx (2022 02:28)      ALLERGIES:  No Known Allergies      MEDICATIONS:  amLODIPine   Tablet 10 milliGRAM(s) Oral daily  ampicillin/sulbactam  IVPB 3 Gram(s) IV Intermittent every 6 hours  aspirin  chewable 81 milliGRAM(s) Oral daily  atorvastatin 40 milliGRAM(s) Oral at bedtime  enoxaparin Injectable 40 milliGRAM(s) SubCutaneous every 24 hours  gabapentin 300 milliGRAM(s) Oral at bedtime  metoprolol tartrate 25 milliGRAM(s) Oral daily  potassium chloride    Tablet ER 20 milliEquivalent(s) Oral once  sertraline 100 milliGRAM(s) Oral two times a day  sodium chloride 0.9%. 1000 milliLiter(s) (75 mL/Hr) IV Continuous <Continuous>    PRN:  acetaminophen     Tablet .. 650 milliGRAM(s) Oral every 6 hours PRN  clonazePAM  Tablet 0.5 milliGRAM(s) Oral two times a day PRN      HOME MEDICATIONS:  Home Medications:  amLODIPine 10 mg oral tablet: 1 tab(s) orally once a day (19 Dec 2021 20:45)  clonazePAM 0.5 mg oral tablet: 1 tab(s) orally 2 times a day, As Needed (2022 04:00)  gabapentin 300 mg oral capsule: 1 tab(s) orally once a day (at bedtime) (2022 04:01)  hydroCHLOROthiazide 25 mg oral tablet: 1 tab(s) orally once a day (2022 04:01)  Lasix 40 mg oral tablet: 1 tab(s) orally once a day (19 Dec 2021 20:43)  potassium chloride 20 mEq oral granule, extended release: 1 tab(s) orally once a day (2022 04:02)  sertraline 100 mg oral tablet: 1 tab(s) orally 2 times a day (2022 04:03)  Vitamin D3 1250 mcg (50,000 intl units) oral capsule: 1 cap(s) orally once a week (2022 04:04)      VITALS:   T(F): 97.6 ( @ 05:05), Max: 98.5 ( @ 20:12)  HR: 68 ( @ 05:05) (63 - 128)  BP: 142/67 ( @ 05:05) (116/70 - 144/82)  BP(mean): --  RR: 18 ( @ 08:27) (18 - 18)  SpO2: 96% ( @ 08:27) (95% - 99%)    I&O's Summary    2022 07:01  -  2022 07:00  --------------------------------------------------------  IN: 560 mL / OUT: 0 mL / NET: 560 mL    2022 07:01  -  2022 12:25  --------------------------------------------------------  IN: 0 mL / OUT: 3 mL / NET: -3 mL        REVIEW OF SYSTEMS:  CONSTITUTIONAL: No weakness, fevers or chills  HEENT: No visual changes, neck/ear pain  RESPIRATORY: No cough, sob  CARDIOVASCULAR: See HPI  GASTROINTESTINAL: No abdominal pain. No nausea, vomiting, diarrhea   GENITOURINARY: No dysuria, frequency or hematuria  NEUROLOGICAL: No new focal deficits  SKIN: No new rashes    PHYSICAL EXAM:  General: Not in distress.  Non-toxic appearing.   HEENT: EOMI  Cardio: regular, S1, S2, no murmur  Pulm: B/L BS.  No wheezing / crackles / rales  Abdomen: Soft, non-tender, non-distended. Normoactive bowel sounds  Extremities: No edema b/l le  Neuro: A&O x2. No focal deficits    LABS:                        17.6   15.71 )-----------( 328      ( 2022 22:09 )             51.0         137  |  92<L>  |  14  ----------------------------<  99  2.8<L>   |  23  |  0.9    Ca    9.7      2022 22:09  Mg     1.9         TPro  7.2  /  Alb  3.9  /  TBili  0.6  /  DBili  x   /  AST  29  /  ALT  6   /  AlkPhos  80        Troponin T, Serum: 0.03 ng/mL *HH* (22 @ 22:09)  Troponin T, Serum: 0.04 ng/mL *HH* (22 @ 12:28)    CARDIAC MARKERS ( 2022 22:09 )  x     / 0.03 ng/mL / x     / x     / x      CARDIAC MARKERS ( 2022 12:28 )  x     / 0.04 ng/mL / x     / x     / x      CARDIAC MARKERS ( 2022 13:22 )  x     / 0.05 ng/mL / x     / x     / x            Troponin trend:        COVID-19 PCR: NotDetec (2022 22:28)  COVID-19 PCR: NotDetec (2022 00:10)      RADIOLOGY:  -CXR:  -TTE:  < from: TTE Echo Complete w/o Contrast w/ Doppler (22 @ 08:28) >  Summary:   1. Normal global left ventricular systolic function.   2. Normal left atrial size.   3. Normal right atrial size.   4. Mild thickening and calcification of the anterior mitral valve   leaflet.   5. Moderate mitral annular calcification.   6. Trace mitral valve regurgitation.   7. Mild tricuspid regurgitation.   8. PSAP 35.    EC Lead ECG:   Ventricular Rate 135 BPM    Atrial Rate 326 BPM    P-R Interval 80 ms    QRS Duration 68 ms    Q-T Interval 274 ms    QTC Calculation(Bazett) 411 ms    P Axis -28 degrees    R Axis 1 degrees    T Axis -63 degrees    Diagnosis Line Atrial flutter with variable A-V block  Inferior infarct , age undetermined  ST & T wave abnormality, consider anterolateral ischemia  Abnormal ECG    Confirmed by Horacio Olguin ([id number]) on 2022 7:14:45 AM ( @ 21:14)      TELEMETRY EVENTS:    A fib with RVR  PVCs       Impression   # Elevated troponins stable - likely demand ischemia Possible underlying CAD   # New onset a fib with RVR - CHADSASC 4 - Now back in NSR s/p metoprolol IV x1   # Worsening lewy body dementia   # Occasional PVC on tele   # Hypokalemia     Recommendations   - Continue medical therapy with stain and beta blocker   - Please change metoprolol tartrate to twice daily and can increase beta blocker for rate control and PVC suppression as long as SBP > 90 and HR > 60  - Can change aspirin to Eliquis 2.5 BID for stroke prophylaxis   - Please correct electrolytes to keep Mg > 2.2 and K > 4  - Not a candidate for cath   - Consider GOC discussion     Discussed with attending   Signature: Catalina SCHROEDER, 1st year Cardiology Fellow     Outpt cardiologist:    HPI:    81y F with 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   Notably, patient was recently seen in ED  as a stroke code, after pt had facial droop. CT H was (-). Was found to have electrolyte abnormalities but left AMA.   Patient returned to PCP and found to be hypokalemic, and + troponins. Was sent to ED by her PCP.   History obtained from patient and her aide. Patient is AxO x2, and is a poor historian. However she reports that she may have had some chest pain earlier. Per Aide bedside, patient was sent in by PCP for unknown abnormal lab values. (high trops and low potassium)    ED Course:   Vitals: T 98.1, HR 72, /86, RR 18, 99% on RA   CXR : cardiomegaly and interstitial opacifications   CT H : (-)   Labs: WBC 13K, Trops 0.02, 0.06, 0.06,   UA (+) for LE, WBC; Urine culture  (+) e. feceium   EKG  noted, nonspecific T wave changes, PACs, prolonged QT, RVH     Patient admitted for workup of abnormal lab values.         (2022 02:28)  ---  cardio fellow additional notes:    Cardiology consulted for episode of afib with RVR overnight   Currently back in sinus   Troponin stable     PAST MEDICAL & SURGICAL HISTORY  HTN (hypertension)    Anxiety    Arthritis    No significant past surgical history        FAMILY HISTORY:  FAMILY HISTORY:  No pertinent family history in first degree relatives        SOCIAL HISTORY:  Social History:  no alcohol, smoking hx (2022 02:28)      ALLERGIES:  No Known Allergies      MEDICATIONS:  amLODIPine   Tablet 10 milliGRAM(s) Oral daily  ampicillin/sulbactam  IVPB 3 Gram(s) IV Intermittent every 6 hours  aspirin  chewable 81 milliGRAM(s) Oral daily  atorvastatin 40 milliGRAM(s) Oral at bedtime  enoxaparin Injectable 40 milliGRAM(s) SubCutaneous every 24 hours  gabapentin 300 milliGRAM(s) Oral at bedtime  metoprolol tartrate 25 milliGRAM(s) Oral daily  potassium chloride    Tablet ER 20 milliEquivalent(s) Oral once  sertraline 100 milliGRAM(s) Oral two times a day  sodium chloride 0.9%. 1000 milliLiter(s) (75 mL/Hr) IV Continuous <Continuous>    PRN:  acetaminophen     Tablet .. 650 milliGRAM(s) Oral every 6 hours PRN  clonazePAM  Tablet 0.5 milliGRAM(s) Oral two times a day PRN      HOME MEDICATIONS:  Home Medications:  amLODIPine 10 mg oral tablet: 1 tab(s) orally once a day (19 Dec 2021 20:45)  clonazePAM 0.5 mg oral tablet: 1 tab(s) orally 2 times a day, As Needed (2022 04:00)  gabapentin 300 mg oral capsule: 1 tab(s) orally once a day (at bedtime) (2022 04:01)  hydroCHLOROthiazide 25 mg oral tablet: 1 tab(s) orally once a day (2022 04:01)  Lasix 40 mg oral tablet: 1 tab(s) orally once a day (19 Dec 2021 20:43)  potassium chloride 20 mEq oral granule, extended release: 1 tab(s) orally once a day (2022 04:02)  sertraline 100 mg oral tablet: 1 tab(s) orally 2 times a day (2022 04:03)  Vitamin D3 1250 mcg (50,000 intl units) oral capsule: 1 cap(s) orally once a week (2022 04:04)      VITALS:   T(F): 97.6 ( @ 05:05), Max: 98.5 ( @ 20:12)  HR: 68 ( @ 05:05) (63 - 128)  BP: 142/67 ( @ 05:05) (116/70 - 144/82)  BP(mean): --  RR: 18 ( @ 08:27) (18 - 18)  SpO2: 96% ( @ 08:27) (95% - 99%)    I&O's Summary    2022 07:01  -  2022 07:00  --------------------------------------------------------  IN: 560 mL / OUT: 0 mL / NET: 560 mL    2022 07:01  -  2022 12:25  --------------------------------------------------------  IN: 0 mL / OUT: 3 mL / NET: -3 mL        REVIEW OF SYSTEMS:  unable to obtain    PHYSICAL EXAM:  General: Not in distress.  Non-toxic appearing.   HEENT: EOMI  Cardio: regular, S1, S2, no murmur  Pulm: B/L BS.  No wheezing / crackles / rales  Abdomen: Soft, non-tender, non-distended. Normoactive bowel sounds  Extremities: No edema b/l le  Neuro: A&O x2. No focal deficits    LABS:                        17.6   15.71 )-----------( 328      ( 2022 22:09 )             51.0         137  |  92<L>  |  14  ----------------------------<  99  2.8<L>   |  23  |  0.9    Ca    9.7      2022 22:09  Mg     1.9         TPro  7.2  /  Alb  3.9  /  TBili  0.6  /  DBili  x   /  AST  29  /  ALT  6   /  AlkPhos  80        Troponin T, Serum: 0.03 ng/mL *HH* (22 @ 22:09)  Troponin T, Serum: 0.04 ng/mL ** (22 @ 12:28)    CARDIAC MARKERS ( 2022 22:09 )  x     / 0.03 ng/mL / x     / x     / x      CARDIAC MARKERS ( 2022 12:28 )  x     / 0.04 ng/mL / x     / x     / x      CARDIAC MARKERS ( 2022 13:22 )  x     / 0.05 ng/mL / x     / x     / x            Troponin trend:        COVID-19 PCR: NotDetec (2022 22:28)  COVID-19 PCR: NotDetec (2022 00:10)      RADIOLOGY:  -CXR:  -TTE:  < from: TTE Echo Complete w/o Contrast w/ Doppler (22 @ 08:28) >  Summary:   1. Normal global left ventricular systolic function.   2. Normal left atrial size.   3. Normal right atrial size.   4. Mild thickening and calcification of the anterior mitral valve   leaflet.   5. Moderate mitral annular calcification.   6. Trace mitral valve regurgitation.   7. Mild tricuspid regurgitation.   8. PSAP 35.    EC Lead ECG:   Ventricular Rate 135 BPM    Atrial Rate 326 BPM    P-R Interval 80 ms    QRS Duration 68 ms    Q-T Interval 274 ms    QTC Calculation(Bazett) 411 ms    P Axis -28 degrees    R Axis 1 degrees    T Axis -63 degrees    Diagnosis Line Atrial flutter with variable A-V block  Inferior infarct , age undetermined  ST & T wave abnormality, consider anterolateral ischemia  Abnormal ECG    Confirmed by Horacio Olguin ([id number]) on 2022 7:14:45 AM ( @ 21:14)      TELEMETRY EVENTS:    A fib with RVR  PVCs       Impression   # Elevated troponins stable - likely demand ischemia Possible underlying CAD   # New onset a fib with RVR - CHADSASC 4 - Now back in NSR s/p metoprolol IV x1   # Worsening lewy body dementia   # Occasional PVC on tele   # Hypokalemia     Recommendations:     - Continue medical therapy with stain and beta blocker   - Please change metoprolol tartrate to twice daily and can increase beta blocker for rate control and PVC suppression as long as SBP > 90 and HR > 60  - Please change aspirin to Eliquis 2.5 BID for stroke prophylaxis   - Please correct electrolytes to keep Mg > 2.2 and K > 4  - Not a candidate for cath or ablation  - Consider GOC discussion

## 2022-07-29 LAB
ALBUMIN SERPL ELPH-MCNC: 3.7 G/DL — SIGNIFICANT CHANGE UP (ref 3.5–5.2)
ALP SERPL-CCNC: 71 U/L — SIGNIFICANT CHANGE UP (ref 30–115)
ALT FLD-CCNC: 5 U/L — SIGNIFICANT CHANGE UP (ref 0–41)
ANION GAP SERPL CALC-SCNC: 12 MMOL/L — SIGNIFICANT CHANGE UP (ref 7–14)
ANION GAP SERPL CALC-SCNC: 14 MMOL/L — SIGNIFICANT CHANGE UP (ref 7–14)
AST SERPL-CCNC: 21 U/L — SIGNIFICANT CHANGE UP (ref 0–41)
BASOPHILS # BLD AUTO: 0.07 K/UL — SIGNIFICANT CHANGE UP (ref 0–0.2)
BASOPHILS NFR BLD AUTO: 0.6 % — SIGNIFICANT CHANGE UP (ref 0–1)
BILIRUB SERPL-MCNC: 0.6 MG/DL — SIGNIFICANT CHANGE UP (ref 0.2–1.2)
BUN SERPL-MCNC: 16 MG/DL — SIGNIFICANT CHANGE UP (ref 10–20)
BUN SERPL-MCNC: 17 MG/DL — SIGNIFICANT CHANGE UP (ref 10–20)
C DIFF BY PCR RESULT: NEGATIVE — SIGNIFICANT CHANGE UP
C DIFF TOX GENS STL QL NAA+PROBE: SIGNIFICANT CHANGE UP
CALCIUM SERPL-MCNC: 9 MG/DL — SIGNIFICANT CHANGE UP (ref 8.5–10.1)
CALCIUM SERPL-MCNC: 9.4 MG/DL — SIGNIFICANT CHANGE UP (ref 8.5–10.1)
CHLORIDE SERPL-SCNC: 100 MMOL/L — SIGNIFICANT CHANGE UP (ref 98–110)
CHLORIDE SERPL-SCNC: 97 MMOL/L — LOW (ref 98–110)
CO2 SERPL-SCNC: 27 MMOL/L — SIGNIFICANT CHANGE UP (ref 17–32)
CO2 SERPL-SCNC: 27 MMOL/L — SIGNIFICANT CHANGE UP (ref 17–32)
CREAT SERPL-MCNC: 0.7 MG/DL — SIGNIFICANT CHANGE UP (ref 0.7–1.5)
CREAT SERPL-MCNC: 0.8 MG/DL — SIGNIFICANT CHANGE UP (ref 0.7–1.5)
CULTURE RESULTS: SIGNIFICANT CHANGE UP
EGFR: 74 ML/MIN/1.73M2 — SIGNIFICANT CHANGE UP
EGFR: 87 ML/MIN/1.73M2 — SIGNIFICANT CHANGE UP
EOSINOPHIL # BLD AUTO: 0.07 K/UL — SIGNIFICANT CHANGE UP (ref 0–0.7)
EOSINOPHIL NFR BLD AUTO: 0.6 % — SIGNIFICANT CHANGE UP (ref 0–8)
GLUCOSE SERPL-MCNC: 68 MG/DL — LOW (ref 70–99)
GLUCOSE SERPL-MCNC: 77 MG/DL — SIGNIFICANT CHANGE UP (ref 70–99)
HCT VFR BLD CALC: 46.2 % — SIGNIFICANT CHANGE UP (ref 37–47)
HGB BLD-MCNC: 15.4 G/DL — SIGNIFICANT CHANGE UP (ref 12–16)
IMM GRANULOCYTES NFR BLD AUTO: 0.6 % — HIGH (ref 0.1–0.3)
LYMPHOCYTES # BLD AUTO: 0.83 K/UL — LOW (ref 1.2–3.4)
LYMPHOCYTES # BLD AUTO: 6.8 % — LOW (ref 20.5–51.1)
MAGNESIUM SERPL-MCNC: 2 MG/DL — SIGNIFICANT CHANGE UP (ref 1.8–2.4)
MCHC RBC-ENTMCNC: 30 PG — SIGNIFICANT CHANGE UP (ref 27–31)
MCHC RBC-ENTMCNC: 33.3 G/DL — SIGNIFICANT CHANGE UP (ref 32–37)
MCV RBC AUTO: 90.1 FL — SIGNIFICANT CHANGE UP (ref 81–99)
MONOCYTES # BLD AUTO: 1.09 K/UL — HIGH (ref 0.1–0.6)
MONOCYTES NFR BLD AUTO: 9 % — SIGNIFICANT CHANGE UP (ref 1.7–9.3)
NEUTROPHILS # BLD AUTO: 10.04 K/UL — HIGH (ref 1.4–6.5)
NEUTROPHILS NFR BLD AUTO: 82.4 % — HIGH (ref 42.2–75.2)
NRBC # BLD: 0 /100 WBCS — SIGNIFICANT CHANGE UP (ref 0–0)
PLATELET # BLD AUTO: 289 K/UL — SIGNIFICANT CHANGE UP (ref 130–400)
POTASSIUM SERPL-MCNC: 3.3 MMOL/L — LOW (ref 3.5–5)
POTASSIUM SERPL-MCNC: 3.4 MMOL/L — LOW (ref 3.5–5)
POTASSIUM SERPL-SCNC: 3.3 MMOL/L — LOW (ref 3.5–5)
POTASSIUM SERPL-SCNC: 3.4 MMOL/L — LOW (ref 3.5–5)
PROT SERPL-MCNC: 6.2 G/DL — SIGNIFICANT CHANGE UP (ref 6–8)
RBC # BLD: 5.13 M/UL — SIGNIFICANT CHANGE UP (ref 4.2–5.4)
RBC # FLD: 14.2 % — SIGNIFICANT CHANGE UP (ref 11.5–14.5)
SARS-COV-2 RNA SPEC QL NAA+PROBE: SIGNIFICANT CHANGE UP
SODIUM SERPL-SCNC: 136 MMOL/L — SIGNIFICANT CHANGE UP (ref 135–146)
SODIUM SERPL-SCNC: 141 MMOL/L — SIGNIFICANT CHANGE UP (ref 135–146)
SPECIMEN SOURCE: SIGNIFICANT CHANGE UP
WBC # BLD: 12.17 K/UL — HIGH (ref 4.8–10.8)
WBC # FLD AUTO: 12.17 K/UL — HIGH (ref 4.8–10.8)

## 2022-07-29 PROCEDURE — 99233 SBSQ HOSP IP/OBS HIGH 50: CPT

## 2022-07-29 PROCEDURE — 93010 ELECTROCARDIOGRAM REPORT: CPT | Mod: 76

## 2022-07-29 RX ORDER — POTASSIUM CHLORIDE 20 MEQ
20 PACKET (EA) ORAL ONCE
Refills: 0 | Status: COMPLETED | OUTPATIENT
Start: 2022-07-29 | End: 2022-07-29

## 2022-07-29 RX ORDER — POTASSIUM CHLORIDE 20 MEQ
40 PACKET (EA) ORAL ONCE
Refills: 0 | Status: COMPLETED | OUTPATIENT
Start: 2022-07-29 | End: 2022-07-29

## 2022-07-29 RX ORDER — APIXABAN 2.5 MG/1
2.5 TABLET, FILM COATED ORAL
Refills: 0 | Status: DISCONTINUED | OUTPATIENT
Start: 2022-07-29 | End: 2022-07-31

## 2022-07-29 RX ORDER — MAGNESIUM SULFATE 500 MG/ML
2 VIAL (ML) INJECTION ONCE
Refills: 0 | Status: COMPLETED | OUTPATIENT
Start: 2022-07-29 | End: 2022-07-29

## 2022-07-29 RX ORDER — FUROSEMIDE 40 MG
1 TABLET ORAL
Qty: 0 | Refills: 0 | DISCHARGE

## 2022-07-29 RX ORDER — METOPROLOL TARTRATE 50 MG
1 TABLET ORAL
Qty: 60 | Refills: 0
Start: 2022-07-29 | End: 2022-08-27

## 2022-07-29 RX ORDER — POTASSIUM CHLORIDE 20 MEQ
20 PACKET (EA) ORAL ONCE
Refills: 0 | Status: DISCONTINUED | OUTPATIENT
Start: 2022-07-29 | End: 2022-07-29

## 2022-07-29 RX ORDER — ATORVASTATIN CALCIUM 80 MG/1
1 TABLET, FILM COATED ORAL
Qty: 30 | Refills: 0
Start: 2022-07-29 | End: 2022-08-27

## 2022-07-29 RX ORDER — METOPROLOL TARTRATE 50 MG
25 TABLET ORAL
Refills: 0 | Status: DISCONTINUED | OUTPATIENT
Start: 2022-07-29 | End: 2022-07-31

## 2022-07-29 RX ADMIN — GABAPENTIN 300 MILLIGRAM(S): 400 CAPSULE ORAL at 21:47

## 2022-07-29 RX ADMIN — AMPICILLIN SODIUM AND SULBACTAM SODIUM 200 GRAM(S): 250; 125 INJECTION, POWDER, FOR SUSPENSION INTRAMUSCULAR; INTRAVENOUS at 00:40

## 2022-07-29 RX ADMIN — Medication 25 MILLIGRAM(S): at 05:32

## 2022-07-29 RX ADMIN — Medication 0.5 MILLIGRAM(S): at 21:59

## 2022-07-29 RX ADMIN — Medication 20 MILLIEQUIVALENT(S): at 05:32

## 2022-07-29 RX ADMIN — ENOXAPARIN SODIUM 40 MILLIGRAM(S): 100 INJECTION SUBCUTANEOUS at 11:27

## 2022-07-29 RX ADMIN — SERTRALINE 100 MILLIGRAM(S): 25 TABLET, FILM COATED ORAL at 05:32

## 2022-07-29 RX ADMIN — Medication 20 MILLIEQUIVALENT(S): at 11:35

## 2022-07-29 RX ADMIN — AMPICILLIN SODIUM AND SULBACTAM SODIUM 200 GRAM(S): 250; 125 INJECTION, POWDER, FOR SUSPENSION INTRAMUSCULAR; INTRAVENOUS at 05:35

## 2022-07-29 RX ADMIN — APIXABAN 2.5 MILLIGRAM(S): 2.5 TABLET, FILM COATED ORAL at 17:24

## 2022-07-29 RX ADMIN — Medication 81 MILLIGRAM(S): at 11:26

## 2022-07-29 RX ADMIN — ATORVASTATIN CALCIUM 40 MILLIGRAM(S): 80 TABLET, FILM COATED ORAL at 21:47

## 2022-07-29 RX ADMIN — Medication 50 MILLIEQUIVALENT(S): at 02:57

## 2022-07-29 RX ADMIN — Medication 25 MILLIGRAM(S): at 17:24

## 2022-07-29 RX ADMIN — AMLODIPINE BESYLATE 10 MILLIGRAM(S): 2.5 TABLET ORAL at 05:32

## 2022-07-29 RX ADMIN — Medication 40 MILLIEQUIVALENT(S): at 17:24

## 2022-07-29 RX ADMIN — SERTRALINE 100 MILLIGRAM(S): 25 TABLET, FILM COATED ORAL at 17:21

## 2022-07-29 RX ADMIN — Medication 12.5 GRAM(S): at 17:21

## 2022-07-29 RX ADMIN — AMPICILLIN SODIUM AND SULBACTAM SODIUM 200 GRAM(S): 250; 125 INJECTION, POWDER, FOR SUSPENSION INTRAMUSCULAR; INTRAVENOUS at 11:29

## 2022-07-29 RX ADMIN — Medication 50 MILLIEQUIVALENT(S): at 19:55

## 2022-07-29 NOTE — PROGRESS NOTE ADULT - ASSESSMENT
80 yo F PMHx suspect Lewy body dementia (AxO x1-2 at baseline), documented hx of hallucinations/anxiety/paranoia/tremors, HTN, hysterectomy, recurrent UTI's, presents with abnormal lab work from PCP of hypokalemia and elevated troponin Notably, patient was recently seen in ED 7/21 as a stroke code, after pt had facial droop. CT H was (-). Was found to have electrolyte abnormalities but left AMA.     Elevated trop  - unclear etiology, could be due to Type II NSTEMI (demand ischemia)  - cardio eval appreciated--GDMT, no cath or other work up  - ECHO with mild regurg of mitral and tricuspid valves  - EKG 7/25 noted, nonspecific T wave changes, PACs, prolonged QT, RVH    Diarrhea  - c. diff negative  - could be related to magnesium use and antibiotics  - dc antibiotics and monitor     Significant hypokalemia  - replenished  - keep K around 4, Mg around 2     Isolated Leukocytosis 2/2 UTI   - dc antibiotics  - follow up sensitives    HTN   - c/w amlodipine 10mg qd   - c/w Lasix 40mg qd   - c/w HCTZ 25mg qd     Suspect Depression   Suspected Lewy body dementia  - c/w Sertraline 100mg bid   - c/w clonazepam 0.5mg BID PRN     #Progress Note Handoff  Pending (specify): monitor diarrhea  Family discussion: spoke to son mariano via phone, aware of pending placement and labs.  Disposition: placement, PT ordered   Anticipated date:  7/29

## 2022-07-29 NOTE — PROGRESS NOTE ADULT - SUBJECTIVE AND OBJECTIVE BOX
YOAN TAI 81y Female  MRN#: [id number]   Hospital Day: 4d    HPI:  81y F with 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   Notably, patient was recently seen in ED 7/21 as a stroke code, after pt had facial droop. CT H was (-). Was found to have electrolyte abnormalities but left AMA.   Patient returned to PCP and found to be hypokalemic, and + troponins. Was sent to ED by her PCP.   History obtained from patient and her aide. Patient is AxO x2, and is a poor historian. However she reports that she may have had some chest pain earlier. Per Aide bedside, patient was sent in by PCP for unknown abnormal lab values. (high trops and low potassium)    ED Course:   Vitals: T 98.1, HR 72, /86, RR 18, 99% on RA   CXR 7/20: cardiomegaly and interstitial opacifications   CT H 7/20: (-)   Labs: WBC 13K, Trops 0.02, 0.06, 0.06,   UA (+) for LE, WBC; Urine culture 7/20 (+) e. feceium   EKG 7/25 noted, nonspecific T wave changes, PACs, prolonged QT, RVH     Patient admitted for workup of abnormal lab values: elevated troponin and hypokalemia        (26 Jul 2022 02:28)      SUBJECTIVE  Patient is a 81y old Female who presents with a chief complaint of elevated cardiac enzymes (28 Jul 2022 13:06)  Currently admitted to medicine with the primary diagnosis of Elevated troponin  the patient is still having watery diarrhea.       REVIEW OF SYMPTOMS:  CONSTITUTIONAL: No weakness, fevers or chills; No headaches  EYES: No visual changes, eye pain, or discharge  ENT: No vertigo; No ear pain or change in hearing; No sore throat or difficulty swallowing  NECK: No pain or stiffness  RESPIRATORY: No cough, wheezing, or hemoptysis; No shortness of breath  CARDIOVASCULAR: No chest pain or palpitations  GASTROINTESTINAL: lower abdominal pressure like pain  GENITOURINARY: No dysuria, frequency or hematuria  MUSCULOSKELETAL: No joint pain, no muscle pain, no weakness  NEUROLOGICAL: + weakness. Dementia.  SKIN: ecchymosis over her upper extremities. (blood draws hematomas)      OBJECTIVE  PAST MEDICAL & SURGICAL HISTORY  HTN (hypertension)    Anxiety    Arthritis    No significant past surgical history      ALLERGIES:  No Known Allergies    MEDICATIONS:  STANDING MEDICATIONS  amLODIPine   Tablet 10 milliGRAM(s) Oral daily  apixaban 2.5 milliGRAM(s) Oral two times a day  atorvastatin 40 milliGRAM(s) Oral at bedtime  gabapentin 300 milliGRAM(s) Oral at bedtime  magnesium sulfate  IVPB 2 Gram(s) IV Intermittent once  metoprolol tartrate 25 milliGRAM(s) Oral two times a day  potassium chloride    Tablet ER 40 milliEquivalent(s) Oral once  potassium chloride    Tablet ER 20 milliEquivalent(s) Oral once  potassium chloride  20 mEq/100 mL IVPB 20 milliEquivalent(s) IV Intermittent once  sertraline 100 milliGRAM(s) Oral two times a day  sodium chloride 0.9%. 1000 milliLiter(s) IV Continuous <Continuous>    PRN MEDICATIONS  acetaminophen     Tablet .. 650 milliGRAM(s) Oral every 6 hours PRN  clonazePAM  Tablet 0.5 milliGRAM(s) Oral two times a day PRN      VITAL SIGNS: Last 24 Hours  T(C): 36.8 (29 Jul 2022 13:15), Max: 36.8 (29 Jul 2022 13:15)  T(F): 98.2 (29 Jul 2022 13:15), Max: 98.2 (29 Jul 2022 13:15)  HR: 73 (29 Jul 2022 13:15) (73 - 81)  BP: 130/64 (29 Jul 2022 13:15) (125/79 - 150/87)  BP(mean): --  RR: 18 (29 Jul 2022 13:15) (18 - 18)  SpO2: 94% (29 Jul 2022 08:41) (94% - 94%)    LABS:                        15.4   12.17 )-----------( 289      ( 29 Jul 2022 07:59 )             46.2     07-29    136  |  97<L>  |  16  ----------------------------<  77  3.4<L>   |  27  |  0.7    Ca    9.4      29 Jul 2022 07:59  Mg     2.0     07-29    TPro  6.2  /  Alb  3.7  /  TBili  0.6  /  DBili  x   /  AST  21  /  ALT  5   /  AlkPhos  71  07-29              Culture - Blood (collected 27 Jul 2022 22:09)  Source: .Blood None  Preliminary Report (29 Jul 2022 10:01):    No growth to date.    Culture - Blood (collected 27 Jul 2022 22:09)  Source: .Blood None  Preliminary Report (29 Jul 2022 10:01):    No growth to date.      CARDIAC MARKERS ( 27 Jul 2022 22:09 )  x     / 0.03 ng/mL / x     / x     / x          RADIOLOGY:  ACC: [id number] EXAM:  CT BRAIN                          PROCEDURE DATE:  07/20/2022      IMPRESSION:      No evidence of acute intracranial hemorrhage. Stable exam.    Stable extensive chronic microvascular changes and chronic infarcts.    ACC: [id number] EXAM:  XR CHEST PORTABLE IMMED 1V                          PROCEDURE DATE:  07/25/2022      Impression: Diffuse interstitial opacities, unchanged.    --- End of Report ---      PHYSICAL EXAM:  CONSTITUTIONAL: No acute distress, well-developed, well-groomed, AAOx3  HEAD: Atraumatic, normocephalic  EYES: EOM intact, PERRLA, conjunctiva and sclera clear  ENT: Supple, no masses, no thyromegaly, no bruits, no JVD; moist mucous membranes  PULMONARY: Clear to auscultation bilaterally; no wheezes, rales, or rhonchi  CARDIOVASCULAR: Regular rate and rhythm; no murmurs, rubs, or gallops  GASTROINTESTINAL: Soft, non-tender, non-distended; bowel sounds present  MUSCULOSKELETAL: 2+ peripheral pulses; no clubbing, no cyanosis, no edema  NEUROLOGY: non-focal  SKIN: No rashes or lesions; warm and dry    ASSESSMENT & PLAN  81y F with 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   Notably, patient was recently seen in ED 7/21 as a stroke code, after pt had facial droop. CT H was (-). Was found to have electrolyte abnormalities but left AMA.   Patient returned to PCP and found to be hypokalemic, and + troponins. Was sent to ED by her PCP.   Patient admitted for abnormal lab values (high trops and low potassium) on outpatient labs, and complaint of chest pain   the patient denies chest patient or SOB today.However she is having recurrent episodes of watery diarrhea     # Atypical Chest Pain, possible NSTEMI   - Trops 0.02, 0.06, 0.06, 0.03 in 7/27  - EKG 7/25 noted, nonspecific T wave changes, PACs, prolonged QT, RVH   - Now asymptomatic       #new onset paroxysmal atrial fibrillation with RVR  TSH 2.47 negative  1 episode was seen on 7/27 on the tele.   Cardio consult appreciated: CHADSASC 4 .  Recommend  to change metoprolol tartrate 25 to twice daily  and can increase beta blocker for rate control and PVC suppression as long as SBP > 90 and HR > 60  switch aspirine to eliquis 2.5 BID for stroke prophylaxis  - Please correct electrolytes to keep Mg > 2.2 and K > 4  - Not a candidate for cath or ablation  - continue statin      #hypokalemia  lasix was stopped  resolved. K3.4  trend to avoid hypokalemia again due to her diarrhea      # diarrhea  f/u stool studies   Cdiff negative -- >most likely ABx side effect   she also received unasyn      # Isolated Leukocytosis   - WBC 16-->12. Resolved  - cultures from 7/20 are + for e. fecium   - pt asymptomatic, can consider abiox if UA returns +     #hand excoriations  wound care consult appreciated:     # HTN   - c/w amlodipine 10mg qd       # Suspect Depression   - c/w Sertraline 100mg bid   - c/w clonazepam 0.5mg BID PRN       DVT ppx: apixaban  Diet: Regular

## 2022-07-29 NOTE — PROGRESS NOTE ADULT - REASON FOR ADMISSION
elevated cardiac enzymes

## 2022-07-29 NOTE — PROGRESS NOTE ADULT - SUBJECTIVE AND OBJECTIVE BOX
CHIEF COMPLAINT:    Patient is a 81y old  Female who presents with a chief complaint of elevated cardiac enzymes     INTERVAL HPI/OVERNIGHT EVENTS:    Patient seen and examined at bedside. No acute overnight events occurred.    ROS: reports abdominal cramping. All other systems are negative.    Medications:  Standing  amLODIPine   Tablet 10 milliGRAM(s) Oral daily  apixaban 2.5 milliGRAM(s) Oral two times a day  atorvastatin 40 milliGRAM(s) Oral at bedtime  gabapentin 300 milliGRAM(s) Oral at bedtime  magnesium sulfate  IVPB 2 Gram(s) IV Intermittent once  metoprolol tartrate 25 milliGRAM(s) Oral two times a day  potassium chloride    Tablet ER 40 milliEquivalent(s) Oral once  potassium chloride    Tablet ER 20 milliEquivalent(s) Oral once  potassium chloride  20 mEq/100 mL IVPB 20 milliEquivalent(s) IV Intermittent once  sertraline 100 milliGRAM(s) Oral two times a day  sodium chloride 0.9%. 1000 milliLiter(s) IV Continuous <Continuous>    PRN Meds  acetaminophen     Tablet .. 650 milliGRAM(s) Oral every 6 hours PRN  clonazePAM  Tablet 0.5 milliGRAM(s) Oral two times a day PRN        Vital Signs:    T(F): 98.2 (07-29-22 @ 13:15), Max: 98.2 (07-29-22 @ 13:15)  HR: 73 (07-29-22 @ 13:15) (73 - 81)  BP: 130/64 (07-29-22 @ 13:15) (125/79 - 150/87)  RR: 18 (07-29-22 @ 13:15) (18 - 18)  SpO2: 94% (07-29-22 @ 08:41) (94% - 94%)  I&O's Summary    28 Jul 2022 07:01  -  29 Jul 2022 07:00  --------------------------------------------------------  IN: 1585 mL / OUT: 411 mL / NET: 1174 mL    29 Jul 2022 07:01  -  29 Jul 2022 16:24  --------------------------------------------------------  IN: 285 mL / OUT: 0 mL / NET: 285 mL      PHYSICAL EXAM:  GENERAL:  NAD  SKIN: No rashes or lesions  HEENT: Atraumatic. Normocephalic. Anicteric  NECK:  No JVD.   PULMONARY: Clear to ausculation bilaterally. No wheezing. No rales  CVS: Normal S1, S2. Regular rate and rhythm. No murmurs.  ABDOMEN/GI: Soft, Nontender, Nondistended; Bowel sounds are present  EXTREMITIES:  No edema B/L LE.  NEUROLOGIC:  No motor deficit, tremors  PSYCH: Alert & oriented x 3, normal affect      LABS:                        15.4   12.17 )-----------( 289      ( 29 Jul 2022 07:59 )             46.2     07-29    136  |  97<L>  |  16  ----------------------------<  77  3.4<L>   |  27  |  0.7    Ca    9.4      29 Jul 2022 07:59  Mg     2.0     07-29    TPro  6.2  /  Alb  3.7  /  TBili  0.6  /  DBili  x   /  AST  21  /  ALT  5   /  AlkPhos  71  07-29        Trop 0.03, CKMB --, CK --, 07-27-22 @ 22:09  Trop 0.04, CKMB --, CK --, 07-27-22 @ 12:28      Culture - Blood (collected 27 Jul 2022 22:09)  Source: .Blood None  Preliminary Report (29 Jul 2022 10:01):    No growth to date.    Culture - Blood (collected 27 Jul 2022 22:09)  Source: .Blood None  Preliminary Report (29 Jul 2022 10:01):    No growth to date.        RADIOLOGY & ADDITIONAL TESTS:  Imaging or report Personally Reviewed:  [ ] YES  [ ] NO -->no new images    Telemetry reviewed independently - NSR, no acute events  EKG reviewed independently -->no new EKGs    Consultant(s) Notes Reviewed:  [ ] YES  [ ] NO  Care Discussed with Consultants/Other Providers [ ] YES  [ ] NO    Case discussed with resident  Care discussed with pt

## 2022-07-30 ENCOUNTER — TRANSCRIPTION ENCOUNTER (OUTPATIENT)
Age: 82
End: 2022-07-30

## 2022-07-30 VITALS
HEART RATE: 55 BPM | TEMPERATURE: 97 F | RESPIRATION RATE: 18 BRPM | DIASTOLIC BLOOD PRESSURE: 62 MMHG | SYSTOLIC BLOOD PRESSURE: 126 MMHG | OXYGEN SATURATION: 94 %

## 2022-07-30 LAB
ALBUMIN SERPL ELPH-MCNC: 3.6 G/DL — SIGNIFICANT CHANGE UP (ref 3.5–5.2)
ALP SERPL-CCNC: 65 U/L — SIGNIFICANT CHANGE UP (ref 30–115)
ALT FLD-CCNC: <5 U/L — SIGNIFICANT CHANGE UP (ref 0–41)
ANION GAP SERPL CALC-SCNC: 14 MMOL/L — SIGNIFICANT CHANGE UP (ref 7–14)
AST SERPL-CCNC: 18 U/L — SIGNIFICANT CHANGE UP (ref 0–41)
BASOPHILS # BLD AUTO: 0.07 K/UL — SIGNIFICANT CHANGE UP (ref 0–0.2)
BASOPHILS NFR BLD AUTO: 0.7 % — SIGNIFICANT CHANGE UP (ref 0–1)
BILIRUB SERPL-MCNC: 0.5 MG/DL — SIGNIFICANT CHANGE UP (ref 0.2–1.2)
BUN SERPL-MCNC: 18 MG/DL — SIGNIFICANT CHANGE UP (ref 10–20)
CALCIUM SERPL-MCNC: 9.3 MG/DL — SIGNIFICANT CHANGE UP (ref 8.5–10.1)
CHLORIDE SERPL-SCNC: 103 MMOL/L — SIGNIFICANT CHANGE UP (ref 98–110)
CO2 SERPL-SCNC: 24 MMOL/L — SIGNIFICANT CHANGE UP (ref 17–32)
CREAT SERPL-MCNC: 0.8 MG/DL — SIGNIFICANT CHANGE UP (ref 0.7–1.5)
EGFR: 74 ML/MIN/1.73M2 — SIGNIFICANT CHANGE UP
EOSINOPHIL # BLD AUTO: 0.09 K/UL — SIGNIFICANT CHANGE UP (ref 0–0.7)
EOSINOPHIL NFR BLD AUTO: 0.8 % — SIGNIFICANT CHANGE UP (ref 0–8)
GLUCOSE SERPL-MCNC: 80 MG/DL — SIGNIFICANT CHANGE UP (ref 70–99)
HCT VFR BLD CALC: 44.4 % — SIGNIFICANT CHANGE UP (ref 37–47)
HGB BLD-MCNC: 14.7 G/DL — SIGNIFICANT CHANGE UP (ref 12–16)
IMM GRANULOCYTES NFR BLD AUTO: 0.6 % — HIGH (ref 0.1–0.3)
LYMPHOCYTES # BLD AUTO: 0.98 K/UL — LOW (ref 1.2–3.4)
LYMPHOCYTES # BLD AUTO: 9.1 % — LOW (ref 20.5–51.1)
MAGNESIUM SERPL-MCNC: 2.3 MG/DL — SIGNIFICANT CHANGE UP (ref 1.8–2.4)
MCHC RBC-ENTMCNC: 30.2 PG — SIGNIFICANT CHANGE UP (ref 27–31)
MCHC RBC-ENTMCNC: 33.1 G/DL — SIGNIFICANT CHANGE UP (ref 32–37)
MCV RBC AUTO: 91.2 FL — SIGNIFICANT CHANGE UP (ref 81–99)
MONOCYTES # BLD AUTO: 0.92 K/UL — HIGH (ref 0.1–0.6)
MONOCYTES NFR BLD AUTO: 8.6 % — SIGNIFICANT CHANGE UP (ref 1.7–9.3)
NEUTROPHILS # BLD AUTO: 8.6 K/UL — HIGH (ref 1.4–6.5)
NEUTROPHILS NFR BLD AUTO: 80.2 % — HIGH (ref 42.2–75.2)
NRBC # BLD: 0 /100 WBCS — SIGNIFICANT CHANGE UP (ref 0–0)
PLATELET # BLD AUTO: 278 K/UL — SIGNIFICANT CHANGE UP (ref 130–400)
POTASSIUM SERPL-MCNC: 3.4 MMOL/L — LOW (ref 3.5–5)
POTASSIUM SERPL-SCNC: 3.4 MMOL/L — LOW (ref 3.5–5)
PROT SERPL-MCNC: 6.2 G/DL — SIGNIFICANT CHANGE UP (ref 6–8)
RBC # BLD: 4.87 M/UL — SIGNIFICANT CHANGE UP (ref 4.2–5.4)
RBC # FLD: 14.2 % — SIGNIFICANT CHANGE UP (ref 11.5–14.5)
SODIUM SERPL-SCNC: 141 MMOL/L — SIGNIFICANT CHANGE UP (ref 135–146)
WBC # BLD: 10.72 K/UL — SIGNIFICANT CHANGE UP (ref 4.8–10.8)
WBC # FLD AUTO: 10.72 K/UL — SIGNIFICANT CHANGE UP (ref 4.8–10.8)

## 2022-07-30 PROCEDURE — 99239 HOSP IP/OBS DSCHRG MGMT >30: CPT

## 2022-07-30 RX ORDER — POTASSIUM CHLORIDE 20 MEQ
20 PACKET (EA) ORAL ONCE
Refills: 0 | Status: DISCONTINUED | OUTPATIENT
Start: 2022-07-30 | End: 2022-07-31

## 2022-07-30 RX ORDER — MAGNESIUM SULFATE 500 MG/ML
2 VIAL (ML) INJECTION ONCE
Refills: 0 | Status: COMPLETED | OUTPATIENT
Start: 2022-07-30 | End: 2022-07-30

## 2022-07-30 RX ORDER — LOPERAMIDE HCL 2 MG
2 TABLET ORAL ONCE
Refills: 0 | Status: COMPLETED | OUTPATIENT
Start: 2022-07-30 | End: 2022-07-30

## 2022-07-30 RX ORDER — APIXABAN 2.5 MG/1
1 TABLET, FILM COATED ORAL
Qty: 0 | Refills: 0 | DISCHARGE
Start: 2022-07-30

## 2022-07-30 RX ORDER — POTASSIUM CHLORIDE 20 MEQ
40 PACKET (EA) ORAL EVERY 4 HOURS
Refills: 0 | Status: COMPLETED | OUTPATIENT
Start: 2022-07-30 | End: 2022-07-30

## 2022-07-30 RX ADMIN — Medication 25 MILLIGRAM(S): at 05:34

## 2022-07-30 RX ADMIN — APIXABAN 2.5 MILLIGRAM(S): 2.5 TABLET, FILM COATED ORAL at 19:30

## 2022-07-30 RX ADMIN — Medication 2 MILLIGRAM(S): at 19:49

## 2022-07-30 RX ADMIN — SERTRALINE 100 MILLIGRAM(S): 25 TABLET, FILM COATED ORAL at 05:35

## 2022-07-30 RX ADMIN — GABAPENTIN 300 MILLIGRAM(S): 400 CAPSULE ORAL at 21:22

## 2022-07-30 RX ADMIN — AMLODIPINE BESYLATE 10 MILLIGRAM(S): 2.5 TABLET ORAL at 05:34

## 2022-07-30 RX ADMIN — Medication 40 MILLIEQUIVALENT(S): at 19:31

## 2022-07-30 RX ADMIN — APIXABAN 2.5 MILLIGRAM(S): 2.5 TABLET, FILM COATED ORAL at 05:34

## 2022-07-30 RX ADMIN — Medication 25 MILLIGRAM(S): at 19:30

## 2022-07-30 RX ADMIN — ATORVASTATIN CALCIUM 40 MILLIGRAM(S): 80 TABLET, FILM COATED ORAL at 21:22

## 2022-07-30 RX ADMIN — Medication 0.5 MILLIGRAM(S): at 10:22

## 2022-07-30 RX ADMIN — Medication 40 MILLIEQUIVALENT(S): at 10:22

## 2022-07-30 RX ADMIN — SERTRALINE 100 MILLIGRAM(S): 25 TABLET, FILM COATED ORAL at 19:31

## 2022-07-30 RX ADMIN — Medication 25 GRAM(S): at 12:37

## 2022-07-30 NOTE — DISCHARGE NOTE NURSING/CASE MANAGEMENT/SOCIAL WORK - PATIENT PORTAL LINK FT
You can access the FollowMyHealth Patient Portal offered by [facility] by registering at the following website: [email or web address]. By joining [employer]’s FollowMyHealth portal, you will also be able to view your health information using other applications (apps) compatible with our system.

## 2022-07-30 NOTE — DISCHARGE NOTE PROVIDER - CARE PROVIDER_API CALL
Deborah Barnett ()  Family Medicine  11 Counts include 234 beds at the [facility] [id number]  [place] [address]  Phone: [phone]  Fax: [phone]  Established Patient  Follow Up Time:     Horacio Olguin (MD)  Cardiovascular Disease; Internal Medicine; Interventional Cardiology  [address] [place] [address], [place] [address]  Phone: [phone]  Fax: [phone]  Follow Up Time:

## 2022-07-30 NOTE — DISCHARGE NOTE PROVIDER - HOSPITAL COURSE
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   Notably, patient was recently seen in ED 7/21 as a stroke code, after pt had facial droop. CT H was (-). Was found to have electrolyte abnormalities but left AMA.   Patient returned to PCP and found to be hypokalemic, and + troponins. Was sent to ED by her PCP.   History obtained from patient and her aide. Patient is AxO x2, and is a poor historian. However she reports that she may have had some chest pain earlier. Per Aide bedside, patient was sent in by PCP for unknown abnormal lab values. (high trops and low potassium)    ED Course:   Vitals: T 98.1, HR 72, /86, RR 18, 99% on RA   CXR 7/20: cardiomegaly and interstitial opacifications   CT H 7/20: (-)   Labs: WBC 13K, Trops 0.02, 0.06, 0.06,   UA (+) for LE, WBC; Urine culture 7/20 (+) e. feceium   EKG 7/25 noted, nonspecific T wave changes, PACs, prolonged QT, RVH     Patient admitted for workup of abnormal lab values. cardio eval done--GDMT, no cath or other work up. patient was having diarrhea -> ABx stopped, CDIFF and GIPCR negative. she was being treated for a UTI -> developped diarrhea -> CDIFF, GI PCR negative. she was also found to have urinary retention -> straight cath performed. 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   Notably, patient was recently seen in ED 7/21 as a stroke code, after pt had facial droop. CT H was (-). Was found to have electrolyte abnormalities but left AMA.   Patient returned to PCP and found to be hypokalemic, and + troponins. Was sent to ED by her PCP.   History obtained from patient and her aide. Patient is AxO x2, and is a poor historian. However she reports that she may have had some chest pain earlier. Per Aide bedside, patient was sent in by PCP for unknown abnormal lab values. (high trops and low potassium)    ED Course:   Vitals: T 98.1, HR 72, /86, RR 18, 99% on RA   CXR 7/20: cardiomegaly and interstitial opacifications   CT H 7/20: (-)   Labs: WBC 13K, Trops 0.02, 0.06, 0.06,   UA (+) for LE, WBC; Urine culture 7/20 (+) e. feceium   EKG 7/25 noted, nonspecific T wave changes, PACs, prolonged QT, RVH     Patient admitted for workup of abnormal lab values. cardio eval done--GDMT, no cath or other work up. patient was having diarrhea -> ABx stopped, CDIFF and GIPCR negative. she was being treated for a UTI -> developped diarrhea -> CDIFF, GI PCR negative. she was also found to have urinary retention -> straight cath performed. during stay -> New onset a fib with RVR - CHADSASC 4 - Now back in NSR s/p metoprolol IV x1 -> aspirin switched to eliquis

## 2022-07-30 NOTE — DISCHARGE NOTE NURSING/CASE MANAGEMENT/SOCIAL WORK - NSDCPEFALRISK_GEN_ALL_CORE
For information on Fall & Injury Prevention, visit: [email or web address] OR  [email or web address] OR  https://www.cdc.gov/steadi/patient.html

## 2022-07-30 NOTE — DISCHARGE NOTE NURSING/CASE MANAGEMENT/SOCIAL WORK - NSDCVIVACCINE_GEN_ALL_CORE_FT
Td (adult) preservative free; 15-Feb-2022 14:49; Purvi Haas (RN); [employer]; [id number] (Exp. Date: 15-Oct-2022); IntraMuscular; Deltoid Right.; 0.5 milliLiter(s); VIS (VIS Published: 07-Oct-2022, VIS Presented: 15-Feb-2022);

## 2022-07-30 NOTE — DISCHARGE NOTE PROVIDER - CARE PROVIDERS DIRECT ADDRESSES
,DirectAddress_Unknown,[email or web address]

## 2022-07-30 NOTE — DISCHARGE NOTE PROVIDER - NSDCPNSUBOBJ_GEN_ALL_CORE
Pt seen and examined at bedside. I spoke with son Fransisco via phone. Through share decision making he would rather have diarrhea and urinary retention worked up further as an outpatient. Aware that diarrhea could lead to worsening electroyte abnormalities that could cause arrhytmias. Aware of urinary retention and straight cath and that  pt may need hill. CM spoke with NH who are aware of above issues.

## 2022-07-30 NOTE — DISCHARGE NOTE PROVIDER - NSDCCPCAREPLAN_GEN_ALL_CORE_FT
PRINCIPAL DISCHARGE DIAGNOSIS  Diagnosis: Elevated troponin  Assessment and Plan of Treatment: you presented for abnormal labs -> you were found to have elevated troponins -> stable on repeat. cardiology consulted -> no interventions. you were also being treated for cystitis -> developped diarrhea but investigations were negative for infectious causes. you were having electrolye imbalances due to heavy diarrhea -> all was being replenished. during your stay you had New onset atrial fibrillation which conversted spontaneously -> aspirin switched to eliquis   please follow up with your PCP and cardiologist      SECONDARY DISCHARGE DIAGNOSES  Diagnosis: Acute cystitis  Assessment and Plan of Treatment: you were also being treated for cystitis -> developped diarrhea but investigations were negative for infectious causes. you were having electrolye imbalances due to heavy diarrhea -> all was being replenished    Diagnosis: Urinary retention  Assessment and Plan of Treatment: you were also being treated for cystitis -> developped diarrhea but investigations were negative for infectious causes. you were having electrolye imbalances due to heavy diarrhea -> all was being replenished    Diagnosis: Diarrhea  Assessment and Plan of Treatment: you were also being treated for cystitis -> developped diarrhea but investigations were negative for infectious causes. you were having electrolye imbalances due to heavy diarrhea -> all was being replenished    Diagnosis: Electrolyte imbalance  Assessment and Plan of Treatment: you were also being treated for cystitis -> developped diarrhea but investigations were negative for infectious causes. you were having electrolye imbalances due to heavy diarrhea -> all was being replenished    Diagnosis: Paroxysmal atrial fibrillation  Assessment and Plan of Treatment: during your stay you had New onset atrial fibrillation which conversted spontaneously -> aspirin switched to eliquis     PRINCIPAL DISCHARGE DIAGNOSIS  Diagnosis: Elevated troponin  Assessment and Plan of Treatment: you presented for abnormal labs -> you were found to have elevated troponins -> stable on repeat. cardiology consulted -> no interventions. you were also being treated for cystitis -> developped diarrhea but investigations were negative for infectious causes. you were having electrolye imbalances due to heavy diarrhea -> all was being replenished. during your stay you had New onset atrial fibrillation which conversted spontaneously -> aspirin switched to eliquis   please follow up with your PCP and cardiologist      SECONDARY DISCHARGE DIAGNOSES  Diagnosis: Acute cystitis  Assessment and Plan of Treatment: you were also being treated for cystitis -> developped diarrhea but investigations were negative for infectious causes. you were having electrolye imbalances due to heavy diarrhea -> all was being replenished    Diagnosis: Urinary retention  Assessment and Plan of Treatment: You were retaining 400 cc of urine. If you do not have adequate urine outpt at your nursing facility a hill catheter will have to be inserted tonight.    Diagnosis: Diarrhea  Assessment and Plan of Treatment: You were negative for c. diff and other GI infections. This may be due to antibiotic use and magnesium administartion. If diarrhea persists you may start immodium and see a GI doctor.    Diagnosis: Electrolyte imbalance  Assessment and Plan of Treatment: Please recheck potassium and magnesium levels on 8/2 and every couple of days after that to make sure you still have therapeutic levels    Diagnosis: Paroxysmal atrial fibrillation  Assessment and Plan of Treatment: during your stay you had New onset atrial fibrillation which conversted spontaneously -> aspirin switched to eliquis

## 2022-08-01 LAB
CULTURE RESULTS: SIGNIFICANT CHANGE UP
SPECIMEN SOURCE: SIGNIFICANT CHANGE UP

## 2022-08-02 LAB
CULTURE RESULTS: SIGNIFICANT CHANGE UP
CULTURE RESULTS: SIGNIFICANT CHANGE UP
SPECIMEN SOURCE: SIGNIFICANT CHANGE UP
SPECIMEN SOURCE: SIGNIFICANT CHANGE UP

## 2022-08-08 NOTE — CDI QUERY NOTE - NSCDIOTHERTXTBX_GEN_ALL_CORE_HH
CLINICAL INDICATORS    7/26 H&P Adult: … Reason for Admission: elevated cardiac enzymes, History obtained from patient and her aide. Patient is AxO x2, and is a poor historian. However she reports that she may have had some chest pain earlier, Cardiovascular: Denies chest pain, palpitations, chest wall tenderness, Assessment: Atypical Chest Pain, possible NSTEMI , Trops 0.02, 0.06, 0.06,  EKG 7/25 noted, nonspecific T wave changes, PACs, prolonged QT, RVH, - Now asymptomatic,  continue to trend troponins,  consider cardio consult in the AM …     7/27 Consult Note Adult-Cardiology Attending: … Impression: # Non cardiac chest, # Elevated troponins but stable, possible underlying CAD, Mild-Moderate pulm hypertension, Recommendations: Recommend medical treatment with aspirin and low dose beta blocker and statin…      7/28-7/29 Progress Note Adult-Internal Medicine Attending: … Elevated trop,  unclear etiology, could be due to Type II NSTEMI (demand ischemia, cardio eval appreciated, no cath or other work up,  ECHO with mild regurg of mitral and tricuspid valves,  EKG 7/25 noted, nonspecific T wave changes, PACs, prolonged QT, RVH…     7/30 Discharge Note Provider: … Principal Discharge Diagnosis: Elevated Troponin…     Based on your professional judgement and the clinical indicators, please clarify if the patient’s elevated troponin can be further specified as:    •   Possible Type II NSTEMI was evaluated and ruled out   •   Possible Type II NSTEMI was evaluated and treated  •   Other (please specify):  •   Clinically unable to further specify the underlying the underlying cause of the patients elevated      troponin    Thank you,  Ani Cruz, RN, BSN, CCDS, [facility]  [phone]

## 2022-09-13 ENCOUNTER — APPOINTMENT (OUTPATIENT)
Dept: NEUROLOGY | Facility: CLINIC | Age: 82
End: 2022-09-13

## 2022-12-12 NOTE — PATIENT PROFILE ADULT - FUNCTIONAL ASSESSMENT - DAILY ACTIVITY 4.
Pt/PCG made aware this is a telephone/virtual visit and verbal consent to participate in this telephone/virtual visit obtained  This visit was performed via:  Joseluis Blue with daughter by phone- verbalized no issues over past month. Appetite good. GI/ good, no chest pain or SOB. skin intact,  no fall reported. Discussed medications. No refills needed. Daughter agreed no needed RN Home visit this month. Instructed daughter to call service office with any concerns. Verbalized good understanding. 1 = Total assistance

## 2023-05-09 ENCOUNTER — INPATIENT (INPATIENT)
Facility: HOSPITAL | Age: 83
LOS: 10 days | Discharge: HOME | DRG: 871 | End: 2023-05-20
Attending: INTERNAL MEDICINE | Admitting: INTERNAL MEDICINE
Payer: MEDICARE

## 2023-05-09 VITALS
SYSTOLIC BLOOD PRESSURE: 130 MMHG | HEIGHT: 66 IN | OXYGEN SATURATION: 97 % | HEART RATE: 37 BPM | DIASTOLIC BLOOD PRESSURE: 80 MMHG | WEIGHT: 119.93 LBS | TEMPERATURE: 102 F | RESPIRATION RATE: 18 BRPM

## 2023-05-09 DIAGNOSIS — A41.9 SEPSIS, UNSPECIFIED ORGANISM: ICD-10-CM

## 2023-05-09 DIAGNOSIS — Z98.890 OTHER SPECIFIED POSTPROCEDURAL STATES: Chronic | ICD-10-CM

## 2023-05-09 LAB
AGGLUTINATION: PRESENT — SIGNIFICANT CHANGE UP
ALBUMIN SERPL ELPH-MCNC: 3 G/DL — LOW (ref 3.5–5.2)
ALP SERPL-CCNC: 106 U/L — SIGNIFICANT CHANGE UP (ref 30–115)
ALT FLD-CCNC: 9 U/L — SIGNIFICANT CHANGE UP (ref 0–41)
ANION GAP SERPL CALC-SCNC: 15 MMOL/L — HIGH (ref 7–14)
ANISOCYTOSIS BLD QL: SLIGHT — SIGNIFICANT CHANGE UP
APPEARANCE UR: ABNORMAL
APTT BLD: 34.8 SEC — SIGNIFICANT CHANGE UP (ref 27–39.2)
AST SERPL-CCNC: 44 U/L — HIGH (ref 0–41)
BACTERIA # UR AUTO: ABNORMAL
BASE EXCESS BLDV CALC-SCNC: -0.8 MMOL/L — SIGNIFICANT CHANGE UP (ref -2–3)
BASOPHILS # BLD AUTO: 0 K/UL — SIGNIFICANT CHANGE UP (ref 0–0.2)
BASOPHILS NFR BLD AUTO: 0 % — SIGNIFICANT CHANGE UP (ref 0–1)
BILIRUB SERPL-MCNC: 0.6 MG/DL — SIGNIFICANT CHANGE UP (ref 0.2–1.2)
BILIRUB UR-MCNC: NEGATIVE — SIGNIFICANT CHANGE UP
BUN SERPL-MCNC: 21 MG/DL — HIGH (ref 10–20)
CA-I SERPL-SCNC: 1.24 MMOL/L — SIGNIFICANT CHANGE UP (ref 1.15–1.33)
CALCIUM SERPL-MCNC: 8.8 MG/DL — SIGNIFICANT CHANGE UP (ref 8.4–10.5)
CHLORIDE SERPL-SCNC: 104 MMOL/L — SIGNIFICANT CHANGE UP (ref 98–110)
CO2 SERPL-SCNC: 20 MMOL/L — SIGNIFICANT CHANGE UP (ref 17–32)
COLOR SPEC: YELLOW — SIGNIFICANT CHANGE UP
CREAT SERPL-MCNC: 0.7 MG/DL — SIGNIFICANT CHANGE UP (ref 0.7–1.5)
DIFF PNL FLD: ABNORMAL
EGFR: 87 ML/MIN/1.73M2 — SIGNIFICANT CHANGE UP
EOSINOPHIL # BLD AUTO: 0 K/UL — SIGNIFICANT CHANGE UP (ref 0–0.7)
EOSINOPHIL NFR BLD AUTO: 0 % — SIGNIFICANT CHANGE UP (ref 0–8)
EPI CELLS # UR: 5 /HPF — SIGNIFICANT CHANGE UP (ref 0–5)
GAS PNL BLDV: 131 MMOL/L — LOW (ref 136–145)
GAS PNL BLDV: SIGNIFICANT CHANGE UP
GIANT PLATELETS BLD QL SMEAR: PRESENT — SIGNIFICANT CHANGE UP
GLUCOSE SERPL-MCNC: 181 MG/DL — HIGH (ref 70–99)
GLUCOSE UR QL: NEGATIVE — SIGNIFICANT CHANGE UP
HCO3 BLDV-SCNC: 24 MMOL/L — SIGNIFICANT CHANGE UP (ref 22–29)
HCT VFR BLD CALC: 51.5 % — HIGH (ref 37–47)
HCT VFR BLDA CALC: 50 % — SIGNIFICANT CHANGE UP (ref 39–51)
HGB BLD CALC-MCNC: 16.7 G/DL — SIGNIFICANT CHANGE UP (ref 12.6–17.4)
HGB BLD-MCNC: 16.8 G/DL — HIGH (ref 12–16)
HYALINE CASTS # UR AUTO: 28 /LPF — HIGH (ref 0–7)
INR BLD: 1.87 RATIO — HIGH (ref 0.65–1.3)
KETONES UR-MCNC: SIGNIFICANT CHANGE UP
LACTATE BLDV-MCNC: 3.9 MMOL/L — HIGH (ref 0.5–2)
LEUKOCYTE ESTERASE UR-ACNC: ABNORMAL
LYMPHOCYTES # BLD AUTO: 0.64 K/UL — LOW (ref 1.2–3.4)
LYMPHOCYTES # BLD AUTO: 2.6 % — LOW (ref 20.5–51.1)
MANUAL SMEAR VERIFICATION: SIGNIFICANT CHANGE UP
MCHC RBC-ENTMCNC: 30.1 PG — SIGNIFICANT CHANGE UP (ref 27–31)
MCHC RBC-ENTMCNC: 32.6 G/DL — SIGNIFICANT CHANGE UP (ref 32–37)
MCV RBC AUTO: 92.3 FL — SIGNIFICANT CHANGE UP (ref 81–99)
MONOCYTES # BLD AUTO: 0.64 K/UL — HIGH (ref 0.1–0.6)
MONOCYTES NFR BLD AUTO: 2.6 % — SIGNIFICANT CHANGE UP (ref 1.7–9.3)
MYELOCYTES NFR BLD: 0.9 % — HIGH (ref 0–0)
NEUTROPHILS # BLD AUTO: 22.54 K/UL — HIGH (ref 1.4–6.5)
NEUTROPHILS NFR BLD AUTO: 92.2 % — HIGH (ref 42.2–75.2)
NITRITE UR-MCNC: POSITIVE
PCO2 BLDV: 37 MMHG — LOW (ref 39–42)
PH BLDV: 7.41 — SIGNIFICANT CHANGE UP (ref 7.32–7.43)
PH UR: 6 — SIGNIFICANT CHANGE UP (ref 5–8)
PLAT MORPH BLD: NORMAL — SIGNIFICANT CHANGE UP
PLATELET # BLD AUTO: 272 K/UL — SIGNIFICANT CHANGE UP (ref 130–400)
PMV BLD: 10.9 FL — HIGH (ref 7.4–10.4)
PO2 BLDV: 39 MMHG — SIGNIFICANT CHANGE UP
POIKILOCYTOSIS BLD QL AUTO: SLIGHT — SIGNIFICANT CHANGE UP
POLYCHROMASIA BLD QL SMEAR: SLIGHT — SIGNIFICANT CHANGE UP
POTASSIUM BLDV-SCNC: 4 MMOL/L — SIGNIFICANT CHANGE UP (ref 3.5–5.1)
POTASSIUM SERPL-MCNC: 6.2 MMOL/L — CRITICAL HIGH (ref 3.5–5)
POTASSIUM SERPL-SCNC: 6.2 MMOL/L — CRITICAL HIGH (ref 3.5–5)
PROT SERPL-MCNC: 6.3 G/DL — SIGNIFICANT CHANGE UP (ref 6–8)
PROT UR-MCNC: ABNORMAL
PROTHROM AB SERPL-ACNC: 21.7 SEC — HIGH (ref 9.95–12.87)
RBC # BLD: 5.58 M/UL — HIGH (ref 4.2–5.4)
RBC # FLD: 16 % — HIGH (ref 11.5–14.5)
RBC BLD AUTO: NORMAL — SIGNIFICANT CHANGE UP
RBC CASTS # UR COMP ASSIST: 3 /HPF — SIGNIFICANT CHANGE UP (ref 0–4)
SAO2 % BLDV: 71.6 % — SIGNIFICANT CHANGE UP
SODIUM SERPL-SCNC: 139 MMOL/L — SIGNIFICANT CHANGE UP (ref 135–146)
SP GR SPEC: 1.03 — HIGH (ref 1.01–1.03)
UROBILINOGEN FLD QL: SIGNIFICANT CHANGE UP
VARIANT LYMPHS # BLD: 1.7 % — SIGNIFICANT CHANGE UP (ref 0–5)
WBC # BLD: 24.45 K/UL — HIGH (ref 4.8–10.8)
WBC # FLD AUTO: 24.45 K/UL — HIGH (ref 4.8–10.8)
WBC UR QL: 706 /HPF — HIGH (ref 0–5)

## 2023-05-09 PROCEDURE — 82962 GLUCOSE BLOOD TEST: CPT

## 2023-05-09 PROCEDURE — 80048 BASIC METABOLIC PNL TOTAL CA: CPT

## 2023-05-09 PROCEDURE — 99291 CRITICAL CARE FIRST HOUR: CPT

## 2023-05-09 PROCEDURE — 84100 ASSAY OF PHOSPHORUS: CPT

## 2023-05-09 PROCEDURE — 85730 THROMBOPLASTIN TIME PARTIAL: CPT

## 2023-05-09 PROCEDURE — 84145 PROCALCITONIN (PCT): CPT

## 2023-05-09 PROCEDURE — 92526 ORAL FUNCTION THERAPY: CPT | Mod: GN

## 2023-05-09 PROCEDURE — 87641 MR-STAPH DNA AMP PROBE: CPT

## 2023-05-09 PROCEDURE — 87040 BLOOD CULTURE FOR BACTERIA: CPT

## 2023-05-09 PROCEDURE — 36415 COLL VENOUS BLD VENIPUNCTURE: CPT

## 2023-05-09 PROCEDURE — 71045 X-RAY EXAM CHEST 1 VIEW: CPT

## 2023-05-09 PROCEDURE — 80202 ASSAY OF VANCOMYCIN: CPT

## 2023-05-09 PROCEDURE — 84484 ASSAY OF TROPONIN QUANT: CPT

## 2023-05-09 PROCEDURE — 0225U NFCT DS DNA&RNA 21 SARSCOV2: CPT

## 2023-05-09 PROCEDURE — 87507 IADNA-DNA/RNA PROBE TQ 12-25: CPT

## 2023-05-09 PROCEDURE — 85379 FIBRIN DEGRADATION QUANT: CPT

## 2023-05-09 PROCEDURE — 93010 ELECTROCARDIOGRAM REPORT: CPT

## 2023-05-09 PROCEDURE — 70450 CT HEAD/BRAIN W/O DYE: CPT

## 2023-05-09 PROCEDURE — 92610 EVALUATE SWALLOWING FUNCTION: CPT | Mod: GN

## 2023-05-09 PROCEDURE — 85027 COMPLETE CBC AUTOMATED: CPT

## 2023-05-09 PROCEDURE — 83605 ASSAY OF LACTIC ACID: CPT

## 2023-05-09 PROCEDURE — 93306 TTE W/DOPPLER COMPLETE: CPT

## 2023-05-09 PROCEDURE — 85025 COMPLETE CBC W/AUTO DIFF WBC: CPT

## 2023-05-09 PROCEDURE — 85610 PROTHROMBIN TIME: CPT

## 2023-05-09 PROCEDURE — 97162 PT EVAL MOD COMPLEX 30 MIN: CPT | Mod: GP

## 2023-05-09 PROCEDURE — 80053 COMPREHEN METABOLIC PANEL: CPT

## 2023-05-09 PROCEDURE — 87640 STAPH A DNA AMP PROBE: CPT

## 2023-05-09 PROCEDURE — 83735 ASSAY OF MAGNESIUM: CPT

## 2023-05-09 PROCEDURE — 71045 X-RAY EXAM CHEST 1 VIEW: CPT | Mod: 26

## 2023-05-09 PROCEDURE — 93005 ELECTROCARDIOGRAM TRACING: CPT

## 2023-05-09 RX ORDER — ACETAMINOPHEN 500 MG
650 TABLET ORAL EVERY 6 HOURS
Refills: 0 | Status: DISCONTINUED | OUTPATIENT
Start: 2023-05-09 | End: 2023-05-20

## 2023-05-09 RX ORDER — METOPROLOL TARTRATE 50 MG
5 TABLET ORAL ONCE
Refills: 0 | Status: DISCONTINUED | OUTPATIENT
Start: 2023-05-09 | End: 2023-05-09

## 2023-05-09 RX ORDER — SERTRALINE 25 MG/1
1 TABLET, FILM COATED ORAL
Qty: 0 | Refills: 0 | DISCHARGE

## 2023-05-09 RX ORDER — MORPHINE SULFATE 50 MG/1
1 CAPSULE, EXTENDED RELEASE ORAL ONCE
Refills: 0 | Status: DISCONTINUED | OUTPATIENT
Start: 2023-05-09 | End: 2023-05-09

## 2023-05-09 RX ORDER — CLONAZEPAM 1 MG
1 TABLET ORAL
Qty: 0 | Refills: 0 | DISCHARGE

## 2023-05-09 RX ORDER — LACTOBACILLUS ACIDOPHILUS 100MM CELL
0.5 CAPSULE ORAL
Refills: 0 | DISCHARGE

## 2023-05-09 RX ORDER — GABAPENTIN 400 MG/1
1 CAPSULE ORAL
Qty: 0 | Refills: 0 | DISCHARGE

## 2023-05-09 RX ORDER — MORPHINE SULFATE 50 MG/1
2 CAPSULE, EXTENDED RELEASE ORAL ONCE
Refills: 0 | Status: DISCONTINUED | OUTPATIENT
Start: 2023-05-09 | End: 2023-05-09

## 2023-05-09 RX ORDER — OXYCODONE AND ACETAMINOPHEN 5; 325 MG/1; MG/1
1 TABLET ORAL
Refills: 0 | DISCHARGE

## 2023-05-09 RX ORDER — ACETAMINOPHEN 500 MG
650 TABLET ORAL ONCE
Refills: 0 | Status: COMPLETED | OUTPATIENT
Start: 2023-05-09 | End: 2023-05-09

## 2023-05-09 RX ORDER — SODIUM CHLORIDE 9 MG/ML
1700 INJECTION, SOLUTION INTRAVENOUS ONCE
Refills: 0 | Status: COMPLETED | OUTPATIENT
Start: 2023-05-09 | End: 2023-05-09

## 2023-05-09 RX ORDER — KETOROLAC TROMETHAMINE 30 MG/ML
15 SYRINGE (ML) INJECTION ONCE
Refills: 0 | Status: DISCONTINUED | OUTPATIENT
Start: 2023-05-09 | End: 2023-05-10

## 2023-05-09 RX ORDER — CEFEPIME 1 G/1
2000 INJECTION, POWDER, FOR SOLUTION INTRAMUSCULAR; INTRAVENOUS EVERY 12 HOURS
Refills: 0 | Status: DISCONTINUED | OUTPATIENT
Start: 2023-05-09 | End: 2023-05-10

## 2023-05-09 RX ORDER — ACETAMINOPHEN 500 MG
325 TABLET ORAL ONCE
Refills: 0 | Status: COMPLETED | OUTPATIENT
Start: 2023-05-09 | End: 2023-05-09

## 2023-05-09 RX ORDER — POTASSIUM CHLORIDE 20 MEQ
1 PACKET (EA) ORAL
Qty: 0 | Refills: 0 | DISCHARGE

## 2023-05-09 RX ORDER — VANCOMYCIN HCL 1 G
1250 VIAL (EA) INTRAVENOUS ONCE
Refills: 0 | Status: COMPLETED | OUTPATIENT
Start: 2023-05-09 | End: 2023-05-09

## 2023-05-09 RX ORDER — ENOXAPARIN SODIUM 100 MG/ML
50 INJECTION SUBCUTANEOUS EVERY 12 HOURS
Refills: 0 | Status: DISCONTINUED | OUTPATIENT
Start: 2023-05-09 | End: 2023-05-11

## 2023-05-09 RX ORDER — CHOLECALCIFEROL (VITAMIN D3) 125 MCG
1 CAPSULE ORAL
Qty: 0 | Refills: 0 | DISCHARGE

## 2023-05-09 RX ORDER — DILTIAZEM HCL 120 MG
10 CAPSULE, EXT RELEASE 24 HR ORAL ONCE
Refills: 0 | Status: COMPLETED | OUTPATIENT
Start: 2023-05-09 | End: 2023-05-09

## 2023-05-09 RX ORDER — KETOROLAC TROMETHAMINE 30 MG/ML
30 SYRINGE (ML) INJECTION ONCE
Refills: 0 | Status: DISCONTINUED | OUTPATIENT
Start: 2023-05-09 | End: 2023-05-09

## 2023-05-09 RX ORDER — CLONAZEPAM 1 MG
1 TABLET ORAL
Refills: 0 | DISCHARGE

## 2023-05-09 RX ORDER — LANOLIN ALCOHOL/MO/W.PET/CERES
1 CREAM (GRAM) TOPICAL
Refills: 0 | DISCHARGE

## 2023-05-09 RX ORDER — MORPHINE SULFATE 50 MG/1
2 CAPSULE, EXTENDED RELEASE ORAL EVERY 6 HOURS
Refills: 0 | Status: DISCONTINUED | OUTPATIENT
Start: 2023-05-09 | End: 2023-05-15

## 2023-05-09 RX ORDER — GABAPENTIN 400 MG/1
1 CAPSULE ORAL
Refills: 0 | DISCHARGE

## 2023-05-09 RX ORDER — VANCOMYCIN HCL 1 G
750 VIAL (EA) INTRAVENOUS EVERY 12 HOURS
Refills: 0 | Status: DISCONTINUED | OUTPATIENT
Start: 2023-05-10 | End: 2023-05-10

## 2023-05-09 RX ORDER — RIVAROXABAN 15 MG-20MG
1 KIT ORAL
Refills: 0 | DISCHARGE

## 2023-05-09 RX ORDER — PIPERACILLIN AND TAZOBACTAM 4; .5 G/20ML; G/20ML
3.38 INJECTION, POWDER, LYOPHILIZED, FOR SOLUTION INTRAVENOUS ONCE
Refills: 0 | Status: COMPLETED | OUTPATIENT
Start: 2023-05-09 | End: 2023-05-09

## 2023-05-09 RX ORDER — AMLODIPINE BESYLATE 2.5 MG/1
1 TABLET ORAL
Qty: 0 | Refills: 0 | DISCHARGE

## 2023-05-09 RX ORDER — CHOLESTYRAMINE 4 G/9G
4 POWDER, FOR SUSPENSION ORAL
Refills: 0 | DISCHARGE

## 2023-05-09 RX ORDER — ACETAMINOPHEN 500 MG
1000 TABLET ORAL ONCE
Refills: 0 | Status: COMPLETED | OUTPATIENT
Start: 2023-05-09 | End: 2023-05-09

## 2023-05-09 RX ADMIN — Medication 166.67 MILLIGRAM(S): at 18:21

## 2023-05-09 RX ADMIN — Medication 325 MILLIGRAM(S): at 17:45

## 2023-05-09 RX ADMIN — PIPERACILLIN AND TAZOBACTAM 200 GRAM(S): 4; .5 INJECTION, POWDER, LYOPHILIZED, FOR SOLUTION INTRAVENOUS at 18:21

## 2023-05-09 RX ADMIN — MORPHINE SULFATE 2 MILLIGRAM(S): 50 CAPSULE, EXTENDED RELEASE ORAL at 22:50

## 2023-05-09 RX ADMIN — MORPHINE SULFATE 2 MILLIGRAM(S): 50 CAPSULE, EXTENDED RELEASE ORAL at 23:05

## 2023-05-09 RX ADMIN — Medication 650 MILLIGRAM(S): at 18:21

## 2023-05-09 RX ADMIN — SODIUM CHLORIDE 1700 MILLILITER(S): 9 INJECTION, SOLUTION INTRAVENOUS at 18:20

## 2023-05-09 RX ADMIN — CEFEPIME 100 MILLIGRAM(S): 1 INJECTION, POWDER, FOR SOLUTION INTRAMUSCULAR; INTRAVENOUS at 22:15

## 2023-05-09 RX ADMIN — Medication 400 MILLIGRAM(S): at 21:54

## 2023-05-09 NOTE — ED PROVIDER NOTE - NS ED MD TWO NIGHTS YN
Acute:  Residual Dysphagia s/p Surgical excision of zenker diverticulum  ( oropharyngeal vs esophageal)  Weight loss    Chronic:  HTN  DLD
Yes

## 2023-05-09 NOTE — ED PROVIDER NOTE - CLINICAL SUMMARY MEDICAL DECISION MAKING FREE TEXT BOX
82-year-old female presents to the emergency department for altered mental status and fever.  Patient was found to have pneumonia on chest x-ray.  Patient is UTI but the urine was from a Mcdaniel bag therefore unclear if this is contamination.  Patient was given broad-spectrum antibiotics.  Patient is found to be in A-fib RVR and fluids Tylenol were given no metoprolol at this time. Patient DNR/DNI. ICU was consulted and pt was approved for stepdown.

## 2023-05-09 NOTE — ED PROVIDER NOTE - ATTENDING CONTRIBUTION TO CARE
82-year-old female with past medical history of Lewy body dementia normally able to say a few words, hypertension, hysterectomy, A-fib presents the emergency department for altered mental status and cough.  Family states for the past 2 days patient's been coughing more.  They also state that she had her Mcdaniel catheter changed yesterday.  Unable to perform review of systems due to dementia.    Const: NAD  Eyes: PERRL, no conjunctival injection  HENT:  Neck supple without meningismus   CV: RRR, Warm, well-perfused extremities  RESP: CTA B/L, no tachypnea   GI: soft, non-tender, non-distended  MSK: No gross deformities appreciated  Skin: Warm, dry. No rashes, decubitus ulcer   Neuro: opens eyes     pt has fever. Concern for sepsis. will do septic workup- labs, UA, fluids and discuss goals of care with family

## 2023-05-09 NOTE — ED ADULT NURSE NOTE - NSIMPLEMENTINTERV_GEN_ALL_ED
Implemented All Fall with Harm Risk Interventions:  [place] to call system. Call bell, personal items and telephone within reach. Instruct patient to call for assistance. Room bathroom lighting operational. Non-slip footwear when patient is off stretcher. Physically safe environment: no spills, clutter or unnecessary equipment. Stretcher in lowest position, wheels locked, appropriate side rails in place. Provide visual cue, wrist band, yellow gown, etc. Monitor gait and stability. Monitor for mental status changes and reorient to person, place, and time. Review medications for side effects contributing to fall risk. Reinforce activity limits and safety measures with patient and family. Provide visual clues: red socks.

## 2023-05-09 NOTE — ED PROVIDER NOTE - PHYSICAL EXAMINATION
CONSTITUTIONAL: contracted  SKIN: Warm dry, dec skin turgor, stage IV sacral ulcer  HEAD: NCAT  EYES: EOMI, PERRLA, no scleral icterus, conjunctiva pink  ENT: normal pharynx with no erythema or exudates  NECK: Supple; non tender.  CARD: RRR, no murmurs.  RESP: clear to ausculation b/l. No crackles or wheezing.  ABD: soft, non-tender, non-distended, no rebound or guarding.  EXT: contacted, no bony tenderness, no pedal edema, no calf tenderness  NEURO: difficulty to assess s/2 mental status  PSYCH: Cooperative, appropriate.

## 2023-05-09 NOTE — H&P ADULT - NSHPSOCIALHISTORY_GEN_ALL_CORE
Lives w/ son. Has had progressively worsening functional status since recent hospitalization in July 2022. 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 Requires 24 HHA.

## 2023-05-09 NOTE — ED PROVIDER NOTE - ADDITIONAL HISTORY OBTAINED FROM [facility]
Attempted to reach patient's father Jhoan in response to phone call he made to clinic yesterday. There was no response. I will try to call tomorrow.    Vikram Moe MD CAP-1       Family

## 2023-05-09 NOTE — ED PROVIDER NOTE - CARE PLAN
Principal Discharge DX:	Sepsis  Secondary Diagnosis:	Acute UTI  Secondary Diagnosis:	Pneumonia  Secondary Diagnosis:	Sacral ulcer   1

## 2023-05-09 NOTE — H&P ADULT - NSHPPHYSICALEXAM_GEN_ALL_CORE
VITALS  T(C): 39.1 (05-09-23 @ 20:11), Max: 39.1 (05-09-23 @ 20:11)  HR: 124 (05-09-23 @ 20:11) (37 - 124)  BP: 120/58 (05-09-23 @ 20:11) (120/58 - 130/80)  RR: 18 (05-09-23 @ 20:11) (18 - 18)  SpO2: 92% (05-09-23 @ 20:11) (92% - 97%)    PHYSICAL EXAM  GENERAL: tachypneic on NRB  NEURO: lethargic; opens eyes spontaneously, but not tracking; responds to painful stimuli, but not verbal stimuli  HEAD:  Atraumatic, Normocephalic  EYES: conjunctiva and sclera clear  NECK: Supple, no lymphadenopathy, no thyromegaly  CHEST/LUNG: b/l rhonchi (R>L)  HEART: irregularly irregular; tachycardic  ABDOMEN: Mcdaniel catheter in place; Soft, Nontender, Nondistended; Bowel sounds present, no masses.  EXTREMITIES:  2+ Peripheral Pulses, No clubbing, cyanosis, or edema  SKIN: stage 4 sacral ulcer w/o purulent drainage

## 2023-05-09 NOTE — H&P ADULT - HISTORY OF PRESENT ILLNESS
82F w/ h/o Lewy Body Dementia (A&O 1-2 baseline), chronic Mcdaniel (persistent retention; last exchanged on 5/8), chronic afib (on Xarelto), and stage 4 sacral ulcer p/w cough and hypoxia since yesterday. History limited from patient due to patient's current mental status. Family at bedside (daughter and two sons) able to provide collateral. Since yesterday, pt has had cough, increased secretions, general malaise, and worsening mental status (minimally verbal; typically able to say few words at baseline). This AM, pt noted to be tachycardic and hypoxic to 80's%, prompting ED evaluation. Of note, patient known to have dysphagia, but able to tolerate PO. No reported fevers, chills, CP, palpitations, abdominal pain, n/v/d, dysuria, or LE swelling per family.     In ED, pt febrile (T 101.8F), tachycardic (), and tachypneic (RR 20's-30's) on NRB, but saturating in mid-90's%. Labs demonstrated WBC 24.45. VBG showed pH 7.41, pCO2 37, and Lactate 3.9. UA concerning for UTI. CXR concerning for right-sided PNA. S/P 1.7L LR bolus and IV abx (Vanc/Zosyn). Admitted to SDU for severe sepsis i/s/o aspiration pneumonia vs UTI.

## 2023-05-09 NOTE — ED PROVIDER NOTE - OBJECTIVE STATEMENT
Patient is a 82y female with pmhx Lewey body dementia (AxO x1-2 at baseline), documented hx of hallucinations/anxiety/paranoia/tremors, HTN, hysterectomy, recurrent UTI's, presents with worsening AMS, and cough since yesterday noted by family. Limited history obtained from pt s/2 decline in mental status, pt is A&Ox0. Per EMS, pt was 79% on RA when they arrived to the home. Otherwise no known fevers, chills, headache, changes in vision, congestion, cp, palpitations, sob, n/v/d, abd pain, constipation, urinary complaints, lower extremity pain/swelling. Patient is a 82y female with pmhx Lewey body dementia (AxO x1-2 at baseline), documented hx of hallucinations/anxiety/paranoia/tremors, HTN, hysterectomy, Afib on Xarelto, recurrent UTI's, presents with worsening AMS, and cough since yesterday noted by family. Limited history obtained from pt s/2 decline in mental status, pt is A&Ox0. Per EMS, pt was 79% on RA when they arrived to the home. Otherwise no known fevers, chills, headache, changes in vision, congestion, cp, palpitations, sob, n/v/d, abd pain, constipation, urinary complaints, lower extremity pain/swelling. Patient is a 82y female with pmhx Lewey body dementia (AxO x1-2 at baseline), documented hx of hallucinations/anxiety/paranoia/tremors, HTN, hysterectomy, Afib on Xarelto, recurrent UTI's, presents with worsening AMS, and cough since yesterday noted by family. Limited history obtained from pt s/2 decline in mental status, pt is A&Ox0. Otherwise no known fevers, chills, headache, changes in vision, congestion, cp, palpitations, sob, n/v/d, abd pain, constipation, urinary complaints, lower extremity pain/swelling.

## 2023-05-09 NOTE — H&P ADULT - ATTENDING COMMENTS
#Severe sepsis, present on admission  with acute hypoxic resp failure; presumed due to covid, possible asp pna, uti  cxr R opacities  ua positive; f/u ucx  bcx  vanco, cefepime  rdv  decadron 6  requiring nc at 6L  failed s+s; ongoing [facility]  appreciate palliative care  trend lactate  ns 50 cc/hr    #Progress Note Handoff:  Pending (specify):  Consults_________, Tests________, Test Results_______, Other___sepsis_____  Family discussion: d/w daughter at bedside re: treatment plan, primary dx  Disposition: Home___/SNF___/Other________/Unknown at this time_____x___ #Severe sepsis, present on admission  with acute hypoxic resp failure; presumed due to covid, possible asp pna, uti  cxr R opacities  ua positive; f/u ucx  bcx  vanco, cefepime  rdv  decadron 6  requiring nc at 6L  failed s+s; ongoing [facility]  appreciate palliative care  trend lactate  ns 50 cc/hr    #AFib with RVR  suspect driven by sepsis  therapeutic lovenox  lopressor 5 iv qid  ivf as above    #Progress Note Handoff:  Pending (specify):  Consults_________, Tests________, Test Results_______, Other___sepsis_____  Family discussion: d/w daughter at bedside re: treatment plan, primary dx  Disposition: Home___/SNF___/Other________/Unknown at this time_____x___

## 2023-05-09 NOTE — H&P ADULT - ASSESSMENT
82F w/ h/o Lewy Body Dementia (A&O 1-2 baseline), chronic Mcdaniel (persistent retention; last exchanged on 5/8), chronic afib (on Xarelto), and stage 4 sacral ulcer p/w cough and hypoxia since yesterday. History limited from patient due to patient's current mental status. Family at bedside (daughter and two sons) able to provide collateral. Since yesterday, pt has had cough, increased secretions, general malaise, and worsening mental status (minimally verbal; typically able to say few words at baseline). This AM, pt noted to be tachycardic and hypoxic to 80's%, prompting ED evaluation. Of note, patient known to have dysphagia, but able to tolerate PO. No reported fevers, chills, CP, palpitations, abdominal pain, n/v/d, dysuria, or LE swelling per family.     In ED, pt febrile (T 101.8F), tachycardic (), and tachypneic (RR 20's-30's) on NRB, but saturating in mid-90's%. Labs demonstrated WBC 24.45. VBG showed pH 7.41, pCO2 37, and Lactate 3.9. UA concerning for UTI. CXR concerning for right-sided PNA. S/P 1.7L LR bolus and IV abx (Vanc/Zosyn). Admitted to SDU for severe sepsis i/s/o aspiration pneumonia vs UTI.    #Community Acquired Aspiration Pneumonia  #Acute Hypoxic Respiratory Failure  #Severe Sepsis  Initially p/w cough and hypoxia. Severe sepsis present on admission (T 101.8F, , RR 20's-30's, WBC 24.45, Lactate 3.9). Suspect aspiration pneumonia based on CXR and hypoxia (required NRB on admission). Likely predisposed i/s/o dysphagia related to LBD. Alternative sepsis source includes UTI as UA appears positive, but obtained from chronic Mcdaniel. Regardless, empiric CAP coverage will cover UTI as well. Additional source includes sacral ulcer, but less likely given no evidence of purulent drainage, foul odor, or surrounding erythema on inspection.  - c/w vancomycin 750mg IV Q12H  - c/w cefepime 2g IV Q12H  - f/u BCx and UCx  - obtain RVP and MRSA swabs (d/c vanc if MRSA negative)  - ID consulted; f/u recs    #Chronic Atrial Fibrillaiton  #Rapid Ventricular Rate  CHADS-VASC 3+. Managed via Xarelto 15 qhs and Lopressor 50(AM)/25(noon)/50(PM). Currently in RVR, but likely provoked i/s/o severe sepsis, hypoxia, and poorly controlled fever (T 102.4F at time of admission).      DVT PPX: Lovenox 50mg SQ BID  GI PPX: none indicated  DIET: NPO (f/u S&S consult)  ACTIVITY: IAT  CODE STATUS: DNR/DNI  DISPOSITION: From Home    PENDING: ID consult; Burn consult; S&S consult 82F w/ h/o Lewy Body Dementia (A&O 1-2 baseline), chronic Mcdaniel (persistent retention; last exchanged on 5/8), chronic afib (on Xarelto), and stage 4 sacral ulcer p/w cough and hypoxia. Admitted to stepdown unit for severe sepsis i/s/o aspiration pneumonia vs UTI.    #Community Acquired Aspiration Pneumonia  #Acute Hypoxic Respiratory Failure  #Severe Sepsis  Initially p/w cough and hypoxia. Severe sepsis present on admission (T 101.8F, , RR 20's-30's, WBC 24.45, Lactate 3.9). Suspect aspiration pneumonia based on CXR and hypoxia (required NRB on admission). Likely predisposed i/s/o dysphagia related to LBD. Alternative sepsis source includes UTI as UA appears positive, but obtained from chronic Mcdaniel. Regardless, empiric CAP coverage will cover UTI as well. Additional source includes sacral ulcer, but less likely given no evidence of purulent drainage, foul odor, or surrounding erythema on inspection.  - c/w vancomycin 750mg IV Q12H  - c/w cefepime 2g IV Q12H  - f/u BCx and UCx  - obtain RVP and MRSA swabs (d/c vanc if MRSA negative)  - S&S consulted due to concern for aspiration; f/u recs  - ID consulted; f/u recs    #Chronic Atrial Fibrillation  #Rapid Ventricular Rate  CHADS-VASC 3+. Managed via Xarelto 15 qhs and Lopressor 50(AM)/25(noon)/50(PM). Currently in RVR, but likely provoked i/s/o severe sepsis, hypoxia, and poorly controlled fever (T 102.4F at time of admission).  - c/w Lovenox 50mg SQ BID  - can give Lopressor IV pushes for persistent RVR  - restart home PO meds once cleared by S&S    #Stage 4 Sacral Ulcer, chronic  Known to have stage 4 sacral ulcer. Managed via VNS and 24 hr HHA. Likely secondary to functional quadriplegia. Chronic pain related to ulcer managed via Percocet 5-325 q8h and gabapentin 300 qhs.  - c/w morphine 2mg IV Q6H while unable to tolerate PO  - Burn consulted to evaluate for debridement and for wound care; f/u recs    #Anxiety  Known to have severe anxiety. Currently managed via buspirone 30 bid and clonazepam 0.5 qhs.  - restart home anxiolytics once able to tolerate PO    DVT PPX: Lovenox 50mg SQ BID  GI PPX: none indicated  DIET: NPO (f/u S&S consult)  ACTIVITY: IAT  CODE STATUS: DNR/DNI  DISPOSITION: From Home    PENDING: ID consult; Burn consult; S&S consult

## 2023-05-09 NOTE — H&P ADULT - CONVERSATION DETAILS
Discussed advanced care planning with family at bedside. Discussed full vs limited medical management. Extensively explained that full medical management would involve intubating the patient if medically indicated and performing chest compressions in an attempt to resuscitate if the patient were to arrest. Further explained the concept of limited medical management, such as DNR/DNI. Further explained that DNR/DNI would not preclude medical management up until the point of arrest/intubation (such as antibiotics, IV fluids, blood draws, etc.). Family expressed understanding of above. At this time, patient is DNR/DNI.

## 2023-05-09 NOTE — ED PROVIDER NOTE - PROGRESS NOTE DETAILS
pk: pt likely has a sepsis as a cause for ams. will obtain labs cxr cultures give IVF to control afib and reassess. family is at bedside and signed forms for the pt to be DNR/DNI pk: pt likely has a sepsis as a cause for ams. will obtain labs cxr cultures give IVF to control afib and reassess. family is at bedside and signed forms for the pt to be DNR/DNI. Molst form signed. pk: labs and imaging reviewed, HR improved with fluid bolus but still 130s-150s. abx on board. will admit to SDU

## 2023-05-09 NOTE — ED ADULT TRIAGE NOTE - CHIEF COMPLAINT QUOTE
pt biba from home for cough since yesterday o2 sat 79 on RA- has stage IV sacral ulcer- nonverbal/bedbound (hx cva and dementia)-  chronic hill with cloudy yellow urine catheter was changed yesterday by FRANKLIN

## 2023-05-09 NOTE — H&P ADULT - NSICDXPASTMEDICALHX_GEN_ALL_CORE_FT
PAST MEDICAL HISTORY:  Anxiety     Arthritis     Chronic atrial fibrillation     Chronic indwelling Mcdaniel catheter     LBD (Lewy body dementia)     Ulcer of sacral region, stage 4

## 2023-05-10 ENCOUNTER — TRANSCRIPTION ENCOUNTER (OUTPATIENT)
Age: 83
End: 2023-05-10

## 2023-05-10 DIAGNOSIS — R13.10 DYSPHAGIA, UNSPECIFIED: ICD-10-CM

## 2023-05-10 DIAGNOSIS — A41.9 SEPSIS, UNSPECIFIED ORGANISM: ICD-10-CM

## 2023-05-10 DIAGNOSIS — Z51.5 ENCOUNTER FOR PALLIATIVE CARE: ICD-10-CM

## 2023-05-10 DIAGNOSIS — L98.429 NON-PRESSURE CHRONIC ULCER OF BACK WITH UNSPECIFIED SEVERITY: ICD-10-CM

## 2023-05-10 DIAGNOSIS — G31.83 NEUROCOGNITIVE DISORDER WITH LEWY BODIES: ICD-10-CM

## 2023-05-10 LAB
ALBUMIN SERPL ELPH-MCNC: 2.3 G/DL — LOW (ref 3.5–5.2)
ALP SERPL-CCNC: 82 U/L — SIGNIFICANT CHANGE UP (ref 30–115)
ALT FLD-CCNC: <5 U/L — SIGNIFICANT CHANGE UP (ref 0–41)
ANION GAP SERPL CALC-SCNC: 11 MMOL/L — SIGNIFICANT CHANGE UP (ref 7–14)
ANION GAP SERPL CALC-SCNC: 9 MMOL/L — SIGNIFICANT CHANGE UP (ref 7–14)
AST SERPL-CCNC: 24 U/L — SIGNIFICANT CHANGE UP (ref 0–41)
BASOPHILS # BLD AUTO: 0.03 K/UL — SIGNIFICANT CHANGE UP (ref 0–0.2)
BASOPHILS NFR BLD AUTO: 0.2 % — SIGNIFICANT CHANGE UP (ref 0–1)
BILIRUB SERPL-MCNC: 0.9 MG/DL — SIGNIFICANT CHANGE UP (ref 0.2–1.2)
BUN SERPL-MCNC: 17 MG/DL — SIGNIFICANT CHANGE UP (ref 10–20)
BUN SERPL-MCNC: 17 MG/DL — SIGNIFICANT CHANGE UP (ref 10–20)
CALCIUM SERPL-MCNC: 8.1 MG/DL — LOW (ref 8.4–10.5)
CALCIUM SERPL-MCNC: 8.4 MG/DL — SIGNIFICANT CHANGE UP (ref 8.4–10.5)
CHLORIDE SERPL-SCNC: 105 MMOL/L — SIGNIFICANT CHANGE UP (ref 98–110)
CHLORIDE SERPL-SCNC: 105 MMOL/L — SIGNIFICANT CHANGE UP (ref 98–110)
CO2 SERPL-SCNC: 20 MMOL/L — SIGNIFICANT CHANGE UP (ref 17–32)
CO2 SERPL-SCNC: 22 MMOL/L — SIGNIFICANT CHANGE UP (ref 17–32)
CREAT SERPL-MCNC: 0.5 MG/DL — LOW (ref 0.7–1.5)
CREAT SERPL-MCNC: 0.6 MG/DL — LOW (ref 0.7–1.5)
D DIMER BLD IA.RAPID-MCNC: 281 NG/ML DDU — HIGH
EGFR: 90 ML/MIN/1.73M2 — SIGNIFICANT CHANGE UP
EGFR: 94 ML/MIN/1.73M2 — SIGNIFICANT CHANGE UP
EOSINOPHIL # BLD AUTO: 0 K/UL — SIGNIFICANT CHANGE UP (ref 0–0.7)
EOSINOPHIL NFR BLD AUTO: 0 % — SIGNIFICANT CHANGE UP (ref 0–8)
GI PCR PANEL: SIGNIFICANT CHANGE UP
GLUCOSE SERPL-MCNC: 121 MG/DL — HIGH (ref 70–99)
GLUCOSE SERPL-MCNC: 132 MG/DL — HIGH (ref 70–99)
HCT VFR BLD CALC: 43.7 % — SIGNIFICANT CHANGE UP (ref 37–47)
HGB BLD-MCNC: 14.1 G/DL — SIGNIFICANT CHANGE UP (ref 12–16)
IMM GRANULOCYTES NFR BLD AUTO: 0.6 % — HIGH (ref 0.1–0.3)
LACTATE SERPL-SCNC: 2.1 MMOL/L — HIGH (ref 0.7–2)
LACTATE SERPL-SCNC: 2.7 MMOL/L — HIGH (ref 0.7–2)
LACTATE SERPL-SCNC: 2.8 MMOL/L — HIGH (ref 0.7–2)
LYMPHOCYTES # BLD AUTO: 0.72 K/UL — LOW (ref 1.2–3.4)
LYMPHOCYTES # BLD AUTO: 3.9 % — LOW (ref 20.5–51.1)
MAGNESIUM SERPL-MCNC: 1.7 MG/DL — LOW (ref 1.8–2.4)
MCHC RBC-ENTMCNC: 29.7 PG — SIGNIFICANT CHANGE UP (ref 27–31)
MCHC RBC-ENTMCNC: 32.3 G/DL — SIGNIFICANT CHANGE UP (ref 32–37)
MCV RBC AUTO: 92 FL — SIGNIFICANT CHANGE UP (ref 81–99)
MONOCYTES # BLD AUTO: 0.55 K/UL — SIGNIFICANT CHANGE UP (ref 0.1–0.6)
MONOCYTES NFR BLD AUTO: 3 % — SIGNIFICANT CHANGE UP (ref 1.7–9.3)
MRSA PCR RESULT.: NEGATIVE — SIGNIFICANT CHANGE UP
NEUTROPHILS # BLD AUTO: 17.13 K/UL — HIGH (ref 1.4–6.5)
NEUTROPHILS NFR BLD AUTO: 92.3 % — HIGH (ref 42.2–75.2)
NRBC # BLD: 0 /100 WBCS — SIGNIFICANT CHANGE UP (ref 0–0)
PHOSPHATE SERPL-MCNC: 2 MG/DL — LOW (ref 2.1–4.9)
PLATELET # BLD AUTO: 217 K/UL — SIGNIFICANT CHANGE UP (ref 130–400)
PMV BLD: 11 FL — HIGH (ref 7.4–10.4)
POTASSIUM SERPL-MCNC: 3.1 MMOL/L — LOW (ref 3.5–5)
POTASSIUM SERPL-MCNC: 3.9 MMOL/L — SIGNIFICANT CHANGE UP (ref 3.5–5)
POTASSIUM SERPL-SCNC: 3.1 MMOL/L — LOW (ref 3.5–5)
POTASSIUM SERPL-SCNC: 3.9 MMOL/L — SIGNIFICANT CHANGE UP (ref 3.5–5)
PROCALCITONIN SERPL-MCNC: 0.64 NG/ML — HIGH (ref 0.02–0.1)
PROT SERPL-MCNC: 4.6 G/DL — LOW (ref 6–8)
RAPID RVP RESULT: DETECTED
RBC # BLD: 4.75 M/UL — SIGNIFICANT CHANGE UP (ref 4.2–5.4)
RBC # FLD: 15.9 % — HIGH (ref 11.5–14.5)
SARS-COV-2 RNA SPEC QL NAA+PROBE: DETECTED
SODIUM SERPL-SCNC: 134 MMOL/L — LOW (ref 135–146)
SODIUM SERPL-SCNC: 138 MMOL/L — SIGNIFICANT CHANGE UP (ref 135–146)
TROPONIN T SERPL-MCNC: <0.01 NG/ML — SIGNIFICANT CHANGE UP
WBC # BLD: 18.55 K/UL — HIGH (ref 4.8–10.8)
WBC # FLD AUTO: 18.55 K/UL — HIGH (ref 4.8–10.8)

## 2023-05-10 PROCEDURE — 99497 ADVNCD CARE PLAN 30 MIN: CPT | Mod: 25

## 2023-05-10 PROCEDURE — 99223 1ST HOSP IP/OBS HIGH 75: CPT

## 2023-05-10 PROCEDURE — 99221 1ST HOSP IP/OBS SF/LOW 40: CPT | Mod: FS

## 2023-05-10 RX ORDER — ACETAMINOPHEN 500 MG
1000 TABLET ORAL ONCE
Refills: 0 | Status: COMPLETED | OUTPATIENT
Start: 2023-05-10 | End: 2023-05-10

## 2023-05-10 RX ORDER — VANCOMYCIN HCL 1 G
750 VIAL (EA) INTRAVENOUS EVERY 12 HOURS
Refills: 0 | Status: DISCONTINUED | OUTPATIENT
Start: 2023-05-10 | End: 2023-05-11

## 2023-05-10 RX ORDER — CEFEPIME 1 G/1
1000 INJECTION, POWDER, FOR SOLUTION INTRAMUSCULAR; INTRAVENOUS EVERY 12 HOURS
Refills: 0 | Status: DISCONTINUED | OUTPATIENT
Start: 2023-05-10 | End: 2023-05-18

## 2023-05-10 RX ORDER — SODIUM CHLORIDE 9 MG/ML
1000 INJECTION, SOLUTION INTRAVENOUS
Refills: 0 | Status: DISCONTINUED | OUTPATIENT
Start: 2023-05-10 | End: 2023-05-10

## 2023-05-10 RX ORDER — SODIUM CHLORIDE 9 MG/ML
1000 INJECTION INTRAMUSCULAR; INTRAVENOUS; SUBCUTANEOUS
Refills: 0 | Status: DISCONTINUED | OUTPATIENT
Start: 2023-05-10 | End: 2023-05-11

## 2023-05-10 RX ORDER — REMDESIVIR 5 MG/ML
200 INJECTION INTRAVENOUS EVERY 24 HOURS
Refills: 0 | Status: COMPLETED | OUTPATIENT
Start: 2023-05-10 | End: 2023-05-10

## 2023-05-10 RX ORDER — MAGNESIUM SULFATE 500 MG/ML
2 VIAL (ML) INJECTION ONCE
Refills: 0 | Status: COMPLETED | OUTPATIENT
Start: 2023-05-10 | End: 2023-05-10

## 2023-05-10 RX ORDER — METRONIDAZOLE 500 MG
500 TABLET ORAL EVERY 8 HOURS
Refills: 0 | Status: DISCONTINUED | OUTPATIENT
Start: 2023-05-10 | End: 2023-05-10

## 2023-05-10 RX ORDER — METOPROLOL TARTRATE 50 MG
5 TABLET ORAL EVERY 6 HOURS
Refills: 0 | Status: DISCONTINUED | OUTPATIENT
Start: 2023-05-10 | End: 2023-05-11

## 2023-05-10 RX ORDER — DEXAMETHASONE 0.5 MG/5ML
6 ELIXIR ORAL DAILY
Refills: 0 | Status: DISCONTINUED | OUTPATIENT
Start: 2023-05-10 | End: 2023-05-19

## 2023-05-10 RX ORDER — REMDESIVIR 5 MG/ML
100 INJECTION INTRAVENOUS EVERY 24 HOURS
Refills: 0 | Status: COMPLETED | OUTPATIENT
Start: 2023-05-11 | End: 2023-05-14

## 2023-05-10 RX ORDER — CHLORHEXIDINE GLUCONATE 213 G/1000ML
1 SOLUTION TOPICAL
Refills: 0 | Status: DISCONTINUED | OUTPATIENT
Start: 2023-05-10 | End: 2023-05-20

## 2023-05-10 RX ORDER — METOPROLOL TARTRATE 50 MG
5 TABLET ORAL ONCE
Refills: 0 | Status: COMPLETED | OUTPATIENT
Start: 2023-05-10 | End: 2023-05-10

## 2023-05-10 RX ORDER — REMDESIVIR 5 MG/ML
INJECTION INTRAVENOUS
Refills: 0 | Status: COMPLETED | OUTPATIENT
Start: 2023-05-10 | End: 2023-05-14

## 2023-05-10 RX ADMIN — Medication 400 MILLIGRAM(S): at 21:40

## 2023-05-10 RX ADMIN — Medication 25 GRAM(S): at 12:02

## 2023-05-10 RX ADMIN — Medication 5 MILLIGRAM(S): at 17:28

## 2023-05-10 RX ADMIN — ENOXAPARIN SODIUM 50 MILLIGRAM(S): 100 INJECTION SUBCUTANEOUS at 01:25

## 2023-05-10 RX ADMIN — ENOXAPARIN SODIUM 50 MILLIGRAM(S): 100 INJECTION SUBCUTANEOUS at 06:36

## 2023-05-10 RX ADMIN — SODIUM CHLORIDE 50 MILLILITER(S): 9 INJECTION INTRAMUSCULAR; INTRAVENOUS; SUBCUTANEOUS at 18:58

## 2023-05-10 RX ADMIN — Medication 100 MILLIGRAM(S): at 10:24

## 2023-05-10 RX ADMIN — SODIUM CHLORIDE 50 MILLILITER(S): 9 INJECTION, SOLUTION INTRAVENOUS at 03:39

## 2023-05-10 RX ADMIN — CEFEPIME 100 MILLIGRAM(S): 1 INJECTION, POWDER, FOR SOLUTION INTRAMUSCULAR; INTRAVENOUS at 17:29

## 2023-05-10 RX ADMIN — Medication 15 MILLIGRAM(S): at 03:38

## 2023-05-10 RX ADMIN — CEFEPIME 100 MILLIGRAM(S): 1 INJECTION, POWDER, FOR SOLUTION INTRAMUSCULAR; INTRAVENOUS at 06:34

## 2023-05-10 RX ADMIN — Medication 15 MILLIGRAM(S): at 04:05

## 2023-05-10 RX ADMIN — REMDESIVIR 200 MILLIGRAM(S): 5 INJECTION INTRAVENOUS at 17:37

## 2023-05-10 RX ADMIN — Medication 5 MILLIGRAM(S): at 02:51

## 2023-05-10 RX ADMIN — SODIUM CHLORIDE 75 MILLILITER(S): 9 INJECTION INTRAMUSCULAR; INTRAVENOUS; SUBCUTANEOUS at 14:50

## 2023-05-10 RX ADMIN — ENOXAPARIN SODIUM 50 MILLIGRAM(S): 100 INJECTION SUBCUTANEOUS at 17:28

## 2023-05-10 RX ADMIN — Medication 250 MILLIGRAM(S): at 18:58

## 2023-05-10 RX ADMIN — Medication 1000 MILLIGRAM(S): at 22:32

## 2023-05-10 RX ADMIN — Medication 250 MILLIGRAM(S): at 06:34

## 2023-05-10 NOTE — CONSULT NOTE ADULT - SUBJECTIVE AND OBJECTIVE BOX
Patient is a 82y old  Female who presents with a chief complaint of Cough; Hypoxia (10 May 2023 14:10)  AM ROUNDS  Burn consulted for sacral wound    Vital Signs Last 24 Hrs  T(C): 37 (10 May 2023 16:14), Max: 39.1 (09 May 2023 20:11)  T(F): 98.6 (10 May 2023 16:14), Max: 102.4 (09 May 2023 20:11)  HR: 134 (10 May 2023 16:14) (120 - 160)  BP: 137/71 (10 May 2023 16:14) (112/76 - 137/71)  BP(mean): 86 (09 May 2023 23:38) (86 - 86)  RR: 18 (10 May 2023 16:14) (18 - 20)  SpO2: 97% (10 May 2023 16:14) (92% - 98%)    LABS:                        14.1   18.55 )-----------( 217      ( 10 May 2023 08:00 )             43.7     05-10    134<L>  |  105  |  17  ----------------------------<  132<H>  3.9   |  20  |  0.6<L>    Ca    8.1<L>      10 May 2023 08:00  Phos  2.0     05-10  Mg     1.7     05-10    MEDICATIONS  (STANDING):  cefepime   IVPB 1000 milliGRAM(s) IV Intermittent every 12 hours  dexAMETHasone  Injectable 6 milliGRAM(s) IV Push daily  enoxaparin Injectable 50 milliGRAM(s) SubCutaneous every 12 hours  metoprolol tartrate Injectable 5 milliGRAM(s) IV Push every 6 hours  remdesivir  IVPB   IV Intermittent   sodium chloride 0.9%. 1000 milliLiter(s) (50 mL/Hr) IV Continuous <Continuous>  sodium phosphate 15 milliMole(s)/250 mL IVPB 15 milliMole(s) IV Intermittent once  vancomycin  IVPB 750 milliGRAM(s) IV Intermittent every 12 hours    MEDICATIONS  (PRN):  acetaminophen     Tablet .. 650 milliGRAM(s) Oral every 6 hours PRN Temp greater or equal to 38C (100.4F), Mild Pain (1 - 3)  morphine  - Injectable 2 milliGRAM(s) IV Push every 6 hours PRN Severe Pain (7 - 10)    PHYSICAL EXAM:  General: pt lying in bed, no acute distress  Skin: sacrum- approx 2x1.5cm sacral wound with mostly pink moist subcutaneous adipose at base. Scattered devitalized slough. No active purulence, bleeding, or surrounding erythema.   +Dressing change performed

## 2023-05-10 NOTE — CONSULT NOTE ADULT - NS ATTEND AMEND GEN_ALL_CORE FT
As above patient seen during daily team rounds  Consideration requested for sacral wound  Chart reviewed    Exam   small sacral wound-mostly pink with minimal slough    Wound care recommendations as above  Offloading /positional changes hygiene and nutritional support   No surgical intervention  Wound is appropriate for further management with certified wound care nurse specialist/nursing protocol

## 2023-05-10 NOTE — CONSULT NOTE ADULT - ASSESSMENT
#sacral wound    - no burn surgical intervention  - continue local wound care BID  - wash with soap and water, apply hydrogel, cover with allevyn pad  - burn signing off  - consult wound care nursing for further wound care recs if needed    Plan of care discussed with daughter at bedside, all questions/concerns addressed

## 2023-05-10 NOTE — CONSULT NOTE ADULT - PROBLEM SELECTOR RECOMMENDATION 3
Recommend non-pharmacological interventions to prevent/treat delirium  - maintain day/night light cycles  - optimize sleep-wake cycle, minimize environmental noise  - reorientation frequently  - use verbal redirection as first line  - minimize restraints and lines  - ensure good bladder/bowel function  - ensure adequate pain control  - minimize use of anticholinergic, antihistaminic, and benzodiazepine medications

## 2023-05-10 NOTE — CONSULT NOTE ADULT - PROBLEM SELECTOR RECOMMENDATION 5
Implement the following healthy bladder habits:   - Making sure Ольга is take their time while peeing. She should be sitting on the toilet for approximately 2 minutes.   - Have her double void (pee twice, sit for two min, have her stand up and move around and sit back down for two min) at least four times daily.   - Avoid eating and drinking bladder irritants such as citrus, caffeine, carbonated beverages, overly sweet beverages and food, & spicy food. Small amounts are okay, but in moderation.   - Avoid constipation, she should have a soft, mashed potato/peanut butter consistency stool (poop) every day.   - Make sure when she is sitting on the toilet that her feet are well supported and hip distance apart (they should be on a stool and she should be able to have flat feet, no tippy toes).      Vaginitis    Vaginitis is irritation and swelling (inflammation) of the vagina. It happens when normal bacteria and yeast in the vagina grow too much. There are many types of this condition. Treatment will depend on the type you have.  Follow these instructions at home:  Lifestyle  - Complete frequent sitzs baths with 4 tablespoons of baking soda in normal bath water or with unscented Epsom Salt. No soap or shampoo in bath.   - A hair dryer on a cool setting may be helpful to assist with drying the genital region after bathing.   - She may have A&D ointment, Vaseline, Aquaphor or Coconut Oil applied after bath & as needed during the day.   - Continue with showers after swimming. Avoid letting children sit in wet swimsuits for long periods of time after swimming.  - Wear loose fitting clothes. Nightgowns allow air to circulate. Avoid tights, leotards, and leggings. Skirts and loose-fitting pants allow air to circulate.   - Use Cotton underpants. Double-rinse underwear after washing to avoid residual irritants. Do not use fabric softeners for underwear and swimsuits.   - If the vulvar area is tender or swollen, cool  compresses may relieve the discomfort.   - Wet wipes can be used instead of toilet paper for wiping.   - Emphasize wiping front-to-back after bowel movements. If she has trouble remembering, try having her sit backwards on the toilet (facing the toilet tank).      General instructions  · Take over-the-counter and prescription medicines only as told by your doctor.  · If you were prescribed an antibiotic medicine, take or use it as told by your doctor. Do not stop taking or using the antibiotic even if you start to feel better.  · Keep all follow-up visits as told by your doctor. This is important.  Contact a doctor if:  · You have pain in your belly.  · You have a fever.  · Your symptoms last for more than 2-3 days.  Get help right away if:  · You have a fever and your symptoms get worse all of a sudden.  Summary  · Vaginitis is irritation and swelling of the vagina. It can happen when the normal bacteria and yeast in the vagina grow too much. There are many types.  · Treatment will depend on the type you have.  This information is not intended to replace advice given to you by your health care provider. Make sure you discuss any questions you have with your health care provider.  Document Released: 03/16/2010 Document Revised: 11/30/2018 Document Reviewed: 01/09/2018  [employer] Patient Education © 2020 [employer]             Constipation    - Increase fluid and fiber intake in the diet to treat/prevent constipation   - High fiber foods include fruits, vegetables, whole grains, and fiber supplements.  - Avoid foods such as:   - Refined grains and starches, these foods include rice, rice cereal, white bread, crackers, and potatoes.  - Foods that are high in fat, low in fiber, or overly processed , such as French fries, hamburgers, cookies, candies, and soda.  - Encourage daily timed toileting for 10 minutes, preferably after a meal.   - If constipation persists, contact/return to clinic to discuss treatment with  medication.          -DNR/DNI  -ongoing medical management  -hospice consult  -will follow

## 2023-05-10 NOTE — CONSULT NOTE ADULT - TIME BILLING
Counseled patient's daughter Lavern and son Fransisco at the bedside  about diagnostic testing and treatment plan. All questions answered. Abnormal lab/radiographical/microbiological data reviewed.

## 2023-05-10 NOTE — CONSULT NOTE ADULT - PROBLEM SELECTOR RECOMMENDATION 2
In the setting of Lewy Body dementia.  Family at bedside notes likely does not wish to pursue PEG.  -F/u S+S  -hospice consult  -ongoing discussinos regarding artificial nutrition

## 2023-05-10 NOTE — CONSULT NOTE ADULT - SUBJECTIVE AND OBJECTIVE BOX
YOAN TAI  82y, Female  Allergy: No Known Allergies      All historical available data reviewed.    HPI:  82F w/ h/o Lewy Body Dementia (A&O 1-2 baseline), chronic Mcdaniel (persistent retention; last exchanged on ), chronic afib (on Xarelto), and sacral ulcer p/w cough and hypoxia since yesterday. History limited from patient due to patient's current mental status. Family at bedside (daughter and two sons) able to provide collateral. Since yesterday, pt has had cough, increased secretions, general malaise, and worsening mental status (minimally verbal; typically able to say few words at baseline). This AM, pt noted to be tachycardic and hypoxic to 80's%, prompting ED evaluation. Of note, patient known to have dysphagia, but able to tolerate PO. No reported fevers, chills, CP, palpitations, abdominal pain, n/v/d, dysuria, or LE swelling per family.     In ED, pt febrile (T 101.8F), tachycardic (), and tachypneic (RR 20's-30's) on NRB, but saturating in mid-90's%. Labs demonstrated WBC 24.45. VBG showed pH 7.41, pCO2 37, and Lactate 3.9. UA concerning for UTI. CXR concerning for right-sided PNA. S/P 1.7L LR bolus and IV abx (Vanc/Zosyn). Admitted to SDU for severe sepsis i/s/o aspiration pneumonia vs UTI. (09 May 2023 21:41)  ID called for sepsis    FAMILY HISTORY:  No pertinent family history in first degree relatives      PAST MEDICAL & SURGICAL HISTORY:  Anxiety      Arthritis      LBD (Lewy body dementia)      Ulcer of sacral region, stage 4      Chronic indwelling Mcdaniel catheter      Chronic atrial fibrillation      H/O hernia repair            VITALS:  T(F): 98.8, Max: 102.4 (23 @ 20:11)  HR: 128  BP: 112/76  RR: 20Vital Signs Last 24 Hrs  T(C): 37.1 (10 May 2023 07:45), Max: 39.1 (09 May 2023 20:11)  T(F): 98.8 (10 May 2023 07:45), Max: 102.4 (09 May 2023 20:11)  HR: 128 (10 May 2023 07:45) (37 - 160)  BP: 112/76 (10 May 2023 07:45) (112/76 - 130/80)  BP(mean): 86 (09 May 2023 23:38) (86 - 86)  RR: 20 (10 May 2023 07:45) (18 - 20)  SpO2: 97% (10 May 2023 07:45) (92% - 98%)    Parameters below as of 10 May 2023 06:58  Patient On (Oxygen Delivery Method): nasal cannula  O2 Flow (L/min): 5      TESTS & MEASUREMENTS:                        14.1   18.55 )-----------( 217      ( 10 May 2023 08:00 )             43.7     05-10    134<L>  |  105  |  17  ----------------------------<  132<H>  3.9   |  20  |  0.6<L>    Ca    8.1<L>      10 May 2023 08:00  Phos  2.0     05-10  Mg     1.7     05-10    TPro  4.6<L>  /  Alb  2.3<L>  /  TBili  0.9  /  DBili  x   /  AST  24  /  ALT  <5  /  AlkPhos  82  05-10    LIVER FUNCTIONS - ( 10 May 2023 08:00 )  Alb: 2.3 g/dL / Pro: 4.6 g/dL / ALK PHOS: 82 U/L / ALT: <5 U/L / AST: 24 U/L / GGT: x             Urinalysis Basic - ( 09 May 2023 18:14 )    Color: Yellow / Appearance: Turbid / S.031 / pH: x  Gluc: x / Ketone: Trace  / Bili: Negative / Urobili: <2 mg/dL   Blood: x / Protein: 300 mg/dL / Nitrite: Positive   Leuk Esterase: Large / RBC: 3 /HPF /  /HPF   Sq Epi: x / Non Sq Epi: x / Bacteria: Many          RADIOLOGY & ADDITIONAL TESTS:  Personal review of radiological diagnostics performed  Echo and EKG results noted when applicable.     MEDICATIONS:  acetaminophen     Tablet .. 650 milliGRAM(s) Oral every 6 hours PRN  cefepime   IVPB 2000 milliGRAM(s) IV Intermittent every 12 hours  enoxaparin Injectable 50 milliGRAM(s) SubCutaneous every 12 hours  lactated ringers. 1000 milliLiter(s) IV Continuous <Continuous>  metroNIDAZOLE  IVPB 500 milliGRAM(s) IV Intermittent every 8 hours  morphine  - Injectable 2 milliGRAM(s) IV Push every 6 hours PRN      ANTIBIOTICS:  cefepime   IVPB 2000 milliGRAM(s) IV Intermittent every 12 hours  metroNIDAZOLE  IVPB 500 milliGRAM(s) IV Intermittent every 8 hours

## 2023-05-10 NOTE — PHARMACOTHERAPY INTERVENTION NOTE - COMMENTS
As per policy, ordered a vancomycin trough level for 5/11 @16:00 prior to the 4th overall dose of vancomycin 750mg IV q12h.    Darvin Robledo, PharmD  Clinical Pharmacy Specialist, Infectious Diseases  Tele-Antimicrobial Stewardship Program (Tele-[facility])  Tele-ASP Phone: [phone]

## 2023-05-10 NOTE — CONSULT NOTE ADULT - SUBJECTIVE AND OBJECTIVE BOX
CC: cough and hypoxia    HPI:  82F w/ h/o Lewy Body Dementia (A&O 1-2 baseline), chronic Mcdaniel (persistent retention; last exchanged on ), chronic afib (on Xarelto), and stage 4 sacral ulcer p/w cough and hypoxia since yesterday. History limited from patient due to patient's current mental status. Family at bedside (daughter and two sons) able to provide collateral. Since yesterday, pt has had cough, increased secretions, general malaise, and worsening mental status (minimally verbal; typically able to say few words at baseline). This AM, pt noted to be tachycardic and hypoxic to 80's%, prompting ED evaluation. Of note, patient known to have dysphagia, but able to tolerate PO. No reported fevers, chills, CP, palpitations, abdominal pain, n/v/d, dysuria, or LE swelling per family.     In ED, pt febrile (T 101.8F), tachycardic (), and tachypneic (RR 20's-30's) on NRB, but saturating in mid-90's%. Labs demonstrated WBC 24.45. VBG showed pH 7.41, pCO2 37, and Lactate 3.9. UA concerning for UTI. CXR concerning for right-sided PNA. S/P 1.7L LR bolus and IV abx (Vanc/Zosyn). Admitted to SDU for severe sepsis i/s/o aspiration pneumonia vs UTI. (09 May 2023 21:41)    PERTINENT PM/SXH:   HTN (hypertension)    Anxiety    Arthritis    LBD (Lewy body dementia)    Ulcer of sacral region, stage 4    Chronic indwelling Mcdaniel catheter    Chronic atrial fibrillation      No significant past surgical history    H/O hernia repair      FAMILY HISTORY:  Unable to determine from patient as she did not participate in exam      ITEMS NOT CHECKED ARE NOT PRESENT    SOCIAL HISTORY:   Significant other/partner[ ]  Children[ ]  Jainism/Spirituality:  Substance hx:  [ ]   Tobacco hx:  [ ]   Alcohol hx: [ ]   Living Situation: [ x]Home  [ ]Long term care  [ ]Rehab [ ]Other  Home Services: [ ] HHA [ ] Osmany RN [ ] Hospice  Occupation:  Home Opioid hx:  [ ] Y [ ] N [ x] I-Stop Reference No:   [id number]    You have not added a HOSSEIN number. Keeping your HOSSEIN number(s) up to date on the My HOSSEIN # tab will enable the separation of your prescriptions from others in the search results.    Practitioner Count: 3  Pharmacy Count: 1  Current Opioid Prescriptions: 1  Current Benzodiazepine Prescriptions: 1  Current Stimulant Prescriptions: 0      Patient Demographic Information (PDI)       PDI	First Name	Last Name	Birth Date	Gender	Street Address	[facility]  A	Chelsea Joseph	1940	Female	[address]	[id number]  B	Chelsea Joseph	1940	Female	[address]	[id number]    Prescription Information      PDI Filter:    PDI	Current Rx	Drug Type	Rx Written	Rx Dispensed	Drug	Quantity	Days Supply	Prescriber Name	Prescriber HOSSEIN #	Payment Method	Dispenser  A	N	B	10/07/2022	10/08/2022	clonazepam 0.5 mg tablet	30	30	ObicTasha Spain	[id number]	Insurance	[id number]  A	N	B	2022	clonazepam 0.5 mg tablet	10	10	Tasha Davis	[id number]	Insurance	[employer]  A	N	B	2022	clonazepam 0.5 mg tablet	5	5	Jevon Velez DO	[id number]	Insurance	[employer]  A	N	B	2022	clonazepam 0.5 mg tablet	5	5	HeatheralicTasha Spain	[id number]	Insurance	[employer]  A	N	B	2022	clonazepam 0.5 mg tablet	14	14	Heatheralic-Tasha Rivera	[id number]	Insurance	[employer]  A	N	B	2022	clonazepam 0.5 mg tablet	10	5	Gabino Almazan	[id number]	Insurance	[employer]  A	N	B	2022	clonazepam 0.5 mg tablet	10	5	Sibalic-Tasha Rivera	[id number]	Insurance	[employer]  A	N	B	2022	clonazepam 0.5 mg tablet	60	30	Mary Boo	[id number]	Insurance	[id number]  B	Y	O	2023	oxycodone-acetaminophen 5-325 mg tablet	40	10	Noemi Deluna	[id number]	Medicare	[id number]  B	Y	B	2023	clonazepam 0.5 mg tablet	30	30	Noemi Deluna	[id number]	Medicare	[id number]  B	N	O	2023	oxycodone-acetaminophen 5-325 mg tab	60	20	Audrey Dumas	[id number]	Medicare	[id number]  B	N	O	2023	oxycodone-acetaminophen 5-325 mg tab	45	15	Audrey Dumas	[id number]	Medicare	[id number]  B	N	B	2023	clonazepam 0.5 mg tablet	30	30	Audrey Dumas	[id number]	Medicare	[id number]  B	N	O	2023	oxycodone-acetaminophen 5-325 mg tablet	45	15	Audrey Dumas	[id number]	Medicare	[id number]  B	N	O	2023	oxycodone-acetaminophen 5-325 mg tab	45	15	Neeta Booline P	[id number]	Medicare	[id number]  B	N	B	2023	clonazepam 0.5 mg tablet	30	30	Neeta Booline P	[id number]	Medicare	[id number]  B	N	O	2023	02/15/2023	oxycodone-acetaminophen 5-325 mg tablet	45	15	Rajeev Mary P	[id number]	Medicare	[id number]  B	N	O	2023	oxycodone-acetaminophen 5-325 mg tablet	45	15	Neeta Booline P	[id number]	Medicare	[id number]  B	N	O	2023	oxycodone-acetaminophen 5-325 mg tab	21	7	Rajeev Mary P	[id number]	Medicare	[id number]  B	N	B	2022	clonazepam 0.5 mg tablet	60	60	Mary Boo	[id number]	Insurance	[id number]  B	N	O	10/12/2022	10/17/2022	oxycodone-acetaminophen 5-325 mg tablet	45	15	Audrey Dumas	[id number]	Cash	[id number]      ADVANCE DIRECTIVES:    [ ] Full Code [x ] DNR  MOLST  [ ]  Living Will  [ ]   DECISION MAKER(s):  [ ] Health Care Proxy(s)  [ x] Surrogate(s)  [ ] Guardian           Name(s): Phone Number(s):  Children    BASELINE (I)ADL(s) (prior to admission):  [place]: [ ]Total  [ ] Moderate [ ]Dependent  Palliative Performance Status Version 2:         %    http://npcrc.org/files/news/palliative_performance_scale_ppsv2.pdf    Allergies    No Known Allergies    Intolerances    MEDICATIONS  (STANDING):  cefepime   IVPB 1000 milliGRAM(s) IV Intermittent every 12 hours  enoxaparin Injectable 50 milliGRAM(s) SubCutaneous every 12 hours  lactated ringers. 1000 milliLiter(s) (50 mL/Hr) IV Continuous <Continuous>  vancomycin  IVPB 750 milliGRAM(s) IV Intermittent every 12 hours    MEDICATIONS  (PRN):  acetaminophen     Tablet .. 650 milliGRAM(s) Oral every 6 hours PRN Temp greater or equal to 38C (100.4F), Mild Pain (1 - 3)  morphine  - Injectable 2 milliGRAM(s) IV Push every 6 hours PRN Severe Pain (7 - 10)    PRESENT SYMPTOMS: [ x]Unable to obtain due to poor mentation   Source if other than patient:  [ ]Family   [ ]Team     Pain: [ ]yes [ ]no  QOL impact -   Location -                    Aggravating factors -  Quality -  Radiation -  Timing-  Severity (0-10 scale):  Minimal acceptable level (0-10 scale):     CPOT:    https://www.sccm.org/getattachment/pzs27i09-4b5k-5x7s-7g2d-1661v6883l2y/Critical-Care-Pain-Observation-Tool-(CPOT)    PAIN AD Score: 0  http://geriatrictoolkit.[place].[facility]/cog/painad.pdf (press ctrl +  left click to view)    Dyspnea:                           [ ]None[ ]Mild [ ]Moderate [ ]Severe     Respiratory Distress Observation Scale (RDOS): 0  A score of 0 to 2 signifies little or no respiratory distress, 3 signifies mild distress, scores 4 to 6 indicate moderate distress, and scores greater than 7 signify severe distress  [email or web address]://[email or web address]/sites/default/files/PDFS/089903-arsvoeahfmx-tumhcnje-ddbwnkecvux-gmggy.pdf    Anxiety:                             [ ]None[ ]Mild [ ]Moderate [ ]Severe   Fatigue:                             [ ]None[ ]Mild [ ]Moderate [ ]Severe   Nausea:                             [ ]None[ ]Mild [ ]Moderate [ ]Severe   Loss of appetite:              [ ]None[ ]Mild [ ]Moderate [ ]Severe   Constipation:                    [ ]None[ ]Mild [ ]Moderate [ ]Severe    Other Symptoms:  [ ]All other review of systems negative     Palliative Performance Status Version 2:         30%    http://[facility].org/files/news/palliative_performance_scale_ppsv2.pdf    PHYSICAL EXAM:  Vital Signs Last 24 Hrs  T(C): 37.1 (10 May 2023 07:45), Max: 39.1 (09 May 2023 20:11)  T(F): 98.8 (10 May 2023 07:45), Max: 102.4 (09 May 2023 20:11)  HR: 128 (10 May 2023 07:45) (37 - 160)  BP: 112/76 (10 May 2023 07:45) (112/76 - 130/80)  BP(mean): 86 (09 May 2023 23:38) (86 - 86)  RR: 20 (10 May 2023 07:45) (18 - 20)  SpO2: 97% (10 May 2023 07:45) (92% - 98%)    Parameters below as of 10 May 2023 06:58  Patient On (Oxygen Delivery Method): nasal cannula  O2 Flow (L/min): 5   I&O's Summary    09 May 2023 07:01  -  10 May 2023 07:00  --------------------------------------------------------  IN: 0 mL / OUT: 400 mL / NET: -400 mL        GENERAL:  [x ] No acute distress [ ]Lethargic  [ ]Unarousable  [ ]Verbal  [ ]Non-Verbal [ ]Cachexia    BEHAVIORAL/PSYCH:  [ ]Alert and Oriented x  [ ] Anxiety [ ] Delirium [ ] Agitation [x ] Calm   EYES: [ x] No scleral icterus [ ] Scleral icterus [ ] Closed  ENMT:  [ ]Dry mouth  [ x]No external oral lesions [ ] No external ear or nose lesions  CARDIOVASCULAR:  [ ]Regular [ ]Irregular [ x]Tachy [ ]Not Tachy  [ ]Surya [ ] Edema [ ] No edema  PULMONARY:  [ ]Tachypnea  [ ]Audible excessive secretions [ x] No labored breathing [ ] labored breathing  GASTROINTESTINAL: [ ]Soft  [ ]Distended  [x ]Not distended [ ]Non tender [ ]Tender  MUSCULOSKELETAL: [ ]No clubbing [ ] clubbing  [ ] No cyanosis [ ] cyanosis  NEUROLOGIC: [ ]No focal deficits  [ ]Follows commands  [x ]Does not follow commands  [ ]Cognitive impairment  [ ]Dysphagia  [ ]Dysarthria  [ ]Paresis   SKIN: [ ] Jaundiced [ x] Non-jaundiced [ ]Rash [ ]No Rash [ ] Warm [ ] Dry  MISC/LINES: [ ] ET tube [ ] Trach [ ]NGT/OGT [ ]PEG [ ]Mcdaniel    CRITICAL CARE:  [ ] Shock Present  [ ]Septic [ ]Cardiogenic [ ]Neurologic [ ]Hypovolemic  [ ]  Vasopressors [ ]  Inotropes   [x ]Respiratory failure present [ ]Mechanical ventilation [ ]Non-invasive ventilatory support [ ]High flow  [ ]Acute  [ ]Chronic [ ]Hypoxic  [ ]Hypercarbic [ ]Other  [ ]Other organ failure     LABS: reviewed by me                        14.1   18.55 )-----------( 217      ( 10 May 2023 08:00 )             43.7   05-10    134<L>  |  105  |  17  ----------------------------<  132<H>  3.9   |  20  |  0.6<L>    Ca    8.1<L>      10 May 2023 08:00  Phos  2.0     05-10  Mg     1.7     05-10    TPro  4.6<L>  /  Alb  2.3<L>  /  TBili  0.9  /  DBili  x   /  AST  24  /  ALT  <5  /  AlkPhos  82  05-10  PT/INR - ( 09 May 2023 18:01 )   PT: 21.70 sec;   INR: 1.87 ratio         PTT - ( 09 May 2023 18:01 )  PTT:34.8 sec    Urinalysis Basic - ( 09 May 2023 18:14 )    Color: Yellow / Appearance: Turbid / S.031 / pH: x  Gluc: x / Ketone: Trace  / Bili: Negative / Urobili: <2 mg/dL   Blood: x / Protein: 300 mg/dL / Nitrite: Positive   Leuk Esterase: Large / RBC: 3 /HPF /  /HPF   Sq Epi: x / Non Sq Epi: x / Bacteria: Many    RADIOLOGY & ADDITIONAL STUDIES: reviewed by me  < from: Xray Chest 1 View- PORTABLE-Urgent (23 @ 18:28) >  Impression:    Patchy right lung opacities.    < end of copied text >      EKG: reviewed by me  < from: 12 Lead ECG (23 @ 22:21) >  Ventricular Rate 146 BPM    Atrial Rate 468 BPM    QRS Duration 82 ms    Q-T Interval 236 ms    QTC Calculation(Bazett) 367 ms    R Axis 72 degrees    T Axis -25 degrees    Diagnosis Line Atrial flutter with variable AV block  Nonspecific ST and T wave abnormality  Abnormal ECG    < end of copied text >      PROTEIN CALORIE MALNUTRITION PRESENT: [ ]mild [ ]moderate [ ]severe [ ]underweight [ ]morbid obesity  https://www.andeal.org/vault/2440/web/files/ONC/Table_Clinical%20Characteristics%20to%20Document%20Malnutrition-White%20JV%20et%20al%790291.pdf    Height (cm): 167.6 (23 @ 17:36), 172.7 (22 @ 19:08), 172.7 (22 @ 19:56)  Weight (kg): 54.4 (23 @ 17:36), 63.2 (22 @ 04:10), 68 (22 @ 19:56)  BMI (kg/m2): 19.4 (23 @ 17:36), 21.2 (22 @ 04:10), 22.8 (22 @ 19:08)    [ ]PPSV2 < or = to 30% [ ]significant weight loss  [ ]poor nutritional intake  [ ]anasarca      [ ]Artificial Nutrition      REFERRALS:   [ ]Chaplaincy  [ x]Hospice  [ ]Child Life  [x ]Social Work  [ ]Case management [ ]Holistic Therapy     Patient discussed with primary medical team MD  Palliative care education provided to patient and/or family    Goals of Care Document:

## 2023-05-10 NOTE — CONSULT NOTE ADULT - ASSESSMENT
82F w/ h/o Lewy Body Dementia (A&O 1-2 baseline), chronic Hill (persistent retention; last exchanged on 5/8), chronic afib (on Xarelto), and sacral ulcer p/w cough and hypoxia since yesterday.     In ED, pt febrile (T 101.8F), tachycardic (), and tachypneic (RR 20's-30's) on NRB.    IMPRESSION/RECOMMENDATIONS  Sepsis ( T 101.8, P 137/m, WBC 24.4 ) secondary to multilobar ( RUL/RML/lingula ) bacterial PNA secondary to aspiration.  Pt was being fed by her children at home> pureed food  Pyuria > indwelling hill catheter since 10/22 ( has a bladder prolapse )  WBC 24.4  Pressure/ischemic  related sacral ulcer with underlying chronic OM with no abscess/cellulitis ( doubt the sinus tract will heal . This was discussed at length with her daughter and son at the bedside )    -BCx  -UCx  -Off loading to prevent pressure sores and preventive measures to avoid aspiration   -monitor WBC  -Nares ORSA  -Burn for evaluation of sacral ulcer  -Speech and Swallow ( discussed G tube option with the family. They were against it )  -Vancomycin 750 mg iv q12h  -Cefepime 1 gm iv q12h    Prognosis is very poor

## 2023-05-10 NOTE — CONSULT NOTE ADULT - CONVERSATION DETAILS
Spoke with daughter outside patient's room.  Palliative care introduced. She was able to provide a clinical and medical history.  Discussed the patient's functional status at home and she noted the patient has been having dysphagia at home.      We discussed treatment options moving forward.  Discussed PEG and noted that in advanced dementia, PEG does not lead to improved QOL of length of life.  She noted she and her family likely did not want PEG.    We discussed hospice and comfort measures only. Family interested in hospice consult. We noted that the patient did not pass S+S testing. Discussed NGT feeding and IV nutrition, and noted that those types of feeding are temporary and will not improve the patient's dysphgia. We discussed that the team would have to discuss nutrition in the next 24 hours.  She wishes to speak to hospice with brother, and will address need for artificial nutrition as appropriate

## 2023-05-10 NOTE — HOSPICE CARE NOTE - CONVESATION DETAILS
Hospice referral completed. Phone call to patient's son and daughter, reviewed hospice services. At this time family is deciding between [facility] hospice and [facility] and will let us know. Patient is currently on [facility] home care.

## 2023-05-10 NOTE — CONSULT NOTE ADULT - ASSESSMENT
82 year old woman with history of Lewy Body Dementia. stage IV sacral ulcer, presents with cough and hypoxia. Palliative care consulted for GO.    Spoke with daughter outside patient's room.  Palliative care introduced. She was able to provide a clinical and medical history.  Discussed the patient's functional status at home and she noted the patient has been having dysphagia at home.      We discussed treatment options moving forward.  Discussed PEG and noted that in advanced dementia, PEG does not lead to improved QOL of length of life.  She noted she and her family likely did not want PEG.    We discussed hospice and comfort measures only. Family interested in hospice consult. We noted that the patient did not pass S+S testing. Discussed NGT feeding and IV nutrition, and noted that those types of feeding are temporary and will not improve the patient's dysphgia. We discussed that the team would have to discuss nutrition in the next 24 hours.  She wishes to speak to hospice with brother, and will address need for artificial nutrition as appropriate    Education about palliative care provided to patient/family.  See Recs below.    Please call [phone] with questions or concerns 24/7.   We will continue to follow.

## 2023-05-10 NOTE — CONSULT NOTE ADULT - PROBLEM SELECTOR RECOMMENDATION 9
Likely aspiration pneumonia with pyuria also noted  -continue cefepime  -continue vanco  -f/u ID  -f/u cultures  -S+S as below

## 2023-05-11 LAB
ALBUMIN SERPL ELPH-MCNC: 2.2 G/DL — LOW (ref 3.5–5.2)
ALP SERPL-CCNC: 94 U/L — SIGNIFICANT CHANGE UP (ref 30–115)
ALT FLD-CCNC: <5 U/L — SIGNIFICANT CHANGE UP (ref 0–41)
ANION GAP SERPL CALC-SCNC: 10 MMOL/L — SIGNIFICANT CHANGE UP (ref 7–14)
APTT BLD: 39.3 SEC — HIGH (ref 27–39.2)
AST SERPL-CCNC: 13 U/L — SIGNIFICANT CHANGE UP (ref 0–41)
BASOPHILS # BLD AUTO: 0.02 K/UL — SIGNIFICANT CHANGE UP (ref 0–0.2)
BASOPHILS NFR BLD AUTO: 0.1 % — SIGNIFICANT CHANGE UP (ref 0–1)
BILIRUB SERPL-MCNC: 0.6 MG/DL — SIGNIFICANT CHANGE UP (ref 0.2–1.2)
BUN SERPL-MCNC: 13 MG/DL — SIGNIFICANT CHANGE UP (ref 10–20)
CALCIUM SERPL-MCNC: 8.2 MG/DL — LOW (ref 8.4–10.5)
CHLORIDE SERPL-SCNC: 108 MMOL/L — SIGNIFICANT CHANGE UP (ref 98–110)
CO2 SERPL-SCNC: 23 MMOL/L — SIGNIFICANT CHANGE UP (ref 17–32)
CREAT SERPL-MCNC: <0.5 MG/DL — LOW (ref 0.7–1.5)
EGFR: 106 ML/MIN/1.73M2 — SIGNIFICANT CHANGE UP
EOSINOPHIL # BLD AUTO: 0 K/UL — SIGNIFICANT CHANGE UP (ref 0–0.7)
EOSINOPHIL NFR BLD AUTO: 0 % — SIGNIFICANT CHANGE UP (ref 0–8)
GLUCOSE SERPL-MCNC: 99 MG/DL — SIGNIFICANT CHANGE UP (ref 70–99)
HCT VFR BLD CALC: 40.9 % — SIGNIFICANT CHANGE UP (ref 37–47)
HGB BLD-MCNC: 13.4 G/DL — SIGNIFICANT CHANGE UP (ref 12–16)
IMM GRANULOCYTES NFR BLD AUTO: 0.6 % — HIGH (ref 0.1–0.3)
INR BLD: 1.31 RATIO — HIGH (ref 0.65–1.3)
LACTATE SERPL-SCNC: 1.4 MMOL/L — SIGNIFICANT CHANGE UP (ref 0.7–2)
LYMPHOCYTES # BLD AUTO: 0.62 K/UL — LOW (ref 1.2–3.4)
LYMPHOCYTES # BLD AUTO: 4.6 % — LOW (ref 20.5–51.1)
MAGNESIUM SERPL-MCNC: 2.1 MG/DL — SIGNIFICANT CHANGE UP (ref 1.8–2.4)
MCHC RBC-ENTMCNC: 29.5 PG — SIGNIFICANT CHANGE UP (ref 27–31)
MCHC RBC-ENTMCNC: 32.8 G/DL — SIGNIFICANT CHANGE UP (ref 32–37)
MCV RBC AUTO: 90.1 FL — SIGNIFICANT CHANGE UP (ref 81–99)
MONOCYTES # BLD AUTO: 0.43 K/UL — SIGNIFICANT CHANGE UP (ref 0.1–0.6)
MONOCYTES NFR BLD AUTO: 3.2 % — SIGNIFICANT CHANGE UP (ref 1.7–9.3)
NEUTROPHILS # BLD AUTO: 12.33 K/UL — HIGH (ref 1.4–6.5)
NEUTROPHILS NFR BLD AUTO: 91.5 % — HIGH (ref 42.2–75.2)
NRBC # BLD: 0 /100 WBCS — SIGNIFICANT CHANGE UP (ref 0–0)
PLATELET # BLD AUTO: 237 K/UL — SIGNIFICANT CHANGE UP (ref 130–400)
PMV BLD: 11.4 FL — HIGH (ref 7.4–10.4)
POTASSIUM SERPL-MCNC: 2.8 MMOL/L — LOW (ref 3.5–5)
POTASSIUM SERPL-SCNC: 2.8 MMOL/L — LOW (ref 3.5–5)
PROT SERPL-MCNC: 4.6 G/DL — LOW (ref 6–8)
PROTHROM AB SERPL-ACNC: 15 SEC — HIGH (ref 9.95–12.87)
RBC # BLD: 4.54 M/UL — SIGNIFICANT CHANGE UP (ref 4.2–5.4)
RBC # FLD: 15.8 % — HIGH (ref 11.5–14.5)
SODIUM SERPL-SCNC: 141 MMOL/L — SIGNIFICANT CHANGE UP (ref 135–146)
TROPONIN T SERPL-MCNC: <0.01 NG/ML — SIGNIFICANT CHANGE UP
VANCOMYCIN TROUGH SERPL-MCNC: 9.9 UG/ML — SIGNIFICANT CHANGE UP (ref 5–10)
WBC # BLD: 13.48 K/UL — HIGH (ref 4.8–10.8)
WBC # FLD AUTO: 13.48 K/UL — HIGH (ref 4.8–10.8)

## 2023-05-11 PROCEDURE — 99233 SBSQ HOSP IP/OBS HIGH 50: CPT

## 2023-05-11 PROCEDURE — 99291 CRITICAL CARE FIRST HOUR: CPT

## 2023-05-11 RX ORDER — POTASSIUM CHLORIDE 20 MEQ
20 PACKET (EA) ORAL
Refills: 0 | Status: COMPLETED | OUTPATIENT
Start: 2023-05-11 | End: 2023-05-11

## 2023-05-11 RX ORDER — DILTIAZEM HCL 120 MG
15 CAPSULE, EXT RELEASE 24 HR ORAL
Qty: 125 | Refills: 0 | Status: DISCONTINUED | OUTPATIENT
Start: 2023-05-11 | End: 2023-05-15

## 2023-05-11 RX ORDER — DILTIAZEM HCL 120 MG
5 CAPSULE, EXT RELEASE 24 HR ORAL
Qty: 125 | Refills: 0 | Status: DISCONTINUED | OUTPATIENT
Start: 2023-05-11 | End: 2023-05-11

## 2023-05-11 RX ORDER — SODIUM CHLORIDE 9 MG/ML
1000 INJECTION, SOLUTION INTRAVENOUS
Refills: 0 | Status: DISCONTINUED | OUTPATIENT
Start: 2023-05-11 | End: 2023-05-15

## 2023-05-11 RX ADMIN — Medication 6 MILLIGRAM(S): at 05:14

## 2023-05-11 RX ADMIN — Medication 650 MILLIGRAM(S): at 17:56

## 2023-05-11 RX ADMIN — Medication 5 MG/HR: at 15:27

## 2023-05-11 RX ADMIN — CEFEPIME 100 MILLIGRAM(S): 1 INJECTION, POWDER, FOR SOLUTION INTRAMUSCULAR; INTRAVENOUS at 17:29

## 2023-05-11 RX ADMIN — Medication 63.75 MILLIMOLE(S): at 23:54

## 2023-05-11 RX ADMIN — Medication 50 MILLIEQUIVALENT(S): at 18:23

## 2023-05-11 RX ADMIN — Medication 50 MILLIEQUIVALENT(S): at 21:42

## 2023-05-11 RX ADMIN — ENOXAPARIN SODIUM 50 MILLIGRAM(S): 100 INJECTION SUBCUTANEOUS at 05:15

## 2023-05-11 RX ADMIN — Medication 250 MILLIGRAM(S): at 05:21

## 2023-05-11 RX ADMIN — Medication 50 MILLIEQUIVALENT(S): at 15:36

## 2023-05-11 RX ADMIN — CHLORHEXIDINE GLUCONATE 1 APPLICATION(S): 213 SOLUTION TOPICAL at 05:14

## 2023-05-11 RX ADMIN — MORPHINE SULFATE 2 MILLIGRAM(S): 50 CAPSULE, EXTENDED RELEASE ORAL at 18:30

## 2023-05-11 RX ADMIN — MORPHINE SULFATE 2 MILLIGRAM(S): 50 CAPSULE, EXTENDED RELEASE ORAL at 18:13

## 2023-05-11 RX ADMIN — REMDESIVIR 200 MILLIGRAM(S): 5 INJECTION INTRAVENOUS at 16:24

## 2023-05-11 RX ADMIN — SODIUM CHLORIDE 75 MILLILITER(S): 9 INJECTION, SOLUTION INTRAVENOUS at 12:05

## 2023-05-11 RX ADMIN — Medication 5 MILLIGRAM(S): at 12:05

## 2023-05-11 RX ADMIN — CEFEPIME 100 MILLIGRAM(S): 1 INJECTION, POWDER, FOR SOLUTION INTRAMUSCULAR; INTRAVENOUS at 05:21

## 2023-05-11 NOTE — PROGRESS NOTE ADULT - ATTENDING COMMENTS
My note supersedes all residents notes that I sign, My correction for their notes are in my notes     General: the patient initially obtunded non verbal, later in the afternoon more awake and was able to tell her name and her son name, she followed some simple commands . chronic ill appearance, frail   Lungs:  decrease breath sound b/l , normal resp effort  Heart: regular ryhthm , tachy  Abdomen: soft, non tender non distended  Ext: no edema, able to move her hands and feets but very weak     82F w/ h/o Lewy Body Dementia (A&O 1-2 baseline), chronic Hill (persistent retention; last exchanged on 5/8), chronic afib (on Xarelto), and stage 4 sacral ulcer p/w cough and hypoxia. Admitted to stepdown unit for severe sepsis i/s/o aspiration pneumonia vs UTI.    Sepsis POA secondary to COVID-19 Pneumonia with possible Aspiration Pneumonia   UTI, in setting of chronic hill   Sacral Ulcer   Hx of Lewy Body Dementia  Hx  Chronic AFIB on Xarelto    ID input appreciated d/c Vancomycin 750 mg iv q12h, c/W -Cefepime 1 gm iv q12h and RDV   C/W dexamethasone as per pulm cc   f/u cultures   on NC   failed s+s; ongoing goc, Discussed with family at bedside for NGT but they stated to wait and discuss it tomorrow   appreciate palliative care  trend lactate  c/w gentel hydration   neurochecks   CT head wo contrast today and resume lovenox tonight if no ICH     []AFib with RVR  suspect driven by sepsis  resume Lovenox tonight  if CT head negative ( received am dose of lovenox)   not controlled witj IV Lopressor   start Cardizem ggt   monitor and correct electrolytes , K is 2.8 ,replete hypokalemia with IV K and repeat bmp       []Sacral wounds:  burn team input appreciated  no burn surgical intervention, - continue local wound care BID  - wash with soap and water, apply hydrogel, cover with allevyn pad/ - burn signing off  - consult wound care nursing for further wound care recs if needed- cosult placed   frequent turning, off loading, and positioning and skin care as per protocol, Maintain pressure injury prevention, Keep skin clean, Offload heels, Monitor wound for changes and notify provider if any. My note supersedes all residents notes that I sign, My correction for their notes are in my notes     General: the patient initially obtunded non verbal, later in the afternoon more awake and was able to tell her name and her son name, she followed some simple commands . chronic ill appearance, frail   Lungs:  decrease breath sound b/l , normal resp effort  Heart: regular ryhthm , tachy  Abdomen: soft, non tender non distended  Ext: no edema, able to move her hands and feets but very weak     82F w/ h/o Lewy Body Dementia (A&O 1-2 baseline), chronic Hill (persistent retention; last exchanged on 5/8), chronic afib (on Xarelto), and stage 4 sacral ulcer p/w cough and hypoxia. Admitted to stepdown unit for severe sepsis i/s/o aspiration pneumonia vs UTI.    Sepsis POA secondary to COVID-19 Pneumonia with possible Aspiration Pneumonia   UTI, in setting of chronic hill   Sacral Ulcer   Hx of Lewy Body Dementia  Hx  Chronic AFIB on Xarelto    ID input appreciated d/c Vancomycin 750 mg iv q12h, c/W -Cefepime 1 gm iv q12h and RDV   C/W dexamethasone as per pulm cc   f/u cultures   on NC   failed s+s; ongoing goc, Discussed with family at bedside for NGT but they stated to wait and discuss it tomorrow   appreciate palliative care  trend lactate  c/w gentel hydration   neurochecks   CT head wo contrast today and resume lovenox tonight if no ICH   s/s re eval     []AFib with RVR  suspect driven by sepsis  resume Lovenox tonight  if CT head negative ( received am dose of lovenox)   not controlled witj IV Lopressor   start Cardizem ggt   monitor and correct electrolytes , K is 2.8 ,replete hypokalemia with IV K and repeat bmp       []Sacral wounds:  burn team input appreciated  no burn surgical intervention, - continue local wound care BID  - wash with soap and water, apply hydrogel, cover with allevyn pad/ - burn signing off  - consult wound care nursing for further wound care recs if needed- cosult placed   frequent turning, off loading, and positioning and skin care as per protocol, Maintain pressure injury prevention, Keep skin clean, Offload heels, Monitor wound for changes and notify provider if any    poor prognosis , DNR/DNI appreciated palliative and hospice input     Pending CT head , resume Lovenox today if CT head negative , monitor resp status, heart rate, s/s

## 2023-05-11 NOTE — PROGRESS NOTE ADULT - SUBJECTIVE AND OBJECTIVE BOX
YOAN TAI  82y, Female    All available historical data reviewed    OVERNIGHT EVENTS:  fevers  97% NC 2 lit  non responsive  soft BM    ROS:  unable to obtain history secondary to patient's mental status and/or sedation     VITALS:  T(F): 98.8, Max: 101.2 (05-10-23 @ 21:11)  HR: 117  BP: 121/72  RR: 20Vital Signs Last 24 Hrs  T(C): 37.1 (11 May 2023 07:15), Max: 38.4 (10 May 2023 21:11)  T(F): 98.8 (11 May 2023 07:15), Max: 101.2 (10 May 2023 21:11)  HR: 117 (11 May 2023 07:15) (105 - 134)  BP: 121/72 (11 May 2023 07:15) (113/55 - 137/71)  BP(mean): 91 (11 May 2023 07:15) (71 - 93)  RR: 20 (11 May 2023 07:15) (18 - 20)  SpO2: 93% (11 May 2023 07:15) (93% - 97%)    Parameters below as of 11 May 2023 07:15  Patient On (Oxygen Delivery Method): nasal cannula        TESTS & MEASUREMENTS:                        13.4   13.48 )-----------( 237      ( 11 May 2023 06:42 )             40.9     -11    141  |  108  |  13  ----------------------------<  99  2.8<L>   |  23  |  <0.5<L>    Ca    8.2<L>      11 May 2023 06:42  Phos  2.0     05-10  Mg     2.1         TPro  4.6<L>  /  Alb  2.2<L>  /  TBili  0.6  /  DBili  x   /  AST  13  /  ALT  <5  /  AlkPhos  94  05-11    LIVER FUNCTIONS - ( 11 May 2023 06:42 )  Alb: 2.2 g/dL / Pro: 4.6 g/dL / ALK PHOS: 94 U/L / ALT: <5 U/L / AST: 13 U/L / GGT: x             Culture - Blood (collected 23 @ 18:01)  Source: .Blood Blood-Peripheral  Preliminary Report (23 @ 01:02):    No growth to date.    Culture - Blood (collected 23 @ 18:01)  Source: .Blood Blood-Peripheral  Preliminary Report (23 @ 01:02):    No growth to date.      Urinalysis Basic - ( 09 May 2023 18:14 )    Color: Yellow / Appearance: Turbid / S.031 / pH: x  Gluc: x / Ketone: Trace  / Bili: Negative / Urobili: <2 mg/dL   Blood: x / Protein: 300 mg/dL / Nitrite: Positive   Leuk Esterase: Large / RBC: 3 /HPF /  /HPF   Sq Epi: x / Non Sq Epi: x / Bacteria: Many          RADIOLOGY & ADDITIONAL TESTS:  Personal review of radiological diagnostics performed  Echo and EKG results noted when applicable.     MEDICATIONS:  acetaminophen     Tablet .. 650 milliGRAM(s) Oral every 6 hours PRN  cefepime   IVPB 1000 milliGRAM(s) IV Intermittent every 12 hours  chlorhexidine 2% Cloths 1 Application(s) Topical <User Schedule>  dexAMETHasone  Injectable 6 milliGRAM(s) IV Push daily  dextrose 5% + sodium chloride 0.9%. 1000 milliLiter(s) IV Continuous <Continuous>  enoxaparin Injectable 50 milliGRAM(s) SubCutaneous every 12 hours  metoprolol tartrate Injectable 5 milliGRAM(s) IV Push every 6 hours  morphine  - Injectable 2 milliGRAM(s) IV Push every 6 hours PRN  remdesivir  IVPB   IV Intermittent   remdesivir  IVPB 100 milliGRAM(s) IV Intermittent every 24 hours  sodium phosphate 15 milliMole(s)/250 mL IVPB 15 milliMole(s) IV Intermittent once      ANTIBIOTICS:  cefepime   IVPB 1000 milliGRAM(s) IV Intermittent every 12 hours  remdesivir  IVPB   IV Intermittent   remdesivir  IVPB 100 milliGRAM(s) IV Intermittent every 24 hours

## 2023-05-11 NOTE — CONSULT NOTE ADULT - ASSESSMENT
IMPRESSION:    Sepsis POA  COVID-19 Pneumonia   Probable Aspiration Pneumonia   UTI, in setting of chronic hill   Sacral Ulcer   HO Lewy Body Dementia  HO Chronic AFIB on Xarelto  Prognosis is very poor    PLAN:    CNS: avoid depressants     HEENT: Oral care    PULMONARY:  HOB @ 45 degrees.  Aspiration precautions. wean oxygen as tolerated, target SaO2 >92%, Continue Dexamethasone 6mg Q24H    CARDIOVASCULAR: D5NS@75cc/hr, repeat CE, EF normal from echo in July 2022    GI: GI prophylaxis.  Feeding per speech and swallow.  Bowel regimen, family declining PEG    RENAL:  Follow up lytes.  Correct as needed    INFECTIOUS DISEASE: Follow up cultures, Cefepime and Remdesivir per ID, nasal MRSA negative, procalcitonin 0.64    HEMATOLOGICAL: on therapeutic Lovenox for AFIB     ENDOCRINE:  Follow up FS.  Insulin protocol if needed.    MUSCULOSKELETAL: activity as tolerated    Palliative care following, possible hospice consideration     SDU monitoring          IMPRESSION:    Sepsis POA  COVID-19 Pneumonia   highly Aspiration Pneumonia   UTI, in setting of chronic hill   Sacral Ulcer   HO Lewy Body Dementia  HO Chronic AFIB on Xarelto    PLAN:    CNS: avoid depressants     HEENT: Oral care    PULMONARY:  HOB @ 45 degrees.  Aspiration precautions. wean oxygen as tolerated, target SaO2 >92%, Continue Dexamethasone 6mg Q24H    CARDIOVASCULAR: D5NS@75cc/hr, repeat CE, EF normal from echo in July 2022    GI: GI prophylaxis.  Feeding per speech and swallow.  Bowel regimen, family declining PEG    RENAL:  Follow up lytes.  Correct as needed    INFECTIOUS DISEASE: Follow up cultures, Cefepime and Remdesivir per ID, nasal MRSA negative, procalcitonin 0.64    HEMATOLOGICAL: on therapeutic Lovenox for AFIB     ENDOCRINE:  Follow up FS.  Insulin protocol if needed.    MUSCULOSKELETAL: activity as tolerated    Palliative care following, possible hospice consideration     SDU monitoring

## 2023-05-11 NOTE — PATIENT PROFILE ADULT - FALL HARM RISK - HARM RISK INTERVENTIONS

## 2023-05-11 NOTE — PROGRESS NOTE ADULT - PROBLEM SELECTOR PLAN 1
Likely aspiration pneumonia with pyuria also noted  -continue cefepime  -continue remdesevir  -f/u ID  -f/u cultures  -S+S not passed today

## 2023-05-11 NOTE — PROGRESS NOTE ADULT - ASSESSMENT
82F w/ h/o Lewy Body Dementia (A&O 1-2 baseline), chronic Mcdaniel (persistent retention; last exchanged on 5/8), chronic afib (on Xarelto), and stage 4 sacral ulcer p/w cough and hypoxia. Admitted to stepdown unit for severe sepsis i/s/o aspiration pneumonia vs UTI.    #Community Acquired Aspiration Pneumonia  #Acute Hypoxic Respiratory Failure  #Severe Sepsis  #COVID Pneumonia   Initially p/w cough and hypoxia. Severe sepsis present on admission (T 101.8F, , RR 20's-30's, WBC 24.45, Lactate 3.9). Suspect aspiration pneumonia based on CXR and hypoxia (required NRB on admission). Likely predisposed i/s/o dysphagia related to LBD. Alternative sepsis source includes UTI as UA appears positive, but obtained from chronic Mcdaniel. Regardless, empiric CAP coverage will cover UTI as well. Additional source includes sacral ulcer, but less likely given no evidence of purulent drainage, foul odor, or surrounding erythema on inspection.  - dc vancomycin 750mg IV Q12H  - c/w cefepime 2g IV Q12H  - bcx - NGTD, ucx pending   - MRSA negative  - COVID+   - S&S - NPO, family open to NG tube but no PEG tube   - ID following - Cefepime, Remdesavir   - c/w decadron     #Chronic Atrial Fibrillation  #Rapid Ventricular Rate  CHADS-VASC 3+. Managed via Xarelto 15 qhs and Lopressor 50(AM)/25(noon)/50(PM). Currently in RVR, but likely provoked i/s/o severe sepsis, hypoxia, and poorly controlled fever (T 102.4F at time of admission).  - c/w Lovenox 50mg SQ BID  - can give Lopressor IV pushes for persistent RVR  - restart home PO meds once cleared by S&S    #Stage 4 Sacral Ulcer, chronic  Known to have stage 4 sacral ulcer. Managed via VNS and 24 hr HHA. Likely secondary to functional quadriplegia. Chronic pain related to ulcer managed via Percocet 5-325 q8h and gabapentin 300 qhs.  - c/w morphine 2mg IV Q6H while unable to tolerate PO  - Burn consulted to evaluate for debridement and for wound care; f/u recs    #Anxiety  Known to have severe anxiety. Currently managed via buspirone 30 bid and clonazepam 0.5 qhs.  - restart home anxiolytics once able to tolerate PO    DVT PPX: Lovenox 50mg SQ BID  GI PPX: none indicated  DIET: NPO  ACTIVITY: IAT  CODE STATUS: DNR/DNI  DISPOSITION: From Home

## 2023-05-11 NOTE — PROGRESS NOTE ADULT - ASSESSMENT
· Assessment	  82F w/ h/o Lewy Body Dementia (A&O 1-2 baseline), chronic Hill (persistent retention; last exchanged on 5/8), chronic afib (on Xarelto), and sacral ulcer p/w cough and hypoxia since yesterday.     In ED, pt febrile (T 101.8F), tachycardic (), and tachypneic (RR 20's-30's) on NRB.    IMPRESSION/RECOMMENDATIONS  # COVID-19 > doubt severe illness  Timeline of infection is unclear based on the clinical history but possible early on and will make recommendations based on this presumption  CXR findings not compatible with COVID-19 along with the leucocytosis  Bacterial PNA as coinfection    -Day 1 : Single  mg IV loading dose  -Day 2-5 : single  mg IV once daily maintenance dose  -Daily CBC,CMP.     # Sepsis ( T 101.8, P 137/m, WBC 24.4 ) secondary to multilobar ( RUL/RML/lingula ) bacterial PNA secondary to aspiration.  Pt was being fed by her children at home> pureed food  Pyuria > indwelling hill catheter since 10/22 ( has a bladder prolapse )  WBC 24.4> 13,4  5/9 BCx NG  Nares ORSA NG  -Speech and Swallow ( discussed G tube option with the family. They were against it )  -d/c Vancomycin 750 mg iv q12h  -Cefepime 1 gm iv q12h    # Pressure/ischemic  related sacral ulcer with underlying chronic OM with no abscess/cellulitis ( doubt the sinus tract will heal . This was discussed at length with her daughter and son at the bedside )  -Burn for evaluation of sacral ulcer    Prognosis is very poor

## 2023-05-11 NOTE — CONSULT NOTE ADULT - SUBJECTIVE AND OBJECTIVE BOX
Patient is a 82y old  Female who presents with a chief complaint of Cough; Hypoxia (10 May 2023 18:22)      HPI: 82F with PMHx  Lewy Body Dementia (A&O 1-2 baseline), chronic Mcdaniel (persistent retention; last exchanged on ), chronic afib (on Xarelto), and stage 4 sacral ulcer p/w cough and hypoxia since yesterday. History limited from patient due to patient's current mental status. Family at bedside (daughter and two sons) able to provide collateral. Since yesterday, pt has had cough, increased secretions, general malaise, and worsening mental status (minimally verbal; typically able to say few words at baseline). This AM, pt noted to be tachycardic and hypoxic to 80's%, prompting ED evaluation. Of note, patient known to have dysphagia, but able to tolerate PO. No reported fevers, chills, CP, palpitations, abdominal pain, n/v/d, dysuria, or LE swelling per family.     In ED, pt febrile (T 101.8F), tachycardic (), and tachypneic (RR 20's-30's) on NRB, but saturating in mid-90's%. Labs demonstrated WBC 24.45. VBG showed pH 7.41, pCO2 37, and Lactate 3.9. UA concerning for UTI. CXR concerning for right-sided PNA. S/P 1.7L LR bolus and IV abx (Vanc/Zosyn). Admitted to SDU for severe sepsis i/s/o aspiration pneumonia vs UTI. (09 May 2023 21:41)      PAST MEDICAL & SURGICAL HISTORY:  Anxiety      Arthritis      LBD (Lewy body dementia)      Ulcer of sacral region, stage 4      Chronic indwelling Mcdaniel catheter      Chronic atrial fibrillation      H/O hernia repair          SOCIAL HX:   Smoking     exsmoker              ETOH                denies            Other denies    FAMILY HISTORY:  No pertinent family history in first degree relatives    :  No known cardiovacular family hisotry     Review Of Systems:     All ROS are negative except per HPI       Allergies    No Known Allergies    Intolerances        Vital Signs Last 24 Hrs  T(C): 37.1 (11 May 2023 07:15), Max: 38.4 (10 May 2023 21:11)  T(F): 98.8 (11 May 2023 07:15), Max: 101.2 (10 May 2023 21:11)  HR: 117 (11 May 2023 07:15) (105 - 134)  BP: 121/72 (11 May 2023 07:15) (113/55 - 137/71)  BP(mean): 91 (11 May 2023 07:15) (71 - 93)  RR: 20 (11 May 2023 07:15) (18 - 20)  SpO2: 93% (11 May 2023 07:15) (93% - 97%)    O2 Parameters below as of 11 May 2023 07:15  Patient On (Oxygen Delivery Method): nasal cannula      CONSTITUTIONAL: Chronically ill appearing, NAD  ENT: Airway patent, Mouth with normal mucosa. No thrush  CARDIAC: Normal rate, Regular rhythm.  No edema  RESPIRATORY: Decreased bilateral air entry, right basilar rhonchi, Not tachypneic, no use of accessory muscles  GASTROINTESTINAL:Abdomen soft, Non-tender, No guarding, + BS  NEUROLOGICAL: somnolent   SKIN: skin normal color for race, sacral ulcer           05-10-23 @ 07:01  -  23 @ 07:00  --------------------------------------------------------  IN:    sodium chloride 0.9%: 600 mL  Total IN: 600 mL    OUT:    Indwelling Catheter - Urethral (mL): 600 mL  Total OUT: 600 mL    Total NET: 0 mL          LABS:                          13.4   13.48 )-----------( 237      ( 11 May 2023 06:42 )             40.9                                               05-10    134<L>  |  105  |  17  ----------------------------<  132<H>  3.9   |  20  |  0.6<L>    Ca    8.1<L>      10 May 2023 08:00  Phos  2.0     05-10  Mg     1.7     05-10    TPro  4.6<L>  /  Alb  2.3<L>  /  TBili  0.9  /  DBili  x   /  AST  24  /  ALT  <5  /  AlkPhos  82  05-10      PT/INR - ( 11 May 2023 06:42 )   PT: 15.00 sec;   INR: 1.31 ratio         PTT - ( 11 May 2023 06:42 )  PTT:39.3 sec                                       Urinalysis Basic - ( 09 May 2023 18:14 )    Color: Yellow / Appearance: Turbid / S.031 / pH: x  Gluc: x / Ketone: Trace  / Bili: Negative / Urobili: <2 mg/dL   Blood: x / Protein: 300 mg/dL / Nitrite: Positive   Leuk Esterase: Large / RBC: 3 /HPF /  /HPF   Sq Epi: x / Non Sq Epi: x / Bacteria: Many        CARDIAC MARKERS ( 10 May 2023 08:00 )  x     / <0.01 ng/mL / x     / x     / x                                                LIVER FUNCTIONS - ( 10 May 2023 08:00 )  Alb: 2.3 g/dL / Pro: 4.6 g/dL / ALK PHOS: 82 U/L / ALT: <5 U/L / AST: 24 U/L / GGT: x                                                  Culture - Blood (collected 09 May 2023 18:01)  Source: .Blood Blood-Peripheral  Preliminary Report (11 May 2023 01:02):    No growth to date.    Culture - Blood (collected 09 May 2023 18:01)  Source: .Blood Blood-Peripheral  Preliminary Report (11 May 2023 01:02):    No growth to date.                                                                MEDICATIONS  (STANDING):  cefepime   IVPB 1000 milliGRAM(s) IV Intermittent every 12 hours  chlorhexidine 2% Cloths 1 Application(s) Topical <User Schedule>  dexAMETHasone  Injectable 6 milliGRAM(s) IV Push daily  enoxaparin Injectable 50 milliGRAM(s) SubCutaneous every 12 hours  metoprolol tartrate Injectable 5 milliGRAM(s) IV Push every 6 hours  remdesivir  IVPB   IV Intermittent   remdesivir  IVPB 100 milliGRAM(s) IV Intermittent every 24 hours  sodium chloride 0.9%. 1000 milliLiter(s) (50 mL/Hr) IV Continuous <Continuous>  sodium phosphate 15 milliMole(s)/250 mL IVPB 15 milliMole(s) IV Intermittent once    MEDICATIONS  (PRN):  acetaminophen     Tablet .. 650 milliGRAM(s) Oral every 6 hours PRN Temp greater or equal to 38C (100.4F), Mild Pain (1 - 3)  morphine  - Injectable 2 milliGRAM(s) IV Push every 6 hours PRN Severe Pain (7 - 10)        CXR reviewed          Patient is a 82y old  Female who presents with a chief complaint of Cough; Hypoxia (10 May 2023 18:22)    82F with PMHx  Lewy Body Dementia (A&O 1-2 baseline), chronic Mcdaniel (persistent retention; last exchanged on ), chronic afib (on Xarelto), and stage 4 sacral ulcer p/w cough and hypoxia since yesterday. History limited from patient due to patient's current mental status. Family at bedside (daughter and two sons) able to provide collateral. Since yesterday, pt has had cough, increased secretions, general malaise, and worsening mental status (minimally verbal; typically able to say few words at baseline). This AM, pt noted to be tachycardic and hypoxic to 80's%, prompting ED evaluation. Of note, patient known to have dysphagia, but able to tolerate PO. No reported fevers, chills, CP, palpitations, abdominal pain, n/v/d, dysuria, or LE swelling per family.     In ED, pt febrile (T 101.8F), tachycardic (), and tachypneic (RR 20's-30's) on NRB, but saturating in mid-90's%. Labs demonstrated WBC 24.45. VBG showed pH 7.41, pCO2 37, and Lactate 3.9. UA concerning for UTI. CXR concerning for right-sided PNA. S/P 1.7L LR bolus and IV abx (Vanc/Zosyn). Admitted to SDU for severe sepsis i/s/o aspiration pneumonia vs UTI. called to evaluate      PAST MEDICAL & SURGICAL HISTORY:  Anxiety      Arthritis      LBD (Lewy body dementia)      Ulcer of sacral region, stage 4      Chronic indwelling Mcdaniel catheter      Chronic atrial fibrillation      H/O hernia repair          SOCIAL HX:   Smoking     exsmoker              ETOH                denies            Other denies    FAMILY HISTORY:  No pertinent family history in first degree relatives    :  No known cardiovacular family hisotry     Review Of Systems:     All ROS are negative except per HPI       Allergies    No Known Allergies    Intolerances        Vital Signs Last 24 Hrs  T(C): 37.1 (11 May 2023 07:15), Max: 38.4 (10 May 2023 21:11)  T(F): 98.8 (11 May 2023 07:15), Max: 101.2 (10 May 2023 21:11)  HR: 117 (11 May 2023 07:15) (105 - 134)  BP: 121/72 (11 May 2023 07:15) (113/55 - 137/71)  BP(mean): 91 (11 May 2023 07:15) (71 - 93)  RR: 20 (11 May 2023 07:15) (18 - 20)  SpO2: 93% (11 May 2023 07:15) (93% - 97%)    O2 Parameters below as of 11 May 2023 07:15  Patient On (Oxygen Delivery Method): nasal cannula      CONSTITUTIONAL: Chronically ill appearing,   ENT: Airway patent, Mouth with normal mucosa. No thrush  CARDIAC: Normal rate, Regular rhythm.  No edema  RESPIRATORY: bl rhonchi  GASTROINTESTINAL:Abdomen soft, Non-tender, No guarding, + BS  NEUROLOGICAL: somnolent   SKIN: skin normal color for race, sacral ulcer           05-10-23 @ 07:01  -  23 @ 07:00  --------------------------------------------------------  IN:    sodium chloride 0.9%: 600 mL  Total IN: 600 mL    OUT:    Indwelling Catheter - Urethral (mL): 600 mL  Total OUT: 600 mL    Total NET: 0 mL          LABS:                          13.4   13.48 )-----------( 237      ( 11 May 2023 06:42 )             40.9                                               05-10    134<L>  |  105  |  17  ----------------------------<  132<H>  3.9   |  20  |  0.6<L>    Ca    8.1<L>      10 May 2023 08:00  Phos  2.0     05-10  Mg     1.7     05-10    TPro  4.6<L>  /  Alb  2.3<L>  /  TBili  0.9  /  DBili  x   /  AST  24  /  ALT  <5  /  AlkPhos  82  05-10      PT/INR - ( 11 May 2023 06:42 )   PT: 15.00 sec;   INR: 1.31 ratio         PTT - ( 11 May 2023 06:42 )  PTT:39.3 sec                                       Urinalysis Basic - ( 09 May 2023 18:14 )    Color: Yellow / Appearance: Turbid / S.031 / pH: x  Gluc: x / Ketone: Trace  / Bili: Negative / Urobili: <2 mg/dL   Blood: x / Protein: 300 mg/dL / Nitrite: Positive   Leuk Esterase: Large / RBC: 3 /HPF /  /HPF   Sq Epi: x / Non Sq Epi: x / Bacteria: Many        CARDIAC MARKERS ( 10 May 2023 08:00 )  x     / <0.01 ng/mL / x     / x     / x                                                LIVER FUNCTIONS - ( 10 May 2023 08:00 )  Alb: 2.3 g/dL / Pro: 4.6 g/dL / ALK PHOS: 82 U/L / ALT: <5 U/L / AST: 24 U/L / GGT: x                                                  Culture - Blood (collected 09 May 2023 18:01)  Source: .Blood Blood-Peripheral  Preliminary Report (11 May 2023 01:02):    No growth to date.    Culture - Blood (collected 09 May 2023 18:01)  Source: .Blood Blood-Peripheral  Preliminary Report (11 May 2023 01:02):    No growth to date.                                                                MEDICATIONS  (STANDING):  cefepime   IVPB 1000 milliGRAM(s) IV Intermittent every 12 hours  chlorhexidine 2% Cloths 1 Application(s) Topical <User Schedule>  dexAMETHasone  Injectable 6 milliGRAM(s) IV Push daily  enoxaparin Injectable 50 milliGRAM(s) SubCutaneous every 12 hours  metoprolol tartrate Injectable 5 milliGRAM(s) IV Push every 6 hours  remdesivir  IVPB   IV Intermittent   remdesivir  IVPB 100 milliGRAM(s) IV Intermittent every 24 hours  sodium chloride 0.9%. 1000 milliLiter(s) (50 mL/Hr) IV Continuous <Continuous>  sodium phosphate 15 milliMole(s)/250 mL IVPB 15 milliMole(s) IV Intermittent once    MEDICATIONS  (PRN):  acetaminophen     Tablet .. 650 milliGRAM(s) Oral every 6 hours PRN Temp greater or equal to 38C (100.4F), Mild Pain (1 - 3)  morphine  - Injectable 2 milliGRAM(s) IV Push every 6 hours PRN Severe Pain (7 - 10)        CXR reviewed

## 2023-05-11 NOTE — PROGRESS NOTE ADULT - SUBJECTIVE AND OBJECTIVE BOX
Hospital Day:  2d    Subjective:    Patient is a 82y old  Female who presents with a chief complaint of Cough; Hypoxia. no acute events overnight.     Admitted to medicine for a primary diagnosis of COVID pna     Past Medical Hx:   HTN (hypertension)    Anxiety    Arthritis    LBD (Lewy body dementia)    Ulcer of sacral region, stage 4    Chronic indwelling Mcdaniel catheter    Chronic atrial fibrillation      Past Sx:  No significant past surgical history    H/O hernia repair      Allergies:  No Known Allergies    Current Meds:   Standng Meds:  cefepime   IVPB 1000 milliGRAM(s) IV Intermittent every 12 hours  chlorhexidine 2% Cloths 1 Application(s) Topical <User Schedule>  dexAMETHasone  Injectable 6 milliGRAM(s) IV Push daily  dextrose 5% + sodium chloride 0.9%. 1000 milliLiter(s) (75 mL/Hr) IV Continuous <Continuous>  enoxaparin Injectable 50 milliGRAM(s) SubCutaneous every 12 hours  metoprolol tartrate Injectable 5 milliGRAM(s) IV Push every 6 hours  remdesivir  IVPB   IV Intermittent   remdesivir  IVPB 100 milliGRAM(s) IV Intermittent every 24 hours  sodium phosphate 15 milliMole(s)/250 mL IVPB 15 milliMole(s) IV Intermittent once    PRN Meds:  acetaminophen     Tablet .. 650 milliGRAM(s) Oral every 6 hours PRN Temp greater or equal to 38C (100.4F), Mild Pain (1 - 3)  morphine  - Injectable 2 milliGRAM(s) IV Push every 6 hours PRN Severe Pain (7 - 10)    HOME MEDICATIONS:  Acidophilus oral tablet: 0.5 milligram(s) orally once a day  busPIRone 30 mg oral tablet: 1 tab(s) orally 2 times a day  cholestyramine 4 g/4.8 g oral powder for reconstitution: 4 gram(s) orally once a day  clonazePAM 0.5 mg oral tablet: 1 tab(s) orally once a day (at bedtime)  gabapentin 300 mg oral capsule: 1 cap(s) orally once a day (at bedtime)  melatonin 5 mg oral tablet: 1 tab(s) orally once a day (at bedtime)  metoprolol tartrate 25 mg oral tablet: orally 2 tabs in AM, 1 tab at noon, 2 tabs in PM  oxycodone-acetaminophen 5 mg-325 mg oral tablet: 1 tab(s) orally every 8 hours as needed for  severe pain  Xarelto 15 mg oral tablet: 1 tab(s) orally once a day with dinner      Vital Signs:   T(F): 98.8 (23 @ 07:15), Max: 101.2 (05-10-23 @ 21:11)  HR: 117 (23 @ 07:15) (105 - 134)  BP: 121/72 (23 @ 07:15) (113/55 - 137/71)  RR: 20 (23 07:15) (18 - 20)  SpO2: 93% (23 @ 07:15) (93% - 97%)      05-10-23 @ 07:01  -  23 @ 07:00  --------------------------------------------------------  IN: 600 mL / OUT: 600 mL / NET: 0 mL        Physical Exam:   GENERAL: ill appearing   HEENT: NCAT  CHEST/LUNG: CTAB  HEART: irregular rate and rhythm; s1 s2 appreciated, No murmurs, rubs, or gallops  ABDOMEN: Soft, Nontender, Nondistended; Bowel sounds present  EXTREMITIES: No LE edema b/l  SKIN: no rashes, no new lesions  NERVOUS SYSTEM: Somnolent   LINES/CATHETERS:        Labs:                         13.4   13.48 )-----------( 237      ( 11 May 2023 06:42 )             40.9     Neutophil% 91.5, Lymphocyte% 4.6, Monocyte% 3.2, Bands% 0.6 23 @ 06:42    11 May 2023 06:42    141    |  108    |  13     ----------------------------<  99     2.8     |  23     |  <0.5     Ca    8.2        11 May 2023 06:42  Phos  2.0       10 May 2023 08:00  Mg     2.1       11 May 2023 06:42    TPro  4.6    /  Alb  2.2    /  TBili  0.6    /  DBili  x      /  AST  13     /  ALT  <5     /  AlkPhos  94     11 May 2023 06:42       pTT    39.3             ----< 1.31 INR  (23 @ 06:42)    15.00        PT          Troponin <0.01, CKMB --, CK -- 05-10-23 @ 08:00        Urinalysis Basic - ( 09 May 2023 18:14 )    Color: Yellow / Appearance: Turbid / S.031 / pH: x  Gluc: x / Ketone: Trace  / Bili: Negative / Urobili: <2 mg/dL   Blood: x / Protein: 300 mg/dL / Nitrite: Positive   Leuk Esterase: Large / RBC: 3 /HPF /  /HPF   Sq Epi: x / Non Sq Epi: x / Bacteria: Many          Culture - Blood (collected 23 @ 18:01)  Source: .Blood Blood-Peripheral  Preliminary Report (23 @ 01:02):    No growth to date.    Culture - Blood (collected 23 @ 18:01)  Source: .Blood Blood-Peripheral  Preliminary Report (23 @ 01:02):    No growth to date.

## 2023-05-11 NOTE — PROGRESS NOTE ADULT - SUBJECTIVE AND OBJECTIVE BOX
HPI:  82F w/ h/o Lewy Body Dementia (A&O 1-2 baseline), chronic Mcdaniel (persistent retention; last exchanged on ), chronic afib (on Xarelto), and stage 4 sacral ulcer p/w cough and hypoxia since yesterday. History limited from patient due to patient's current mental status. Family at bedside (daughter and two sons) able to provide collateral. Since yesterday, pt has had cough, increased secretions, general malaise, and worsening mental status (minimally verbal; typically able to say few words at baseline). This AM, pt noted to be tachycardic and hypoxic to 80's%, prompting ED evaluation. Of note, patient known to have dysphagia, but able to tolerate PO. No reported fevers, chills, CP, palpitations, abdominal pain, n/v/d, dysuria, or LE swelling per family.     In ED, pt febrile (T 101.8F), tachycardic (), and tachypneic (RR 20's-30's) on NRB, but saturating in mid-90's%. Labs demonstrated WBC 24.45. VBG showed pH 7.41, pCO2 37, and Lactate 3.9. UA concerning for UTI. CXR concerning for right-sided PNA. S/P 1.7L LR bolus and IV abx (Vanc/Zosyn). Admitted to SDU for severe sepsis i/s/o aspiration pneumonia vs UTI. (09 May 2023 21:41)    Interval History  -Patient seen at bedside  -She denied any pain or symptoms    ADVANCE DIRECTIVES:    [ ] Full Code [x ] DNR  MOLST  [ ]  Living Will  [ ]   DECISION MAKER(s):  [ x] Health Care Proxy(s)  [ x] Surrogate(s)  [ ] Guardian           Name(s): Phone Number(s):  HCP reportedly son Edward per daughter - [phone]    BASELINE (I)ADL(s) (prior to admission):  [place]: [ ]Total  [ ] Moderate [ ]Dependent  Palliative Performance Status Version 2:         %    http://npcrc.org/files/news/palliative_performance_scale_ppsv2.pdf    Allergies    No Known Allergies    Intolerances    MEDICATIONS  (STANDING):  cefepime   IVPB 1000 milliGRAM(s) IV Intermittent every 12 hours  chlorhexidine 2% Cloths 1 Application(s) Topical <User Schedule>  dexAMETHasone  Injectable 6 milliGRAM(s) IV Push daily  dextrose 5% + sodium chloride 0.9%. 1000 milliLiter(s) (75 mL/Hr) IV Continuous <Continuous>  diltiazem Infusion 5 mG/Hr (5 mL/Hr) IV Continuous <Continuous>  metoprolol tartrate Injectable 5 milliGRAM(s) IV Push every 6 hours  potassium chloride  20 mEq/100 mL IVPB 20 milliEquivalent(s) IV Intermittent every 2 hours  remdesivir  IVPB   IV Intermittent   remdesivir  IVPB 100 milliGRAM(s) IV Intermittent every 24 hours  sodium phosphate 15 milliMole(s)/250 mL IVPB 15 milliMole(s) IV Intermittent once    MEDICATIONS  (PRN):  acetaminophen     Tablet .. 650 milliGRAM(s) Oral every 6 hours PRN Temp greater or equal to 38C (100.4F), Mild Pain (1 - 3)  morphine  - Injectable 2 milliGRAM(s) IV Push every 6 hours PRN Severe Pain (7 - 10)    PRESENT SYMPTOMS: [ ]Unable to obtain due to poor mentation   Source if other than patient:  [ ]Family   [ ]Team     Pain: [ ]yes [x]no  QOL impact -   Location -                    Aggravating factors -  Quality -  Radiation -  Timing-  Severity (0-10 scale):  Minimal acceptable level (0-10 scale):     CPOT:    https://www.sccm.org/getattachment/wth06i94-4k2r-1l6x-1d2y-3205n0537w1a/Critical-Care-Pain-Observation-Tool-(CPOT)    PAIN AD Score: 0  http://geriatrictoolkit.[place].[facility]/cog/painad.pdf (press ctrl +  left click to view)    Dyspnea:                           [ x]None[ ]Mild [ ]Moderate [ ]Severe     Respiratory Distress Observation Scale (RDOS):   A score of 0 to 2 signifies little or no respiratory distress, 3 signifies mild distress, scores 4 to 6 indicate moderate distress, and scores greater than 7 signify severe distress  https://www.[facility].ca/sites/default/files/PDFS/561843-oaaifgfigdl-eiayqxqg-jongyozpydf-mkfdk.pdf    Anxiety:                             [x ]None[ ]Mild [ ]Moderate [ ]Severe   Fatigue:                             [x ]None[ ]Mild [ ]Moderate [ ]Severe   Nausea:                             [ x]None[ ]Mild [ ]Moderate [ ]Severe   Loss of appetite:              [x ]None[ ]Mild [ ]Moderate [ ]Severe   Constipation:                    [x ]None[ ]Mild [ ]Moderate [ ]Severe    Other Symptoms:  [ x]All other review of systems negative     Palliative Performance Status Version 2:         30%    http://[facility].org/files/news/palliative_performance_scale_ppsv2.pdf    PHYSICAL EXAM:  Vital Signs Last 24 Hrs  T(C): 35.9 (11 May 2023 16:00), Max: 38.4 (10 May 2023 21:11)  T(F): 96.6 (11 May 2023 16:00), Max: 101.2 (10 May 2023 21:11)  HR: 134 (11 May 2023 16:00) (105 - 134)  BP: 121/79 (11 May 2023 16:00) (113/55 - 133/91)  BP(mean): 98 (11 May 2023 16:00) (71 - 108)  RR: 20 (11 May 2023 16:00) (18 - 20)  SpO2: 95% (11 May 2023 16:00) (93% - 98%)    Parameters below as of 11 May 2023 16:00  Patient On (Oxygen Delivery Method): nasal cannula    GENERAL:  [x ] No acute distress [ ]Lethargic  [ ]Unarousable  [ x]Verbal  [ ]Non-Verbal [ ]Cachexia    BEHAVIORAL/PSYCH:  [ x]Alert and Oriented x2  [ ] Anxiety [ ] Delirium [ ] Agitation [x ] Calm   EYES: [ x] No scleral icterus [ ] Scleral icterus [ ] Closed  ENMT:  [ ]Dry mouth  [ x]No external oral lesions [ ] No external ear or nose lesions  CARDIOVASCULAR:  [ ]Regular [ ]Irregular [ x]Tachy [ ]Not Tachy  [ ][place]a [ ] Edema [ ] No edema  PULMONARY:  [ ]Tachypnea  [ ]Audible excessive secretions [ x] No labored breathing [ ] labored breathing  GASTROINTESTINAL: [ ]Soft  [ ]Distended  [x ]Not distended [ ]Non tender [ ]Tender  MUSCULOSKELETAL: [ ]No clubbing [ ] clubbing  [ ] No cyanosis [ ] cyanosis  NEUROLOGIC: [ ]No focal deficits  [x ]Follows some commands  [ ]Does not follow commands  [ ]Cognitive impairment  [ ]Dysphagia  [ ]Dysarthria  [ ]Paresis   SKIN: [ ] Jaundiced [ x] Non-jaundiced [ ]Rash [ ]No Rash [ ] Warm [ ] Dry  MISC/LINES: [ ] ET tube [ ] Trach [ ]NGT/OGT [ ]PEG [ ]Mcdaniel    CRITICAL CARE:  [ ] Shock Present  [ ]Septic [ ]Cardiogenic [ ]Neurologic [ ]Hypovolemic  [ ]  Vasopressors [ ]  Inotropes   [x ]Respiratory failure present [ ]Mechanical ventilation [ ]Non-invasive ventilatory support [ ]High flow  [ ]Acute  [ ]Chronic [ ]Hypoxic  [ ]Hypercarbic [ ]Other  [ ]Other organ failure     LABS: reviewed by me                          13.4   13.48 )-----------( 237      ( 11 May 2023 06:42 )             40.9       05-11    141  |  108  |  13  ----------------------------<  99  2.8<L>   |  23  |  <0.5<L>    Ca    8.2<L>      11 May 2023 06:42  Phos  2.0     05-10  Mg     2.1     05-11    TPro  4.6<L>  /  Alb  2.2<L>  /  TBili  0.6  /  DBili  x   /  AST  13  /  ALT  <5  /  AlkPhos  94  05-11              Urinalysis Basic - ( 09 May 2023 18:14 )    Color: Yellow / Appearance: Turbid / S.031 / pH: x  Gluc: x / Ketone: Trace  / Bili: Negative / Urobili: <2 mg/dL   Blood: x / Protein: 300 mg/dL / Nitrite: Positive   Leuk Esterase: Large / RBC: 3 /HPF /  /HPF   Sq Epi: x / Non Sq Epi: x / Bacteria: Many        PT/INR - ( 11 May 2023 06:42 )   PT: 15.00 sec;   INR: 1.31 ratio         PTT - ( 11 May 2023 06:42 )  PTT:39.3 sec    CARDIAC MARKERS ( 11 May 2023 11:27 )  x     / <0.01 ng/mL / x     / x     / x      CARDIAC MARKERS ( 10 May 2023 08:00 )  x     / <0.01 ng/mL / x     / x     / x            CAPILLARY BLOOD GLUCOSE      POCT Blood Glucose.: 155 mg/dL (09 May 2023 17:50)              EKG: reviewed by me  < from: 12 Lead ECG (23 @ 22:21) >  Ventricular Rate 146 BPM    Atrial Rate 468 BPM    QRS Duration 82 ms    Q-T Interval 236 ms    QTC Calculation(Bazett) 367 ms    R Axis 72 degrees    T Axis -25 degrees    Diagnosis Line Atrial flutter with variable AV block  Nonspecific ST and T wave abnormality  Abnormal ECG    < end of copied text >      PROTEIN CALORIE MALNUTRITION PRESENT: [ ]mild [ ]moderate [ ]severe [ ]underweight [ ]morbid obesity  https://www.andeal.org/vault/2440/web/files/ONC/Table_Clinical%20Characteristics%20to%20Document%20Malnutrition-White%20JV%20et%20al%2020.pdf    Height (cm): 167.6 (23 @ 17:36), 172.7 (22 @ 19:08), 172.7 (22 @ 19:56)  Weight (kg): 54.4 (23 @ 17:36), 63.2 (22 @ 04:10), 68 (22 @ 19:56)  BMI (kg/m2): 19.4 (23 @ 17:36), 21.2 (22 @ 04:10), 22.8 (22 @ 19:08)    [ ]PPSV2 < or = to 30% [ ]significant weight loss  [ ]poor nutritional intake  [ ]anasarca      [ ]Artificial Nutrition      REFERRALS:   [ ]Chaplaincy  [ x]Hospice  [ ]Child Life  [x ]Social Work  [ ]Case management [ ]Holistic Therapy     Patient discussed with primary medical team MD  Palliative care education provided to patient and/or family    Goals of Care Document:

## 2023-05-11 NOTE — PROGRESS NOTE ADULT - ASSESSMENT
82 year old woman with history of Lewy Body Dementia. stage IV sacral ulcer, presents with cough and hypoxia. Palliative care consulted for C.    Spoke with daughter over the phone. She deferred to her brother, Fernando.  Called and spoke with Fernando, and he was at the urgent care and couldn't speak at length. Will plan to meet at bedside at 9:45am tomorrow.    Education about palliative care provided to patient/family.  See Recs below.    Please call [phone] with questions or concerns 24/7.   We will continue to follow.

## 2023-05-11 NOTE — PROGRESS NOTE ADULT - PROBLEM SELECTOR PLAN 2
In the setting of Lewy Body dementia.  Family at bedside notes likely does not wish to pursue PEG, but may be ok with temporary NGT  -F/u S+S  -hospice consult  -ongoing discussions regarding artificial nutrition and hospice

## 2023-05-12 LAB
-  AMIKACIN: SIGNIFICANT CHANGE UP
-  AMOXICILLIN/CLAVULANIC ACID: SIGNIFICANT CHANGE UP
-  AMPICILLIN/SULBACTAM: SIGNIFICANT CHANGE UP
-  AMPICILLIN: SIGNIFICANT CHANGE UP
-  AZTREONAM: SIGNIFICANT CHANGE UP
-  CEFAZOLIN: SIGNIFICANT CHANGE UP
-  CEFEPIME: SIGNIFICANT CHANGE UP
-  CEFOXITIN: SIGNIFICANT CHANGE UP
-  CEFTRIAXONE: SIGNIFICANT CHANGE UP
-  CEFUROXIME: SIGNIFICANT CHANGE UP
-  CIPROFLOXACIN: SIGNIFICANT CHANGE UP
-  ERTAPENEM: SIGNIFICANT CHANGE UP
-  GENTAMICIN: SIGNIFICANT CHANGE UP
-  IMIPENEM: SIGNIFICANT CHANGE UP
-  LEVOFLOXACIN: SIGNIFICANT CHANGE UP
-  MEROPENEM: SIGNIFICANT CHANGE UP
-  NITROFURANTOIN: SIGNIFICANT CHANGE UP
-  PIPERACILLIN/TAZOBACTAM: SIGNIFICANT CHANGE UP
-  TOBRAMYCIN: SIGNIFICANT CHANGE UP
-  TRIMETHOPRIM/SULFAMETHOXAZOLE: SIGNIFICANT CHANGE UP
ALBUMIN SERPL ELPH-MCNC: 2.6 G/DL — LOW (ref 3.5–5.2)
ALP SERPL-CCNC: 83 U/L — SIGNIFICANT CHANGE UP (ref 30–115)
ALT FLD-CCNC: <5 U/L — SIGNIFICANT CHANGE UP (ref 0–41)
ANION GAP SERPL CALC-SCNC: 13 MMOL/L — SIGNIFICANT CHANGE UP (ref 7–14)
AST SERPL-CCNC: 12 U/L — SIGNIFICANT CHANGE UP (ref 0–41)
BASOPHILS # BLD AUTO: 0.02 K/UL — SIGNIFICANT CHANGE UP (ref 0–0.2)
BASOPHILS NFR BLD AUTO: 0.2 % — SIGNIFICANT CHANGE UP (ref 0–1)
BILIRUB SERPL-MCNC: 0.6 MG/DL — SIGNIFICANT CHANGE UP (ref 0.2–1.2)
BUN SERPL-MCNC: 15 MG/DL — SIGNIFICANT CHANGE UP (ref 10–20)
CALCIUM SERPL-MCNC: 8.3 MG/DL — LOW (ref 8.4–10.5)
CHLORIDE SERPL-SCNC: 108 MMOL/L — SIGNIFICANT CHANGE UP (ref 98–110)
CO2 SERPL-SCNC: 20 MMOL/L — SIGNIFICANT CHANGE UP (ref 17–32)
CREAT SERPL-MCNC: <0.5 MG/DL — LOW (ref 0.7–1.5)
CULTURE RESULTS: SIGNIFICANT CHANGE UP
EGFR: 99 ML/MIN/1.73M2 — SIGNIFICANT CHANGE UP
EOSINOPHIL # BLD AUTO: 0 K/UL — SIGNIFICANT CHANGE UP (ref 0–0.7)
EOSINOPHIL NFR BLD AUTO: 0 % — SIGNIFICANT CHANGE UP (ref 0–8)
GLUCOSE SERPL-MCNC: 179 MG/DL — HIGH (ref 70–99)
HCT VFR BLD CALC: 44 % — SIGNIFICANT CHANGE UP (ref 37–47)
HGB BLD-MCNC: 14.6 G/DL — SIGNIFICANT CHANGE UP (ref 12–16)
IMM GRANULOCYTES NFR BLD AUTO: 1.3 % — HIGH (ref 0.1–0.3)
LYMPHOCYTES # BLD AUTO: 0.4 K/UL — LOW (ref 1.2–3.4)
LYMPHOCYTES # BLD AUTO: 4.8 % — LOW (ref 20.5–51.1)
MAGNESIUM SERPL-MCNC: 1.9 MG/DL — SIGNIFICANT CHANGE UP (ref 1.8–2.4)
MCHC RBC-ENTMCNC: 29.2 PG — SIGNIFICANT CHANGE UP (ref 27–31)
MCHC RBC-ENTMCNC: 33.2 G/DL — SIGNIFICANT CHANGE UP (ref 32–37)
MCV RBC AUTO: 88 FL — SIGNIFICANT CHANGE UP (ref 81–99)
METHOD TYPE: SIGNIFICANT CHANGE UP
MONOCYTES # BLD AUTO: 0.18 K/UL — SIGNIFICANT CHANGE UP (ref 0.1–0.6)
MONOCYTES NFR BLD AUTO: 2.2 % — SIGNIFICANT CHANGE UP (ref 1.7–9.3)
NEUTROPHILS # BLD AUTO: 7.58 K/UL — HIGH (ref 1.4–6.5)
NEUTROPHILS NFR BLD AUTO: 91.5 % — HIGH (ref 42.2–75.2)
NRBC # BLD: 0 /100 WBCS — SIGNIFICANT CHANGE UP (ref 0–0)
ORGANISM # SPEC MICROSCOPIC CNT: SIGNIFICANT CHANGE UP
ORGANISM # SPEC MICROSCOPIC CNT: SIGNIFICANT CHANGE UP
PLATELET # BLD AUTO: 346 K/UL — SIGNIFICANT CHANGE UP (ref 130–400)
PMV BLD: 10.7 FL — HIGH (ref 7.4–10.4)
POTASSIUM SERPL-MCNC: 3.7 MMOL/L — SIGNIFICANT CHANGE UP (ref 3.5–5)
POTASSIUM SERPL-SCNC: 3.7 MMOL/L — SIGNIFICANT CHANGE UP (ref 3.5–5)
PROT SERPL-MCNC: 5.1 G/DL — LOW (ref 6–8)
RBC # BLD: 5 M/UL — SIGNIFICANT CHANGE UP (ref 4.2–5.4)
RBC # FLD: 15.6 % — HIGH (ref 11.5–14.5)
SODIUM SERPL-SCNC: 141 MMOL/L — SIGNIFICANT CHANGE UP (ref 135–146)
SPECIMEN SOURCE: SIGNIFICANT CHANGE UP
WBC # BLD: 8.29 K/UL — SIGNIFICANT CHANGE UP (ref 4.8–10.8)
WBC # FLD AUTO: 8.29 K/UL — SIGNIFICANT CHANGE UP (ref 4.8–10.8)

## 2023-05-12 PROCEDURE — 99497 ADVNCD CARE PLAN 30 MIN: CPT

## 2023-05-12 PROCEDURE — 99233 SBSQ HOSP IP/OBS HIGH 50: CPT

## 2023-05-12 PROCEDURE — 70450 CT HEAD/BRAIN W/O DYE: CPT | Mod: 26

## 2023-05-12 PROCEDURE — 99222 1ST HOSP IP/OBS MODERATE 55: CPT

## 2023-05-12 PROCEDURE — 71045 X-RAY EXAM CHEST 1 VIEW: CPT | Mod: 26

## 2023-05-12 RX ORDER — OXYCODONE HYDROCHLORIDE 5 MG/1
5 TABLET ORAL EVERY 8 HOURS
Refills: 0 | Status: DISCONTINUED | OUTPATIENT
Start: 2023-05-12 | End: 2023-05-15

## 2023-05-12 RX ORDER — METOPROLOL TARTRATE 50 MG
25 TABLET ORAL THREE TIMES A DAY
Refills: 0 | Status: DISCONTINUED | OUTPATIENT
Start: 2023-05-12 | End: 2023-05-13

## 2023-05-12 RX ORDER — CLONAZEPAM 1 MG
0.5 TABLET ORAL ONCE
Refills: 0 | Status: DISCONTINUED | OUTPATIENT
Start: 2023-05-12 | End: 2023-05-12

## 2023-05-12 RX ORDER — GABAPENTIN 400 MG/1
300 CAPSULE ORAL AT BEDTIME
Refills: 0 | Status: DISCONTINUED | OUTPATIENT
Start: 2023-05-12 | End: 2023-05-20

## 2023-05-12 RX ORDER — ENOXAPARIN SODIUM 100 MG/ML
50 INJECTION SUBCUTANEOUS EVERY 12 HOURS
Refills: 0 | Status: DISCONTINUED | OUTPATIENT
Start: 2023-05-12 | End: 2023-05-20

## 2023-05-12 RX ORDER — CLONAZEPAM 1 MG
0.5 TABLET ORAL AT BEDTIME
Refills: 0 | Status: DISCONTINUED | OUTPATIENT
Start: 2023-05-12 | End: 2023-05-19

## 2023-05-12 RX ADMIN — MORPHINE SULFATE 2 MILLIGRAM(S): 50 CAPSULE, EXTENDED RELEASE ORAL at 06:41

## 2023-05-12 RX ADMIN — ENOXAPARIN SODIUM 50 MILLIGRAM(S): 100 INJECTION SUBCUTANEOUS at 10:46

## 2023-05-12 RX ADMIN — MORPHINE SULFATE 2 MILLIGRAM(S): 50 CAPSULE, EXTENDED RELEASE ORAL at 00:13

## 2023-05-12 RX ADMIN — CEFEPIME 100 MILLIGRAM(S): 1 INJECTION, POWDER, FOR SOLUTION INTRAMUSCULAR; INTRAVENOUS at 05:16

## 2023-05-12 RX ADMIN — Medication 15 MG/HR: at 10:45

## 2023-05-12 RX ADMIN — MORPHINE SULFATE 2 MILLIGRAM(S): 50 CAPSULE, EXTENDED RELEASE ORAL at 00:25

## 2023-05-12 RX ADMIN — REMDESIVIR 200 MILLIGRAM(S): 5 INJECTION INTRAVENOUS at 15:24

## 2023-05-12 RX ADMIN — Medication 0.5 MILLIGRAM(S): at 15:29

## 2023-05-12 RX ADMIN — Medication 30 MILLIGRAM(S): at 22:26

## 2023-05-12 RX ADMIN — GABAPENTIN 300 MILLIGRAM(S): 400 CAPSULE ORAL at 22:26

## 2023-05-12 RX ADMIN — Medication 6 MILLIGRAM(S): at 05:17

## 2023-05-12 RX ADMIN — CEFEPIME 100 MILLIGRAM(S): 1 INJECTION, POWDER, FOR SOLUTION INTRAMUSCULAR; INTRAVENOUS at 17:48

## 2023-05-12 RX ADMIN — ENOXAPARIN SODIUM 50 MILLIGRAM(S): 100 INJECTION SUBCUTANEOUS at 22:24

## 2023-05-12 RX ADMIN — CHLORHEXIDINE GLUCONATE 1 APPLICATION(S): 213 SOLUTION TOPICAL at 05:17

## 2023-05-12 RX ADMIN — SODIUM CHLORIDE 75 MILLILITER(S): 9 INJECTION, SOLUTION INTRAVENOUS at 10:47

## 2023-05-12 RX ADMIN — Medication 0.5 MILLIGRAM(S): at 22:29

## 2023-05-12 RX ADMIN — Medication 25 MILLIGRAM(S): at 22:26

## 2023-05-12 NOTE — SWALLOW BEDSIDE ASSESSMENT ADULT - SLP PERTINENT HISTORY OF CURRENT PROBLEM
Pt is an 81 y/o F w/ PMHx: Lewy Body Dementia (A&O 1-2 baseline), chronic Mcdaniel (persistent retention; last exchanged on 5/8), chronic afib (on Xarelto), sacral ulcer, p/w cough and hypoxia. Pt COVID-19 (+); Pt seen by ID, CXR findings not compatible with COVID-19 along with the leucocytosis. Bacterial PNA as coinfection. Sepsis 2' multilobar (RUL/RML/lingula) bacterial PNA 2' aspiration. CXR-> Patchy right lung opacities.
Pt is an 83 y/o F w/ PMHx: Lewy Body Dementia (A&O 1-2 baseline), chronic Mcdaniel (persistent retention; last exchanged on 5/8), chronic afib (on Xarelto), sacral ulcer, p/w cough and hypoxia. Pt COVID-19 (+); Pt seen by ID, CXR findings not compatible with COVID-19 along with the leucocytosis. Bacterial PNA as coinfection. Sepsis 2' multilobar (RUL/RML/lingula) bacterial PNA 2' aspiration. CXR-> Patchy right lung opacities.
81yo female w/ h/o Lewy Body Dementia (A&O 1-2 baseline), chronic Mcdaniel (persistent retention; last exchanged on 5/8), chronic afib (on Xarelto), and sacral ulcer p/w cough and hypoxia. Admitted to stepdown unit for severe sepsis r/o aspiration pneumonia vs UTI. CXR: Patchy right lung opacities.

## 2023-05-12 NOTE — PROGRESS NOTE ADULT - ATTENDING COMMENTS
My note supersedes all residents notes that I sign, My correction for their notes are in my notes   the patient is more awake today and interactive  her daughter and son at bedside     General: more awake alert, she followed some simple commands . chronic ill appearance, frail   Lungs:  decrease breath sound b/l , normal resp effort  Heart: regular rhythm , tachy  Abdomen: soft, non tender non distended  Ext: no edema, able to move her hands and feets but very weak     82F w/ h/o Lewy Body Dementia (A&O 1-2 baseline), chronic Hill (persistent retention; last exchanged on 5/8), chronic afib (on Xarelto), and stage 4 sacral ulcer p/w cough and hypoxia. Admitted to stepdown unit for severe sepsis i/s/o aspiration pneumonia vs UTI.    Sepsis POA secondary to COVID-19 Pneumonia with possible Aspiration Pneumonia   UTI, in setting of chronic hill   Sacral Ulcer   Hx of Lewy Body Dementia  Hx  Chronic AFIB on Xarelto    ID input appreciated  c/W -Cefepime 1 gm iv q12h and RDV   C/W dexamethasone as per pulm cc   f/u cultures   on NC   S/S eval appreciated - started puree diet   appreciate palliative care  lactate trended down   c/w gentle hydration   neurochecks   CT head wo contrast today and resume lovenox tonight if no ICH   s/s re eval     []AFib with RVR  suspect driven by sepsis  Lovenox  therapeutic for now   on Cardizem ggt  as she was NPO , taper off and resume home meds metoprolol   monitor and correct electrolytes       []Sacral/skin  wounds:  burn team input appreciated  no burn surgical intervention, - continue local wound care BID  - wash with soap and water, apply hydrogel, cover with allevyn pad/ - burn signing off  - consult wound care nursing for further wound care recs if needed- cosult placed   frequent turning, off loading, and positioning and skin care as per protocol, Maintain pressure injury prevention, Keep skin clean, Offload heels, Monitor wound for changes and notify provider if any    Hx of Lewy Body Dementia  CT head no acute findings   resume po meds buspar, klonopin and oxy, gabapentin     poor prognosis , DNR/DNI appreciated palliative and hospice input      monitor resp status, heart rate

## 2023-05-12 NOTE — SWALLOW BEDSIDE ASSESSMENT ADULT - NS SPL SWALLOW CLINIC TRIAL FT
bolus removed from oral cavity
no overt s/s of penetration/aspiration observed with all trials, aspiration cannot be ruled out at the bedside. Discussed assessment with pt's son Fernando, who stated that they do not wish to pursue alternative means of nutrition/hydration and want to orally feed the patient for comfort only. Pt's son verbalized understanding that aspiration cannot be ruled out at the bedside, and wishes to feed his mother despite the risks associated with an oral diet

## 2023-05-12 NOTE — SWALLOW BEDSIDE ASSESSMENT ADULT - SWALLOW EVAL: RECOMMENDED DIET
continue NPO w/ non-oral means of nutrition/hydration
Puree solids with mildly thick liquids as tolerated for comfort
NPO with alternate means for nutrition/ hydration

## 2023-05-12 NOTE — CONSULT NOTE ADULT - SUBJECTIVE AND OBJECTIVE BOX
Outpt cardiologist:    HPI:  82F w/ h/o Lewy Body Dementia (A&O 1-2 baseline), chronic Mcdaniel (persistent retention; last exchanged on 5/8), chronic afib (on Xarelto), and stage 4 sacral ulcer p/w cough and hypoxia since yesterday. History limited from patient due to patient's current mental status. Family at bedside (daughter and two sons) able to provide collateral. Since yesterday, pt has had cough, increased secretions, general malaise, and worsening mental status (minimally verbal; typically able to say few words at baseline). This AM, pt noted to be tachycardic and hypoxic to 80's%, prompting ED evaluation. Of note, patient known to have dysphagia, but able to tolerate PO. No reported fevers, chills, CP, palpitations, abdominal pain, n/v/d, dysuria, or LE swelling per family.     In ED, pt febrile (T 101.8F), tachycardic (), and tachypneic (RR 20's-30's) on NRB, but saturating in mid-90's%. Labs demonstrated WBC 24.45. VBG showed pH 7.41, pCO2 37, and Lactate 3.9. UA concerning for UTI. CXR concerning for right-sided PNA. S/P 1.7L LR bolus and IV abx (Vanc/Zosyn). Admitted to SDU for severe sepsis i/s/o aspiration pneumonia vs UTI. (09 May 2023 21:41)        PAST MEDICAL & SURGICAL HISTORY  Anxiety    Arthritis    LBD (Lewy body dementia)    Ulcer of sacral region, stage 4    Chronic indwelling Mcdaniel catheter    Chronic atrial fibrillation    H/O hernia repair        FAMILY HISTORY:  FAMILY HISTORY:  No pertinent family history in first degree relatives        SOCIAL HISTORY:  Social History:  Lives w/ son. Has had progressively worsening functional status since recent hospitalization in July 2022. 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 Requires 24 HHA. (09 May 2023 21:41)      ALLERGIES:  No Known Allergies      MEDICATIONS:  cefepime   IVPB 1000 milliGRAM(s) IV Intermittent every 12 hours  chlorhexidine 2% Cloths 1 Application(s) Topical <User Schedule>  clonazePAM  Tablet 0.5 milliGRAM(s) Oral once  dexAMETHasone  Injectable 6 milliGRAM(s) IV Push daily  dextrose 5% + sodium chloride 0.9%. 1000 milliLiter(s) (75 mL/Hr) IV Continuous <Continuous>  diltiazem Infusion 15 mG/Hr (15 mL/Hr) IV Continuous <Continuous>  enoxaparin Injectable 50 milliGRAM(s) SubCutaneous every 12 hours  remdesivir  IVPB   IV Intermittent   remdesivir  IVPB 100 milliGRAM(s) IV Intermittent every 24 hours    PRN:  acetaminophen     Tablet .. 650 milliGRAM(s) Oral every 6 hours PRN  morphine  - Injectable 2 milliGRAM(s) IV Push every 6 hours PRN      HOME MEDICATIONS:  Home Medications:  Acidophilus oral tablet: 0.5 milligram(s) orally once a day (09 May 2023 21:40)  busPIRone 30 mg oral tablet: 1 tab(s) orally 2 times a day (09 May 2023 21:40)  cholestyramine 4 g/4.8 g oral powder for reconstitution: 4 gram(s) orally once a day (09 May 2023 21:40)  clonazePAM 0.5 mg oral tablet: 1 tab(s) orally once a day (at bedtime) (09 May 2023 21:39)  gabapentin 300 mg oral capsule: 1 cap(s) orally once a day (at bedtime) (09 May 2023 21:41)  melatonin 5 mg oral tablet: 1 tab(s) orally once a day (at bedtime) (09 May 2023 21:41)  metoprolol tartrate 25 mg oral tablet: orally 2 tabs in AM, 1 tab at noon, 2 tabs in PM (09 May 2023 21:41)  oxycodone-acetaminophen 5 mg-325 mg oral tablet: 1 tab(s) orally every 8 hours as needed for  severe pain (09 May 2023 21:41)  Xarelto 15 mg oral tablet: 1 tab(s) orally once a day with dinner (09 May 2023 21:41)      VITALS:   T(F): 97.9 (05-12 @ 11:23), Max: 102.4 (05-09 @ 20:11)  HR: 141 (05-12 @ 11:23) (37 - 160)  BP: 110/79 (05-12 @ 11:23) (106/66 - 137/71)  BP(mean): 79 (05-11 @ 20:00) (71 - 108)  RR: 20 (05-12 @ 11:23) (18 - 20)  SpO2: 97% (05-12 @ 11:23) (92% - 99%)    I&O's Summary    11 May 2023 07:01  -  12 May 2023 07:00  --------------------------------------------------------  IN: 0 mL / OUT: 400 mL / NET: -400 mL        REVIEW OF SYSTEMS:  CONSTITUTIONAL: No weakness, fevers or chills  HEENT: No visual changes, neck/ear pain  RESPIRATORY: No cough, sob  CARDIOVASCULAR: See HPI  GASTROINTESTINAL: No abdominal pain. No nausea, vomiting, diarrhea   GENITOURINARY: No dysuria, frequency or hematuria  NEUROLOGICAL: No new focal deficits  SKIN: No new rashes    PHYSICAL EXAM:  General: Not in distress.  Non-toxic appearing.   HEENT: EOMI  Cardio: irregular, S1, S2, no murmur  Pulm: B/L BS.  No wheezing / crackles / rales  Abdomen: Soft, non-tender, non-distended. Normoactive bowel sounds  Extremities: No edema b/l le  Neuro: A&O x3. No focal deficits    LABS:                        14.6   8.29  )-----------( 346      ( 12 May 2023 12:10 )             44.0     05-12    141  |  108  |  15  ----------------------------<  179<H>  3.7   |  20  |  <0.5<L>    Ca    8.3<L>      12 May 2023 12:10  Mg     1.9     05-12    TPro  5.1<L>  /  Alb  2.6<L>  /  TBili  0.6  /  DBili  x   /  AST  12  /  ALT  <5  /  AlkPhos  83  05-12    PT/INR - ( 11 May 2023 06:42 )   PT: 15.00 sec;   INR: 1.31 ratio         PTT - ( 11 May 2023 06:42 )  PTT:39.3 sec    CARDIAC MARKERS ( 11 May 2023 11:27 )  x     / <0.01 ng/mL / x     / x     / x        SARS-CoV-2: Detected (10 May 2023 01:45)      RADIOLOGY:  Xray Chest 1 View- PORTABLE-Routine (Xray Chest 1 View- PORTABLE-Routine in AM.) (05.12.23): stable patchy right lung opacities. Low lung volumes Outpt cardiologist:    HPI:  82F w/ h/o Lewy Body Dementia (A&O 1-2 baseline), chronic Mcdaniel (persistent retention; last exchanged on 5/8), chronic afib (on Xarelto), and stage 4 sacral ulcer p/w cough and hypoxia since yesterday. History limited from patient due to patient's current mental status. Family at bedside (daughter and two sons) able to provide collateral. Since yesterday, pt has had cough, increased secretions, general malaise, and worsening mental status (minimally verbal; typically able to say few words at baseline). This AM, pt noted to be tachycardic and hypoxic to 80's%, prompting ED evaluation. Of note, patient known to have dysphagia, but able to tolerate PO. No reported fevers, chills, CP, palpitations, abdominal pain, n/v/d, dysuria, or LE swelling per family.     In ED, pt febrile (T 101.8F), tachycardic (), and tachypneic (RR 20's-30's) on NRB, but saturating in mid-90's%. Labs demonstrated WBC 24.45. VBG showed pH 7.41, pCO2 37, and Lactate 3.9. UA concerning for UTI. CXR concerning for right-sided PNA. S/P 1.7L LR bolus and IV abx (Vanc/Zosyn). Admitted to SDU for severe sepsis i/s/o aspiration pneumonia vs UTI. (09 May 2023 21:41)        PAST MEDICAL & SURGICAL HISTORY  Anxiety    Arthritis    LBD (Lewy body dementia)    Ulcer of sacral region, stage 4    Chronic indwelling Mcdaniel catheter    Chronic atrial fibrillation    H/O hernia repair        FAMILY HISTORY:  FAMILY HISTORY:  No pertinent family history in first degree relatives        SOCIAL HISTORY:  Social History:  Lives w/ son. Has had progressively worsening functional status since recent hospitalization in July 2022. 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 Requires 24 HHA. (09 May 2023 21:41)      ALLERGIES:  No Known Allergies      MEDICATIONS:  cefepime   IVPB 1000 milliGRAM(s) IV Intermittent every 12 hours  chlorhexidine 2% Cloths 1 Application(s) Topical <User Schedule>  clonazePAM  Tablet 0.5 milliGRAM(s) Oral once  dexAMETHasone  Injectable 6 milliGRAM(s) IV Push daily  dextrose 5% + sodium chloride 0.9%. 1000 milliLiter(s) (75 mL/Hr) IV Continuous <Continuous>  diltiazem Infusion 15 mG/Hr (15 mL/Hr) IV Continuous <Continuous>  enoxaparin Injectable 50 milliGRAM(s) SubCutaneous every 12 hours  remdesivir  IVPB   IV Intermittent   remdesivir  IVPB 100 milliGRAM(s) IV Intermittent every 24 hours    PRN:  acetaminophen     Tablet .. 650 milliGRAM(s) Oral every 6 hours PRN  morphine  - Injectable 2 milliGRAM(s) IV Push every 6 hours PRN      HOME MEDICATIONS:  Home Medications:  Acidophilus oral tablet: 0.5 milligram(s) orally once a day (09 May 2023 21:40)  busPIRone 30 mg oral tablet: 1 tab(s) orally 2 times a day (09 May 2023 21:40)  cholestyramine 4 g/4.8 g oral powder for reconstitution: 4 gram(s) orally once a day (09 May 2023 21:40)  clonazePAM 0.5 mg oral tablet: 1 tab(s) orally once a day (at bedtime) (09 May 2023 21:39)  gabapentin 300 mg oral capsule: 1 cap(s) orally once a day (at bedtime) (09 May 2023 21:41)  melatonin 5 mg oral tablet: 1 tab(s) orally once a day (at bedtime) (09 May 2023 21:41)  metoprolol tartrate 25 mg oral tablet: orally 2 tabs in AM, 1 tab at noon, 2 tabs in PM (09 May 2023 21:41)  oxycodone-acetaminophen 5 mg-325 mg oral tablet: 1 tab(s) orally every 8 hours as needed for  severe pain (09 May 2023 21:41)  Xarelto 15 mg oral tablet: 1 tab(s) orally once a day with dinner (09 May 2023 21:41)      VITALS:   T(F): 97.9 (05-12 @ 11:23), Max: 102.4 (05-09 @ 20:11)  HR: 141 (05-12 @ 11:23) (37 - 160)  BP: 110/79 (05-12 @ 11:23) (106/66 - 137/71)  BP(mean): 79 (05-11 @ 20:00) (71 - 108)  RR: 20 (05-12 @ 11:23) (18 - 20)  SpO2: 97% (05-12 @ 11:23) (92% - 99%)    I&O's Summary    11 May 2023 07:01  -  12 May 2023 07:00  --------------------------------------------------------  IN: 0 mL / OUT: 400 mL / NET: -400 mL        REVIEW OF SYSTEMS:  CONSTITUTIONAL: No weakness, fevers or chills  HEENT: No visual changes, neck/ear pain  RESPIRATORY: No cough, sob  CARDIOVASCULAR: See HPI  GASTROINTESTINAL: No abdominal pain. No nausea, vomiting, diarrhea   GENITOURINARY: No dysuria, frequency or hematuria  NEUROLOGICAL: No new focal deficits  SKIN: No new rashes    PHYSICAL EXAM:  General: Not in distress.  Non-toxic appearing.   HEENT: EOMI  Cardio: irregular , S1, S2, no murmur  Pulm: B/L BS.  No wheezing / crackles / rales  Abdomen: Soft, non-tender, non-distended. Normoactive bowel sounds  Extremities: No edema b/l le  Neuro: A&O x3. No focal deficits    LABS:                        14.6   8.29  )-----------( 346      ( 12 May 2023 12:10 )             44.0     05-12    141  |  108  |  15  ----------------------------<  179<H>  3.7   |  20  |  <0.5<L>    Ca    8.3<L>      12 May 2023 12:10  Mg     1.9     05-12    TPro  5.1<L>  /  Alb  2.6<L>  /  TBili  0.6  /  DBili  x   /  AST  12  /  ALT  <5  /  AlkPhos  83  05-12    PT/INR - ( 11 May 2023 06:42 )   PT: 15.00 sec;   INR: 1.31 ratio         PTT - ( 11 May 2023 06:42 )  PTT:39.3 sec    CARDIAC MARKERS ( 11 May 2023 11:27 )  x     / <0.01 ng/mL / x     / x     / x        SARS-CoV-2: Detected (10 May 2023 01:45)      RADIOLOGY:  Xray Chest 1 View- PORTABLE-Routine (Xray Chest 1 View- PORTABLE-Routine in AM.) (05.12.23): stable patchy right lung opacities. Low lung volumes

## 2023-05-12 NOTE — SWALLOW BEDSIDE ASSESSMENT ADULT - NS ASR SWALLOW FINDINGS DISCUS
Dr. Santamaria on unit, RN Isabell, son at bedside/Physician
Physician/Nursing/Family
Pt's daughter, RN and MD Jackson made aware./Physician/Nursing/Family

## 2023-05-12 NOTE — SWALLOW BEDSIDE ASSESSMENT ADULT - SWALLOW EVAL: DIAGNOSIS
+toleration of puree solids with mildly thick liquids w/o overt s/s of penetration/aspiration; aspiration cannot be ruled out at the bedside; low suspicion that patient will meet nutritional needs orally
pt w/o awareness of feeding situation, remains unsafe for PO diet.
Pt. received lethargic, poor secretion management, no spoon stripping. PO trials are not appropriate at this time 2' high aspiration risk

## 2023-05-12 NOTE — CONSULT NOTE ADULT - ATTENDING COMMENTS
AF    Multiple comorbidities as above.  Poor functional status.    Hospitalized with COVID PNA.  Possible aspiration.  Complicated by rapid AF.    BP stable.  Tachycardic on Cardizem gtt.    - Start Metoprolol 25mg q6.  - Wean off Cardizem gtt as tolerated.  - Anticoagulate if bleeding risk acceptable.    Poor prognosis.
events noted, bed bound / body lewy dementia/ sacral ulcer/ presented for pneumonia/ UTI, covid +. plan as above, spoke with daughter at bedside

## 2023-05-12 NOTE — PROGRESS NOTE ADULT - PROBLEM SELECTOR PLAN 2
In the setting of Lewy Body dementia.  Family at bedside notes likely does not wish to pursue PEG, but may be ok with temporary NGT  -F/u S+S  -hospice consult  -ongoing discussions regarding artificial nutrition and hospice In the setting of Lewy Body dementia.  Family at bedside notes likely does not wish to pursue PEG, but may be ok with temporary NGT  -hospice consulted - will follow up follow S+S  -ongoing discussions regarding artificial nutrition and hospice

## 2023-05-12 NOTE — PROGRESS NOTE ADULT - ASSESSMENT
82 year old woman with history of Lewy Body Dementia. stage IV sacral ulcer, presents with cough and hypoxia. Palliative care consulted for GOC.      Education about palliative care provided to patient/family.  See Recs below.    Please call [phone] with questions or concerns 24/7.   We will continue to follow.

## 2023-05-12 NOTE — PROGRESS NOTE ADULT - PROBLEM SELECTOR PLAN 1
Likely aspiration pneumonia with pyuria also noted  -continue cefepime  -continue remdesevir  -f/u ID  -f/u cultures  -S+S not passed today Likely aspiration pneumonia with pyuria also noted  -continue cefepime  -continue remdesevir  -f/u ID  -f/u cultures  -S+S noted - pureed diet recommended in setting of comfort feeds  -will follow up re: GOC and hospice

## 2023-05-12 NOTE — CONSULT NOTE ADULT - ASSESSMENT
# AFIB with RVR likely secodnaty to underlying sepsis   # COVID-19 Pneumonia   # Suspected Aspiration Pneumonia   # E. Coli UTI, in setting of chronic hill   # Lewy Body Dementia    Cardiology consulted for Afib with RVR     - HR 140s  - c/w cardizem ggt  - CHADSASC 4  - EKG: Afib   - ECHO (07/27/22): Nomral LV function   - CXR: b/l congestion   - CT Head: No evidence of acute intracranial hemorrhage.   -  - c/w Lovenox   - c/w dexamethasone, remdesivir and cefepime    - keep Mg > 2.2 and K > 4  - Not a candidate for cath or ablation      INCOMPLETE NOTE   # AFIB with RVR likely secondary to underlying sepsis   # COVID-19 Pneumonia   # Suspected Aspiration Pneumonia   # E. Coli UTI, in setting of chronic Mcdaniel   # Lewy Body Dementia    Cardiology consulted for Afib with RVR     -  --> 110s   - c/w Cardizem ggt  - CHADVASC 4  - EKG: Afib   - ECHO (07/27/22): Normal LV function   - CXR: b/l infiltrates   - CT Head: No evidence of acute intracranial hemorrhage.   - c/w Lovenox   - c/w dexamethasone, remdesivir and cefepime    - keep Mg > 2.2 and K > 4  - Not a candidate for cath or ablation    Overall poor prognosis

## 2023-05-12 NOTE — PROGRESS NOTE ADULT - SUBJECTIVE AND OBJECTIVE BOX
YOAN TAI  82y, Female    All available historical data reviewed    OVERNIGHT EVENTS:  daughter at the bedside  pt is more alert  very weak but dies follow commands  98%NC 4 LIT  no pressors  no diarrhea, soft BM    ROS:  not reliable    VITALS:  T(F): 97.9, Max: 98 (05-12-23 @ 09:32)  HR: 141  BP: 110/79  RR: 20Vital Signs Last 24 Hrs  T(C): 36.6 (12 May 2023 11:23), Max: 36.7 (12 May 2023 09:32)  T(F): 97.9 (12 May 2023 11:23), Max: 98 (12 May 2023 09:32)  HR: 141 (12 May 2023 11:23) (126 - 144)  BP: 110/79 (12 May 2023 11:23) (106/66 - 133/74)  BP(mean): 79 (11 May 2023 20:00) (79 - 79)  RR: 20 (12 May 2023 11:23) (20 - 20)  SpO2: 97% (12 May 2023 11:23) (96% - 99%)    Parameters below as of 12 May 2023 06:00  Patient On (Oxygen Delivery Method): nasal cannula  O2 Flow (L/min): 4      TESTS & MEASUREMENTS:                        14.6   8.29  )-----------( 346      ( 12 May 2023 12:10 )             44.0     05-12    141  |  108  |  15  ----------------------------<  179<H>  3.7   |  20  |  <0.5<L>    Ca    8.3<L>      12 May 2023 12:10  Mg     1.9     05-12    TPro  5.1<L>  /  Alb  2.6<L>  /  TBili  0.6  /  DBili  x   /  AST  12  /  ALT  <5  /  AlkPhos  83  05-12    LIVER FUNCTIONS - ( 12 May 2023 12:10 )  Alb: 2.6 g/dL / Pro: 5.1 g/dL / ALK PHOS: 83 U/L / ALT: <5 U/L / AST: 12 U/L / GGT: x             Culture - Blood (collected 05-11-23 @ 06:42)  Source: .Blood None  Preliminary Report (05-12-23 @ 13:02):    No growth to date.    Culture - Blood (collected 05-09-23 @ 18:01)  Source: .Blood Blood-Peripheral  Preliminary Report (05-11-23 @ 01:02):    No growth to date.    Culture - Blood (collected 05-09-23 @ 18:01)  Source: .Blood Blood-Peripheral  Preliminary Report (05-11-23 @ 01:02):    No growth to date.    Culture - Urine (collected 05-09-23 @ 18:01)  Source: Clean Catch Clean Catch (Midstream)  Final Report (05-12-23 @ 10:55):    >100,000 CFU/ml Escherichia coli  Organism: Escherichia coli (05-12-23 @ 10:55)  Organism: Escherichia coli (05-12-23 @ 10:55)      Method Type: AYDEN      -  Amikacin: S <=16      -  Amoxicillin/Clavulanic Acid: I 16/8      -  Ampicillin: R >16 These ampicillin results predict results for amoxicillin      -  Ampicillin/Sulbactam: I 16/8 Enterobacter, Klebsiella aerogenes, Citrobacter, and Serratia may develop resistance during prolonged therapy (3-4 days)      -  Aztreonam: S <=4      -  Cefazolin: S <=2 For uncomplicated UTI with K. pneumoniae, E. coli, or P. mirablis: AYDEN <=16 is sensitive and AYDEN >=32 is resistant. This also predicts results for oral agents cefaclor, cefdinir, cefpodoxime, cefprozil, cefuroxime axetil, cephalexin and locarbef for uncomplicated UTI. Note that some isolates may be susceptible to these agents while testing resistant to cefazolin.      -  Cefepime: S <=2      -  Cefoxitin: S <=8      -  Ceftriaxone: S <=1 Enterobacter, Klebsiella aerogenes, Citrobacter, and Serratia may develop resistance during prolonged therapy      -  Cefuroxime: S 8      -  Ciprofloxacin: I 0.5      -  Ertapenem: S <=0.5      -  Gentamicin: S <=2      -  Imipenem: S <=1      -  Levofloxacin: S <=0.5      -  Meropenem: S <=1      -  Nitrofurantoin: S <=32 Should not be used to treat pyelonephritis      -  Piperacillin/Tazobactam: S <=8      -  Tobramycin: S <=2      -  Trimethoprim/Sulfamethoxazole: S <=0.5/9.5            RADIOLOGY & ADDITIONAL TESTS:  Personal review of radiological diagnostics performed  Echo and EKG results noted when applicable.     MEDICATIONS:  acetaminophen     Tablet .. 650 milliGRAM(s) Oral every 6 hours PRN  busPIRone 30 milliGRAM(s) Oral two times a day  cefepime   IVPB 1000 milliGRAM(s) IV Intermittent every 12 hours  chlorhexidine 2% Cloths 1 Application(s) Topical <User Schedule>  clonazePAM  Tablet 0.5 milliGRAM(s) Oral at bedtime  dexAMETHasone  Injectable 6 milliGRAM(s) IV Push daily  dextrose 5% + sodium chloride 0.9%. 1000 milliLiter(s) IV Continuous <Continuous>  diltiazem Infusion 15 mG/Hr IV Continuous <Continuous>  enoxaparin Injectable 50 milliGRAM(s) SubCutaneous every 12 hours  gabapentin 300 milliGRAM(s) Oral at bedtime  metoprolol tartrate 25 milliGRAM(s) Oral three times a day  morphine  - Injectable 2 milliGRAM(s) IV Push every 6 hours PRN  oxyCODONE    IR 5 milliGRAM(s) Oral every 8 hours PRN  remdesivir  IVPB   IV Intermittent   remdesivir  IVPB 100 milliGRAM(s) IV Intermittent every 24 hours      ANTIBIOTICS:  cefepime   IVPB 1000 milliGRAM(s) IV Intermittent every 12 hours  remdesivir  IVPB   IV Intermittent   remdesivir  IVPB 100 milliGRAM(s) IV Intermittent every 24 hours

## 2023-05-12 NOTE — PROGRESS NOTE ADULT - ASSESSMENT
· Assessment	  82F w/ h/o Lewy Body Dementia (A&O 1-2 baseline), chronic Hlil (persistent retention; last exchanged on 5/8), chronic afib (on Xarelto), and sacral ulcer p/w cough and hypoxia since yesterday.     In ED, pt febrile (T 101.8F), tachycardic (), and tachypneic (RR 20's-30's) on NRB.    IMPRESSION/RECOMMENDATIONS  Clinically improved  No fevers, responsive, is hungry , WBC WNL    # COVID-19 > not  severe illness  Timeline of infection is unclear based on the clinical history but possible early on and will make recommendations based on this presumption  CXR findings not compatible with COVID-19 along with the leucocytosis  Bacterial PNA as coinfection    -Day 1 : Single  mg IV loading dose  -Day 2-5 : single  mg IV once daily maintenance dose  -Daily CBC,CMP.     # Resolved Sepsis secondary to multilobar ( RUL/RML/lingula ) bacterial PNA secondary to aspiration.  Pt was being fed by her children at home> pureed food  S7S recommendations noted .  Pyuria > indwelling hill catheter since 10/22 ( has a bladder prolapse )  WBC 24.4> 8.2   5/9 BCx NG  Nares ORSA NG    -Cefepime 1 gm iv q12h    # Pressure/ischemic  related sacral ulcer with underlying chronic OM with no abscess/cellulitis ( doubt the sinus tract will heal . This was discussed at length with her daughter and son at the bedside )  -Burn for evaluation of sacral ulcer    Prognosis is very poor  Discussed with her daughter at the bedside

## 2023-05-12 NOTE — PROGRESS NOTE ADULT - SUBJECTIVE AND OBJECTIVE BOX
Patient is a 82y old  Female who presents with a chief complaint of Cough; Hypoxia (11 May 2023 10:51)        Over Night Events: AFIB with RVR started on Cardizem drip      Vital Signs Last 24 Hrs  T(C): 35.8 (12 May 2023 06:00), Max: 36.9 (11 May 2023 12:01)  T(F): 96.5 (12 May 2023 06:00), Max: 98.5 (11 May 2023 12:01)  HR: 144 (12 May 2023 06:00) (117 - 144)  BP: 133/74 (12 May 2023 06:00) (106/66 - 133/91)  BP(mean): 79 (11 May 2023 20:00) (79 - 108)  RR: 20 (12 May 2023 06:00) (20 - 20)  SpO2: 98% (12 May 2023 06:00) (95% - 99%)    O2 Parameters below as of 12 May 2023 06:00  Patient On (Oxygen Delivery Method): nasal cannula  O2 Flow (L/min): 4          CONSTITUTIONAL: Chronically ill appearing,   ENT: Airway patent, Mouth with normal mucosa. No thrush  CARDIAC: Normal rate, Regular rhythm.  No edema  RESPIRATORY: bl rhonchi  GASTROINTESTINAL:Abdomen soft, Non-tender, No guarding, + BS  NEUROLOGICAL: somnolent   SKIN: skin normal color for race, sacral ulcer     05-11-23 @ 07:01  -  05-12-23 @ 07:00  --------------------------------------------------------  IN:  Total IN: 0 mL    OUT:    Indwelling Catheter - Urethral (mL): 400 mL  Total OUT: 400 mL    Total NET: -400 mL          LABS:                            13.4   13.48 )-----------( 237      ( 11 May 2023 06:42 )             40.9                                               05-11    141  |  108  |  13  ----------------------------<  99  2.8<L>   |  23  |  <0.5<L>    Ca    8.2<L>      11 May 2023 06:42  Phos  2.0     05-10  Mg     2.1     05-11    TPro  4.6<L>  /  Alb  2.2<L>  /  TBili  0.6  /  DBili  x   /  AST  13  /  ALT  <5  /  AlkPhos  94  05-11      PT/INR - ( 11 May 2023 06:42 )   PT: 15.00 sec;   INR: 1.31 ratio         PTT - ( 11 May 2023 06:42 )  PTT:39.3 sec                                           CARDIAC MARKERS ( 11 May 2023 11:27 )  x     / <0.01 ng/mL / x     / x     / x      CARDIAC MARKERS ( 10 May 2023 08:00 )  x     / <0.01 ng/mL / x     / x     / x                                                LIVER FUNCTIONS - ( 11 May 2023 06:42 )  Alb: 2.2 g/dL / Pro: 4.6 g/dL / ALK PHOS: 94 U/L / ALT: <5 U/L / AST: 13 U/L / GGT: x                                                  Culture - Blood (collected 09 May 2023 18:01)  Source: .Blood Blood-Peripheral  Preliminary Report (11 May 2023 01:02):    No growth to date.    Culture - Blood (collected 09 May 2023 18:01)  Source: .Blood Blood-Peripheral  Preliminary Report (11 May 2023 01:02):    No growth to date.    Culture - Urine (collected 09 May 2023 18:01)  Source: Clean Catch Clean Catch (Midstream)  Preliminary Report (11 May 2023 11:35):    >100,000 CFU/ml Escherichia coli                                                                                           MEDICATIONS  (STANDING):  cefepime   IVPB 1000 milliGRAM(s) IV Intermittent every 12 hours  chlorhexidine 2% Cloths 1 Application(s) Topical <User Schedule>  dexAMETHasone  Injectable 6 milliGRAM(s) IV Push daily  dextrose 5% + sodium chloride 0.9%. 1000 milliLiter(s) (75 mL/Hr) IV Continuous <Continuous>  diltiazem Infusion 7.5 mG/Hr (7.5 mL/Hr) IV Continuous <Continuous>  remdesivir  IVPB   IV Intermittent   remdesivir  IVPB 100 milliGRAM(s) IV Intermittent every 24 hours    MEDICATIONS  (PRN):  acetaminophen     Tablet .. 650 milliGRAM(s) Oral every 6 hours PRN Temp greater or equal to 38C (100.4F), Mild Pain (1 - 3)  morphine  - Injectable 2 milliGRAM(s) IV Push every 6 hours PRN Severe Pain (7 - 10)      CXR reviewed    Patient is a 82y old  Female who presents with a chief complaint of Cough; Hypoxia (11 May 2023 10:51)        Over Night Events: AFIB with RVR started on Cardizem drip, afebrile      Vital Signs Last 24 Hrs  T(C): 35.8 (12 May 2023 06:00), Max: 36.9 (11 May 2023 12:01)  T(F): 96.5 (12 May 2023 06:00), Max: 98.5 (11 May 2023 12:01)  HR: 144 (12 May 2023 06:00) (117 - 144)  BP: 133/74 (12 May 2023 06:00) (106/66 - 133/91)  BP(mean): 79 (11 May 2023 20:00) (79 - 108)  RR: 20 (12 May 2023 06:00) (20 - 20)  SpO2: 98% (12 May 2023 06:00) (95% - 99%)    O2 Parameters below as of 12 May 2023 06:00  Patient On (Oxygen Delivery Method): nasal cannula  O2 Flow (L/min): 4          CONSTITUTIONAL: Chronically ill appearing,   ENT: Airway patent, Mouth with normal mucosa. No thrush  CARDIAC: Normal rate, Regular rhythm.  No edema  RESPIRATORY: bl rhonchi  GASTROINTESTINAL: Abdomen soft, Non-tender, No guarding, + BS  NEUROLOGICAL: somnolent   SKIN: skin normal color for race, sacral ulcer     05-11-23 @ 07:01  -  05-12-23 @ 07:00  --------------------------------------------------------  IN:  Total IN: 0 mL    OUT:    Indwelling Catheter - Urethral (mL): 400 mL  Total OUT: 400 mL    Total NET: -400 mL          LABS:                            13.4   13.48 )-----------( 237      ( 11 May 2023 06:42 )             40.9                                               05-11    141  |  108  |  13  ----------------------------<  99  2.8<L>   |  23  |  <0.5<L>    Ca    8.2<L>      11 May 2023 06:42  Phos  2.0     05-10  Mg     2.1     05-11    TPro  4.6<L>  /  Alb  2.2<L>  /  TBili  0.6  /  DBili  x   /  AST  13  /  ALT  <5  /  AlkPhos  94  05-11      PT/INR - ( 11 May 2023 06:42 )   PT: 15.00 sec;   INR: 1.31 ratio         PTT - ( 11 May 2023 06:42 )  PTT:39.3 sec                                           CARDIAC MARKERS ( 11 May 2023 11:27 )  x     / <0.01 ng/mL / x     / x     / x      CARDIAC MARKERS ( 10 May 2023 08:00 )  x     / <0.01 ng/mL / x     / x     / x                                                LIVER FUNCTIONS - ( 11 May 2023 06:42 )  Alb: 2.2 g/dL / Pro: 4.6 g/dL / ALK PHOS: 94 U/L / ALT: <5 U/L / AST: 13 U/L / GGT: x                                                  Culture - Blood (collected 09 May 2023 18:01)  Source: .Blood Blood-Peripheral  Preliminary Report (11 May 2023 01:02):    No growth to date.    Culture - Blood (collected 09 May 2023 18:01)  Source: .Blood Blood-Peripheral  Preliminary Report (11 May 2023 01:02):    No growth to date.    Culture - Urine (collected 09 May 2023 18:01)  Source: Clean Catch Clean Catch (Midstream)  Preliminary Report (11 May 2023 11:35):    >100,000 CFU/ml Escherichia coli                                                                                           MEDICATIONS  (STANDING):  cefepime   IVPB 1000 milliGRAM(s) IV Intermittent every 12 hours  chlorhexidine 2% Cloths 1 Application(s) Topical <User Schedule>  dexAMETHasone  Injectable 6 milliGRAM(s) IV Push daily  dextrose 5% + sodium chloride 0.9%. 1000 milliLiter(s) (75 mL/Hr) IV Continuous <Continuous>  diltiazem Infusion 7.5 mG/Hr (7.5 mL/Hr) IV Continuous <Continuous>  remdesivir  IVPB   IV Intermittent   remdesivir  IVPB 100 milliGRAM(s) IV Intermittent every 24 hours    MEDICATIONS  (PRN):  acetaminophen     Tablet .. 650 milliGRAM(s) Oral every 6 hours PRN Temp greater or equal to 38C (100.4F), Mild Pain (1 - 3)  morphine  - Injectable 2 milliGRAM(s) IV Push every 6 hours PRN Severe Pain (7 - 10)      CXR reviewed

## 2023-05-12 NOTE — PROGRESS NOTE ADULT - SUBJECTIVE AND OBJECTIVE BOX
HPI:  82F w/ h/o Lewy Body Dementia (A&O 1-2 baseline), chronic Mcdaniel (persistent retention; last exchanged on ), chronic afib (on Xarelto), and stage 4 sacral ulcer p/w cough and hypoxia since yesterday. History limited from patient due to patient's current mental status. Family at bedside (daughter and two sons) able to provide collateral. Since yesterday, pt has had cough, increased secretions, general malaise, and worsening mental status (minimally verbal; typically able to say few words at baseline). This AM, pt noted to be tachycardic and hypoxic to 80's%, prompting ED evaluation. Of note, patient known to have dysphagia, but able to tolerate PO. No reported fevers, chills, CP, palpitations, abdominal pain, n/v/d, dysuria, or LE swelling per family.     In ED, pt febrile (T 101.8F), tachycardic (), and tachypneic (RR 20's-30's) on NRB, but saturating in mid-90's%. Labs demonstrated WBC 24.45. VBG showed pH 7.41, pCO2 37, and Lactate 3.9. UA concerning for UTI. CXR concerning for right-sided PNA. S/P 1.7L LR bolus and IV abx (Vanc/Zosyn). Admitted to SDU for severe sepsis i/s/o aspiration pneumonia vs UTI. (09 May 2023 21:41)    Interval History  -Patient seen at bedside  -She denied any pain or symptoms    ADVANCE DIRECTIVES:    [ ] Full Code [x ] DNR  MOLST  [ ]  Living Will  [ ]   DECISION MAKER(s):  [ x] Health Care Proxy(s)  [ x] Surrogate(s)  [ ] Guardian           Name(s): Phone Number(s):  HCP reportedly son Edward per daughter - [phone]    BASELINE (I)ADL(s) (prior to admission):  [place]: [ ]Total  [ ] Moderate [ ]Dependent  Palliative Performance Status Version 2:         %    http://npcrc.org/files/news/palliative_performance_scale_ppsv2.pdf    Allergies    No Known Allergies    Intolerances    MEDICATIONS  (STANDING):  cefepime   IVPB 1000 milliGRAM(s) IV Intermittent every 12 hours  chlorhexidine 2% Cloths 1 Application(s) Topical <User Schedule>  dexAMETHasone  Injectable 6 milliGRAM(s) IV Push daily  dextrose 5% + sodium chloride 0.9%. 1000 milliLiter(s) (75 mL/Hr) IV Continuous <Continuous>  diltiazem Infusion 5 mG/Hr (5 mL/Hr) IV Continuous <Continuous>  metoprolol tartrate Injectable 5 milliGRAM(s) IV Push every 6 hours  potassium chloride  20 mEq/100 mL IVPB 20 milliEquivalent(s) IV Intermittent every 2 hours  remdesivir  IVPB   IV Intermittent   remdesivir  IVPB 100 milliGRAM(s) IV Intermittent every 24 hours  sodium phosphate 15 milliMole(s)/250 mL IVPB 15 milliMole(s) IV Intermittent once    MEDICATIONS  (PRN):  acetaminophen     Tablet .. 650 milliGRAM(s) Oral every 6 hours PRN Temp greater or equal to 38C (100.4F), Mild Pain (1 - 3)  morphine  - Injectable 2 milliGRAM(s) IV Push every 6 hours PRN Severe Pain (7 - 10)    PRESENT SYMPTOMS: [ ]Unable to obtain due to poor mentation   Source if other than patient:  [ ]Family   [ ]Team     Pain: [ ]yes [x]no  QOL impact -   Location -                    Aggravating factors -  Quality -  Radiation -  Timing-  Severity (0-10 scale):  Minimal acceptable level (0-10 scale):     CPOT:    https://www.sccm.org/getattachment/pcg82k02-4b7m-7o4v-3v1n-7513b8684o0c/Critical-Care-Pain-Observation-Tool-(CPOT)    PAIN AD Score: 0  http://geriatrictoolkit.[place].[facility]/cog/painad.pdf (press ctrl +  left click to view)    Dyspnea:                           [ x]None[ ]Mild [ ]Moderate [ ]Severe     Respiratory Distress Observation Scale (RDOS):   A score of 0 to 2 signifies little or no respiratory distress, 3 signifies mild distress, scores 4 to 6 indicate moderate distress, and scores greater than 7 signify severe distress  https://[email or web address].ca/sites/default/files/PDFS/778358-hpqviwafdlw-tfjuynfv-ztqopepwzlu-[email or web address].pdf    Anxiety:                             [x ]None[ ]Mild [ ]Moderate [ ]Severe   Fatigue:                             [x ]None[ ]Mild [ ]Moderate [ ]Severe   Nausea:                             [ x]None[ ]Mild [ ]Moderate [ ]Severe   Loss of appetite:              [x ]None[ ]Mild [ ]Moderate [ ]Severe   Constipation:                    [x ]None[ ]Mild [ ]Moderate [ ]Severe    Other Symptoms:  [ x]All other review of systems negative     Palliative Performance Status Version 2:         30%    http://[facility].org/files/news/palliative_performance_scale_[email or web address].pdf    PHYSICAL EXAM:  Vital Signs Last 24 Hrs  T(C): 35.9 (11 May 2023 16:00), Max: 38.4 (10 May 2023 21:11)  T(F): 96.6 (11 May 2023 16:00), Max: 101.2 (10 May 2023 21:11)  HR: 134 (11 May 2023 16:00) (105 - 134)  BP: 121/79 (11 May 2023 16:00) (113/55 - 133/91)  BP(mean): 98 (11 May 2023 16:00) (71 - 108)  RR: 20 (11 May 2023 16:00) (18 - 20)  SpO2: 95% (11 May 2023 16:00) (93% - 98%)    Parameters below as of 11 May 2023 16:00  Patient On (Oxygen Delivery Method): nasal cannula    GENERAL:  [x ] No acute distress [ ]Lethargic  [ ]Unarousable  [ x]Verbal  [ ]Non-Verbal [ ]Cachexia    BEHAVIORAL/PSYCH:  [ x]Alert and Oriented x2  [ ] Anxiety [ ] Delirium [ ] Agitation [x ] Calm   EYES: [ x] No scleral icterus [ ] Scleral icterus [ ] Closed  ENMT:  [ ]Dry mouth  [ x]No external oral lesions [ ] No external ear or nose lesions  CARDIOVASCULAR:  [ ]Regular [ ]Irregular [ x]Tachy [ ]Not Tachy  [ ]Surya [ ] Edema [ ] No edema  PULMONARY:  [ ]Tachypnea  [ ]Audible excessive secretions [ x] No labored breathing [ ] labored breathing  GASTROINTESTINAL: [ ]Soft  [ ]Distended  [x ]Not distended [ ]Non tender [ ]Tender  MUSCULOSKELETAL: [ ]No clubbing [ ] clubbing  [ ] No cyanosis [ ] cyanosis  NEUROLOGIC: [ ]No focal deficits  [x ]Follows some commands  [ ]Does not follow commands  [ ]Cognitive impairment  [ ]Dysphagia  [ ]Dysarthria  [ ]Paresis   SKIN: [ ] Jaundiced [ x] Non-jaundiced [ ]Rash [ ]No Rash [ ] Warm [ ] Dry  MISC/LINES: [ ] ET tube [ ] Trach [ ]NGT/OGT [ ]PEG [ ]Mcdaniel    CRITICAL CARE:  [ ] Shock Present  [ ]Septic [ ]Cardiogenic [ ]Neurologic [ ]Hypovolemic  [ ]  Vasopressors [ ]  Inotropes   [x ]Respiratory failure present [ ]Mechanical ventilation [ ]Non-invasive ventilatory support [ ]High flow  [ ]Acute  [ ]Chronic [ ]Hypoxic  [ ]Hypercarbic [ ]Other  [ ]Other organ failure     LABS: reviewed by me                          13.4   13.48 )-----------( 237      ( 11 May 2023 06:42 )             40.9       05-11    141  |  108  |  13  ----------------------------<  99  2.8<L>   |  23  |  <0.5<L>    Ca    8.2<L>      11 May 2023 06:42  Phos  2.0     05-10  Mg     2.1     05-11    TPro  4.6<L>  /  Alb  2.2<L>  /  TBili  0.6  /  DBili  x   /  AST  13  /  ALT  <5  /  AlkPhos  94  05-11              Urinalysis Basic - ( 09 May 2023 18:14 )    Color: Yellow / Appearance: Turbid / S.031 / pH: x  Gluc: x / Ketone: Trace  / Bili: Negative / Urobili: <2 mg/dL   Blood: x / Protein: 300 mg/dL / Nitrite: Positive   Leuk Esterase: Large / RBC: 3 /HPF /  /HPF   Sq Epi: x / Non Sq Epi: x / Bacteria: Many        PT/INR - ( 11 May 2023 06:42 )   PT: 15.00 sec;   INR: 1.31 ratio         PTT - ( 11 May 2023 06:42 )  PTT:39.3 sec    CARDIAC MARKERS ( 11 May 2023 11:27 )  x     / <0.01 ng/mL / x     / x     / x      CARDIAC MARKERS ( 10 May 2023 08:00 )  x     / <0.01 ng/mL / x     / x     / x            CAPILLARY BLOOD GLUCOSE      POCT Blood Glucose.: 155 mg/dL (09 May 2023 17:50)              EKG: reviewed by me  < from: 12 Lead ECG (23 @ 22:21) >  Ventricular Rate 146 BPM    Atrial Rate 468 BPM    QRS Duration 82 ms    Q-T Interval 236 ms    QTC Calculation(Bazett) 367 ms    R Axis 72 degrees    T Axis -25 degrees    Diagnosis Line Atrial flutter with variable AV block  Nonspecific ST and T wave abnormality  Abnormal ECG    < end of copied text >      PROTEIN CALORIE MALNUTRITION PRESENT: [ ]mild [ ]moderate [ ]severe [ ]underweight [ ]morbid obesity  https://www.andeal.org/vault/2440/web/files/ONC/Table_Clinical%20Characteristics%20to%20Document%20Malnutrition-White%20JV%20et%20al%2020.pdf    Height (cm): 167.6 (23 @ 17:36), 172.7 (22 @ 19:08), 172.7 (22 @ 19:56)  Weight (kg): 54.4 (23 @ 17:36), 63.2 (22 @ 04:10), 68 (22 @ 19:56)  BMI (kg/m2): 19.4 (23 @ 17:36), 21.2 (22 @ 04:10), 22.8 (22 @ 19:08)    [ ]PPSV2 < or = to 30% [ ]significant weight loss  [ ]poor nutritional intake  [ ]anasarca      [ ]Artificial Nutrition      REFERRALS:   [ ]Chaplaincy  [ x]Hospice  [ ]Child Life  [x ]Social Work  [ ]Case management [ ]Holistic Therapy     Patient discussed with primary medical team MD  Palliative care education provided to patient and/or family    Goals of Care Document:    HPI:  82F w/ h/o Lewy Body Dementia (A&O 1-2 baseline), chronic Mcdaniel (persistent retention; last exchanged on 5/8), chronic afib (on Xarelto), and stage 4 sacral ulcer p/w cough and hypoxia since yesterday. History limited from patient due to patient's current mental status. Family at bedside (daughter and two sons) able to provide collateral. Since yesterday, pt has had cough, increased secretions, general malaise, and worsening mental status (minimally verbal; typically able to say few words at baseline). This AM, pt noted to be tachycardic and hypoxic to 80's%, prompting ED evaluation. Of note, patient known to have dysphagia, but able to tolerate PO. No reported fevers, chills, CP, palpitations, abdominal pain, n/v/d, dysuria, or LE swelling per family.     In ED, pt febrile (T 101.8F), tachycardic (), and tachypneic (RR 20's-30's) on NRB, but saturating in mid-90's%. Labs demonstrated WBC 24.45. VBG showed pH 7.41, pCO2 37, and Lactate 3.9. UA concerning for UTI. CXR concerning for right-sided PNA. S/P 1.7L LR bolus and IV abx (Vanc/Zosyn). Admitted to SDU for severe sepsis i/s/o aspiration pneumonia vs UTI. (09 May 2023 21:41)    Interval History  -Patient seen at bedside  -She denied any pain or symptoms    ADVANCE DIRECTIVES:    [ ] Full Code [x ] DNR  MOLST  [ ]  Living Will  [ ]   DECISION MAKER(s):  [ x] Health Care Proxy(s)  [ x] Surrogate(s)  [ ] Guardian           Name(s): Phone Number(s):  HCP reportedly son Edward per daughter - [phone]    BASELINE (I)ADL(s) (prior to admission):  [place]: [ ]Total  [ ] Moderate [ ]Dependent  Palliative Performance Status Version 2:         %    http://npcrc.org/files/news/palliative_performance_scale_ppsv2.pdf    Allergies    No Known Allergies    Intolerances    MEDICATIONS  (STANDING):  MEDICATIONS  (STANDING):  busPIRone 30 milliGRAM(s) Oral two times a day  cefepime   IVPB 1000 milliGRAM(s) IV Intermittent every 12 hours  chlorhexidine 2% Cloths 1 Application(s) Topical <User Schedule>  clonazePAM  Tablet 0.5 milliGRAM(s) Oral at bedtime  dexAMETHasone  Injectable 6 milliGRAM(s) IV Push daily  dextrose 5% + sodium chloride 0.9%. 1000 milliLiter(s) (75 mL/Hr) IV Continuous <Continuous>  diltiazem Infusion 15 mG/Hr (15 mL/Hr) IV Continuous <Continuous>  enoxaparin Injectable 50 milliGRAM(s) SubCutaneous every 12 hours  gabapentin 300 milliGRAM(s) Oral at bedtime  metoprolol tartrate 25 milliGRAM(s) Oral three times a day  remdesivir  IVPB   IV Intermittent   remdesivir  IVPB 100 milliGRAM(s) IV Intermittent every 24 hours    MEDICATIONS  (PRN):  acetaminophen     Tablet .. 650 milliGRAM(s) Oral every 6 hours PRN Temp greater or equal to 38C (100.4F), Mild Pain (1 - 3)  morphine  - Injectable 2 milliGRAM(s) IV Push every 6 hours PRN Severe Pain (7 - 10)  oxyCODONE    IR 5 milliGRAM(s) Oral every 8 hours PRN Severe Pain (7 - 10)    PRESENT SYMPTOMS: [ ]Unable to obtain due to poor mentation   Source if other than patient:  [ ]Family   [ ]Team     Pain: [ ]yes [x]no  QOL impact -   Location -                    Aggravating factors -  Quality -  Radiation -  Timing-  Severity (0-10 scale):  Minimal acceptable level (0-10 scale):     CPOT:    https://www.sccm.org/getattachment/ifj70e45-4s3s-9g5f-4j3l-3060x6681t5n/Critical-Care-Pain-Observation-Tool-(CPOT)    PAIN AD Score: 0  http://geriatrictoolkit.[place].[facility]/cog/painad.pdf (press ctrl +  left click to view)    Dyspnea:                           [ x]None[ ]Mild [ ]Moderate [ ]Severe     Respiratory Distress Observation Scale (RDOS):   A score of 0 to 2 signifies little or no respiratory distress, 3 signifies mild distress, scores 4 to 6 indicate moderate distress, and scores greater than 7 signify severe distress  https://www.[facility].ca/sites/default/files/PDFS/435330-mfizaemyxhu-wpkeduvc-hefaainmxbk-exkzi.pdf    Anxiety:                             [x ]None[ ]Mild [ ]Moderate [ ]Severe   Fatigue:                             [x ]None[ ]Mild [ ]Moderate [ ]Severe   Nausea:                             [ x]None[ ]Mild [ ]Moderate [ ]Severe   Loss of appetite:              [x ]None[ ]Mild [ ]Moderate [ ]Severe   Constipation:                    [x ]None[ ]Mild [ ]Moderate [ ]Severe    Other Symptoms:  [ x]All other review of systems negative     Palliative Performance Status Version 2:         30%    http://[facility].org/files/news/palliative_performance_scale_ppsv2.pdf    PHYSICAL EXAM:  Vital Signs Last 24 Hrs  T(C): 36.6 (12 May 2023 11:23), Max: 36.7 (12 May 2023 09:32)  T(F): 97.9 (12 May 2023 11:23), Max: 98 (12 May 2023 09:32)  HR: 141 (12 May 2023 11:23) (126 - 144)  BP: 110/79 (12 May 2023 11:23) (108/70 - 133/74)  BP(mean): --  RR: 20 (12 May 2023 11:23) (20 - 20)  SpO2: 97% (12 May 2023 11:23) (96% - 99%)    Parameters below as of 12 May 2023 06:00  Patient On (Oxygen Delivery Method): nasal cannula  O2 Flow (L/min): 4      GENERAL:  [x ] No acute distress [ ]Lethargic  [ ]Unarousable  [ x]Verbal  [ ]Non-Verbal [ ]Cachexia    BEHAVIORAL/PSYCH:  [ x]Alert and Oriented x2  [ ] Anxiety [ ] Delirium [ ] Agitation [x ] Calm   EYES: [ x] No scleral icterus [ ] Scleral icterus [ ] Closed  ENMT:  [ ]Dry mouth  [ x]No external oral lesions [ ] No external ear or nose lesions  CARDIOVASCULAR:  [ ]Regular [ ]Irregular [ x]Tachy [ ]Not Tachy  [ ]Surya [ ] Edema [ ] No edema  PULMONARY:  [ ]Tachypnea  [ ]Audible excessive secretions [ x] No labored breathing [ ] labored breathing  GASTROINTESTINAL: [ ]Soft  [ ]Distended  [x ]Not distended [ ]Non tender [ ]Tender  MUSCULOSKELETAL: [ ]No clubbing [ ] clubbing  [ ] No cyanosis [ ] cyanosis  NEUROLOGIC: [ ]No focal deficits  [x ]Follows some commands  [ ]Does not follow commands  [ ]Cognitive impairment  [ ]Dysphagia  [ ]Dysarthria  [ ]Paresis   SKIN: [ ] Jaundiced [ x] Non-jaundiced [ ]Rash [ ]No Rash [ ] Warm [ ] Dry  MISC/LINES: [ ] ET tube [ ] Trach [ ]NGT/OGT [ ]PEG [ ]Mcdaniel    CRITICAL CARE:  [ ] Shock Present  [ ]Septic [ ]Cardiogenic [ ]Neurologic [ ]Hypovolemic  [ ]  Vasopressors [ ]  Inotropes   [x ]Respiratory failure present [ ]Mechanical ventilation [ ]Non-invasive ventilatory support [ ]High flow  [ ]Acute  [ ]Chronic [ ]Hypoxic  [ ]Hypercarbic [ ]Other  [ ]Other organ failure     LABS: reviewed by me                          14.6   8.29  )-----------( 346      ( 12 May 2023 12:10 )             44.0       05-12    141  |  108  |  15  ----------------------------<  179<H>  3.7   |  20  |  <0.5<L>    Ca    8.3<L>      12 May 2023 12:10  Mg     1.9     05-12    TPro  5.1<L>  /  Alb  2.6<L>  /  TBili  0.6  /  DBili  x   /  AST  12  /  ALT  <5  /  AlkPhos  83  05-12                  PT/INR - ( 11 May 2023 06:42 )   PT: 15.00 sec;   INR: 1.31 ratio         PTT - ( 11 May 2023 06:42 )  PTT:39.3 sec    CARDIAC MARKERS ( 11 May 2023 11:27 )  x     / <0.01 ng/mL / x     / x     / x            CAPILLARY BLOOD GLUCOSE                EKG: reviewed by me  < from: 12 Lead ECG (05.09.23 @ 22:21) >  Ventricular Rate 146 BPM    Atrial Rate 468 BPM    QRS Duration 82 ms    Q-T Interval 236 ms    QTC Calculation(Bazett) 367 ms    R Axis 72 degrees    T Axis -25 degrees    Diagnosis Line Atrial flutter with variable AV block  Nonspecific ST and T wave abnormality  Abnormal ECG    < end of copied text >    PROTEIN CALORIE MALNUTRITION PRESENT: [ ]mild [ ]moderate [ ]severe [ ]underweight [ ]morbid obesity  https://www.andeal.org/vault/2440/web/files/ONC/Table_Clinical%20Characteristics%20to%20Document%20Malnutrition-White%20JV%20et%20al%202012.pdf    Height (cm): 167.6 (05-09-23 @ 17:36), 172.7 (07-25-22 @ 19:08), 172.7 (07-20-22 @ 19:56)  Weight (kg): 54.4 (05-09-23 @ 17:36), 63.2 (07-29-22 @ 04:10), 68 (07-20-22 @ 19:56)  BMI (kg/m2): 19.4 (05-09-23 @ 17:36), 21.2 (07-29-22 @ 04:10), 22.8 (07-25-22 @ 19:08)    [ ]PPSV2 < or = to 30% [ ]significant weight loss  [ ]poor nutritional intake  [ ]anasarca      [ ]Artificial Nutrition      REFERRALS:   [ ]Chaplaincy  [ x]Hospice  [ ]Child Life  [x ]Social Work  [ ]Case management [ ]Holistic Therapy     Patient discussed with primary medical team MD  Palliative care education provided to patient and/or family    Goals of Care Document:

## 2023-05-12 NOTE — SWALLOW BEDSIDE ASSESSMENT ADULT - DIET PRIOR TO ADMI
Puree diet w/ mildly thick liquids (as per Pt's daughter)
puree, mildly thick liquids per pts son
puree, mildly thick liquids per pts son

## 2023-05-12 NOTE — PROGRESS NOTE ADULT - SUBJECTIVE AND OBJECTIVE BOX
SUBJECTIVE:    Patient is a 82y old Female who presents with a chief complaint of Cough; Hypoxia (12 May 2023 14:09)    Currently admitted to medicine with the primary diagnosis of Sepsis       Today is hospital day 3d.     PAST MEDICAL & SURGICAL HISTORY  Anxiety    Arthritis    LBD (Lewy body dementia)    Ulcer of sacral region, stage 4    Chronic indwelling Mcdaniel catheter    Chronic atrial fibrillation    H/O hernia repair      SOCIAL HISTORY:  Negative for smoking/alcohol/drug use.     ALLERGIES:  No Known Allergies    MEDICATIONS:  STANDING MEDICATIONS  busPIRone 30 milliGRAM(s) Oral two times a day  cefepime   IVPB 1000 milliGRAM(s) IV Intermittent every 12 hours  chlorhexidine 2% Cloths 1 Application(s) Topical <User Schedule>  clonazePAM  Tablet 0.5 milliGRAM(s) Oral at bedtime  dexAMETHasone  Injectable 6 milliGRAM(s) IV Push daily  dextrose 5% + sodium chloride 0.9%. 1000 milliLiter(s) IV Continuous <Continuous>  diltiazem Infusion 15 mG/Hr IV Continuous <Continuous>  enoxaparin Injectable 50 milliGRAM(s) SubCutaneous every 12 hours  gabapentin 300 milliGRAM(s) Oral at bedtime  metoprolol tartrate 25 milliGRAM(s) Oral three times a day  remdesivir  IVPB 100 milliGRAM(s) IV Intermittent every 24 hours  remdesivir  IVPB   IV Intermittent     PRN MEDICATIONS  acetaminophen     Tablet .. 650 milliGRAM(s) Oral every 6 hours PRN  morphine  - Injectable 2 milliGRAM(s) IV Push every 6 hours PRN  oxyCODONE    IR 5 milliGRAM(s) Oral every 8 hours PRN    VITALS:   T(F): 97.9  HR: 141  BP: 110/79  RR: 20  SpO2: 97%    LABS:                        14.6   8.29  )-----------( 346      ( 12 May 2023 12:10 )             44.0     05-12    141  |  108  |  15  ----------------------------<  179<H>  3.7   |  20  |  <0.5<L>    Ca    8.3<L>      12 May 2023 12:10  Mg     1.9     05-12    TPro  5.1<L>  /  Alb  2.6<L>  /  TBili  0.6  /  DBili  x   /  AST  12  /  ALT  <5  /  AlkPhos  83  05-12    PT/INR - ( 11 May 2023 06:42 )   PT: 15.00 sec;   INR: 1.31 ratio         PTT - ( 11 May 2023 06:42 )  PTT:39.3 sec          Culture - Blood (collected 11 May 2023 06:42)  Source: .Blood None  Preliminary Report (12 May 2023 13:02):    No growth to date.    Culture - Blood (collected 09 May 2023 18:01)  Source: .Blood Blood-Peripheral  Preliminary Report (11 May 2023 01:02):    No growth to date.    Culture - Blood (collected 09 May 2023 18:01)  Source: .Blood Blood-Peripheral  Preliminary Report (11 May 2023 01:02):    No growth to date.    Culture - Urine (collected 09 May 2023 18:01)  Source: Clean Catch Clean Catch (Midstream)  Final Report (12 May 2023 10:55):    >100,000 CFU/ml Escherichia coli  Organism: Escherichia coli (12 May 2023 10:55)  Organism: Escherichia coli (12 May 2023 10:55)      CARDIAC MARKERS ( 11 May 2023 11:27 )  x     / <0.01 ng/mL / x     / x     / x          RADIOLOGY:  ACC: [id number] EXAM:  XR CHEST PORTABLE ROUTINE 1V   ORDERED BY: RICHY MOYA     PROCEDURE DATE:  05/12/2023          INTERPRETATION:  Clinical History/Reason for Exam:  covid    Technique:  Frontal view the chest.    Comparison: Chest x-ray 5/9/2023.    Findings: Low lung volumes    Support devices:  none    Cardiac/mediastinum/hilum: Stable    Lung parenchyma/ Pleura: Stable patchy right lung opacities.      Skeleton/soft tissues: Stable      Impression:    Stable patchy right lung opacities. Low lung volumes    PHYSICAL EXAM:  GEN: No acute distress  LUNGS: Clear to auscultation bilaterally   HEART: S1/S2 present. RRR.   ABD: Soft, non-tender, non-distended. Bowel sounds present  EXT: NC/NC/NE/2+PP/HICKS/Skin Intact.   NEURO: AAOX3        Connect To:  Leg Bag    Indication:  Urine Output Monitoring in Critically Ill (05-12-23 @ 07:59) (not performed) [Active]  Indwelling Urethral Catheter:     Connect To:  Leg Bag    Indication:  Urine Output Monitoring in Critically Ill (05-11-23 @ 07:31) (not performed) [Active]

## 2023-05-12 NOTE — SWALLOW BEDSIDE ASSESSMENT ADULT - SLP GENERAL OBSERVATIONS
pt received in bed awake in no apparent pain. +no verbalizations, not following commands +4L NC +extensive oral care provided to pt +son at bedside
Pt received at bedside, on 02 via NC. Responding to questions.  Pt's son Magenjuliane at bedside.
Pt. received lethargic, non verbal, no command following, o2 NC, +generalized weakness, appeared deconditioned. Accompanied by daughter at bedside.

## 2023-05-12 NOTE — PROGRESS NOTE ADULT - PROBLEM SELECTOR PLAN 5
-DNR/DNI  -ongoing medical management  -hospice consulted  -will follow -DNR/DNI  -ongoing medical management  -hospice consulted  -follow up with family follow S+S eval  -will follow

## 2023-05-12 NOTE — PROGRESS NOTE ADULT - ASSESSMENT
IMPRESSION:    Sepsis POA  COVID-19 Pneumonia   highly Aspiration Pneumonia   E. Coli UTI, in setting of chronic hill   Sacral Ulcer   AFIB with RVR   HO Lewy Body Dementia  HO Chronic AFIB on Xarelto    PLAN:    CNS: avoid depressants     HEENT: Oral care    PULMONARY:  HOB @ 45 degrees.  Aspiration precautions. wean oxygen as tolerated, target SaO2 >92%, Continue Dexamethasone 6mg Q24H    CARDIOVASCULAR: Continue D5NS@75cc/hr until PO toleration, repeat CE, EF normal from echo in July 2022, continue Cardizem drip     GI: GI prophylaxis.  Feeding per speech and swallow.  Bowel regimen, family declining PEG    RENAL:  Follow up lytes.  Correct as needed, replete potassium and magnesium     INFECTIOUS DISEASE: Follow up cultures, Cefepime and Remdesivir per ID, nasal MRSA negative, procalcitonin 0.64    HEMATOLOGICAL: on therapeutic Lovenox for AFIB     ENDOCRINE:  Follow up FS.  Insulin protocol if needed.    MUSCULOSKELETAL: activity as tolerated    Palliative care following, possible hospice consideration, family meeting today at 9:45AM today     SDU monitoring

## 2023-05-13 LAB
ALBUMIN SERPL ELPH-MCNC: 2.5 G/DL — LOW (ref 3.5–5.2)
ALP SERPL-CCNC: 80 U/L — SIGNIFICANT CHANGE UP (ref 30–115)
ALT FLD-CCNC: <5 U/L — SIGNIFICANT CHANGE UP (ref 0–41)
ANION GAP SERPL CALC-SCNC: 9 MMOL/L — SIGNIFICANT CHANGE UP (ref 7–14)
AST SERPL-CCNC: 10 U/L — SIGNIFICANT CHANGE UP (ref 0–41)
BILIRUB SERPL-MCNC: 0.4 MG/DL — SIGNIFICANT CHANGE UP (ref 0.2–1.2)
BUN SERPL-MCNC: 14 MG/DL — SIGNIFICANT CHANGE UP (ref 10–20)
CALCIUM SERPL-MCNC: 8.1 MG/DL — LOW (ref 8.4–10.5)
CHLORIDE SERPL-SCNC: 109 MMOL/L — SIGNIFICANT CHANGE UP (ref 98–110)
CO2 SERPL-SCNC: 24 MMOL/L — SIGNIFICANT CHANGE UP (ref 17–32)
CREAT SERPL-MCNC: <0.5 MG/DL — LOW (ref 0.7–1.5)
EGFR: 106 ML/MIN/1.73M2 — SIGNIFICANT CHANGE UP
GLUCOSE BLDC GLUCOMTR-MCNC: 151 MG/DL — HIGH (ref 70–99)
GLUCOSE SERPL-MCNC: 160 MG/DL — HIGH (ref 70–99)
HCT VFR BLD CALC: 42.5 % — SIGNIFICANT CHANGE UP (ref 37–47)
HGB BLD-MCNC: 13.8 G/DL — SIGNIFICANT CHANGE UP (ref 12–16)
MAGNESIUM SERPL-MCNC: 1.9 MG/DL — SIGNIFICANT CHANGE UP (ref 1.8–2.4)
MCHC RBC-ENTMCNC: 29.3 PG — SIGNIFICANT CHANGE UP (ref 27–31)
MCHC RBC-ENTMCNC: 32.5 G/DL — SIGNIFICANT CHANGE UP (ref 32–37)
MCV RBC AUTO: 90.2 FL — SIGNIFICANT CHANGE UP (ref 81–99)
NRBC # BLD: 0 /100 WBCS — SIGNIFICANT CHANGE UP (ref 0–0)
PLATELET # BLD AUTO: 280 K/UL — SIGNIFICANT CHANGE UP (ref 130–400)
PMV BLD: 10.3 FL — SIGNIFICANT CHANGE UP (ref 7.4–10.4)
POTASSIUM SERPL-MCNC: 3.3 MMOL/L — LOW (ref 3.5–5)
POTASSIUM SERPL-SCNC: 3.3 MMOL/L — LOW (ref 3.5–5)
PROT SERPL-MCNC: 4.8 G/DL — LOW (ref 6–8)
RBC # BLD: 4.71 M/UL — SIGNIFICANT CHANGE UP (ref 4.2–5.4)
RBC # FLD: 15.6 % — HIGH (ref 11.5–14.5)
SODIUM SERPL-SCNC: 142 MMOL/L — SIGNIFICANT CHANGE UP (ref 135–146)
WBC # BLD: 7.84 K/UL — SIGNIFICANT CHANGE UP (ref 4.8–10.8)
WBC # FLD AUTO: 7.84 K/UL — SIGNIFICANT CHANGE UP (ref 4.8–10.8)

## 2023-05-13 PROCEDURE — 99233 SBSQ HOSP IP/OBS HIGH 50: CPT

## 2023-05-13 RX ORDER — POTASSIUM CHLORIDE 20 MEQ
20 PACKET (EA) ORAL ONCE
Refills: 0 | Status: COMPLETED | OUTPATIENT
Start: 2023-05-13 | End: 2023-05-13

## 2023-05-13 RX ORDER — METOPROLOL TARTRATE 50 MG
50 TABLET ORAL
Refills: 0 | Status: DISCONTINUED | OUTPATIENT
Start: 2023-05-13 | End: 2023-05-14

## 2023-05-13 RX ORDER — METOPROLOL TARTRATE 50 MG
25 TABLET ORAL
Refills: 0 | Status: DISCONTINUED | OUTPATIENT
Start: 2023-05-14 | End: 2023-05-14

## 2023-05-13 RX ORDER — LACTOBACILLUS ACIDOPHILUS 100MM CELL
1 CAPSULE ORAL DAILY
Refills: 0 | Status: DISCONTINUED | OUTPATIENT
Start: 2023-05-13 | End: 2023-05-20

## 2023-05-13 RX ORDER — LANOLIN ALCOHOL/MO/W.PET/CERES
5 CREAM (GRAM) TOPICAL AT BEDTIME
Refills: 0 | Status: DISCONTINUED | OUTPATIENT
Start: 2023-05-13 | End: 2023-05-20

## 2023-05-13 RX ADMIN — REMDESIVIR 200 MILLIGRAM(S): 5 INJECTION INTRAVENOUS at 14:19

## 2023-05-13 RX ADMIN — Medication 50 MILLIGRAM(S): at 17:02

## 2023-05-13 RX ADMIN — Medication 6 MILLIGRAM(S): at 07:23

## 2023-05-13 RX ADMIN — CEFEPIME 100 MILLIGRAM(S): 1 INJECTION, POWDER, FOR SOLUTION INTRAMUSCULAR; INTRAVENOUS at 17:05

## 2023-05-13 RX ADMIN — Medication 1 TABLET(S): at 11:25

## 2023-05-13 RX ADMIN — GABAPENTIN 300 MILLIGRAM(S): 400 CAPSULE ORAL at 21:10

## 2023-05-13 RX ADMIN — Medication 5 MILLIGRAM(S): at 21:10

## 2023-05-13 RX ADMIN — CEFEPIME 100 MILLIGRAM(S): 1 INJECTION, POWDER, FOR SOLUTION INTRAMUSCULAR; INTRAVENOUS at 07:19

## 2023-05-13 RX ADMIN — Medication 0.5 MILLIGRAM(S): at 21:10

## 2023-05-13 RX ADMIN — ENOXAPARIN SODIUM 50 MILLIGRAM(S): 100 INJECTION SUBCUTANEOUS at 07:18

## 2023-05-13 RX ADMIN — CHLORHEXIDINE GLUCONATE 1 APPLICATION(S): 213 SOLUTION TOPICAL at 07:23

## 2023-05-13 RX ADMIN — ENOXAPARIN SODIUM 50 MILLIGRAM(S): 100 INJECTION SUBCUTANEOUS at 17:02

## 2023-05-13 RX ADMIN — Medication 50 MILLIEQUIVALENT(S): at 08:19

## 2023-05-13 RX ADMIN — Medication 30 MILLIGRAM(S): at 17:02

## 2023-05-13 NOTE — DIETITIAN INITIAL EVALUATION ADULT - OTHER INFO
Pertinent medical information:   #Community Acquired Aspiration Pneumonia  #Acute Hypoxic Respiratory Failure  #Severe Sepsis  #COVID Pneumonia   #Stage 4 Sacral Ulcer, chronic  #Known to have stage 4 sacral ulcer

## 2023-05-13 NOTE — DIETITIAN INITIAL EVALUATION ADULT - PERTINENT LABORATORY DATA
05-13    142  |  109  |  14  ----------------------------<  160<H>  3.3<L>   |  24  |  <0.5<L>    Ca    8.1<L>      13 May 2023 02:20  Mg     1.9     05-13    TPro  4.8<L>  /  Alb  2.5<L>  /  TBili  0.4  /  DBili  x   /  AST  10  /  ALT  <5  /  AlkPhos  80  05-13  POCT Blood Glucose.: 151 mg/dL (05-13-23 @ 07:58)

## 2023-05-13 NOTE — PROGRESS NOTE ADULT - SUBJECTIVE AND OBJECTIVE BOX
Over Night Events: events noted, remain on cardizem drip, cardio/ ID/ palliative noted    PHYSICAL EXAM    ICU Vital Signs Last 24 Hrs  T(C): 36.2 (13 May 2023 06:53), Max: 36.8 (12 May 2023 22:32)  T(F): 97.1 (13 May 2023 06:53), Max: 98.2 (12 May 2023 22:32)  HR: 114 (13 May 2023 06:53) (90 - 141)  BP: 101/61 (13 May 2023 06:53) (101/61 - 117/73)  BP(mean): 76 (13 May 2023 06:53) (76 - 85)  RR: 20 (13 May 2023 06:53) (20 - 20)  SpO2: 98% (13 May 2023 06:53) (96% - 98%)    O2 Parameters below as of 13 May 2023 00:00  Patient On (Oxygen Delivery Method): nasal cannula            General: ill looking  Lungs: dec bs both bases  Cardiovascular: irregualar  Abdomen: Soft, Positive BS  Extremities: No clubbing   obtunded  ulcer      05-12-23 @ 07:01  -  05-13-23 @ 07:00  --------------------------------------------------------  IN:  Total IN: 0 mL    OUT:    Indwelling Catheter - Urethral (mL): 900 mL  Total OUT: 900 mL    Total NET: -900 mL          LABS:                          13.8   7.84  )-----------( 280      ( 13 May 2023 02:20 )             42.5                                               05-13    142  |  109  |  14  ----------------------------<  160<H>  3.3<L>   |  24  |  <0.5<L>    Ca    8.1<L>      13 May 2023 02:20  Mg     1.9     05-13    TPro  4.8<L>  /  Alb  2.5<L>  /  TBili  0.4  /  DBili  x   /  AST  10  /  ALT  <5  /  AlkPhos  80  05-13                                                 CARDIAC MARKERS ( 11 May 2023 11:27 )  x     / <0.01 ng/mL / x     / x     / x                                                LIVER FUNCTIONS - ( 13 May 2023 02:20 )  Alb: 2.5 g/dL / Pro: 4.8 g/dL / ALK PHOS: 80 U/L / ALT: <5 U/L / AST: 10 U/L / GGT: x                                                  Culture - Blood (collected 11 May 2023 06:42)  Source: .Blood None  Preliminary Report (12 May 2023 13:02):    No growth to date.                                                                                           MEDICATIONS  (STANDING):  busPIRone 30 milliGRAM(s) Oral two times a day  cefepime   IVPB 1000 milliGRAM(s) IV Intermittent every 12 hours  chlorhexidine 2% Cloths 1 Application(s) Topical <User Schedule>  clonazePAM  Tablet 0.5 milliGRAM(s) Oral at bedtime  dexAMETHasone  Injectable 6 milliGRAM(s) IV Push daily  dextrose 5% + sodium chloride 0.9%. 1000 milliLiter(s) (75 mL/Hr) IV Continuous <Continuous>  diltiazem Infusion 15 mG/Hr (15 mL/Hr) IV Continuous <Continuous>  enoxaparin Injectable 50 milliGRAM(s) SubCutaneous every 12 hours  gabapentin 300 milliGRAM(s) Oral at bedtime  metoprolol tartrate 25 milliGRAM(s) Oral three times a day  remdesivir  IVPB   IV Intermittent   remdesivir  IVPB 100 milliGRAM(s) IV Intermittent every 24 hours    MEDICATIONS  (PRN):  acetaminophen     Tablet .. 650 milliGRAM(s) Oral every 6 hours PRN Temp greater or equal to 38C (100.4F), Mild Pain (1 - 3)  morphine  - Injectable 2 milliGRAM(s) IV Push every 6 hours PRN Severe Pain (7 - 10)  oxyCODONE    IR 5 milliGRAM(s) Oral every 8 hours PRN Severe Pain (7 - 10)

## 2023-05-13 NOTE — PROGRESS NOTE ADULT - ASSESSMENT
82F w/ h/o Lewy Body Dementia (A&O 1-2 baseline), chronic Hill (persistent retention; last exchanged on 5/8), chronic afib (on Xarelto), and stage 4 sacral ulcer p/w cough and hypoxia. Admitted to stepdown unit for severe sepsis i/s/o aspiration pneumonia vs UTI.    Sepsis POA secondary to COVID-19 Pneumonia with possible Aspiration Pneumonia   UTI, in setting of chronic hill   Sacral Ulcer   Hx of Lewy Body Dementia  Hx  Chronic AFIB on Xarelto    ID input appreciated  c/W -Cefepime 1 gm iv q12h and RDV   C/W dexamethasone as per pulm cc   f/u cultures , Urine with E coli    on NC   S/S eval appreciated - started puree diet   appreciate palliative care  lactate trended down   c/w gentle hydration   neurochecks   CT head wo contrast today and resume lovenox tonight if no ICH   s/s re eval     []AFib with RVR  suspect driven by sepsis  Lovenox  therapeutic for now   on Cardizem ggt  as she was NPO , taper off and resume home meds metoprolol   monitor and correct electrolytes       []Sacral/skin  wounds:  burn team input appreciated  no burn surgical intervention, - continue local wound care BID  - wash with soap and water, apply hydrogel, cover with allevyn pad/ - burn signing off  - consult wound care nursing for further wound care recs if needed- cosult placed   frequent turning, off loading, and positioning and skin care as per protocol, Maintain pressure injury prevention, Keep skin clean, Offload heels, Monitor wound for changes and notify provider if any.   Heal off  for R heal pain and monitor  - discussed with RN       Hx of Lewy Body Dementia  CT head no acute findings   resume po meds buspar, klonopin and oxy, gabapentin     poor prognosis , DNR/DNI appreciated palliative and hospice input      monitor resp status, heart rate .

## 2023-05-13 NOTE — DIETITIAN INITIAL EVALUATION ADULT - ADD RECOMMEND
1) Continue diet order per SLP recommendations    2) Order boost pudding TID to optimize energy and protein intake   3) Order vitamin C 500 mg q24 and zinc sulfate 220 mg for 10-14 days then d/c

## 2023-05-13 NOTE — PROGRESS NOTE ADULT - ASSESSMENT
IMPRESSION:    Sepsis POA  COVID-19 Pneumonia   highly Aspiration Pneumonia   E. Coli UTI, in setting of chronic hill   Sacral Ulcer   AFIB with RVR   HO Lewy Body Dementia  HO Chronic AFIB on Xarelto    PLAN:    CNS: avoid depressants     HEENT: Oral care    PULMONARY:  HOB @ 45 degrees.  Aspiration precautions. wean oxygen as tolerated, target SaO2 92 TO 96%    CARDIOVASCULAR: Continue D5NS@75cc/hr until PO toleration, repeat CE, EF normal from echo in July 2022, continue Cardizem drip     GI: GI prophylaxis.  Feeding per speech and swallow.  Bowel regimen, family declining PEG, consider NGT    RENAL:  Follow up lytes.  Correct as needed, replete potassium and magnesium     INFECTIOUS DISEASE: ABX  PER ID    HEMATOLOGICAL: on therapeutic Lovenox for AFIB     ENDOCRINE:  Follow up FS.  Insulin protocol if needed.    MUSCULOSKELETAL: activity as tolerated    Palliative care following,    SDU monitoring

## 2023-05-13 NOTE — DIETITIAN INITIAL EVALUATION ADULT - PERTINENT MEDS FT
MEDICATIONS  (STANDING):  busPIRone 30 milliGRAM(s) Oral two times a day  cefepime   IVPB 1000 milliGRAM(s) IV Intermittent every 12 hours  chlorhexidine 2% Cloths 1 Application(s) Topical <User Schedule>  clonazePAM  Tablet 0.5 milliGRAM(s) Oral at bedtime  dexAMETHasone  Injectable 6 milliGRAM(s) IV Push daily  dextrose 5% + sodium chloride 0.9%. 1000 milliLiter(s) (75 mL/Hr) IV Continuous <Continuous>  diltiazem Infusion 15 mG/Hr (15 mL/Hr) IV Continuous <Continuous>  enoxaparin Injectable 50 milliGRAM(s) SubCutaneous every 12 hours  gabapentin 300 milliGRAM(s) Oral at bedtime  metoprolol tartrate 25 milliGRAM(s) Oral three times a day  potassium chloride  20 mEq/100 mL IVPB 20 milliEquivalent(s) IV Intermittent once  remdesivir  IVPB 100 milliGRAM(s) IV Intermittent every 24 hours  remdesivir  IVPB   IV Intermittent     MEDICATIONS  (PRN):  acetaminophen     Tablet .. 650 milliGRAM(s) Oral every 6 hours PRN Temp greater or equal to 38C (100.4F), Mild Pain (1 - 3)  morphine  - Injectable 2 milliGRAM(s) IV Push every 6 hours PRN Severe Pain (7 - 10)  oxyCODONE    IR 5 milliGRAM(s) Oral every 8 hours PRN Severe Pain (7 - 10)   MEDICATIONS  (STANDING):  dexAMETHasone  Injectable 6 milliGRAM(s) IV Push daily  dextrose 5% + sodium chloride 0.9%. 1000 milliLiter(s) (75 mL/Hr) IV Continuous <Continuous>  metoprolol tartrate 25 milliGRAM(s) Oral three times a day  potassium chloride  20 mEq/100 mL IVPB 20 milliEquivalent(s) IV Intermittent once  remdesivir  IVPB 100 milliGRAM(s) IV Intermittent every 24 hours  remdesivir  IVPB   IV Intermittent     MEDICATIONS  (PRN):  oxyCODONE    IR 5 milliGRAM(s) Oral every 8 hours PRN Severe Pain (7 - 10)

## 2023-05-13 NOTE — DIETITIAN INITIAL EVALUATION ADULT - COLLABORATION WITH OTHER PROVIDERS
RD to monitor: diet order, body composition, energy intake, nutrition focused physical finding: high risk due in 4 days  Interventions: meals and snacks, medical nutrition supplements, coordination of care, vitamin and mineral supplement:

## 2023-05-13 NOTE — DIETITIAN INITIAL EVALUATION ADULT - ORAL INTAKE PTA/DIET HISTORY
Patient unable to provide nutrition hx at this time due to mental status will need to obtain nutrition hx at f/u. No answer from emergency contact information

## 2023-05-13 NOTE — PROGRESS NOTE ADULT - SUBJECTIVE AND OBJECTIVE BOX
YOAN TAI  82y, Female  Allergy: No Known Allergies    Hospital Day: 4d    Patient seen and examined earlier today. she is more awake stated that she has some R ankle pain, on exam no clinical finding, no wound or ulcer , her son at bedside     PMH/PSH:  PAST MEDICAL & SURGICAL HISTORY:  Anxiety      Arthritis      LBD (Lewy body dementia)      Ulcer of sacral region, stage 4      Chronic indwelling Mcdaniel catheter      Chronic atrial fibrillation      H/O hernia repair          LAST 24-Hr EVENTS:    VITALS:  T(F): 95 (05-13-23 @ 08:25), Max: 98.2 (05-12-23 @ 22:32)  HR: 134 (05-13-23 @ 08:25)  BP: 113/70 (05-13-23 @ 08:25) (101/61 - 113/70)  RR: 20 (05-13-23 @ 08:25)  SpO2: 96% (05-13-23 @ 08:25)          TESTS & MEASUREMENTS:  Weight/BMI  54.4 (05-09-23 @ 17:36)  19.4 (05-09-23 @ 17:36)    05-11-23 @ 07:01  -  05-12-23 @ 07:00  --------------------------------------------------------  IN: 0 mL / OUT: 400 mL / NET: -400 mL    05-12-23 @ 07:01  -  05-13-23 @ 07:00  --------------------------------------------------------  IN: 0 mL / OUT: 900 mL / NET: -900 mL    05-13-23 @ 07:01  -  05-13-23 @ 13:54  --------------------------------------------------------  IN: 52.5 mL / OUT: 0 mL / NET: 52.5 mL                            13.8   7.84  )-----------( 280      ( 13 May 2023 02:20 )             42.5       INR: 1.31 ratio (05-11-23 @ 06:42)  INR: 1.87 ratio (05-09-23 @ 18:01)    05-13    142  |  109  |  14  ----------------------------<  160<H>  3.3<L>   |  24  |  <0.5<L>    Ca    8.1<L>      13 May 2023 02:20  Mg     1.9     05-13    TPro  4.8<L>  /  Alb  2.5<L>  /  TBili  0.4  /  DBili  x   /  AST  10  /  ALT  <5  /  AlkPhos  80  05-13    LIVER FUNCTIONS - ( 13 May 2023 02:20 )  Alb: 2.5 g/dL / Pro: 4.8 g/dL / ALK PHOS: 80 U/L / ALT: <5 U/L / AST: 10 U/L / GGT: x                 Culture - Blood (collected 05-11-23 @ 06:42)  Source: .Blood None  Preliminary Report (05-12-23 @ 13:02):    No growth to date.    Culture - Blood (collected 05-09-23 @ 18:01)  Source: .Blood Blood-Peripheral  Preliminary Report (05-11-23 @ 01:02):    No growth to date.    Culture - Blood (collected 05-09-23 @ 18:01)  Source: .Blood Blood-Peripheral  Preliminary Report (05-11-23 @ 01:02):    No growth to date.    Culture - Urine (collected 05-09-23 @ 18:01)  Source: Clean Catch Clean Catch (Midstream)  Final Report (05-12-23 @ 10:55):    >100,000 CFU/ml Escherichia coli  Organism: Escherichia coli (05-12-23 @ 10:55)  Organism: Escherichia coli (05-12-23 @ 10:55)      Method Type: AYDEN      -  Amikacin: S <=16      -  Amoxicillin/Clavulanic Acid: I 16/8      -  Ampicillin: R >16 These ampicillin results predict results for amoxicillin      -  Ampicillin/Sulbactam: I 16/8 Enterobacter, Klebsiella aerogenes, Citrobacter, and Serratia may develop resistance during prolonged therapy (3-4 days)      -  Aztreonam: S <=4      -  Cefazolin: S <=2 For uncomplicated UTI with K. pneumoniae, E. coli, or P. mirablis: AYDEN <=16 is sensitive and AYDEN >=32 is resistant. This also predicts results for oral agents cefaclor, cefdinir, cefpodoxime, cefprozil, cefuroxime axetil, cephalexin and locarbef for uncomplicated UTI. Note that some isolates may be susceptible to these agents while testing resistant to cefazolin.      -  Cefepime: S <=2      -  Cefoxitin: S <=8      -  Ceftriaxone: S <=1 Enterobacter, Klebsiella aerogenes, Citrobacter, and Serratia may develop resistance during prolonged therapy      -  Cefuroxime: S 8      -  Ciprofloxacin: I 0.5      -  Ertapenem: S <=0.5      -  Gentamicin: S <=2      -  Imipenem: S <=1      -  Levofloxacin: S <=0.5      -  Meropenem: S <=1      -  Nitrofurantoin: S <=32 Should not be used to treat pyelonephritis      -  Piperacillin/Tazobactam: S <=8      -  Tobramycin: S <=2      -  Trimethoprim/Sulfamethoxazole: S <=0.5/9.5        Procalcitonin, Serum: 0.64 ng/mL (05-10-23 @ 00:34)    D-Dimer Assay, Quantitative: 281 ng/mL DDU (05-10-23 @ 00:34)            Vancomycin Level, Trough: 9.9 ug/mL (05-11-23 @ 18:12)        Indwelling Urethral Catheter:     Connect To:  Leg Bag    Indication:  Urine Output Monitoring in Critically Ill (05-13-23 @ 11:00) (not performed)  Indwelling Urethral Catheter:     Connect To:  Leg Bag    Indication:  Urine Output Monitoring in Critically Ill (05-12-23 @ 07:59) (not performed)      RADIOLOGY, ECG, & ADDITIONAL TESTS:  12 Lead ECG:   Ventricular Rate 146 BPM    Atrial Rate 468 BPM    QRS Duration 82 ms    Q-T Interval 236 ms    QTC Calculation(Bazett) 367 ms    R Axis 72 degrees    T Axis -25 degrees    Diagnosis Line Atrial flutter with variable AV block  Nonspecific ST and T wave abnormality  Abnormal ECG    Confirmed by jose barlow ([id number]) on 5/10/2023 11:36:51 AM (05-09-23 @ 22:21)    CT Head No Cont:   ACC: [id number] EXAM:  CT BRAIN   ORDERED BY: RICHY MOYA     PROCEDURE DATE:  05/12/2023          INTERPRETATION:  Clinical History / Reason for exam: Rule out bleed.    Technique: Noncontrast head CT.  Contiguous unenhanced CT axial images of   thehead from the base to the vertex with coronal and sagittal reformats.    Comparison: CT head dated 7/20/2022    Findings:    The ventricles and cortical sulci demonstrate stable moderate atrophic   changes.    There are stable confluent hypodensitiesthroughout the hemispheric white   matter without mass effect compatible with chronic microvascular changes.    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 Stable   chronic lacunar infarcts within bilateral deep gray nuclei.    There is no acute intracranial hemorrhage, extra-axial fluid collection   or midline shift.    Vascular calcifications are noted.    Unchanged opacification of the right sphenoid sinus with calcified   contents. The visualized paranasal sinuses and mastoids are otherwise   clear.    IMPRESSION:    1.  No evidence of acute intracranial hemorrhage. Stable exam since   7/20/2022.    2.  Stable extensive chronic microvascular changes and chronic infarcts.    --- End of Report ---            SHAHEED CARDOSO MD; Attending Radiologist  This document has been electronically signed. May 12 2023 10:39AM (05-12-23 @ 06:24)    RECENT DIAGNOSTIC ORDERS:  Comprehensive Metabolic Panel: AM Sched. Collection: 14-May-2023 04:30 (05-13-23 @ 10:57)  Complete Blood Count + Automated Diff: AM Sched. Collection: 14-May-2023 04:30 (05-13-23 @ 10:57)  Comprehensive Metabolic Panel: Repeat From: 13-May-2023 10:57 To: 15-May-2023 04:30, Every 1 day(s) (05-13-23 @ 10:57)  Complete Blood Count + Automated Diff: Repeat From: 13-May-2023 10:57 To: 15-May-2023 04:30, Every 1 day(s) (05-13-23 @ 10:57)  Diet, Pureed:   Mildly Thick Liquids (MILDTHICKLIQS)  Supplement Feeding Modality:  Oral  Ensure Pudding Cans or Servings Per Day:  1       Frequency:  Three Times a day (05-13-23 @ 08:36)      MEDICATIONS:  MEDICATIONS  (STANDING):  busPIRone 30 milliGRAM(s) Oral two times a day  cefepime   IVPB 1000 milliGRAM(s) IV Intermittent every 12 hours  chlorhexidine 2% Cloths 1 Application(s) Topical <User Schedule>  clonazePAM  Tablet 0.5 milliGRAM(s) Oral at bedtime  dexAMETHasone  Injectable 6 milliGRAM(s) IV Push daily  dextrose 5% + sodium chloride 0.9%. 1000 milliLiter(s) (60 mL/Hr) IV Continuous <Continuous>  diltiazem Infusion 17.5 mG/Hr (17.5 mL/Hr) IV Continuous <Continuous>  enoxaparin Injectable 50 milliGRAM(s) SubCutaneous every 12 hours  gabapentin 300 milliGRAM(s) Oral at bedtime  lactobacillus acidophilus 1 Tablet(s) Oral daily  melatonin 5 milliGRAM(s) Oral at bedtime  metoprolol tartrate 50 milliGRAM(s) Oral two times a day  remdesivir  IVPB 100 milliGRAM(s) IV Intermittent every 24 hours  remdesivir  IVPB   IV Intermittent     MEDICATIONS  (PRN):  acetaminophen     Tablet .. 650 milliGRAM(s) Oral every 6 hours PRN Temp greater or equal to 38C (100.4F), Mild Pain (1 - 3)  morphine  - Injectable 2 milliGRAM(s) IV Push every 6 hours PRN Severe Pain (7 - 10)  oxyCODONE    IR 5 milliGRAM(s) Oral every 8 hours PRN Severe Pain (7 - 10)      HOME MEDICATIONS:  Acidophilus oral tablet (05-09)  busPIRone 30 mg oral tablet (05-09)  cholestyramine 4 g/4.8 g oral powder for reconstitution (05-09)  clonazePAM 0.5 mg oral tablet (05-09)  gabapentin 300 mg oral capsule (05-09)  melatonin 5 mg oral tablet (05-09)  metoprolol tartrate 25 mg oral tablet (05-09)  oxycodone-acetaminophen 5 mg-325 mg oral tablet (05-09)  Xarelto 15 mg oral tablet (05-09)      PHYSICAL EXAM:  General: more awake alert, she followed some simple commands . chronic ill appearance, frail   Lungs:  decrease breath sound b/l , normal resp effort  Heart: regular rhythm , tachy  Abdomen: soft, non tender non distended  Ext: no edema, able to move her hands and feets but very weak

## 2023-05-13 NOTE — DIETITIAN INITIAL EVALUATION ADULT - OTHER CALCULATIONS
weight used: 54.4 kg --- dosing weight   energy and protein need increased in the setting of pressure injury  fluid needs: 1ml/kcal

## 2023-05-14 LAB
ALBUMIN SERPL ELPH-MCNC: 2.7 G/DL — LOW (ref 3.5–5.2)
ALP SERPL-CCNC: 90 U/L — SIGNIFICANT CHANGE UP (ref 30–115)
ALT FLD-CCNC: <5 U/L — SIGNIFICANT CHANGE UP (ref 0–41)
ANION GAP SERPL CALC-SCNC: 11 MMOL/L — SIGNIFICANT CHANGE UP (ref 7–14)
AST SERPL-CCNC: 15 U/L — SIGNIFICANT CHANGE UP (ref 0–41)
BASOPHILS # BLD AUTO: 0.05 K/UL — SIGNIFICANT CHANGE UP (ref 0–0.2)
BASOPHILS NFR BLD AUTO: 0.4 % — SIGNIFICANT CHANGE UP (ref 0–1)
BILIRUB SERPL-MCNC: 0.5 MG/DL — SIGNIFICANT CHANGE UP (ref 0.2–1.2)
BUN SERPL-MCNC: 10 MG/DL — SIGNIFICANT CHANGE UP (ref 10–20)
CALCIUM SERPL-MCNC: 8.2 MG/DL — LOW (ref 8.4–10.5)
CHLORIDE SERPL-SCNC: 105 MMOL/L — SIGNIFICANT CHANGE UP (ref 98–110)
CO2 SERPL-SCNC: 22 MMOL/L — SIGNIFICANT CHANGE UP (ref 17–32)
CREAT SERPL-MCNC: <0.5 MG/DL — LOW (ref 0.7–1.5)
EGFR: 99 ML/MIN/1.73M2 — SIGNIFICANT CHANGE UP
EOSINOPHIL # BLD AUTO: 0 K/UL — SIGNIFICANT CHANGE UP (ref 0–0.7)
EOSINOPHIL NFR BLD AUTO: 0 % — SIGNIFICANT CHANGE UP (ref 0–8)
GLUCOSE SERPL-MCNC: 167 MG/DL — HIGH (ref 70–99)
HCT VFR BLD CALC: 46 % — SIGNIFICANT CHANGE UP (ref 37–47)
HGB BLD-MCNC: 15.3 G/DL — SIGNIFICANT CHANGE UP (ref 12–16)
IMM GRANULOCYTES NFR BLD AUTO: 2 % — HIGH (ref 0.1–0.3)
LYMPHOCYTES # BLD AUTO: 0.59 K/UL — LOW (ref 1.2–3.4)
LYMPHOCYTES # BLD AUTO: 4.8 % — LOW (ref 20.5–51.1)
MCHC RBC-ENTMCNC: 29.5 PG — SIGNIFICANT CHANGE UP (ref 27–31)
MCHC RBC-ENTMCNC: 33.3 G/DL — SIGNIFICANT CHANGE UP (ref 32–37)
MCV RBC AUTO: 88.6 FL — SIGNIFICANT CHANGE UP (ref 81–99)
MONOCYTES # BLD AUTO: 0.28 K/UL — SIGNIFICANT CHANGE UP (ref 0.1–0.6)
MONOCYTES NFR BLD AUTO: 2.3 % — SIGNIFICANT CHANGE UP (ref 1.7–9.3)
NEUTROPHILS # BLD AUTO: 11.16 K/UL — HIGH (ref 1.4–6.5)
NEUTROPHILS NFR BLD AUTO: 90.5 % — HIGH (ref 42.2–75.2)
NRBC # BLD: 0 /100 WBCS — SIGNIFICANT CHANGE UP (ref 0–0)
PLATELET # BLD AUTO: 371 K/UL — SIGNIFICANT CHANGE UP (ref 130–400)
PMV BLD: 10.6 FL — HIGH (ref 7.4–10.4)
POTASSIUM SERPL-MCNC: 3.2 MMOL/L — LOW (ref 3.5–5)
POTASSIUM SERPL-SCNC: 3.2 MMOL/L — LOW (ref 3.5–5)
PROT SERPL-MCNC: 5.2 G/DL — LOW (ref 6–8)
RBC # BLD: 5.19 M/UL — SIGNIFICANT CHANGE UP (ref 4.2–5.4)
RBC # FLD: 15.5 % — HIGH (ref 11.5–14.5)
SODIUM SERPL-SCNC: 138 MMOL/L — SIGNIFICANT CHANGE UP (ref 135–146)
WBC # BLD: 12.33 K/UL — HIGH (ref 4.8–10.8)
WBC # FLD AUTO: 12.33 K/UL — HIGH (ref 4.8–10.8)

## 2023-05-14 PROCEDURE — 99233 SBSQ HOSP IP/OBS HIGH 50: CPT

## 2023-05-14 PROCEDURE — 71045 X-RAY EXAM CHEST 1 VIEW: CPT | Mod: 26

## 2023-05-14 RX ORDER — METOPROLOL TARTRATE 50 MG
50 TABLET ORAL EVERY 8 HOURS
Refills: 0 | Status: DISCONTINUED | OUTPATIENT
Start: 2023-05-14 | End: 2023-05-15

## 2023-05-14 RX ADMIN — Medication 50 MILLIGRAM(S): at 05:20

## 2023-05-14 RX ADMIN — CEFEPIME 100 MILLIGRAM(S): 1 INJECTION, POWDER, FOR SOLUTION INTRAMUSCULAR; INTRAVENOUS at 17:53

## 2023-05-14 RX ADMIN — Medication 1 TABLET(S): at 13:32

## 2023-05-14 RX ADMIN — CHLORHEXIDINE GLUCONATE 1 APPLICATION(S): 213 SOLUTION TOPICAL at 05:21

## 2023-05-14 RX ADMIN — Medication 50 MILLIGRAM(S): at 13:33

## 2023-05-14 RX ADMIN — CEFEPIME 100 MILLIGRAM(S): 1 INJECTION, POWDER, FOR SOLUTION INTRAMUSCULAR; INTRAVENOUS at 05:20

## 2023-05-14 RX ADMIN — Medication 0.5 MILLIGRAM(S): at 21:04

## 2023-05-14 RX ADMIN — REMDESIVIR 200 MILLIGRAM(S): 5 INJECTION INTRAVENOUS at 16:54

## 2023-05-14 RX ADMIN — ENOXAPARIN SODIUM 50 MILLIGRAM(S): 100 INJECTION SUBCUTANEOUS at 17:53

## 2023-05-14 RX ADMIN — Medication 650 MILLIGRAM(S): at 17:58

## 2023-05-14 RX ADMIN — GABAPENTIN 300 MILLIGRAM(S): 400 CAPSULE ORAL at 21:04

## 2023-05-14 RX ADMIN — Medication 30 MILLIGRAM(S): at 05:20

## 2023-05-14 RX ADMIN — Medication 5 MILLIGRAM(S): at 21:04

## 2023-05-14 RX ADMIN — Medication 30 MILLIGRAM(S): at 17:54

## 2023-05-14 RX ADMIN — Medication 50 MILLIGRAM(S): at 21:04

## 2023-05-14 RX ADMIN — ENOXAPARIN SODIUM 50 MILLIGRAM(S): 100 INJECTION SUBCUTANEOUS at 05:21

## 2023-05-14 RX ADMIN — Medication 6 MILLIGRAM(S): at 05:21

## 2023-05-14 NOTE — PROGRESS NOTE ADULT - SUBJECTIVE AND OBJECTIVE BOX
T H I S   I S    N O  T   A    F I N A L I Z E D   N O T YOAN SHELDON  82y, Female  Allergy: No Known Allergies    Hospital Day: 5d    Patient seen and examined earlier today.     PMH/PSH:  PAST MEDICAL & SURGICAL HISTORY:  Anxiety      Arthritis      LBD (Lewy body dementia)      Ulcer of sacral region, stage 4      Chronic indwelling Mcdaniel catheter      Chronic atrial fibrillation      H/O hernia repair          LAST 24-Hr EVENTS:    VITALS:  T(F): 96.4 (05-14-23 @ 08:26), Max: 97.5 (05-13-23 @ 16:00)  HR: 92 (05-14-23 @ 08:26)  BP: 127/66 (05-14-23 @ 08:26) (97/61 - 127/66)  RR: 18 (05-14-23 @ 08:26)  SpO2: 98% (05-14-23 @ 08:26)          TESTS & MEASUREMENTS:  Weight/BMI  54.4 (05-09-23 @ 17:36)  19.4 (05-09-23 @ 17:36)    05-12-23 @ 07:01  -  05-13-23 @ 07:00  --------------------------------------------------------  IN: 0 mL / OUT: 900 mL / NET: -900 mL    05-13-23 @ 07:01  -  05-14-23 @ 07:00  --------------------------------------------------------  IN: 955 mL / OUT: 2400 mL / NET: -1445 mL                            15.3   12.33 )-----------( 371      ( 14 May 2023 09:10 )             46.0       INR: 1.31 ratio (05-11-23 @ 06:42)  INR: 1.87 ratio (05-09-23 @ 18:01)    05-14    138  |  105  |  10  ----------------------------<  167<H>  3.2<L>   |  22  |  <0.5<L>    Ca    8.2<L>      14 May 2023 09:10  Mg     1.9     05-13    TPro  5.2<L>  /  Alb  2.7<L>  /  TBili  0.5  /  DBili  x   /  AST  15  /  ALT  <5  /  AlkPhos  90  05-14    LIVER FUNCTIONS - ( 14 May 2023 09:10 )  Alb: 2.7 g/dL / Pro: 5.2 g/dL / ALK PHOS: 90 U/L / ALT: <5 U/L / AST: 15 U/L / GGT: x                 Culture - Blood (collected 05-11-23 @ 06:42)  Source: .Blood None  Preliminary Report (05-12-23 @ 13:02):    No growth to date.    Culture - Blood (collected 05-09-23 @ 18:01)  Source: .Blood Blood-Peripheral  Preliminary Report (05-11-23 @ 01:02):    No growth to date.    Culture - Blood (collected 05-09-23 @ 18:01)  Source: .Blood Blood-Peripheral  Preliminary Report (05-11-23 @ 01:02):    No growth to date.    Culture - Urine (collected 05-09-23 @ 18:01)  Source: Clean Catch Clean Catch (Midstream)  Final Report (05-12-23 @ 10:55):    >100,000 CFU/ml Escherichia coli  Organism: Escherichia coli (05-12-23 @ 10:55)  Organism: Escherichia coli (05-12-23 @ 10:55)      Method Type: AYDEN      -  Amikacin: S <=16      -  Amoxicillin/Clavulanic Acid: I 16/8      -  Ampicillin: R >16 These ampicillin results predict results for amoxicillin      -  Ampicillin/Sulbactam: I 16/8 Enterobacter, Klebsiella aerogenes, Citrobacter, and Serratia may develop resistance during prolonged therapy (3-4 days)      -  Aztreonam: S <=4      -  Cefazolin: S <=2 For uncomplicated UTI with K. pneumoniae, E. coli, or P. mirablis: AYDEN <=16 is sensitive and AYDEN >=32 is resistant. This also predicts results for oral agents cefaclor, cefdinir, cefpodoxime, cefprozil, cefuroxime axetil, cephalexin and locarbef for uncomplicated UTI. Note that some isolates may be susceptible to these agents while testing resistant to cefazolin.      -  Cefepime: S <=2      -  Cefoxitin: S <=8      -  Ceftriaxone: S <=1 Enterobacter, Klebsiella aerogenes, Citrobacter, and Serratia may develop resistance during prolonged therapy      -  Cefuroxime: S 8      -  Ciprofloxacin: I 0.5      -  Ertapenem: S <=0.5      -  Gentamicin: S <=2      -  Imipenem: S <=1      -  Levofloxacin: S <=0.5      -  Meropenem: S <=1      -  Nitrofurantoin: S <=32 Should not be used to treat pyelonephritis      -  Piperacillin/Tazobactam: S <=8      -  Tobramycin: S <=2      -  Trimethoprim/Sulfamethoxazole: S <=0.5/9.5        Procalcitonin, Serum: 0.64 ng/mL (05-10-23 @ 00:34)    D-Dimer Assay, Quantitative: 281 ng/mL DDU (05-10-23 @ 00:34)            Vancomycin Level, Trough: 9.9 ug/mL (05-11-23 @ 18:12)        Indwelling Urethral Catheter:     Connect To:  Straight Drainage/Gravity    Indication:  Urine Output Monitoring in Critically Ill (05-14-23 @ 10:55) (not performed)  Indwelling Urethral Catheter:     Connect To:  Leg Bag    Indication:  Urine Output Monitoring in Critically Ill (05-13-23 @ 11:00) (not performed)      RADIOLOGY, ECG, & ADDITIONAL TESTS:  12 Lead ECG:   Ventricular Rate 146 BPM    Atrial Rate 468 BPM    QRS Duration 82 ms    Q-T Interval 236 ms    QTC Calculation(Bazett) 367 ms    R Axis 72 degrees    T Axis -25 degrees    Diagnosis Line Atrial flutter with variable AV block  Nonspecific ST and T wave abnormality  Abnormal ECG    Confirmed by jose barlow ([id number]) on 5/10/2023 11:36:51 AM (05-09-23 @ 22:21)    CT Head No Cont:   ACC: [id number] EXAM:  CT BRAIN   ORDERED BY: RICHY MOYA     PROCEDURE DATE:  05/12/2023          INTERPRETATION:  Clinical History / Reason for exam: Rule out bleed.    Technique: Noncontrast head CT.  Contiguous unenhanced CT axial images of   thehead from the base to the vertex with coronal and sagittal reformats.    Comparison: CT head dated 7/20/2022    Findings:    The ventricles and cortical sulci demonstrate stable moderate atrophic   changes.    There are stable confluent hypodensitiesthroughout the hemispheric white   matter without mass effect compatible with chronic microvascular changes.    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 Stable   chronic lacunar infarcts within bilateral deep gray nuclei.    There is no acute intracranial hemorrhage, extra-axial fluid collection   or midline shift.    Vascular calcifications are noted.    Unchanged opacification of the right sphenoid sinus with calcified   contents. The visualized paranasal sinuses and mastoids are otherwise   clear.    IMPRESSION:    1.  No evidence of acute intracranial hemorrhage. Stable exam since   7/20/2022.    2.  Stable extensive chronic microvascular changes and chronic infarcts.    --- End of Report ---            SHAHEED CARDOSO MD; Attending Radiologist  This document has been electronically signed. May 12 2023 10:39AM (05-12-23 @ 06:24)    RECENT DIAGNOSTIC ORDERS:  Xray Chest 1 View-PORTABLE IMMEDIATE: IMMEDIATE   Indication: covid  Transport: Portable  Exam Completed (05-14-23 @ 07:53)      MEDICATIONS:  MEDICATIONS  (STANDING):  busPIRone 30 milliGRAM(s) Oral two times a day  cefepime   IVPB 1000 milliGRAM(s) IV Intermittent every 12 hours  chlorhexidine 2% Cloths 1 Application(s) Topical <User Schedule>  clonazePAM  Tablet 0.5 milliGRAM(s) Oral at bedtime  dexAMETHasone  Injectable 6 milliGRAM(s) IV Push daily  dextrose 5% + sodium chloride 0.9%. 1000 milliLiter(s) (60 mL/Hr) IV Continuous <Continuous>  diltiazem Infusion 15 mG/Hr (15 mL/Hr) IV Continuous <Continuous>  enoxaparin Injectable 50 milliGRAM(s) SubCutaneous every 12 hours  gabapentin 300 milliGRAM(s) Oral at bedtime  lactobacillus acidophilus 1 Tablet(s) Oral daily  melatonin 5 milliGRAM(s) Oral at bedtime  metoprolol tartrate 50 milliGRAM(s) Oral every 8 hours  remdesivir  IVPB 100 milliGRAM(s) IV Intermittent every 24 hours  remdesivir  IVPB   IV Intermittent     MEDICATIONS  (PRN):  acetaminophen     Tablet .. 650 milliGRAM(s) Oral every 6 hours PRN Temp greater or equal to 38C (100.4F), Mild Pain (1 - 3)  morphine  - Injectable 2 milliGRAM(s) IV Push every 6 hours PRN Severe Pain (7 - 10)  oxyCODONE    IR 5 milliGRAM(s) Oral every 8 hours PRN Severe Pain (7 - 10)      HOME MEDICATIONS:  Acidophilus oral tablet (05-09)  busPIRone 30 mg oral tablet (05-09)  cholestyramine 4 g/4.8 g oral powder for reconstitution (05-09)  clonazePAM 0.5 mg oral tablet (05-09)  gabapentin 300 mg oral capsule (05-09)  melatonin 5 mg oral tablet (05-09)  metoprolol tartrate 25 mg oral tablet (05-09)  oxycodone-acetaminophen 5 mg-325 mg oral tablet (05-09)  Xarelto 15 mg oral tablet (05-09)      PHYSICAL EXAM:  GENERAL:   CHEST/LUNG:   HEART:   ABDOMEN:   EXTREMITIES:               YOAN TAI  82y, Female  Allergy: No Known Allergies    Hospital Day: 5d    Patient seen and examined earlier today. she stated that she feels better , her son at bedside , then her daughter at bedside later     PMH/PSH:  PAST MEDICAL & SURGICAL HISTORY:  Anxiety      Arthritis      LBD (Lewy body dementia)      Ulcer of sacral region, stage 4      Chronic indwelling Mcdaniel catheter      Chronic atrial fibrillation      H/O hernia repair          LAST 24-Hr EVENTS:    VITALS:  T(F): 96.4 (05-14-23 @ 08:26), Max: 97.5 (05-13-23 @ 16:00)  HR: 92 (05-14-23 @ 08:26)  BP: 127/66 (05-14-23 @ 08:26) (97/61 - 127/66)  RR: 18 (05-14-23 @ 08:26)  SpO2: 98% (05-14-23 @ 08:26)          TESTS & MEASUREMENTS:  Weight/BMI  54.4 (05-09-23 @ 17:36)  19.4 (05-09-23 @ 17:36)    05-12-23 @ 07:01  -  05-13-23 @ 07:00  --------------------------------------------------------  IN: 0 mL / OUT: 900 mL / NET: -900 mL    05-13-23 @ 07:01  -  05-14-23 @ 07:00  --------------------------------------------------------  IN: 955 mL / OUT: 2400 mL / NET: -1445 mL                            15.3   12.33 )-----------( 371      ( 14 May 2023 09:10 )             46.0       INR: 1.31 ratio (05-11-23 @ 06:42)  INR: 1.87 ratio (05-09-23 @ 18:01)    05-14    138  |  105  |  10  ----------------------------<  167<H>  3.2<L>   |  22  |  <0.5<L>    Ca    8.2<L>      14 May 2023 09:10  Mg     1.9     05-13    TPro  5.2<L>  /  Alb  2.7<L>  /  TBili  0.5  /  DBili  x   /  AST  15  /  ALT  <5  /  AlkPhos  90  05-14    LIVER FUNCTIONS - ( 14 May 2023 09:10 )  Alb: 2.7 g/dL / Pro: 5.2 g/dL / ALK PHOS: 90 U/L / ALT: <5 U/L / AST: 15 U/L / GGT: x                 Culture - Blood (collected 05-11-23 @ 06:42)  Source: .Blood None  Preliminary Report (05-12-23 @ 13:02):    No growth to date.    Culture - Blood (collected 05-09-23 @ 18:01)  Source: .Blood Blood-Peripheral  Preliminary Report (05-11-23 @ 01:02):    No growth to date.    Culture - Blood (collected 05-09-23 @ 18:01)  Source: .Blood Blood-Peripheral  Preliminary Report (05-11-23 @ 01:02):    No growth to date.    Culture - Urine (collected 05-09-23 @ 18:01)  Source: Clean Catch Clean Catch (Midstream)  Final Report (05-12-23 @ 10:55):    >100,000 CFU/ml Escherichia coli  Organism: Escherichia coli (05-12-23 @ 10:55)  Organism: Escherichia coli (05-12-23 @ 10:55)      Method Type: AYDEN      -  Amikacin: S <=16      -  Amoxicillin/Clavulanic Acid: I 16/8      -  Ampicillin: R >16 These ampicillin results predict results for amoxicillin      -  Ampicillin/Sulbactam: I 16/8 Enterobacter, Klebsiella aerogenes, Citrobacter, and Serratia may develop resistance during prolonged therapy (3-4 days)      -  Aztreonam: S <=4      -  Cefazolin: S <=2 For uncomplicated UTI with K. pneumoniae, E. coli, or P. mirablis: AYDEN <=16 is sensitive and AYDEN >=32 is resistant. This also predicts results for oral agents cefaclor, cefdinir, cefpodoxime, cefprozil, cefuroxime axetil, cephalexin and locarbef for uncomplicated UTI. Note that some isolates may be susceptible to these agents while testing resistant to cefazolin.      -  Cefepime: S <=2      -  Cefoxitin: S <=8      -  Ceftriaxone: S <=1 Enterobacter, Klebsiella aerogenes, Citrobacter, and Serratia may develop resistance during prolonged therapy      -  Cefuroxime: S 8      -  Ciprofloxacin: I 0.5      -  Ertapenem: S <=0.5      -  Gentamicin: S <=2      -  Imipenem: S <=1      -  Levofloxacin: S <=0.5      -  Meropenem: S <=1      -  Nitrofurantoin: S <=32 Should not be used to treat pyelonephritis      -  Piperacillin/Tazobactam: S <=8      -  Tobramycin: S <=2      -  Trimethoprim/Sulfamethoxazole: S <=0.5/9.5        Procalcitonin, Serum: 0.64 ng/mL (05-10-23 @ 00:34)    D-Dimer Assay, Quantitative: 281 ng/mL DDU (05-10-23 @ 00:34)            Vancomycin Level, Trough: 9.9 ug/mL (05-11-23 @ 18:12)        Indwelling Urethral Catheter:     Connect To:  Straight Drainage/Gravity    Indication:  Urine Output Monitoring in Critically Ill (05-14-23 @ 10:55) (not performed)  Indwelling Urethral Catheter:     Connect To:  Leg Bag    Indication:  Urine Output Monitoring in Critically Ill (05-13-23 @ 11:00) (not performed)      RADIOLOGY, ECG, & ADDITIONAL TESTS:  12 Lead ECG:   Ventricular Rate 146 BPM    Atrial Rate 468 BPM    QRS Duration 82 ms    Q-T Interval 236 ms    QTC Calculation(Bazett) 367 ms    R Axis 72 degrees    T Axis -25 degrees    Diagnosis Line Atrial flutter with variable AV block  Nonspecific ST and T wave abnormality  Abnormal ECG    Confirmed by jose barlow ([id number]) on 5/10/2023 11:36:51 AM (05-09-23 @ 22:21)    CT Head No Cont:   ACC: [id number] EXAM:  CT BRAIN   ORDERED BY: RICHY MOYA     PROCEDURE DATE:  05/12/2023          INTERPRETATION:  Clinical History / Reason for exam: Rule out bleed.    Technique: Noncontrast head CT.  Contiguous unenhanced CT axial images of   thehead from the base to the vertex with coronal and sagittal reformats.    Comparison: CT head dated 7/20/2022    Findings:    The ventricles and cortical sulci demonstrate stable moderate atrophic   changes.    There are stable confluent hypodensitiesthroughout the hemispheric white   matter without mass effect compatible with chronic microvascular changes.    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 Stable   chronic lacunar infarcts within bilateral deep gray nuclei.    There is no acute intracranial hemorrhage, extra-axial fluid collection   or midline shift.    Vascular calcifications are noted.    Unchanged opacification of the right sphenoid sinus with calcified   contents. The visualized paranasal sinuses and mastoids are otherwise   clear.    IMPRESSION:    1.  No evidence of acute intracranial hemorrhage. Stable exam since   7/20/2022.    2.  Stable extensive chronic microvascular changes and chronic infarcts.    --- End of Report ---            SHAHEED CARDOSO MD; Attending Radiologist  This document has been electronically signed. May 12 2023 10:39AM (05-12-23 @ 06:24)    RECENT DIAGNOSTIC ORDERS:  Xray Chest 1 View-PORTABLE IMMEDIATE: IMMEDIATE   Indication: covid  Transport: Portable  Exam Completed (05-14-23 @ 07:53)      MEDICATIONS:  MEDICATIONS  (STANDING):  busPIRone 30 milliGRAM(s) Oral two times a day  cefepime   IVPB 1000 milliGRAM(s) IV Intermittent every 12 hours  chlorhexidine 2% Cloths 1 Application(s) Topical <User Schedule>  clonazePAM  Tablet 0.5 milliGRAM(s) Oral at bedtime  dexAMETHasone  Injectable 6 milliGRAM(s) IV Push daily  dextrose 5% + sodium chloride 0.9%. 1000 milliLiter(s) (60 mL/Hr) IV Continuous <Continuous>  diltiazem Infusion 15 mG/Hr (15 mL/Hr) IV Continuous <Continuous>  enoxaparin Injectable 50 milliGRAM(s) SubCutaneous every 12 hours  gabapentin 300 milliGRAM(s) Oral at bedtime  lactobacillus acidophilus 1 Tablet(s) Oral daily  melatonin 5 milliGRAM(s) Oral at bedtime  metoprolol tartrate 50 milliGRAM(s) Oral every 8 hours  remdesivir  IVPB 100 milliGRAM(s) IV Intermittent every 24 hours  remdesivir  IVPB   IV Intermittent     MEDICATIONS  (PRN):  acetaminophen     Tablet .. 650 milliGRAM(s) Oral every 6 hours PRN Temp greater or equal to 38C (100.4F), Mild Pain (1 - 3)  morphine  - Injectable 2 milliGRAM(s) IV Push every 6 hours PRN Severe Pain (7 - 10)  oxyCODONE    IR 5 milliGRAM(s) Oral every 8 hours PRN Severe Pain (7 - 10)      HOME MEDICATIONS:  Acidophilus oral tablet (05-09)  busPIRone 30 mg oral tablet (05-09)  cholestyramine 4 g/4.8 g oral powder for reconstitution (05-09)  clonazePAM 0.5 mg oral tablet (05-09)  gabapentin 300 mg oral capsule (05-09)  melatonin 5 mg oral tablet (05-09)  metoprolol tartrate 25 mg oral tablet (05-09)  oxycodone-acetaminophen 5 mg-325 mg oral tablet (05-09)  Xarelto 15 mg oral tablet (05-09)      PHYSICAL EXAM:    General: more awake alert, she followed some simple commands . chronic ill appearance, frail   Lungs:  decrease breath sound b/l , normal resp effort  Heart: regular rhythm , tachy  Abdomen: soft, non tender non distended  Ext: no edema, able to move her hands and feets but very weak

## 2023-05-14 NOTE — PROGRESS NOTE ADULT - ASSESSMENT
82F w/ h/o Lewy Body Dementia (A&O 1-2 baseline), chronic Hill (persistent retention; last exchanged on 5/8), chronic afib (on Xarelto), and stage 4 sacral ulcer p/w cough and hypoxia. Admitted to stepdown unit for severe sepsis i/s/o aspiration pneumonia vs UTI.    Sepsis POA secondary to COVID-19 Pneumonia with possible Aspiration Pneumonia   UTI, in setting of chronic hill   Sacral Ulcer   Hx of Lewy Body Dementia  Hx  Chronic AFIB on Xarelto    ID input appreciated  c/W -Cefepime 1 gm iv q12h and RDV   C/W dexamethasone as per pulm cc   f/u cultures , Urine with E coli    on NC   S/S eval appreciated - started puree diet   appreciate palliative care  lactate trended down   c/w gentle hydration   neurochecks   CT head negative     []AFib with RVR  suspect driven by sepsis  Lovenox  therapeutic for now   on Cardizem ggt  as she was NPO , taper off and resume home meds metoprolol - increased metoprolol ot 50 q 8h  monitor and correct electrolytes       []Sacral/skin  wounds:  burn team input appreciated  no burn surgical intervention, - continue local wound care BID  - wash with soap and water, apply hydrogel, cover with allevyn pad/ - burn signing off  - consult wound care nursing for further wound care recs if needed- cosult placed   frequent turning, off loading, and positioning and skin care as per protocol, Maintain pressure injury prevention, Keep skin clean, Offload heels, Monitor wound for changes and notify provider if any.   Heal off  for R heal pain and monitor  - discussed with RN       Hx of Lewy Body Dementia  CT head no acute findings   resume po meds buspar, klonopin and oxy, gabapentin     poor prognosis , DNR/DNI appreciated palliative and hospice input      monitor resp status, heart rate .

## 2023-05-14 NOTE — PROGRESS NOTE ADULT - SUBJECTIVE AND OBJECTIVE BOX
Over Night Events: events noted, still on cardizem drip, afebrile  PHYSICAL EXAM    ICU Vital Signs Last 24 Hrs  T(C): 35.8 (14 May 2023 08:26), Max: 36.4 (13 May 2023 16:00)  T(F): 96.4 (14 May 2023 08:26), Max: 97.5 (13 May 2023 16:00)  HR: 92 (14 May 2023 08:26) (76 - 131)  BP: 127/66 (14 May 2023 08:26) (97/61 - 127/66)  BP(mean): 75 (14 May 2023 04:00) (75 - 75)  RR: 18 (14 May 2023 08:26) (18 - 20)  SpO2: 98% (14 May 2023 08:26) (97% - 99%)    O2 Parameters below as of 14 May 2023 00:03  Patient On (Oxygen Delivery Method): nasal cannula              General: ill looking  Lungs: dec bs both bases  Cardiovascular: irregualr  Abdomen: Soft, Positive BS  sacral ucler      05-13-23 @ 07:01  -  05-14-23 @ 07:00  --------------------------------------------------------  IN:    dextrose 5% + sodium chloride 0.9%: 660 mL    Diltiazem: 245 mL    IV PiggyBack: 50 mL  Total IN: 955 mL    OUT:    Indwelling Catheter - Urethral (mL): 2400 mL  Total OUT: 2400 mL    Total NET: -1445 mL          LABS:                          13.8   7.84  )-----------( 280      ( 13 May 2023 02:20 )             42.5                                               05-13    142  |  109  |  14  ----------------------------<  160<H>  3.3<L>   |  24  |  <0.5<L>    Ca    8.1<L>      13 May 2023 02:20  Mg     1.9     05-13    TPro  4.8<L>  /  Alb  2.5<L>  /  TBili  0.4  /  DBili  x   /  AST  10  /  ALT  <5  /  AlkPhos  80  05-13                                                                                           LIVER FUNCTIONS - ( 13 May 2023 02:20 )  Alb: 2.5 g/dL / Pro: 4.8 g/dL / ALK PHOS: 80 U/L / ALT: <5 U/L / AST: 10 U/L / GGT: x                                                                                                                                       MEDICATIONS  (STANDING):  busPIRone 30 milliGRAM(s) Oral two times a day  cefepime   IVPB 1000 milliGRAM(s) IV Intermittent every 12 hours  chlorhexidine 2% Cloths 1 Application(s) Topical <User Schedule>  clonazePAM  Tablet 0.5 milliGRAM(s) Oral at bedtime  dexAMETHasone  Injectable 6 milliGRAM(s) IV Push daily  dextrose 5% + sodium chloride 0.9%. 1000 milliLiter(s) (60 mL/Hr) IV Continuous <Continuous>  diltiazem Infusion 17.5 mG/Hr (17.5 mL/Hr) IV Continuous <Continuous>  enoxaparin Injectable 50 milliGRAM(s) SubCutaneous every 12 hours  gabapentin 300 milliGRAM(s) Oral at bedtime  lactobacillus acidophilus 1 Tablet(s) Oral daily  melatonin 5 milliGRAM(s) Oral at bedtime  metoprolol tartrate 25 milliGRAM(s) Oral <User Schedule>  metoprolol tartrate 50 milliGRAM(s) Oral two times a day  remdesivir  IVPB   IV Intermittent   remdesivir  IVPB 100 milliGRAM(s) IV Intermittent every 24 hours    MEDICATIONS  (PRN):  acetaminophen     Tablet .. 650 milliGRAM(s) Oral every 6 hours PRN Temp greater or equal to 38C (100.4F), Mild Pain (1 - 3)  morphine  - Injectable 2 milliGRAM(s) IV Push every 6 hours PRN Severe Pain (7 - 10)  oxyCODONE    IR 5 milliGRAM(s) Oral every 8 hours PRN Severe Pain (7 - 10)      CXR reviewed

## 2023-05-14 NOTE — PROGRESS NOTE ADULT - ASSESSMENT
IMPRESSION:    Sepsis POA  COVID-19 Pneumonia   highly Aspiration Pneumonia   E. Coli UTI, in setting of chronic hill   Sacral Ulcer   AFIB with RVR   HO Lewy Body Dementia  HO Chronic AFIB on Xarelto    PLAN:    CNS: avoid depressants     HEENT: Oral care    PULMONARY:  HOB @ 45 degrees.  Aspiration precautions. wean oxygen as tolerated, target SaO2 92 TO 96%    CARDIOVASCULAR:  po cardizem if able if not NGT    GI: GI prophylaxis.  Feeding per speech and swallow.  Bowel regimen, family declining PEG    RENAL:  Follow up lytes.  Correct as needed, replete potassium and magnesium     INFECTIOUS DISEASE: ABX  PER ID    HEMATOLOGICAL: on therapeutic Lovenox for AFIB     ENDOCRINE:  Follow up FS.  Insulin protocol if needed.    MUSCULOSKELETAL: activity as tolerated    Palliative care following,    SDU monitoring

## 2023-05-15 DIAGNOSIS — R45.1 RESTLESSNESS AND AGITATION: ICD-10-CM

## 2023-05-15 LAB
ALBUMIN SERPL ELPH-MCNC: 2.6 G/DL — LOW (ref 3.5–5.2)
ALP SERPL-CCNC: 86 U/L — SIGNIFICANT CHANGE UP (ref 30–115)
ALT FLD-CCNC: <5 U/L — SIGNIFICANT CHANGE UP (ref 0–41)
ANION GAP SERPL CALC-SCNC: 11 MMOL/L — SIGNIFICANT CHANGE UP (ref 7–14)
ANISOCYTOSIS BLD QL: SLIGHT — SIGNIFICANT CHANGE UP
AST SERPL-CCNC: 10 U/L — SIGNIFICANT CHANGE UP (ref 0–41)
BASOPHILS # BLD AUTO: 0 K/UL — SIGNIFICANT CHANGE UP (ref 0–0.2)
BASOPHILS NFR BLD AUTO: 0 % — SIGNIFICANT CHANGE UP (ref 0–1)
BILIRUB SERPL-MCNC: 0.5 MG/DL — SIGNIFICANT CHANGE UP (ref 0.2–1.2)
BUN SERPL-MCNC: 9 MG/DL — LOW (ref 10–20)
BURR CELLS BLD QL SMEAR: PRESENT — SIGNIFICANT CHANGE UP
CALCIUM SERPL-MCNC: 8.3 MG/DL — LOW (ref 8.4–10.4)
CHLORIDE SERPL-SCNC: 104 MMOL/L — SIGNIFICANT CHANGE UP (ref 98–110)
CO2 SERPL-SCNC: 28 MMOL/L — SIGNIFICANT CHANGE UP (ref 17–32)
CREAT SERPL-MCNC: <0.5 MG/DL — LOW (ref 0.7–1.5)
CULTURE RESULTS: SIGNIFICANT CHANGE UP
CULTURE RESULTS: SIGNIFICANT CHANGE UP
EGFR: 106 ML/MIN/1.73M2 — SIGNIFICANT CHANGE UP
EOSINOPHIL # BLD AUTO: 0 K/UL — SIGNIFICANT CHANGE UP (ref 0–0.7)
EOSINOPHIL NFR BLD AUTO: 0 % — SIGNIFICANT CHANGE UP (ref 0–8)
GIANT PLATELETS BLD QL SMEAR: PRESENT — SIGNIFICANT CHANGE UP
GLUCOSE SERPL-MCNC: 160 MG/DL — HIGH (ref 70–99)
HCT VFR BLD CALC: 45.6 % — SIGNIFICANT CHANGE UP (ref 37–47)
HGB BLD-MCNC: 15.3 G/DL — SIGNIFICANT CHANGE UP (ref 12–16)
LYMPHOCYTES # BLD AUTO: 0.55 K/UL — LOW (ref 1.2–3.4)
LYMPHOCYTES # BLD AUTO: 4.4 % — LOW (ref 20.5–51.1)
MACROCYTES BLD QL: SLIGHT — SIGNIFICANT CHANGE UP
MANUAL SMEAR VERIFICATION: SIGNIFICANT CHANGE UP
MCHC RBC-ENTMCNC: 29.8 PG — SIGNIFICANT CHANGE UP (ref 27–31)
MCHC RBC-ENTMCNC: 33.6 G/DL — SIGNIFICANT CHANGE UP (ref 32–37)
MCV RBC AUTO: 88.7 FL — SIGNIFICANT CHANGE UP (ref 81–99)
MONOCYTES # BLD AUTO: 0.66 K/UL — HIGH (ref 0.1–0.6)
MONOCYTES NFR BLD AUTO: 5.2 % — SIGNIFICANT CHANGE UP (ref 1.7–9.3)
NEUTROPHILS # BLD AUTO: 11.4 K/UL — HIGH (ref 1.4–6.5)
NEUTROPHILS NFR BLD AUTO: 90.4 % — HIGH (ref 42.2–75.2)
OVALOCYTES BLD QL SMEAR: SLIGHT — SIGNIFICANT CHANGE UP
PLAT MORPH BLD: NORMAL — SIGNIFICANT CHANGE UP
PLATELET # BLD AUTO: 365 K/UL — SIGNIFICANT CHANGE UP (ref 130–400)
PMV BLD: 10.1 FL — SIGNIFICANT CHANGE UP (ref 7.4–10.4)
POIKILOCYTOSIS BLD QL AUTO: SIGNIFICANT CHANGE UP
POLYCHROMASIA BLD QL SMEAR: SIGNIFICANT CHANGE UP
POTASSIUM SERPL-MCNC: 2.9 MMOL/L — LOW (ref 3.5–5)
POTASSIUM SERPL-SCNC: 2.9 MMOL/L — LOW (ref 3.5–5)
PROT SERPL-MCNC: 5.3 G/DL — LOW (ref 6–8)
RBC # BLD: 5.14 M/UL — SIGNIFICANT CHANGE UP (ref 4.2–5.4)
RBC # FLD: 15.3 % — HIGH (ref 11.5–14.5)
RBC BLD AUTO: ABNORMAL
SODIUM SERPL-SCNC: 143 MMOL/L — SIGNIFICANT CHANGE UP (ref 135–146)
SPECIMEN SOURCE: SIGNIFICANT CHANGE UP
SPECIMEN SOURCE: SIGNIFICANT CHANGE UP
WBC # BLD: 12.61 K/UL — HIGH (ref 4.8–10.8)
WBC # FLD AUTO: 12.61 K/UL — HIGH (ref 4.8–10.8)

## 2023-05-15 PROCEDURE — 99233 SBSQ HOSP IP/OBS HIGH 50: CPT

## 2023-05-15 PROCEDURE — 71045 X-RAY EXAM CHEST 1 VIEW: CPT | Mod: 26

## 2023-05-15 PROCEDURE — 99497 ADVNCD CARE PLAN 30 MIN: CPT

## 2023-05-15 RX ORDER — DILTIAZEM HCL 120 MG
60 CAPSULE, EXT RELEASE 24 HR ORAL EVERY 6 HOURS
Refills: 0 | Status: DISCONTINUED | OUTPATIENT
Start: 2023-05-15 | End: 2023-05-15

## 2023-05-15 RX ORDER — DILTIAZEM HCL 120 MG
5 CAPSULE, EXT RELEASE 24 HR ORAL
Qty: 125 | Refills: 0 | Status: DISCONTINUED | OUTPATIENT
Start: 2023-05-15 | End: 2023-05-17

## 2023-05-15 RX ORDER — POTASSIUM CHLORIDE 20 MEQ
20 PACKET (EA) ORAL ONCE
Refills: 0 | Status: COMPLETED | OUTPATIENT
Start: 2023-05-15 | End: 2023-05-15

## 2023-05-15 RX ORDER — DILTIAZEM HCL 120 MG
90 CAPSULE, EXT RELEASE 24 HR ORAL EVERY 6 HOURS
Refills: 0 | Status: DISCONTINUED | OUTPATIENT
Start: 2023-05-15 | End: 2023-05-15

## 2023-05-15 RX ORDER — POTASSIUM CHLORIDE 20 MEQ
20 PACKET (EA) ORAL ONCE
Refills: 0 | Status: DISCONTINUED | OUTPATIENT
Start: 2023-05-15 | End: 2023-05-15

## 2023-05-15 RX ORDER — DILTIAZEM HCL 120 MG
30 CAPSULE, EXT RELEASE 24 HR ORAL ONCE
Refills: 0 | Status: COMPLETED | OUTPATIENT
Start: 2023-05-15 | End: 2023-05-15

## 2023-05-15 RX ORDER — POTASSIUM CHLORIDE 20 MEQ
40 PACKET (EA) ORAL EVERY 4 HOURS
Refills: 0 | Status: COMPLETED | OUTPATIENT
Start: 2023-05-15 | End: 2023-05-15

## 2023-05-15 RX ORDER — SODIUM CHLORIDE 9 MG/ML
1000 INJECTION, SOLUTION INTRAVENOUS
Refills: 0 | Status: DISCONTINUED | OUTPATIENT
Start: 2023-05-15 | End: 2023-05-20

## 2023-05-15 RX ORDER — POTASSIUM CHLORIDE 20 MEQ
20 PACKET (EA) ORAL ONCE
Refills: 0 | Status: DISCONTINUED | OUTPATIENT
Start: 2023-05-15 | End: 2023-05-20

## 2023-05-15 RX ADMIN — Medication 40 MILLIEQUIVALENT(S): at 14:44

## 2023-05-15 RX ADMIN — CEFEPIME 100 MILLIGRAM(S): 1 INJECTION, POWDER, FOR SOLUTION INTRAMUSCULAR; INTRAVENOUS at 05:21

## 2023-05-15 RX ADMIN — CHLORHEXIDINE GLUCONATE 1 APPLICATION(S): 213 SOLUTION TOPICAL at 05:24

## 2023-05-15 RX ADMIN — Medication 40 MILLIEQUIVALENT(S): at 11:35

## 2023-05-15 RX ADMIN — Medication 50 MILLIGRAM(S): at 05:21

## 2023-05-15 RX ADMIN — Medication 5 MILLIGRAM(S): at 21:12

## 2023-05-15 RX ADMIN — Medication 50 MILLIEQUIVALENT(S): at 08:19

## 2023-05-15 RX ADMIN — Medication 60 MILLIGRAM(S): at 10:38

## 2023-05-15 RX ADMIN — Medication 6 MILLIGRAM(S): at 05:20

## 2023-05-15 RX ADMIN — Medication 1 TABLET(S): at 11:35

## 2023-05-15 RX ADMIN — Medication 50 MILLIGRAM(S): at 14:36

## 2023-05-15 RX ADMIN — SODIUM CHLORIDE 100 MILLILITER(S): 9 INJECTION, SOLUTION INTRAVENOUS at 21:12

## 2023-05-15 RX ADMIN — Medication 90 MILLIGRAM(S): at 16:35

## 2023-05-15 RX ADMIN — Medication 30 MILLIGRAM(S): at 05:21

## 2023-05-15 RX ADMIN — Medication 0.5 MILLIGRAM(S): at 21:12

## 2023-05-15 RX ADMIN — ENOXAPARIN SODIUM 50 MILLIGRAM(S): 100 INJECTION SUBCUTANEOUS at 17:49

## 2023-05-15 RX ADMIN — GABAPENTIN 300 MILLIGRAM(S): 400 CAPSULE ORAL at 21:12

## 2023-05-15 RX ADMIN — ENOXAPARIN SODIUM 50 MILLIGRAM(S): 100 INJECTION SUBCUTANEOUS at 05:20

## 2023-05-15 RX ADMIN — CEFEPIME 100 MILLIGRAM(S): 1 INJECTION, POWDER, FOR SOLUTION INTRAMUSCULAR; INTRAVENOUS at 17:49

## 2023-05-15 RX ADMIN — Medication 30 MILLIGRAM(S): at 15:43

## 2023-05-15 RX ADMIN — Medication 5 MG/HR: at 21:12

## 2023-05-15 RX ADMIN — Medication 30 MILLIGRAM(S): at 17:50

## 2023-05-15 NOTE — PROGRESS NOTE ADULT - ATTENDING COMMENTS
IMPRESSION:    Sepsis POA  COVID-19 Pneumonia   Aspiration Pneumonia   E. Coli UTI, in setting of chronic hill   Sacral Ulcer   AFIB with RVR   HO Lewy Body Dementia  HO Chronic AFIB on Xarelto    Plan as outlined above

## 2023-05-15 NOTE — PROGRESS NOTE ADULT - CONVERSATION DETAILS
Palliative NP met with son Fernando    Reviewed palliative care/ and discussed mom's condition and treatment. He is aware of her overall poor prognosis. He has been speaking to [facility] hospice (even before admission) to have mom be home on hospice care. She could still see her home visit MD whom they have a good relationship with. Also we discussed that mom would not come back to the hospital on hospice. He said he is ready to bring her home.    He made her DNR/DNI, and he DOES NOT WANT ARTIFICAL FEEDS. Only comfort feeds and let her eat what she wants to. He understands she won't eat that much due to her dementia.
Spoke with patient's children outside patient's room this morning. Palliative care reintroduced. Discussed GOC at length. THey were awaiting S+S eval.  We discussed that if the patient failed S+S, the next step would be to consider PEG vs hospice. We discussed that PEG will likely not improve QOL or length of life in patients with dysphagia from dementia, and they noted they would likely not wish to pursue PEG.    Discussed hospice and comfort feeds at length. All questions answered.  They asked about NGT feeds, but it was noted that if the patient has dysphagia from her underlying dementia, the patient's dysphagia would likely not improve and the NGT would not be beneficial. ALl questions answered.  THey will await S+S

## 2023-05-15 NOTE — PROGRESS NOTE ADULT - SUBJECTIVE AND OBJECTIVE BOX
Patient is a 82y old  Female who presents with a chief complaint of Cough; Hypoxia (14 May 2023 10:57)        Over Night Events: no acute events overnight, on 4L NC, remains on Cardizem drip        Vital Signs Last 24 Hrs  T(C): 36.1 (15 May 2023 08:11), Max: 36.1 (15 May 2023 08:11)  T(F): 96.9 (15 May 2023 08:11), Max: 96.9 (15 May 2023 08:11)  HR: 48 (15 May 2023 08:11) (48 - 104)  BP: 142/63 (15 May 2023 08:11) (113/70 - 142/63)  RR: 18 (15 May 2023 08:11) (18 - 18)  SpO2: 99% (15 May 2023 08:11) (98% - 99%)    O2 Parameters below as of 15 May 2023 04:00  Patient On (Oxygen Delivery Method): nasal cannula            CONSTITUTIONAL:  Chronically Ill appearing.  NAD    ENT:   Airway patent,   Mouth with normal mucosa.   No thrush    EYES:   Pupils equal,   Round and reactive to light.    CARDIAC:   Normal rate,   Regular rhythm.    No edema      RESPIRATORY:   No wheezing  Bilateral BS  Normal chest expansion  Not tachypneic,  No use of accessory muscles    GASTROINTESTINAL:  Abdomen soft,   Non-tender,   No guarding,   + BS      MUSCULOSKELETAL:   Range of motion is not limited      NEUROLOGICAL:   Alert and oriented x2    SKIN:   Skin normal color for race,   Warm and dry  sacral ulcer     PSYCHIATRIC:   No apparent risk to self or others.          05-14-23 @ 07:01  -  05-15-23 @ 07:00  --------------------------------------------------------  IN:    dextrose 5% + sodium chloride 0.9%: 720 mL    Diltiazem: 180 mL    IV PiggyBack: 50 mL  Total IN: 950 mL    OUT:    Indwelling Catheter - Urethral (mL): 2300 mL  Total OUT: 2300 mL    Total NET: -1350 mL          LABS:                            15.3   12.61 )-----------( 365      ( 15 May 2023 07:10 )             45.6                                               05-15    143  |  104  |  9<L>  ----------------------------<  160<H>  2.9<L>   |  28  |  <0.5<L>    Ca    8.3<L>      15 May 2023 07:10    TPro  5.3<L>  /  Alb  2.6<L>  /  TBili  0.5  /  DBili  x   /  AST  10  /  ALT  <5  /  AlkPhos  86  05-15                                                                                           LIVER FUNCTIONS - ( 15 May 2023 07:10 )  Alb: 2.6 g/dL / Pro: 5.3 g/dL / ALK PHOS: 86 U/L / ALT: <5 U/L / AST: 10 U/L / GGT: x                                                                                                                                       MEDICATIONS  (STANDING):  busPIRone 30 milliGRAM(s) Oral two times a day  cefepime   IVPB 1000 milliGRAM(s) IV Intermittent every 12 hours  chlorhexidine 2% Cloths 1 Application(s) Topical <User Schedule>  clonazePAM  Tablet 0.5 milliGRAM(s) Oral at bedtime  dexAMETHasone  Injectable 6 milliGRAM(s) IV Push daily  dextrose 5% + sodium chloride 0.9%. 1000 milliLiter(s) (60 mL/Hr) IV Continuous <Continuous>  diltiazem Infusion 15 mG/Hr (15 mL/Hr) IV Continuous <Continuous>  enoxaparin Injectable 50 milliGRAM(s) SubCutaneous every 12 hours  gabapentin 300 milliGRAM(s) Oral at bedtime  lactobacillus acidophilus 1 Tablet(s) Oral daily  melatonin 5 milliGRAM(s) Oral at bedtime  metoprolol tartrate 50 milliGRAM(s) Oral every 8 hours    MEDICATIONS  (PRN):  acetaminophen     Tablet .. 650 milliGRAM(s) Oral every 6 hours PRN Temp greater or equal to 38C (100.4F), Mild Pain (1 - 3)  morphine  - Injectable 2 milliGRAM(s) IV Push every 6 hours PRN Severe Pain (7 - 10)  oxyCODONE    IR 5 milliGRAM(s) Oral every 8 hours PRN Severe Pain (7 - 10)      CXR reviewed  Patient is a 82y old  Female who presents with a chief complaint of Cough; Hypoxia (14 May 2023 10:57)        Over Night Events: no acute events overnight, on 4L NC, remains on Cardizem drip        Vital Signs Last 24 Hrs  T(C): 36.1 (15 May 2023 08:11), Max: 36.1 (15 May 2023 08:11)  T(F): 96.9 (15 May 2023 08:11), Max: 96.9 (15 May 2023 08:11)  HR: 48 (15 May 2023 08:11) (48 - 104)  BP: 142/63 (15 May 2023 08:11) (113/70 - 142/63)  RR: 18 (15 May 2023 08:11) (18 - 18)  SpO2: 99% (15 May 2023 08:11) (98% - 99%)    O2 Parameters below as of 15 May 2023 04:00  Patient On (Oxygen Delivery Method): nasal cannula            CONSTITUTIONAL:  Chronically Ill appearing.  NAD    ENT:   Airway patent,   Mouth with normal mucosa.   No thrush    EYES:   Pupils equal,   Round and reactive to light.    CARDIAC:   Normal rate,   irregular rhythm.        RESPIRATORY:   No wheezing  Bilateral BS  Normal chest expansion  Not tachypneic,  No use of accessory muscles    GASTROINTESTINAL:  Abdomen soft,   Non-tender,   No guarding,   + BS      MUSCULOSKELETAL:   Range of motion is not limited      NEUROLOGICAL:   Alert and oriented x2    SKIN:   Skin normal color for race,   Warm and dry  sacral ulcer     PSYCHIATRIC:   No apparent risk to self or others.          05-14-23 @ 07:01  -  05-15-23 @ 07:00  --------------------------------------------------------  IN:    dextrose 5% + sodium chloride 0.9%: 720 mL    Diltiazem: 180 mL    IV PiggyBack: 50 mL  Total IN: 950 mL    OUT:    Indwelling Catheter - Urethral (mL): 2300 mL  Total OUT: 2300 mL    Total NET: -1350 mL          LABS:                            15.3   12.61 )-----------( 365      ( 15 May 2023 07:10 )             45.6                                               05-15    143  |  104  |  9<L>  ----------------------------<  160<H>  2.9<L>   |  28  |  <0.5<L>    Ca    8.3<L>      15 May 2023 07:10    TPro  5.3<L>  /  Alb  2.6<L>  /  TBili  0.5  /  DBili  x   /  AST  10  /  ALT  <5  /  AlkPhos  86  05-15                                                                                           LIVER FUNCTIONS - ( 15 May 2023 07:10 )  Alb: 2.6 g/dL / Pro: 5.3 g/dL / ALK PHOS: 86 U/L / ALT: <5 U/L / AST: 10 U/L / GGT: x                                                                                                                                       MEDICATIONS  (STANDING):  busPIRone 30 milliGRAM(s) Oral two times a day  cefepime   IVPB 1000 milliGRAM(s) IV Intermittent every 12 hours  chlorhexidine 2% Cloths 1 Application(s) Topical <User Schedule>  clonazePAM  Tablet 0.5 milliGRAM(s) Oral at bedtime  dexAMETHasone  Injectable 6 milliGRAM(s) IV Push daily  dextrose 5% + sodium chloride 0.9%. 1000 milliLiter(s) (60 mL/Hr) IV Continuous <Continuous>  diltiazem Infusion 15 mG/Hr (15 mL/Hr) IV Continuous <Continuous>  enoxaparin Injectable 50 milliGRAM(s) SubCutaneous every 12 hours  gabapentin 300 milliGRAM(s) Oral at bedtime  lactobacillus acidophilus 1 Tablet(s) Oral daily  melatonin 5 milliGRAM(s) Oral at bedtime  metoprolol tartrate 50 milliGRAM(s) Oral every 8 hours    MEDICATIONS  (PRN):  acetaminophen     Tablet .. 650 milliGRAM(s) Oral every 6 hours PRN Temp greater or equal to 38C (100.4F), Mild Pain (1 - 3)  morphine  - Injectable 2 milliGRAM(s) IV Push every 6 hours PRN Severe Pain (7 - 10)  oxyCODONE    IR 5 milliGRAM(s) Oral every 8 hours PRN Severe Pain (7 - 10)      CXR reviewed

## 2023-05-15 NOTE — CONSULT NOTE ADULT - SUBJECTIVE AND OBJECTIVE BOX
NUTRITION SUPPORT TEAM  -  CONSULT NOTE     82F w/ h/o Lewy Body Dementia (A&O 1-2 baseline), chronic Mcdaniel (persistent retention; last exchanged on 5/8), chronic afib (on Xarelto), and stage 4 sacral ulcer p/w cough and hypoxia since yesterday. History limited from patient due to patient's current mental status. Family at bedside (daughter and two sons) able to provide collateral. Since yesterday, pt has had cough, increased secretions, general malaise, and worsening mental status (minimally verbal; typically able to say few words at baseline). This AM, pt noted to be tachycardic and hypoxic to 80's%, prompting ED evaluation. Of note, patient known to have dysphagia, but able to tolerate PO. No reported fevers, chills, CP, palpitations, abdominal pain, n/v/d, dysuria, or LE swelling per family.     In ED, pt febrile (T 101.8F), tachycardic (), and tachypneic (RR 20's-30's) on NRB, but saturating in mid-90's%. Labs demonstrated WBC 24.45. VBG showed pH 7.41, pCO2 37, and Lactate 3.9. UA concerning for UTI. CXR concerning for right-sided PNA. S/P 1.7L LR bolus and IV abx (Vanc/Zosyn). Admitted to SDU for severe sepsis i/s/o aspiration pneumonia vs UTI. (09 May 2023 21:41)    CEU Course:   - Patient admitted to CEU, CXR revealed multilobar PNA 2/2 suspected aspiration  - UCx +ve E coli, BCx -ve - started on cefepime/vancomycin, COVID +ve started on decadron for hypoxia   - Patient RVR during hospital stay, started on cardizem ggt - now rate controlled switched to cardizem 90mg q6hr with home lopressor 50mg BID, AC lovenox 50mg BID  - SSE eval on admission recommended NPO but after improvement now tolerating po diet   - Burn team consulted for sacral wound - no intervention - wound care recs given     NUTRITION SUPPORT NOTE:  ++ weight loss over the past year  + poor po intake, jesus when pt has resp compromise  DNR/ DNI  pt now able to take po, but intake is poor  GOC note appreciated - palliative team d/w pt's son - do NOT want artificial feeds or tube    pt awake, weak, anicteric  skin turgor fair  abd soft, ND, NT  pt with O2NC, uncomfortable but not in distress    REVIEW OF SYSTEMS:  Negative except as noted above.     PAST MEDICAL/SURGICAL HISTORY:   Anxiety  Arthritis  LBD (Lewy body dementia)  Ulcer of sacral region, stage 4  Chronic indwelling Mcdaniel catheter  Chronic atrial fibrillation  H/O hernia repair    ALLERGIES:  No Known Allergies    VITALS:  T(F): 97.3 (05-15 @ 16:00), Max: 97.3 (05-15 @ 16:00)  HR: 136 (05-15 @ 16:00) (48 - 136)  BP: 126/64 (05-15 @ 16:00) (105/64 - 142/63)  RR: 18 (05-15 @ 16:00) (18 - 18)  SpO2: 97% (05-15 @ 16:00) (97% - 99%)    HEIGHT/WEIGHT/BMI:   Height (cm): 167.6 (05-09), 172.7 (07-25), 172.7 (07-20)  Weight (kg): 54.4 (05-09), 63.2 (07-29), 68 (07-20)  BMI (kg/m2): 19.4 (05-09), 21.2 (07-29), 22.8 (07-25), 22.8 (07-20)    I/Os:   05-14-23 @ 07:01  -  05-15-23 @ 07:00  --------------------------------------------------------  IN:    dextrose 5% + sodium chloride 0.9%: 720 mL    Diltiazem: 180 mL    IV PiggyBack: 50 mL  Total IN: 950 mL  OUT:    Indwelling Catheter - Urethral (mL): 2300 mL  Total OUT: 2300 mL  Total NET: -1350 mL    STANDING MEDICATIONS:   busPIRone 30 milliGRAM(s) Oral two times a day  cefepime   IVPB 1000 milliGRAM(s) IV Intermittent every 12 hours  chlorhexidine 2% Cloths 1 Application(s) Topical <User Schedule>  clonazePAM  Tablet 0.5 milliGRAM(s) Oral at bedtime  dexAMETHasone  Injectable 6 milliGRAM(s) IV Push daily  diltiazem    Tablet 90 milliGRAM(s) Oral every 6 hours  enoxaparin Injectable 50 milliGRAM(s) SubCutaneous every 12 hours  gabapentin 300 milliGRAM(s) Oral at bedtime  lactobacillus acidophilus 1 Tablet(s) Oral daily  melatonin 5 milliGRAM(s) Oral at bedtime  metoprolol tartrate 50 milliGRAM(s) Oral every 8 hours    LABS:                         15.3   12.61 )-----------( 365      ( 15 May 2023 07:10 )             45.6     143  |  104  |  9<L>  ----------------------------<  160<H>          (05-15-23 @ 07:10)  2.9<L>   |  28  |  <0.5<L>    Ca    8.3<L>          (05-15-23 @ 07:10)    TPro  5.3<L>  /  Alb  2.6<L>  /  TBili  0.5  /  DBili  x   /  AST  10  /  ALT  <5  /  AlkPhos  86       05-15-23 @ 07:10    DIET:   Diet, Pureed:   Mildly Thick Liquids (MILDTHICKLIQS)  Supplement Feeding Modality:  Oral  Ensure Pudding Cans or Servings Per Day:  1       Frequency:  Three Times a day (05-13-23 @ 08:36) [Active]    RADIOLOGY:   < from: Xray Chest 1 View- PORTABLE-Urgent (Xray Chest 1 View- PORTABLE-Urgent .) (05.15.23 @ 11:27) >  Support devices: None.    Cardiac/mediastinum/hilum: Not well evaluated    Lung parenchyma/Pleura: Low lung volumes. Unchanged bibasilar   opacities/effusion. No pneumothorax.    < end of copied text >

## 2023-05-15 NOTE — PROGRESS NOTE ADULT - PROBLEM SELECTOR PLAN 2
In the setting of Lewy Body dementia. On diet as per speech and swallow  - family DOES NOT WANT ARTIFICIAL FEEDING

## 2023-05-15 NOTE — PROGRESS NOTE ADULT - PROBLEM SELECTOR PLAN 4
-PRN morphine per primary team  -f/u wound/burn consults. - Roxanol 5mg SL Q 4 hrs PRN for pain   -f/u wound/burn consults.

## 2023-05-15 NOTE — PROGRESS NOTE ADULT - SUBJECTIVE AND OBJECTIVE BOX
HPI:  82F w/ h/o Lewy Body Dementia (A&O 1-2 baseline), chronic Mcdaniel (persistent retention; last exchanged on 5/8), chronic afib (on Xarelto), and stage 4 sacral ulcer p/w cough and hypoxia since yesterday. History limited from patient due to patient's current mental status. Family at bedside (daughter and two sons) able to provide collateral. Since yesterday, pt has had cough, increased secretions, general malaise, and worsening mental status (minimally verbal; typically able to say few words at baseline). This AM, pt noted to be tachycardic and hypoxic to 80's%, prompting ED evaluation. Of note, patient known to have dysphagia, but able to tolerate PO. No reported fevers, chills, CP, palpitations, abdominal pain, n/v/d, dysuria, or LE swelling per family.     In ED, pt febrile (T 101.8F), tachycardic (), and tachypneic (RR 20's-30's) on NRB, but saturating in mid-90's%. Labs demonstrated WBC 24.45. VBG showed pH 7.41, pCO2 37, and Lactate 3.9. UA concerning for UTI. CXR concerning for right-sided PNA. S/P 1.7L LR bolus and IV abx (Vanc/Zosyn). Admitted to SDU for severe sepsis i/s/o aspiration pneumonia vs UTI. (09 May 2023 21:41)      Interval History  - pt on nasal cannula oxygen, son at bedside. eats w 1:1 assist, no verbal today   ADVANCE DIRECTIVES:    [ ] Full Code [x ] DNR/DNI  MOLST  [ ]  Living Will  [ ]   DECISION MAKER(s):  [ ] Health Care Proxy(s)  [ x] Surrogate(s)  [ ] Guardian           Name(s): Phone Number(s):  Name(s): Phone Number(s):  HCP reportedly son Edward per daughter - [phone]  BASELINE (I)ADL(s) (prior to admission):  [place]: [ ]Total  [ ] Moderate [ x]Dependent      Allergies    No Known Allergies    Intolerances    MEDICATIONS  (STANDING):  busPIRone 30 milliGRAM(s) Oral two times a day  cefepime   IVPB 1000 milliGRAM(s) IV Intermittent every 12 hours  chlorhexidine 2% Cloths 1 Application(s) Topical <User Schedule>  clonazePAM  Tablet 0.5 milliGRAM(s) Oral at bedtime  dexAMETHasone  Injectable 6 milliGRAM(s) IV Push daily  diltiazem    Tablet 60 milliGRAM(s) Oral every 6 hours  diltiazem Infusion 15 mG/Hr (15 mL/Hr) IV Continuous <Continuous>  enoxaparin Injectable 50 milliGRAM(s) SubCutaneous every 12 hours  gabapentin 300 milliGRAM(s) Oral at bedtime  lactobacillus acidophilus 1 Tablet(s) Oral daily  melatonin 5 milliGRAM(s) Oral at bedtime  metoprolol tartrate 50 milliGRAM(s) Oral every 8 hours  potassium chloride   Powder 40 milliEquivalent(s) Oral every 4 hours    MEDICATIONS  (PRN):  acetaminophen     Tablet .. 650 milliGRAM(s) Oral every 6 hours PRN Temp greater or equal to 38C (100.4F), Mild Pain (1 - 3)  morphine  - Injectable 2 milliGRAM(s) IV Push every 6 hours PRN Severe Pain (7 - 10)  oxyCODONE    IR 5 milliGRAM(s) Oral every 8 hours PRN Severe Pain (7 - 10)    PRESENT SYMPTOMS: [x ]Unable to obtain due to poor mentation   Source if other than patient:  [ ]Family   [ ]Team         PAIN AD Score: 2  [email or web address] (press ctrl +  left click to view)         Respiratory Distress Observation Scale (RDOS): 2  A score of 0 to 2 signifies little or no respiratory distress, 3 signifies mild distress, scores 4 to 6 indicate moderate distress, and scores greater than 7 signify severe distress  [email or web address]      PHYSICAL EXAM:  Vital Signs Last 24 Hrs  T(C): 36.1 (15 May 2023 08:11), Max: 36.1 (15 May 2023 08:11)  T(F): 96.9 (15 May 2023 08:11), Max: 96.9 (15 May 2023 08:11)  HR: 48 (15 May 2023 08:11) (48 - 104)  BP: 142/63 (15 May 2023 08:11) (113/70 - 142/63)  BP(mean): --  RR: 18 (15 May 2023 08:11) (18 - 18)  SpO2: 99% (15 May 2023 08:11) (98% - 99%)    Parameters below as of 15 May 2023 04:00  Patient On (Oxygen Delivery Method): nasal cannula     I&O's Summary    14 May 2023 07:01  -  15 May 2023 07:00  --------------------------------------------------------  IN: 950 mL / OUT: 2300 mL / NET: -1350 mL    15 May 2023 07:01  -  15 May 2023 12:25  --------------------------------------------------------  IN: 55 mL / OUT: 0 mL / NET: 55 mL      GENERAL:  [x ] No acute distress [ ]Lethargic  [ ]Unarousable  [ x]Verbal - minimal  [ ]Non-Verbal [ ]Cachexia    BEHAVIORAL/PSYCH:  [ ]Alert and Oriented [ ] Anxiety [ ] Delirium [ ] Agitation [x ] Calm   EYES: [ x] No scleral icterus [ ] Scleral icterus [ ] Closed  ENMT:  [ ]Dry mouth  [ x]No external oral lesions [ ] No external ear or nose lesions  CARDIOVASCULAR:  [ ]Regular [x ]Irregular [ ]Tachy [ ]Not Tachy  [ ]Surya [ ] Edema [ ] No edema  PULMONARY:  [ ]Tachypnea  [ ]Audible excessive secretions [ x] No labored breathing [ ] labored breathing  GASTROINTESTINAL: [ ]Soft  [ ]Distended  [x ]Not distended [ ]Non tender [ ]Tender  MUSCULOSKELETAL: [ ]No clubbing [ ] clubbing  [ ] No cyanosis [ ] cyanosis  NEUROLOGIC: [ ]No focal deficits  [ ]Follows some commands  [ ]Does not follow commands  [ ]Cognitive impairment  [ ]Dysphagia  [ ]Dysarthria  [ ]Paresis   SKIN: [ ] Jaundiced [ x] Non-jaundiced [ ]Rash [ ]No Rash [ ] Warm [ ] Dry  MISC/LINES: [ ] ET tube [ ] Trach [ ]NGT/OGT [ ]PEG [ ]Mcdaniel    CRITICAL CARE:  [ ] Shock Present  [ ]Septic [ ]Cardiogenic [ ]Neurologic [ ]Hypovolemic  [ ]  Vasopressors [ ]  Inotropes   [x ]Respiratory failure present [ ]Mechanical ventilation [ ]Non-invasive ventilatory support [ ]High flow  [ ]Acute  [ ]Chronic [ ]Hypoxic  [ ]Hypercarbic [ ]Other  [ ]Other organ failure       LABS: reviewed by me                        15.3   12.61 )-----------( 365      ( 15 May 2023 07:10 )             45.6   05-15    143  |  104  |  9<L>  ----------------------------<  160<H>  2.9<L>   |  28  |  <0.5<L>    Ca    8.3<L>      15 May 2023 07:10    TPro  5.3<L>  /  Alb  2.6<L>  /  TBili  0.5  /  DBili  x   /  AST  10  /  ALT  <5  /  AlkPhos  86  05-15        RADIOLOGY & ADDITIONAL STUDIES: reviewed by me    EKG: reviewed by me      REFERRALS:   [ ]Chaplaincy  [x ][facility]- d/w  and primary CEU team   [ ]Child Life  [x ]Social Work  [ x]Case management [ ]Holistic Therapy     Patient discussed with primary medical team MD  Palliative care education provided to patient and/or family    Goals of Care Document:

## 2023-05-15 NOTE — PROGRESS NOTE ADULT - SUBJECTIVE AND OBJECTIVE BOX
T H I S   I S    N O  T   A    F I N A L I Z E D   N O T YOAN SHELDON  82y, Female  Allergy: No Known Allergies    Hospital Day: 6d    Patient seen and examined earlier today.     PMH/PSH:  PAST MEDICAL & SURGICAL HISTORY:  Anxiety      Arthritis      LBD (Lewy body dementia)      Ulcer of sacral region, stage 4      Chronic indwelling Mcdaniel catheter      Chronic atrial fibrillation      H/O hernia repair          LAST 24-Hr EVENTS:    VITALS:  T(F): 97 (05-15-23 @ 12:53), Max: 97 (05-15-23 @ 12:53)  HR: 119 (05-15-23 @ 12:53)  BP: 105/64 (05-15-23 @ 12:53) (105/64 - 142/63)  RR: 18 (05-15-23 @ 12:53)  SpO2: 98% (05-15-23 @ 12:53)          TESTS & MEASUREMENTS:  Weight/BMI      05-13-23 @ 07:01  -  05-14-23 @ 07:00  --------------------------------------------------------  IN: 955 mL / OUT: 2400 mL / NET: -1445 mL    05-14-23 @ 07:01  -  05-15-23 @ 07:00  --------------------------------------------------------  IN: 950 mL / OUT: 2300 mL / NET: -1350 mL    05-15-23 @ 07:01  -  05-15-23 @ 12:57  --------------------------------------------------------  IN: 55 mL / OUT: 0 mL / NET: 55 mL                            15.3   12.61 )-----------( 365      ( 15 May 2023 07:10 )             45.6       INR: 1.31 ratio (05-11-23 @ 06:42)    05-15    143  |  104  |  9<L>  ----------------------------<  160<H>  2.9<L>   |  28  |  <0.5<L>    Ca    8.3<L>      15 May 2023 07:10    TPro  5.3<L>  /  Alb  2.6<L>  /  TBili  0.5  /  DBili  x   /  AST  10  /  ALT  <5  /  AlkPhos  86  05-15    LIVER FUNCTIONS - ( 15 May 2023 07:10 )  Alb: 2.6 g/dL / Pro: 5.3 g/dL / ALK PHOS: 86 U/L / ALT: <5 U/L / AST: 10 U/L / GGT: x                 Culture - Blood (collected 05-11-23 @ 06:42)  Source: .Blood None  Preliminary Report (05-12-23 @ 13:02):    No growth to date.    Culture - Blood (collected 05-09-23 @ 18:01)  Source: .Blood Blood-Peripheral  Final Report (05-15-23 @ 01:00):    No Growth Final    Culture - Blood (collected 05-09-23 @ 18:01)  Source: .Blood Blood-Peripheral  Final Report (05-15-23 @ 01:00):    No Growth Final    Culture - Urine (collected 05-09-23 @ 18:01)  Source: Clean Catch Clean Catch (Midstream)  Final Report (05-12-23 @ 10:55):    >100,000 CFU/ml Escherichia coli  Organism: Escherichia coli (05-12-23 @ 10:55)  Organism: Escherichia coli (05-12-23 @ 10:55)      Method Type: AYDEN      -  Amikacin: S <=16      -  Amoxicillin/Clavulanic Acid: I 16/8      -  Ampicillin: R >16 These ampicillin results predict results for amoxicillin      -  Ampicillin/Sulbactam: I 16/8 Enterobacter, Klebsiella aerogenes, Citrobacter, and Serratia may develop resistance during prolonged therapy (3-4 days)      -  Aztreonam: S <=4      -  Cefazolin: S <=2 For uncomplicated UTI with K. pneumoniae, E. coli, or P. mirablis: AYDEN <=16 is sensitive and AYDEN >=32 is resistant. This also predicts results for oral agents cefaclor, cefdinir, cefpodoxime, cefprozil, cefuroxime axetil, cephalexin and locarbef for uncomplicated UTI. Note that some isolates may be susceptible to these agents while testing resistant to cefazolin.      -  Cefepime: S <=2      -  Cefoxitin: S <=8      -  Ceftriaxone: S <=1 Enterobacter, Klebsiella aerogenes, Citrobacter, and Serratia may develop resistance during prolonged therapy      -  Cefuroxime: S 8      -  Ciprofloxacin: I 0.5      -  Ertapenem: S <=0.5      -  Gentamicin: S <=2      -  Imipenem: S <=1      -  Levofloxacin: S <=0.5      -  Meropenem: S <=1      -  Nitrofurantoin: S <=32 Should not be used to treat pyelonephritis      -  Piperacillin/Tazobactam: S <=8      -  Tobramycin: S <=2      -  Trimethoprim/Sulfamethoxazole: S <=0.5/9.5        Procalcitonin, Serum: 0.64 ng/mL (05-10-23 @ 00:34)    D-Dimer Assay, Quantitative: 281 ng/mL DDU (05-10-23 @ 00:34)            Vancomycin Level, Trough: 9.9 ug/mL (05-11-23 @ 18:12)        Indwelling Urethral Catheter:     Connect To:  Straight Drainage/Gravity    Indication:  Urine Output Monitoring in Critically Ill (05-14-23 @ 10:55) (not performed)  Indwelling Urethral Catheter:     Connect To:  Leg Bag    Indication:  Urine Output Monitoring in Critically Ill (05-13-23 @ 11:00) (not performed)      RADIOLOGY, ECG, & ADDITIONAL TESTS:  12 Lead ECG:   Ventricular Rate 146 BPM    Atrial Rate 468 BPM    QRS Duration 82 ms    Q-T Interval 236 ms    QTC Calculation(Bazett) 367 ms    R Axis 72 degrees    T Axis -25 degrees    Diagnosis Line Atrial flutter with variable AV block  Nonspecific ST and T wave abnormality  Abnormal ECG    Confirmed by jose barlow ([id number]) on 5/10/2023 11:36:51 AM (05-09-23 @ 22:21)    CT Head No Cont:   ACC: [id number] EXAM:  CT BRAIN   ORDERED BY: RICHY GRIFFIN     PROCEDURE DATE:  05/12/2023          INTERPRETATION:  Clinical History / Reason for exam: Rule out bleed.    Technique: Noncontrast head CT.  Contiguous unenhanced CT axial images of   thehead from the base to the vertex with coronal and sagittal reformats.    Comparison: CT head dated 7/20/2022    Findings:    The ventricles and cortical sulci demonstrate stable moderate atrophic   changes.    There are stable confluent hypodensitiesthroughout the hemispheric white   matter without mass effect compatible with chronic microvascular changes.    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 Stable   chronic lacunar infarcts within bilateral deep gray nuclei.    There is no acute intracranial hemorrhage, extra-axial fluid collection   or midline shift.    Vascular calcifications are noted.    Unchanged opacification of the right sphenoid sinus with calcified   contents. The visualized paranasal sinuses and mastoids are otherwise   clear.    IMPRESSION:    1.  No evidence of acute intracranial hemorrhage. Stable exam since   7/20/2022.    2.  Stable extensive chronic microvascular changes and chronic infarcts.    --- End of Report ---            SHAHEED CARDOSO MD; Attending Radiologist  This document has been electronically signed. May 12 2023 10:39AM (05-12-23 @ 06:24)    RECENT DIAGNOSTIC ORDERS:  Xray Chest 1 View- PORTABLE-Urgent: Urgent   Indication: volume overload, hypoxia  Transport: Portable  Exam Completed (05-15-23 @ 10:56)  Procalcitonin, Serum: 16:00 (05-15-23 @ 10:56)  Pro-Brain Natriuretic Peptide: 16:00 (05-15-23 @ 10:56)  Basic Metabolic Panel: 16:00 (05-15-23 @ 10:52)      MEDICATIONS:  MEDICATIONS  (STANDING):  busPIRone 30 milliGRAM(s) Oral two times a day  cefepime   IVPB 1000 milliGRAM(s) IV Intermittent every 12 hours  chlorhexidine 2% Cloths 1 Application(s) Topical <User Schedule>  clonazePAM  Tablet 0.5 milliGRAM(s) Oral at bedtime  dexAMETHasone  Injectable 6 milliGRAM(s) IV Push daily  diltiazem    Tablet 60 milliGRAM(s) Oral every 6 hours  diltiazem Infusion 15 mG/Hr (15 mL/Hr) IV Continuous <Continuous>  enoxaparin Injectable 50 milliGRAM(s) SubCutaneous every 12 hours  gabapentin 300 milliGRAM(s) Oral at bedtime  lactobacillus acidophilus 1 Tablet(s) Oral daily  melatonin 5 milliGRAM(s) Oral at bedtime  metoprolol tartrate 50 milliGRAM(s) Oral every 8 hours  potassium chloride   Powder 40 milliEquivalent(s) Oral every 4 hours    MEDICATIONS  (PRN):  acetaminophen     Tablet .. 650 milliGRAM(s) Oral every 6 hours PRN Temp greater or equal to 38C (100.4F), Mild Pain (1 - 3)  morphine  - Injectable 2 milliGRAM(s) IV Push every 6 hours PRN Severe Pain (7 - 10)  oxyCODONE    IR 5 milliGRAM(s) Oral every 8 hours PRN Severe Pain (7 - 10)      HOME MEDICATIONS:  Acidophilus oral tablet (05-09)  busPIRone 30 mg oral tablet (05-09)  cholestyramine 4 g/4.8 g oral powder for reconstitution (05-09)  clonazePAM 0.5 mg oral tablet (05-09)  gabapentin 300 mg oral capsule (05-09)  melatonin 5 mg oral tablet (05-09)  metoprolol tartrate 25 mg oral tablet (05-09)  oxycodone-acetaminophen 5 mg-325 mg oral tablet (05-09)  Xarelto 15 mg oral tablet (05-09)      PHYSICAL EXAM:  GENERAL:   CHEST/LUNG:   HEART:   ABDOMEN:   EXTREMITIES:               YOAN TAI  82y, Female  Allergy: No Known Allergies    Hospital Day: 6d    Patient seen and examined earlier today. she stated that she feels fine, no pain , son at bedside     PMH/PSH:  PAST MEDICAL & SURGICAL HISTORY:  Anxiety      Arthritis      LBD (Lewy body dementia)      Ulcer of sacral region, stage 4      Chronic indwelling Mcdaniel catheter      Chronic atrial fibrillation      H/O hernia repair          LAST 24-Hr EVENTS:    VITALS:  T(F): 97 (05-15-23 @ 12:53), Max: 97 (05-15-23 @ 12:53)  HR: 119 (05-15-23 @ 12:53)  BP: 105/64 (05-15-23 @ 12:53) (105/64 - 142/63)  RR: 18 (05-15-23 @ 12:53)  SpO2: 98% (05-15-23 @ 12:53)          TESTS & MEASUREMENTS:  Weight/BMI      05-13-23 @ 07:01  -  05-14-23 @ 07:00  --------------------------------------------------------  IN: 955 mL / OUT: 2400 mL / NET: -1445 mL    05-14-23 @ 07:01  -  05-15-23 @ 07:00  --------------------------------------------------------  IN: 950 mL / OUT: 2300 mL / NET: -1350 mL    05-15-23 @ 07:01  -  05-15-23 @ 12:57  --------------------------------------------------------  IN: 55 mL / OUT: 0 mL / NET: 55 mL                            15.3   12.61 )-----------( 365      ( 15 May 2023 07:10 )             45.6       INR: 1.31 ratio (05-11-23 @ 06:42)    05-15    143  |  104  |  9<L>  ----------------------------<  160<H>  2.9<L>   |  28  |  <0.5<L>    Ca    8.3<L>      15 May 2023 07:10    TPro  5.3<L>  /  Alb  2.6<L>  /  TBili  0.5  /  DBili  x   /  AST  10  /  ALT  <5  /  AlkPhos  86  05-15    LIVER FUNCTIONS - ( 15 May 2023 07:10 )  Alb: 2.6 g/dL / Pro: 5.3 g/dL / ALK PHOS: 86 U/L / ALT: <5 U/L / AST: 10 U/L / GGT: x                 Culture - Blood (collected 05-11-23 @ 06:42)  Source: .Blood None  Preliminary Report (05-12-23 @ 13:02):    No growth to date.    Culture - Blood (collected 05-09-23 @ 18:01)  Source: .Blood Blood-Peripheral  Final Report (05-15-23 @ 01:00):    No Growth Final    Culture - Blood (collected 05-09-23 @ 18:01)  Source: .Blood Blood-Peripheral  Final Report (05-15-23 @ 01:00):    No Growth Final    Culture - Urine (collected 05-09-23 @ 18:01)  Source: Clean Catch Clean Catch (Midstream)  Final Report (05-12-23 @ 10:55):    >100,000 CFU/ml Escherichia coli  Organism: Escherichia coli (05-12-23 @ 10:55)  Organism: Escherichia coli (05-12-23 @ 10:55)      Method Type: AYDEN      -  Amikacin: S <=16      -  Amoxicillin/Clavulanic Acid: I 16/8      -  Ampicillin: R >16 These ampicillin results predict results for amoxicillin      -  Ampicillin/Sulbactam: I 16/8 Enterobacter, Klebsiella aerogenes, Citrobacter, and Serratia may develop resistance during prolonged therapy (3-4 days)      -  Aztreonam: S <=4      -  Cefazolin: S <=2 For uncomplicated UTI with K. pneumoniae, E. coli, or P. mirablis: AYDEN <=16 is sensitive and AYDEN >=32 is resistant. This also predicts results for oral agents cefaclor, cefdinir, cefpodoxime, cefprozil, cefuroxime axetil, cephalexin and locarbef for uncomplicated UTI. Note that some isolates may be susceptible to these agents while testing resistant to cefazolin.      -  Cefepime: S <=2      -  Cefoxitin: S <=8      -  Ceftriaxone: S <=1 Enterobacter, Klebsiella aerogenes, Citrobacter, and Serratia may develop resistance during prolonged therapy      -  Cefuroxime: S 8      -  Ciprofloxacin: I 0.5      -  Ertapenem: S <=0.5      -  Gentamicin: S <=2      -  Imipenem: S <=1      -  Levofloxacin: S <=0.5      -  Meropenem: S <=1      -  Nitrofurantoin: S <=32 Should not be used to treat pyelonephritis      -  Piperacillin/Tazobactam: S <=8      -  Tobramycin: S <=2      -  Trimethoprim/Sulfamethoxazole: S <=0.5/9.5        Procalcitonin, Serum: 0.64 ng/mL (05-10-23 @ 00:34)    D-Dimer Assay, Quantitative: 281 ng/mL DDU (05-10-23 @ 00:34)            Vancomycin Level, Trough: 9.9 ug/mL (05-11-23 @ 18:12)        Indwelling Urethral Catheter:     Connect To:  Straight Drainage/Gravity    Indication:  Urine Output Monitoring in Critically Ill (05-14-23 @ 10:55) (not performed)  Indwelling Urethral Catheter:     Connect To:  Leg Bag    Indication:  Urine Output Monitoring in Critically Ill (05-13-23 @ 11:00) (not performed)      RADIOLOGY, ECG, & ADDITIONAL TESTS:  12 Lead ECG:   Ventricular Rate 146 BPM    Atrial Rate 468 BPM    QRS Duration 82 ms    Q-T Interval 236 ms    QTC Calculation(Bazett) 367 ms    R Axis 72 degrees    T Axis -25 degrees    Diagnosis Line Atrial flutter with variable AV block  Nonspecific ST and T wave abnormality  Abnormal ECG    Confirmed by jose barlow ([id number]) on 5/10/2023 11:36:51 AM (05-09-23 @ 22:21)    CT Head No Cont:   ACC: [id number] EXAM:  CT BRAIN   ORDERED BY: RICHY MOYA     PROCEDURE DATE:  05/12/2023          INTERPRETATION:  Clinical History / Reason for exam: Rule out bleed.    Technique: Noncontrast head CT.  Contiguous unenhanced CT axial images of   thehead from the base to the vertex with coronal and sagittal reformats.    Comparison: CT head dated 7/20/2022    Findings:    The ventricles and cortical sulci demonstrate stable moderate atrophic   changes.    There are stable confluent hypodensitiesthroughout the hemispheric white   matter without mass effect compatible with chronic microvascular changes.    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 Stable   chronic lacunar infarcts within bilateral deep gray nuclei.    There is no acute intracranial hemorrhage, extra-axial fluid collection   or midline shift.    Vascular calcifications are noted.    Unchanged opacification of the right sphenoid sinus with calcified   contents. The visualized paranasal sinuses and mastoids are otherwise   clear.    IMPRESSION:    1.  No evidence of acute intracranial hemorrhage. Stable exam since   7/20/2022.    2.  Stable extensive chronic microvascular changes and chronic infarcts.    --- End of Report ---            SHAHEED CARDOSO MD; Attending Radiologist  This document has been electronically signed. May 12 2023 10:39AM (05-12-23 @ 06:24)    RECENT DIAGNOSTIC ORDERS:  Xray Chest 1 View- PORTABLE-Urgent: Urgent   Indication: volume overload, hypoxia  Transport: Portable  Exam Completed (05-15-23 @ 10:56)  Procalcitonin, Serum: 16:00 (05-15-23 @ 10:56)  Pro-Brain Natriuretic Peptide: 16:00 (05-15-23 @ 10:56)  Basic Metabolic Panel: 16:00 (05-15-23 @ 10:52)      MEDICATIONS:  MEDICATIONS  (STANDING):  busPIRone 30 milliGRAM(s) Oral two times a day  cefepime   IVPB 1000 milliGRAM(s) IV Intermittent every 12 hours  chlorhexidine 2% Cloths 1 Application(s) Topical <User Schedule>  clonazePAM  Tablet 0.5 milliGRAM(s) Oral at bedtime  dexAMETHasone  Injectable 6 milliGRAM(s) IV Push daily  diltiazem    Tablet 60 milliGRAM(s) Oral every 6 hours  diltiazem Infusion 15 mG/Hr (15 mL/Hr) IV Continuous <Continuous>  enoxaparin Injectable 50 milliGRAM(s) SubCutaneous every 12 hours  gabapentin 300 milliGRAM(s) Oral at bedtime  lactobacillus acidophilus 1 Tablet(s) Oral daily  melatonin 5 milliGRAM(s) Oral at bedtime  metoprolol tartrate 50 milliGRAM(s) Oral every 8 hours  potassium chloride   Powder 40 milliEquivalent(s) Oral every 4 hours    MEDICATIONS  (PRN):  acetaminophen     Tablet .. 650 milliGRAM(s) Oral every 6 hours PRN Temp greater or equal to 38C (100.4F), Mild Pain (1 - 3)  morphine  - Injectable 2 milliGRAM(s) IV Push every 6 hours PRN Severe Pain (7 - 10)  oxyCODONE    IR 5 milliGRAM(s) Oral every 8 hours PRN Severe Pain (7 - 10)      HOME MEDICATIONS:  Acidophilus oral tablet (05-09)  busPIRone 30 mg oral tablet (05-09)  cholestyramine 4 g/4.8 g oral powder for reconstitution (05-09)  clonazePAM 0.5 mg oral tablet (05-09)  gabapentin 300 mg oral capsule (05-09)  melatonin 5 mg oral tablet (05-09)  metoprolol tartrate 25 mg oral tablet (05-09)  oxycodone-acetaminophen 5 mg-325 mg oral tablet (05-09)  Xarelto 15 mg oral tablet (05-09)      PHYSICAL EXAM:  General: more awake alert, she followed some simple commands . chronic ill appearance, frail   Lungs:  decrease breath sound b/l , normal resp effort  Heart: regular rhythm   Abdomen: soft, non tender non distended  Ext: no edema, generalized weakness

## 2023-05-15 NOTE — PROGRESS NOTE ADULT - ASSESSMENT
82F w/ h/o Lewy Body Dementia (A&O 1-2 baseline), chronic Hill (persistent retention; last exchanged on 5/8), chronic afib (on Xarelto), and stage 4 sacral ulcer p/w cough and hypoxia. Admitted to stepdown unit for severe sepsis i/s/o aspiration pneumonia vs UTI.    Sepsis POA secondary to COVID-19 Pneumonia with possible Aspiration Pneumonia   UTI, in setting of chronic hill   Sacral Ulcer   Hx of Lewy Body Dementia  Hx  Chronic AFIB on Xarelto    ID input appreciated  c/W -Cefepime 1 gm iv q12h s/p RDV   C/W dexamethasone as per pulm cc   f/u cultures , Urine with E coli    on NC   S/S eval appreciated - started puree diet   appreciate palliative care  lactate trended down   c/w gentle hydration   neurochecks   CT head negative     []AFib with RVR  suspect driven by sepsis  Lovenox  therapeutic for now   on Cardizem ggt as she was NPO  , taper off and start po    increased metoprolol ot 50 q 8h  get echo   monitor and correct electrolytes     Hypoakelmemia   replete and monitor   patient did not tolerate IV K ( pain and burning)  , So given po       []Sacral/skin  wounds:  burn team input appreciated  no burn surgical intervention, - continue local wound care BID  - wash with soap and water, apply hydrogel, cover with allevyn pad/ - burn signing off  - consult wound care nursing for further wound care recs if needed- cosult placed then- consult replaced ( discussed with unit nurse manager)   frequent turning, off loading, and positioning and skin care as per protocol, Maintain pressure injury prevention, Keep skin clean, Offload heels, Monitor wound for changes and notify provider if any.   Heal off  for R heal pain and monitor  - discussed with RN       Hx of Lewy Body Dementia  CT head no acute findings   resume po meds buspar, klonopin and oxy, gabapentin     pending: resp status, might need home o2 , heart rate , K level     poor prognosis , DNR/DNI appreciated palliative and hospice input   5/15 palliative f/u appreciated [-son Fernando [phone], he is requesting [facility] - spoke to ]   as per the patient son  DNR/DNI, and he DOES NOT WANT ARTIFICAL FEEDS. Only comfort feeds and let her eat what she wants to. He understands she won't eat that much due to her dementia.

## 2023-05-15 NOTE — CHART NOTE - NSCHARTNOTEFT_GEN_A_CORE
PT HR noted 130s-140s since 1200. Will restart cardizem gtt. VS temp oral of 95.7, otherwise VSS. will give fluids x12hrs as pt with poor PO intake, no volume overload on exam.

## 2023-05-15 NOTE — PROGRESS NOTE ADULT - PROBLEM SELECTOR PLAN 3
-DNR/DNI  - Met with son Fernando [phone], he is requesting [facility] -DNR/DNI  - Met with son Fernando [phone], he is requesting [facility] - spoke to

## 2023-05-15 NOTE — CONSULT NOTE ADULT - ASSESSMENT
- covid +  - multilobar pneumonia  - UTI + E coli, being treated  - severe malnutrition + 20% wt loss in the pas year, if previous wts are correct  - lewy body dementia    d/w resident, rationale and issues explained  per palliative team d/w family, no artificial mode of feeding wanted  pt able to take po - give pt what she wants and tolerates  provide small volumes of nutrient-dense foods such as Magic Cup, yogurt, Boost VHC, Ensure Enlive, protein rich soups (split pea, puree chicken)  treat hypokalemia

## 2023-05-15 NOTE — CHART NOTE - NSCHARTNOTEFT_GEN_A_CORE
CEU Transfer Note    Transfer from: CEU    Transfer to: (  ) Medicine    ( X) Telemetry    (  ) RCU                               (  ) Palliative    (  ) Stroke Unit    (  ) MICU    (  ) __________________    HPI / CCU COURSE:    82F w/ h/o Lewy Body Dementia (A&O 1-2 baseline), chronic Mcdaniel (persistent retention; last exchanged on 5/8), chronic afib (on Xarelto), and stage 4 sacral ulcer p/w cough and hypoxia started 5/8. History limited from patient due to patient's current mental status. Family at bedside (daughter and two sons) able to provide collateral. Since 5/8 pt has had cough, increased secretions, general malaise, and worsening mental status (minimally verbal; typically able to say few words at baseline). AM 5/9, pt noted to be tachycardic and hypoxic to 80's%, prompting ED evaluation. Of note, patient known to have dysphagia, but able to tolerate PO. No reported fevers, chills, CP, palpitations, abdominal pain, n/v/d, dysuria, or LE swelling per family.     In ED, pt febrile (T 101.8F), tachycardic (), and tachypneic (RR 20's-30's) on NRB, but saturating in mid-90's%. Labs demonstrated WBC 24.45. VBG showed pH 7.41, pCO2 37, and Lactate 3.9. UA concerning for UTI. CXR concerning for right-sided PNA. S/P 1.7L LR bolus and IV abx (Vanc/Zosyn). Admitted to SDU for severe sepsis i/s/o aspiration pneumonia vs UTI.    CEU Course:             Vital Signs Last 24 Hrs  T(C): 36.1 (15 May 2023 12:53), Max: 36.1 (15 May 2023 08:11)  T(F): 97 (15 May 2023 12:53), Max: 97 (15 May 2023 12:53)  HR: 119 (15 May 2023 12:53) (48 - 119)  BP: 105/64 (15 May 2023 12:53) (105/64 - 142/63)  BP(mean): --  RR: 18 (15 May 2023 12:53) (18 - 18)  SpO2: 98% (15 May 2023 12:53) (98% - 99%)    Parameters below as of 15 May 2023 04:00  Patient On (Oxygen Delivery Method): nasal cannula        I&O's Summary    14 May 2023 07:01  -  15 May 2023 07:00  --------------------------------------------------------  IN: 950 mL / OUT: 2300 mL / NET: -1350 mL    15 May 2023 07:01  -  15 May 2023 15:15  --------------------------------------------------------  IN: 55 mL / OUT: 0 mL / NET: 55 mL        Physical Exam:       LABS:                               15.3   12.61 )-----------( 365      ( 15 May 2023 07:10 )             45.6       05-15    143  |  104  |  9<L>  ----------------------------<  160<H>  2.9<L>   |  28  |  <0.5<L>    Ca    8.3<L>      15 May 2023 07:10    TPro  5.3<L>  /  Alb  2.6<L>  /  TBili  0.5  /  DBili  x   /  AST  10  /  ALT  <5  /  AlkPhos  86  05-15                ECG:    Telemetry:    Imaging:      ASSESSMENT & PLAN:           FOR FOLLOW UP:  [ ]   [ ]   [ ]     Taylor Guaman MD PGY1 CEU Transfer Note    Transfer from: CEU    Transfer to: (  ) Medicine    ( X) Telemetry    (  ) RCU                               (  ) Palliative    (  ) Stroke Unit    (  ) MICU    (  ) __________________    HPI / CCU COURSE:    82F w/ h/o Lewy Body Dementia (A&O 1-2 baseline), chronic Hill (persistent retention; last exchanged on 5/8), chronic afib (on Xarelto), and stage 4 sacral ulcer p/w cough and hypoxia started 5/8. History limited from patient due to patient's current mental status. Family at bedside (daughter and two sons) able to provide collateral. Since 5/8 pt has had cough, increased secretions, general malaise, and worsening mental status (minimally verbal; typically able to say few words at baseline). AM 5/9, pt noted to be tachycardic and hypoxic to 80's%, prompting ED evaluation. Of note, patient known to have dysphagia, but able to tolerate PO. No reported fevers, chills, CP, palpitations, abdominal pain, n/v/d, dysuria, or LE swelling per family.     In ED, pt febrile (T 101.8F), tachycardic (), and tachypneic (RR 20's-30's) on NRB, but saturating in mid-90's%. Labs demonstrated WBC 24.45. VBG showed pH 7.41, pCO2 37, and Lactate 3.9. UA concerning for UTI. CXR concerning for right-sided PNA. S/P 1.7L LR bolus and IV abx (Vanc/Zosyn). Admitted to SDU for severe sepsis i/s/o aspiration pneumonia vs UTI.    CEU Course:   - Patient admitted to CEU, CXR revealed multilobar PNA 2/2 suspected aspiration  - UCx +ve E coli, BCx -ve - started on cefepime/vancomycin, COVID +ve started on decadron for hypoxia   - Patient RVR during hospital stay, started on cardizem ggt - now rate controlled switched to cardizem 90mg q6hr with home lopressor 50mg BID, AC lovenox 50mg BID  - SSE eval on admission recommended NPO but after improvement now tolerating po diet   - Burn team consulted for sacral wound - no intervention - wound care recs given   - lactate wnl, rate controlled, now afebrile, WBC stable medically stable to downgrade to telemetry  - plan follows below       Vital Signs Last 24 Hrs  T(C): 36.1 (15 May 2023 12:53), Max: 36.1 (15 May 2023 08:11)  T(F): 97 (15 May 2023 12:53), Max: 97 (15 May 2023 12:53)  HR: 119 (15 May 2023 12:53) (48 - 119)  BP: 105/64 (15 May 2023 12:53) (105/64 - 142/63)  BP(mean): --  RR: 18 (15 May 2023 12:53) (18 - 18)  SpO2: 98% (15 May 2023 12:53) (98% - 99%)    Parameters below as of 15 May 2023 04:00  Patient On (Oxygen Delivery Method): nasal cannula        I&O's Summary    14 May 2023 07:01  -  15 May 2023 07:00  --------------------------------------------------------  IN: 950 mL / OUT: 2300 mL / NET: -1350 mL    15 May 2023 07:01  -  15 May 2023 15:15  --------------------------------------------------------  IN: 55 mL / OUT: 0 mL / NET: 55 mL        Physical Exam:   General: NAD, ill appearance, frail   Lungs:  decrease breath sound b/l , no wheezing, rales, rhonchi, not in respiratory distress   Heart: Afib, rate controlled, S1/S2 present, no heaves, thrills, murmurs  Abdomen: soft, non tender non distended, BS +  Ext: no edema, follows commands, decreased strength      LABS:                               15.3   12.61 )-----------( 365      ( 15 May 2023 07:10 )             45.6       05-15    143  |  104  |  9<L>  ----------------------------<  160<H>  2.9<L>   |  28  |  <0.5<L>    Ca    8.3<L>      15 May 2023 07:10    TPro  5.3<L>  /  Alb  2.6<L>  /  TBili  0.5  /  DBili  x   /  AST  10  /  ALT  <5  /  AlkPhos  86  05-15                ECG: Atrial fibrillation     Telemetry: no events    Imaging:  CTH 5/12:  1.  No evidence of acute intracranial hemorrhage. Stable exam since   7/20/2022.  2.  Stable extensive chronic microvascular changes and chronic infarcts.    CXR 5/15:  Low lung volumes.  Unchanged bibasilar opacities/effusion.        ASSESSMENT & PLAN:   82y F pmh lewy body dementia, chronic hill, chronic afib on xarelto, stage 4 sacral ulcer presenting with cough and hypoxia admitted for COVID 19 infection with superimposed aspiration pnemonia c/b Afib RVR.     #Sepsis POA  #COVID 19 PNA  #Aspiration PNA   #UTI 2/2 chronic hill   - hypoxic, febrile on admission  - wbc 12k, COVID +ve, BCx -ve, UCx +ve E coli   - lactate wnl   - CTH -ve for acute intracranial pathology   - c/w cefepime 1g IV BID  - c/w RDV d3/5   - c/w decadron 6mg IV qD  - currently on NC - continue to wean as tolerated   - SSE - c/w pureed diet     #Chronic Afib now w/ RVR  - 2/2 sepsis   - previously on cardizem ggt - converted to 90mg q6hr PO   - c/w metoprolol 50mg q8hr   - c/w lovenox 50mg BID  - monitor on telemetry - adjust cardizem/metoprolol as needed for rate control     #Lewy Body Dementia  - CTH -ve  - AxO 1-2 at baseline, can follow simple commands   - c/w buspirone 30mg po BID  - c/w klonopin 0.5mg qhs   - c/w gabapentin 300mg po qhs     #Stage IV sacral ulcer   - burn consulted - no signs of infection, no plan for surgical debridement   - c/w wound care recs BID   - morphine 1mg IV q6hr PRN, tylenol 650mg po q6hr, oxycodone 5mg po q8hr PRN for pain   - c/w frequent repositioning     # To follow up  - rate control   - complete abx course for UTI and RDV  - wean o2 as tolerated  - patient family aware of plan - wish to have patient stabilized so she can go home on hospice  - palliative following     # Misc  - DVT Prophylaxis: enoxaparin Injectable  - GI Prophylaxis: - Diet: Diet, Pureed  - Activity: Activity - Increase As Tolerated  - Code Status: Full Do Not Resuscitate      Denis Bone  PGY1, Internal Medicine   [facility]

## 2023-05-15 NOTE — PROGRESS NOTE ADULT - ASSESSMENT
IMPRESSION:    Sepsis POA  COVID-19 Pneumonia   Aspiration Pneumonia   E. Coli UTI, in setting of chronic hill   Sacral Ulcer   AFIB with RVR   HO Lewy Body Dementia  HO Chronic AFIB on Xarelto    PLAN:    CNS: avoid depressants     HEENT: Oral care    PULMONARY:  HOB @ 45 degrees.  Aspiration precautions. wean oxygen as tolerated, target SaO2 92 TO 96%, repeat CXR today     CARDIOVASCULAR: Avoid volume overload, on metoprolol 50mg Q8H, start PO Cardizem 60mg Q6H, wean off drip, DC IVF, check BNP     GI: GI prophylaxis.  Feeding per speech and swallow.  Bowel regimen, family declining PEG    RENAL:  Follow up lytes.  Correct as needed, replete potassium     INFECTIOUS DISEASE: on Cefepime per ID, SP Remdesivir, procalcitonin 0.64, repeat     HEMATOLOGICAL: on therapeutic Lovenox for AFIB     ENDOCRINE:  Follow up FS.  Insulin protocol if needed.    MUSCULOSKELETAL: activity as tolerated, PT/OT    Palliative care following    Downgrade to Telemetry  IMPRESSION:    Sepsis POA  COVID-19 Pneumonia   Aspiration Pneumonia   E. Coli UTI, in setting of chronic hill   Sacral Ulcer   AFIB with RVR   HO Lewy Body Dementia  HO Chronic AFIB on Xarelto    PLAN:    CNS: avoid depressants     HEENT: Oral care    PULMONARY:  HOB @ 45 degrees.  Aspiration precautions. wean oxygen as tolerated, target SaO2 92 TO 96%, repeat CXR today     CARDIOVASCULAR: Avoid volume overload, on metoprolol 50mg Q8H, start PO Cardizem 60mg Q6H, wean off drip, DC IVF, check BNP     GI: GI prophylaxis.  Feeding per speech and swallow.  Bowel regimen, family declining PEG    RENAL:  Follow up lytes.  Correct as needed, replete potassium     INFECTIOUS DISEASE: on Cefepime per ID, SP Remdesivir, procalcitonin 0.64, repeat     HEMATOLOGICAL: on therapeutic Lovenox for AFIB     ENDOCRINE:  Follow up FS.  Insulin protocol if needed.    MUSCULOSKELETAL: activity as tolerated, PT/OT    Palliative care following    DNR/DNI    Downgrade to Telemetry

## 2023-05-16 LAB
ANION GAP SERPL CALC-SCNC: 10 MMOL/L — SIGNIFICANT CHANGE UP (ref 7–14)
BASOPHILS # BLD AUTO: 0.05 K/UL — SIGNIFICANT CHANGE UP (ref 0–0.2)
BASOPHILS NFR BLD AUTO: 0.3 % — SIGNIFICANT CHANGE UP (ref 0–1)
BUN SERPL-MCNC: 12 MG/DL — SIGNIFICANT CHANGE UP (ref 10–20)
CALCIUM SERPL-MCNC: 8.3 MG/DL — LOW (ref 8.4–10.5)
CHLORIDE SERPL-SCNC: 105 MMOL/L — SIGNIFICANT CHANGE UP (ref 98–110)
CO2 SERPL-SCNC: 25 MMOL/L — SIGNIFICANT CHANGE UP (ref 17–32)
CREAT SERPL-MCNC: <0.5 MG/DL — LOW (ref 0.7–1.5)
CULTURE RESULTS: SIGNIFICANT CHANGE UP
EGFR: 106 ML/MIN/1.73M2 — SIGNIFICANT CHANGE UP
EOSINOPHIL # BLD AUTO: 0 K/UL — SIGNIFICANT CHANGE UP (ref 0–0.7)
EOSINOPHIL NFR BLD AUTO: 0 % — SIGNIFICANT CHANGE UP (ref 0–8)
GLUCOSE SERPL-MCNC: 97 MG/DL — SIGNIFICANT CHANGE UP (ref 70–99)
HCT VFR BLD CALC: 44.5 % — SIGNIFICANT CHANGE UP (ref 37–47)
HGB BLD-MCNC: 14.8 G/DL — SIGNIFICANT CHANGE UP (ref 12–16)
IMM GRANULOCYTES NFR BLD AUTO: 2.8 % — HIGH (ref 0.1–0.3)
LYMPHOCYTES # BLD AUTO: 0.73 K/UL — LOW (ref 1.2–3.4)
LYMPHOCYTES # BLD AUTO: 4.7 % — LOW (ref 20.5–51.1)
MAGNESIUM SERPL-MCNC: 1.9 MG/DL — SIGNIFICANT CHANGE UP (ref 1.8–2.4)
MCHC RBC-ENTMCNC: 29.4 PG — SIGNIFICANT CHANGE UP (ref 27–31)
MCHC RBC-ENTMCNC: 33.3 G/DL — SIGNIFICANT CHANGE UP (ref 32–37)
MCV RBC AUTO: 88.3 FL — SIGNIFICANT CHANGE UP (ref 81–99)
MONOCYTES # BLD AUTO: 0.91 K/UL — HIGH (ref 0.1–0.6)
MONOCYTES NFR BLD AUTO: 5.9 % — SIGNIFICANT CHANGE UP (ref 1.7–9.3)
NEUTROPHILS # BLD AUTO: 13.38 K/UL — HIGH (ref 1.4–6.5)
NEUTROPHILS NFR BLD AUTO: 86.3 % — HIGH (ref 42.2–75.2)
NRBC # BLD: 0 /100 WBCS — SIGNIFICANT CHANGE UP (ref 0–0)
PLATELET # BLD AUTO: 373 K/UL — SIGNIFICANT CHANGE UP (ref 130–400)
PMV BLD: 10.6 FL — HIGH (ref 7.4–10.4)
POTASSIUM SERPL-MCNC: 4.3 MMOL/L — SIGNIFICANT CHANGE UP (ref 3.5–5)
POTASSIUM SERPL-SCNC: 4.3 MMOL/L — SIGNIFICANT CHANGE UP (ref 3.5–5)
RBC # BLD: 5.04 M/UL — SIGNIFICANT CHANGE UP (ref 4.2–5.4)
RBC # FLD: 15.9 % — HIGH (ref 11.5–14.5)
SODIUM SERPL-SCNC: 140 MMOL/L — SIGNIFICANT CHANGE UP (ref 135–146)
SPECIMEN SOURCE: SIGNIFICANT CHANGE UP
WBC # BLD: 15.51 K/UL — HIGH (ref 4.8–10.8)
WBC # FLD AUTO: 15.51 K/UL — HIGH (ref 4.8–10.8)

## 2023-05-16 PROCEDURE — 99233 SBSQ HOSP IP/OBS HIGH 50: CPT

## 2023-05-16 RX ORDER — DILTIAZEM HCL 120 MG
90 CAPSULE, EXT RELEASE 24 HR ORAL EVERY 6 HOURS
Refills: 0 | Status: DISCONTINUED | OUTPATIENT
Start: 2023-05-16 | End: 2023-05-20

## 2023-05-16 RX ORDER — METOPROLOL TARTRATE 50 MG
50 TABLET ORAL EVERY 8 HOURS
Refills: 0 | Status: DISCONTINUED | OUTPATIENT
Start: 2023-05-16 | End: 2023-05-17

## 2023-05-16 RX ADMIN — Medication 30 MILLIGRAM(S): at 17:31

## 2023-05-16 RX ADMIN — ENOXAPARIN SODIUM 50 MILLIGRAM(S): 100 INJECTION SUBCUTANEOUS at 05:47

## 2023-05-16 RX ADMIN — Medication 5 MILLIGRAM(S): at 21:52

## 2023-05-16 RX ADMIN — Medication 6 MILLIGRAM(S): at 05:47

## 2023-05-16 RX ADMIN — Medication 1 TABLET(S): at 11:24

## 2023-05-16 RX ADMIN — CHLORHEXIDINE GLUCONATE 1 APPLICATION(S): 213 SOLUTION TOPICAL at 05:47

## 2023-05-16 RX ADMIN — Medication 50 MILLIGRAM(S): at 11:22

## 2023-05-16 RX ADMIN — Medication 90 MILLIGRAM(S): at 09:33

## 2023-05-16 RX ADMIN — ENOXAPARIN SODIUM 50 MILLIGRAM(S): 100 INJECTION SUBCUTANEOUS at 17:30

## 2023-05-16 RX ADMIN — Medication 30 MILLIGRAM(S): at 05:47

## 2023-05-16 RX ADMIN — Medication 50 MILLIGRAM(S): at 21:51

## 2023-05-16 RX ADMIN — CEFEPIME 100 MILLIGRAM(S): 1 INJECTION, POWDER, FOR SOLUTION INTRAMUSCULAR; INTRAVENOUS at 05:46

## 2023-05-16 RX ADMIN — Medication 90 MILLIGRAM(S): at 17:31

## 2023-05-16 RX ADMIN — GABAPENTIN 300 MILLIGRAM(S): 400 CAPSULE ORAL at 21:52

## 2023-05-16 RX ADMIN — CEFEPIME 100 MILLIGRAM(S): 1 INJECTION, POWDER, FOR SOLUTION INTRAMUSCULAR; INTRAVENOUS at 17:30

## 2023-05-16 RX ADMIN — Medication 0.5 MILLIGRAM(S): at 21:55

## 2023-05-16 RX ADMIN — Medication 50 MILLIGRAM(S): at 15:16

## 2023-05-16 NOTE — PROGRESS NOTE ADULT - ATTENDING COMMENTS
IMPRESSION:    Sepsis POA  COVID-19 Pneumonia   Aspiration Pneumonia   E. Coli UTI, in setting of chronic Mcdaniel   Sacral Ulcer   AFIB with RVR   HO Lewy Body Dementia  HO Chronic AFIB on Xarelto    Plan as outlined above

## 2023-05-16 NOTE — PROGRESS NOTE ADULT - ASSESSMENT
82 year old woman with history of Lewy Body Dementia. stage IV sacral ulcer, presents with cough and hypoxia. Palliative care consulted for GOC.    Patient without pain or dyspnea. Son Fernando wants [facility] hospice. Spoke to medical team, pt is clinically unstable today restarted Cardizem gtt and is not being downgraded. Asked attending and resident to call Fernando and give him an update and speak to case management     Education about palliative care provided to patient/family.  See Recs below.    Please call [phone] with questions or concerns 24/7.   We will continue to follow.

## 2023-05-16 NOTE — PROGRESS NOTE ADULT - PROBLEM SELECTOR PLAN 2
On diet as per speech and swallow  - 1:1 feeds. aspiration precautions   - family DOES NOT WANT ARTIFICIAL FEEDING.

## 2023-05-16 NOTE — PHYSICAL THERAPY INITIAL EVALUATION ADULT - NSACTIVITYREC_GEN_A_PT
PT recommends ongoing nursing care for comfort, and to prevent further skin breakdown and contractures

## 2023-05-16 NOTE — PROGRESS NOTE ADULT - SUBJECTIVE AND OBJECTIVE BOX
T H I S   I S    N O  T   A    F I N A L I Z E D   N O T YOAN SHELDON  82y, Female  Allergy: No Known Allergies    Hospital Day: 7d    Patient seen and examined earlier today.     PMH/PSH:  PAST MEDICAL & SURGICAL HISTORY:  Anxiety      Arthritis      LBD (Lewy body dementia)      Ulcer of sacral region, stage 4      Chronic indwelling Hill catheter      Chronic atrial fibrillation      H/O hernia repair          LAST 24-Hr EVENTS:    VITALS:  T(F): 97 (05-16-23 @ 12:02), Max: 97.9 (05-16-23 @ 08:52)  HR: 135 (05-16-23 @ 12:02)  BP: 111/57 (05-16-23 @ 12:02) (104/62 - 126/64)  RR: 18 (05-16-23 @ 12:02)  SpO2: 99% (05-16-23 @ 12:02)    Oxygen Delivery Therapy:   Oxygen Method: Nasal Cannula   LPM: 3   Targeted SpO2 Range (%): 88-97   Notify Provider for SpO2 BELOW: 88   Notify Provider for SpO2 ABOVE: 98   O2 Titration Guideline: Device O2 Percent Range (%): 24-44%, Device O2 Flow Rate Range (LPM): 1-6LPM, O2 Titration Guideline: Increase/Decrease by 1LPM or 4%. Notify provider for any increase in oxygen therapy or inability to achieve targeted SpO2. (05-15-23 @ 14:15)        TESTS & MEASUREMENTS:  Weight/BMI      05-14-23 @ 07:01  -  05-15-23 @ 07:00  --------------------------------------------------------  IN: 950 mL / OUT: 2300 mL / NET: -1350 mL    05-15-23 @ 07:01  -  05-16-23 @ 07:00  --------------------------------------------------------  IN: 1645 mL / OUT: 1500 mL / NET: 145 mL                            14.8   15.51 )-----------( 373      ( 16 May 2023 06:50 )             44.5         05-16    140  |  105  |  12  ----------------------------<  97  4.3   |  25  |  <0.5<L>    Ca    8.3<L>      16 May 2023 06:50  Mg     1.9     05-16    TPro  5.3<L>  /  Alb  2.6<L>  /  TBili  0.5  /  DBili  x   /  AST  10  /  ALT  <5  /  AlkPhos  86  05-15    LIVER FUNCTIONS - ( 15 May 2023 07:10 )  Alb: 2.6 g/dL / Pro: 5.3 g/dL / ALK PHOS: 86 U/L / ALT: <5 U/L / AST: 10 U/L / GGT: x                 Culture - Blood (collected 05-11-23 @ 06:42)  Source: .Blood None  Preliminary Report (05-12-23 @ 13:02):    No growth to date.    Culture - Blood (collected 05-09-23 @ 18:01)  Source: .Blood Blood-Peripheral  Final Report (05-15-23 @ 01:00):    No Growth Final    Culture - Blood (collected 05-09-23 @ 18:01)  Source: .Blood Blood-Peripheral  Final Report (05-15-23 @ 01:00):    No Growth Final    Culture - Urine (collected 05-09-23 @ 18:01)  Source: Clean Catch Clean Catch (Midstream)  Final Report (05-12-23 @ 10:55):    >100,000 CFU/ml Escherichia coli  Organism: Escherichia coli (05-12-23 @ 10:55)  Organism: Escherichia coli (05-12-23 @ 10:55)      Method Type: AYDEN      -  Amikacin: S <=16      -  Amoxicillin/Clavulanic Acid: I 16/8      -  Ampicillin: R >16 These ampicillin results predict results for amoxicillin      -  Ampicillin/Sulbactam: I 16/8 Enterobacter, Klebsiella aerogenes, Citrobacter, and Serratia may develop resistance during prolonged therapy (3-4 days)      -  Aztreonam: S <=4      -  Cefazolin: S <=2 For uncomplicated UTI with K. pneumoniae, E. coli, or P. mirablis: AYDEN <=16 is sensitive and AYDEN >=32 is resistant. This also predicts results for oral agents cefaclor, cefdinir, cefpodoxime, cefprozil, cefuroxime axetil, cephalexin and locarbef for uncomplicated UTI. Note that some isolates may be susceptible to these agents while testing resistant to cefazolin.      -  Cefepime: S <=2      -  Cefoxitin: S <=8      -  Ceftriaxone: S <=1 Enterobacter, Klebsiella aerogenes, Citrobacter, and Serratia may develop resistance during prolonged therapy      -  Cefuroxime: S 8      -  Ciprofloxacin: I 0.5      -  Ertapenem: S <=0.5      -  Gentamicin: S <=2      -  Imipenem: S <=1      -  Levofloxacin: S <=0.5      -  Meropenem: S <=1      -  Nitrofurantoin: S <=32 Should not be used to treat pyelonephritis      -  Piperacillin/Tazobactam: S <=8      -  Tobramycin: S <=2      -  Trimethoprim/Sulfamethoxazole: S <=0.5/9.5        Procalcitonin, Serum: 0.64 ng/mL (05-10-23 @ 00:34)    D-Dimer Assay, Quantitative: 281 ng/mL DDU (05-10-23 @ 00:34)            Vancomycin Level, Trough: 9.9 ug/mL (05-11-23 @ 18:12)        Indwelling Urethral Catheter:     Connect To:  Straight Drainage/[place]    Indication:  Urinary Retention / Obstruction    Additional Instructions:  chronic (05-16-23 @ 11:22) (not performed)  Indwelling Urethral Catheter:     Connect To:  Straight Drainage/[place]    Indication:  Urinary Retention / Obstruction    Additional Instructions:  chronic hill (05-15-23 @ 13:36) (not performed)      RADIOLOGY, ECG, & ADDITIONAL TESTS:  12 Lead ECG:   Ventricular Rate 146 BPM    Atrial Rate 468 BPM    QRS Duration 82 ms    Q-T Interval 236 ms    QTC Calculation(Bazett) 367 ms    R Axis 72 degrees    T Axis -25 degrees    Diagnosis Line Atrial flutter with variable AV block  Nonspecific ST and T wave abnormality  Abnormal ECG    Confirmed by jose barlow ([id number]) on 5/10/2023 11:36:51 AM (05-09-23 @ 22:21)    CT Head No Cont:   ACC: [id number] EXAM:  CT BRAIN   ORDERED BY: RICHY MOYA     PROCEDURE DATE:  05/12/2023          INTERPRETATION:  Clinical History / Reason for exam: Rule out bleed.    Technique: Noncontrast head CT.  Contiguous unenhanced CT axial images of   thehead from the base to the vertex with coronal and sagittal reformats.    Comparison: CT head dated 7/20/2022    Findings:    The ventricles and cortical sulci demonstrate stable moderate atrophic   changes.    There are stable confluent hypodensitiesthroughout the hemispheric white   matter without mass effect compatible with chronic microvascular changes.    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 Stable   chronic lacunar infarcts within bilateral deep gray nuclei.    There is no acute intracranial hemorrhage, extra-axial fluid collection   or midline shift.    Vascular calcifications are noted.    Unchanged opacification of the right sphenoid sinus with calcified   contents. The visualized paranasal sinuses and mastoids are otherwise   clear.    IMPRESSION:    1.  No evidence of acute intracranial hemorrhage. Stable exam since   7/20/2022.    2.  Stable extensive chronic microvascular changes and chronic infarcts.    --- End of Report ---            SHAHEED CARDOSO MD; Attending Radiologist  This document has been electronically signed. May 12 2023 10:39AM (05-12-23 @ 06:24)    RECENT DIAGNOSTIC ORDERS:  TTE Echo Complete w/o Contrast w/ Doppler:   Transport: Portable  Monitor: w/ Monitor (05-15-23 @ 19:13)  Magnesium, Serum: Repeat From: 15-May-2023 15:47 To: 22-May-2023 04:30, Every 1 day(s) (05-15-23 @ 15:47)  Basic Metabolic Panel: Repeat From: 15-May-2023 15:47 To: 22-May-2023 04:30, Every 1 day(s) (05-15-23 @ 15:47)  Complete Blood Count + Automated Diff: Repeat From: 15-May-2023 15:47 To: 22-May-2023 04:30, Every 1 day(s) (05-15-23 @ 15:47)      MEDICATIONS:  MEDICATIONS  (STANDING):  busPIRone 30 milliGRAM(s) Oral two times a day  cefepime   IVPB 1000 milliGRAM(s) IV Intermittent every 12 hours  chlorhexidine 2% Cloths 1 Application(s) Topical <User Schedule>  clonazePAM  Tablet 0.5 milliGRAM(s) Oral at bedtime  dexAMETHasone  Injectable 6 milliGRAM(s) IV Push daily  diltiazem    Tablet 90 milliGRAM(s) Oral every 6 hours  diltiazem Infusion 5 mG/Hr (5 mL/Hr) IV Continuous <Continuous>  enoxaparin Injectable 50 milliGRAM(s) SubCutaneous every 12 hours  gabapentin 300 milliGRAM(s) Oral at bedtime  lactated ringers. 1000 milliLiter(s) (100 mL/Hr) IV Continuous <Continuous>  lactobacillus acidophilus 1 Tablet(s) Oral daily  melatonin 5 milliGRAM(s) Oral at bedtime  metoprolol tartrate 50 milliGRAM(s) Oral every 8 hours  potassium chloride   Powder 20 milliEquivalent(s) Oral once    MEDICATIONS  (PRN):  acetaminophen     Tablet .. 650 milliGRAM(s) Oral every 6 hours PRN Temp greater or equal to 38C (100.4F), Mild Pain (1 - 3)      HOME MEDICATIONS:  Acidophilus oral tablet (05-09)  busPIRone 30 mg oral tablet (05-09)  cholestyramine 4 g/4.8 g oral powder for reconstitution (05-09)  clonazePAM 0.5 mg oral tablet (05-09)  gabapentin 300 mg oral capsule (05-09)  melatonin 5 mg oral tablet (05-09)  metoprolol tartrate 25 mg oral tablet (05-09)  oxycodone-acetaminophen 5 mg-325 mg oral tablet (05-09)  Xarelto 15 mg oral tablet (05-09)      PHYSICAL EXAM:  GENERAL:   CHEST/LUNG:   HEART:   ABDOMEN:   EXTREMITIES:             ZAIRE YOAN  82y, Female  Allergy: No Known Allergies    Hospital Day: 7d    Patient seen and examined earlier today.  she sated that she feels ok , HHA at bedside     PMH/PSH:  PAST MEDICAL & SURGICAL HISTORY:  Anxiety      Arthritis      LBD (Lewy body dementia)      Ulcer of sacral region, stage 4      Chronic indwelling [place] catheter      Chronic atrial fibrillation      H/O hernia repair          LAST 24-Hr EVENTS:    VITALS:  T(F): 97 (05-16-23 @ 12:02), Max: 97.9 (05-16-23 @ 08:52)  HR: 135 (05-16-23 @ 12:02)  BP: 111/57 (05-16-23 @ 12:02) (104/62 - 126/64)  RR: 18 (05-16-23 @ 12:02)  SpO2: 99% (05-16-23 @ 12:02)    Oxygen Delivery Therapy:   Oxygen Method: Nasal Cannula   LPM: 3   Targeted SpO2 Range (%): 88-97   Notify Provider for SpO2 BELOW: 88   Notify Provider for SpO2 ABOVE: 98   O2 Titration Guideline: Device O2 Percent Range (%): 24-44%, Device O2 Flow Rate Range (LPM): 1-6LPM, O2 Titration Guideline: Increase/Decrease by 1LPM or 4%. Notify provider for any increase in oxygen therapy or inability to achieve targeted SpO2. (05-15-23 @ 14:15)        TESTS & MEASUREMENTS:  Weight/BMI      05-14-23 @ 07:01  -  05-15-23 @ 07:00  --------------------------------------------------------  IN: 950 mL / OUT: 2300 mL / NET: -1350 mL    05-15-23 @ 07:01  -  05-16-23 @ 07:00  --------------------------------------------------------  IN: 1645 mL / OUT: 1500 mL / NET: 145 mL                            14.8   15.51 )-----------( 373      ( 16 May 2023 06:50 )             44.5         05-16    140  |  105  |  12  ----------------------------<  97  4.3   |  25  |  <0.5<L>    Ca    8.3<L>      16 May 2023 06:50  Mg     1.9     05-16    TPro  5.3<L>  /  Alb  2.6<L>  /  TBili  0.5  /  DBili  x   /  AST  10  /  ALT  <5  /  AlkPhos  86  05-15    LIVER FUNCTIONS - ( 15 May 2023 07:10 )  Alb: 2.6 g/dL / Pro: 5.3 g/dL / ALK PHOS: 86 U/L / ALT: <5 U/L / AST: 10 U/L / GGT: x                 Culture - Blood (collected 05-11-23 @ 06:42)  Source: .Blood None  Preliminary Report (05-12-23 @ 13:02):    No growth to date.    Culture - Blood (collected 05-09-23 @ 18:01)  Source: .Blood Blood-Peripheral  Final Report (05-15-23 @ 01:00):    No Growth Final    Culture - Blood (collected 05-09-23 @ 18:01)  Source: .Blood Blood-Peripheral  Final Report (05-15-23 @ 01:00):    No Growth Final    Culture - Urine (collected 05-09-23 @ 18:01)  Source: Clean Catch Clean Catch (Midstream)  Final Report (05-12-23 @ 10:55):    >100,000 CFU/ml Escherichia coli  Organism: Escherichia coli (05-12-23 @ 10:55)  Organism: Escherichia coli (05-12-23 @ 10:55)      Method Type: AYDEN      -  Amikacin: S <=16      -  Amoxicillin/Clavulanic Acid: I 16/8      -  Ampicillin: R >16 These ampicillin results predict results for amoxicillin      -  Ampicillin/Sulbactam: I 16/8 Enterobacter, Klebsiella aerogenes, Citrobacter, and Serratia may develop resistance during prolonged therapy (3-4 days)      -  Aztreonam: S <=4      -  Cefazolin: S <=2 For uncomplicated UTI with K. pneumoniae, E. coli, or P. mirablis: AYDEN <=16 is sensitive and AYDEN >=32 is resistant. This also predicts results for oral agents cefaclor, cefdinir, cefpodoxime, cefprozil, cefuroxime axetil, cephalexin and locarbef for uncomplicated UTI. Note that some isolates may be susceptible to these agents while testing resistant to cefazolin.      -  Cefepime: S <=2      -  Cefoxitin: S <=8      -  Ceftriaxone: S <=1 Enterobacter, Klebsiella aerogenes, Citrobacter, and Serratia may develop resistance during prolonged therapy      -  Cefuroxime: S 8      -  Ciprofloxacin: I 0.5      -  Ertapenem: S <=0.5      -  Gentamicin: S <=2      -  Imipenem: S <=1      -  Levofloxacin: S <=0.5      -  Meropenem: S <=1      -  Nitrofurantoin: S <=32 Should not be used to treat pyelonephritis      -  Piperacillin/Tazobactam: S <=8      -  Tobramycin: S <=2      -  Trimethoprim/Sulfamethoxazole: S <=0.5/9.5        Procalcitonin, Serum: 0.64 ng/mL (05-10-23 @ 00:34)    D-Dimer Assay, Quantitative: 281 ng/mL DDU (05-10-23 @ 00:34)            Vancomycin Level, Trough: 9.9 ug/mL (05-11-23 @ 18:12)        Indwelling Urethral Catheter:     Connect To:  Straight Drainage/[place]    Indication:  Urinary Retention / Obstruction    Additional Instructions:  chronic (05-16-23 @ 11:22) (not performed)  Indwelling Urethral Catheter:     Connect To:  Straight Drainage/[place]    Indication:  Urinary Retention / Obstruction    Additional Instructions:  chronic hill (05-15-23 @ 13:36) (not performed)      RADIOLOGY, ECG, & ADDITIONAL TESTS:  12 Lead ECG:   Ventricular Rate 146 BPM    Atrial Rate 468 BPM    QRS Duration 82 ms    Q-T Interval 236 ms    QTC Calculation(Bazett) 367 ms    R Axis 72 degrees    T Axis -25 degrees    Diagnosis Line Atrial flutter with variable AV block  Nonspecific ST and T wave abnormality  Abnormal ECG    Confirmed by jose barlow ([id number]) on 5/10/2023 11:36:51 AM (05-09-23 @ 22:21)    CT Head No Cont:   ACC: [id number] EXAM:  CT BRAIN   ORDERED BY: RICHY MOYA     PROCEDURE DATE:  05/12/2023          INTERPRETATION:  Clinical History / Reason for exam: Rule out bleed.    Technique: Noncontrast head CT.  Contiguous unenhanced CT axial images of   thehead from the base to the vertex with coronal and sagittal reformats.    Comparison: CT head dated 7/20/2022    Findings:    The ventricles and cortical sulci demonstrate stable moderate atrophic   changes.    There are stable confluent hypodensitiesthroughout the hemispheric white   matter without mass effect compatible with chronic microvascular changes.    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 Stable   chronic lacunar infarcts within bilateral deep gray nuclei.    There is no acute intracranial hemorrhage, extra-axial fluid collection   or midline shift.    Vascular calcifications are noted.    Unchanged opacification of the right sphenoid sinus with calcified   contents. The visualized paranasal sinuses and mastoids are otherwise   clear.    IMPRESSION:    1.  No evidence of acute intracranial hemorrhage. Stable exam since   7/20/2022.    2.  Stable extensive chronic microvascular changes and chronic infarcts.    --- End of Report ---            SHAHEED CARDOSO MD; Attending Radiologist  This document has been electronically signed. May 12 2023 10:39AM (05-12-23 @ 06:24)    RECENT DIAGNOSTIC ORDERS:  TTE Echo Complete w/o Contrast w/ Doppler:   Transport: Portable  Monitor: w/ Monitor (05-15-23 @ 19:13)  Magnesium, Serum: Repeat From: 15-May-2023 15:47 To: 22-May-2023 04:30, Every 1 day(s) (05-15-23 @ 15:47)  Basic Metabolic Panel: Repeat From: 15-May-2023 15:47 To: 22-May-2023 04:30, Every 1 day(s) (05-15-23 @ 15:47)  Complete Blood Count + Automated Diff: Repeat From: 15-May-2023 15:47 To: 22-May-2023 04:30, Every 1 day(s) (05-15-23 @ 15:47)      MEDICATIONS:  MEDICATIONS  (STANDING):  busPIRone 30 milliGRAM(s) Oral two times a day  cefepime   IVPB 1000 milliGRAM(s) IV Intermittent every 12 hours  chlorhexidine 2% Cloths 1 Application(s) Topical <User Schedule>  clonazePAM  Tablet 0.5 milliGRAM(s) Oral at bedtime  dexAMETHasone  Injectable 6 milliGRAM(s) IV Push daily  diltiazem    Tablet 90 milliGRAM(s) Oral every 6 hours  diltiazem Infusion 5 mG/Hr (5 mL/Hr) IV Continuous <Continuous>  enoxaparin Injectable 50 milliGRAM(s) SubCutaneous every 12 hours  gabapentin 300 milliGRAM(s) Oral at bedtime  lactated ringers. 1000 milliLiter(s) (100 mL/Hr) IV Continuous <Continuous>  lactobacillus acidophilus 1 Tablet(s) Oral daily  melatonin 5 milliGRAM(s) Oral at bedtime  metoprolol tartrate 50 milliGRAM(s) Oral every 8 hours  potassium chloride   Powder 20 milliEquivalent(s) Oral once    MEDICATIONS  (PRN):  acetaminophen     Tablet .. 650 milliGRAM(s) Oral every 6 hours PRN Temp greater or equal to 38C (100.4F), Mild Pain (1 - 3)      HOME MEDICATIONS:  Acidophilus oral tablet (05-09)  busPIRone 30 mg oral tablet (05-09)  cholestyramine 4 g/4.8 g oral powder for reconstitution (05-09)  clonazePAM 0.5 mg oral tablet (05-09)  gabapentin 300 mg oral capsule (05-09)  melatonin 5 mg oral tablet (05-09)  metoprolol tartrate 25 mg oral tablet (05-09)  oxycodone-acetaminophen 5 mg-325 mg oral tablet (05-09)  Xarelto 15 mg oral tablet (05-09)      PHYSICAL EXAM:    General: more awake alert, she followed some simple commands . chronic ill appearance, frail   Lungs:  decrease breath sound b/l , normal resp effort  Heart: regular rhythm   Abdomen: soft, non tender non distended  Ext: no edema, generalized weakness

## 2023-05-16 NOTE — PROGRESS NOTE ADULT - SUBJECTIVE AND OBJECTIVE BOX
Patient is a 82y old  Female who presents with a chief complaint of Cough; Hypoxia (15 May 2023 17:10)        Over Night Events: AFIB with RVR overnight, resumed on Cardizem drip, on 2L NC       Vital Signs Last 24 Hrs  T(C): 36.6 (16 May 2023 08:52), Max: 36.6 (16 May 2023 08:52)  T(F): 97.9 (16 May 2023 08:52), Max: 97.9 (16 May 2023 08:52)  HR: 122 (16 May 2023 08:52) (119 - 136)  BP: 108/64 (16 May 2023 08:52) (104/62 - 126/64)  BP(mean): 78 (15 May 2023 20:00) (78 - 87)  RR: 18 (16 May 2023 08:52) (18 - 18)  SpO2: 96% (16 May 2023 08:52) (96% - 98%)    O2 Parameters below as of 15 May 2023 20:00  Patient On (Oxygen Delivery Method): nasal cannula        CONSTITUTIONAL:  Chronically Ill appearing.  NAD    ENT:   Airway patent,   Mouth with normal mucosa.   No thrush    EYES:   Pupils equal,   Round and reactive to light.    CARDIAC:   Normal rate,   irregular rhythm.        RESPIRATORY:   No wheezing  Bilateral BS  Normal chest expansion  Not tachypneic,  No use of accessory muscles    GASTROINTESTINAL:  Abdomen soft,   Non-tender,   No guarding,   + BS      MUSCULOSKELETAL:   Range of motion is not limited      NEUROLOGICAL:   Alert and oriented x2    SKIN:   Skin normal color for race,   Warm and dry  sacral ulcer     PSYCHIATRIC:   No apparent risk to self or others.      05-15-23 @ 07:01  -  05-16-23 @ 07:00  --------------------------------------------------------  IN:    Diltiazem: 60 mL    Diltiazem: 55 mL    IV PiggyBack: 100 mL    Lactated Ringers: 1100 mL    Oral Fluid: 330 mL  Total IN: 1645 mL    OUT:    Indwelling Catheter - Urethral (mL): 1500 mL  Total OUT: 1500 mL    Total NET: 145 mL          LABS:                            14.8   15.51 )-----------( 373      ( 16 May 2023 06:50 )             44.5                                               05-16    140  |  105  |  12  ----------------------------<  97  4.3   |  25  |  <0.5<L>    Ca    8.3<L>      16 May 2023 06:50  Mg     1.9     05-16    TPro  5.3<L>  /  Alb  2.6<L>  /  TBili  0.5  /  DBili  x   /  AST  10  /  ALT  <5  /  AlkPhos  86  05-15                                                                                           LIVER FUNCTIONS - ( 15 May 2023 07:10 )  Alb: 2.6 g/dL / Pro: 5.3 g/dL / ALK PHOS: 86 U/L / ALT: <5 U/L / AST: 10 U/L / GGT: x                                                                                                                                       MEDICATIONS  (STANDING):  busPIRone 30 milliGRAM(s) Oral two times a day  cefepime   IVPB 1000 milliGRAM(s) IV Intermittent every 12 hours  chlorhexidine 2% Cloths 1 Application(s) Topical <User Schedule>  clonazePAM  Tablet 0.5 milliGRAM(s) Oral at bedtime  dexAMETHasone  Injectable 6 milliGRAM(s) IV Push daily  diltiazem Infusion 5 mG/Hr (5 mL/Hr) IV Continuous <Continuous>  enoxaparin Injectable 50 milliGRAM(s) SubCutaneous every 12 hours  gabapentin 300 milliGRAM(s) Oral at bedtime  lactobacillus acidophilus 1 Tablet(s) Oral daily  melatonin 5 milliGRAM(s) Oral at bedtime      MEDICATIONS  (PRN):  acetaminophen     Tablet .. 650 milliGRAM(s) Oral every 6 hours PRN Temp greater or equal to 38C (100.4F), Mild Pain (1 - 3)      CXR reviewed

## 2023-05-16 NOTE — PHYSICAL THERAPY INITIAL EVALUATION ADULT - PERTINENT HX OF CURRENT PROBLEM, REHAB EVAL
82F w/ h/o Lewy Body Dementia (A&O 1-2 baseline), chronic Mcdaniel (persistent retention; last exchanged on 5/8), chronic afib (on Xarelto), and stage 4 sacral ulcer p/w cough and hypoxia since yesterday. History limited from patient due to patient's current mental status. Family at bedside (daughter and two sons) able to provide collateral. Since yesterday, pt has had cough, increased secretions, general malaise, and worsening mental status (minimally verbal; typically able to say few words at baseline).

## 2023-05-16 NOTE — PROGRESS NOTE ADULT - SUBJECTIVE AND OBJECTIVE BOX
HPI:  82F w/ h/o Lewy Body Dementia (A&O 1-2 baseline), chronic Hill (persistent retention; last exchanged on 5/8), chronic afib (on Xarelto), and stage 4 sacral ulcer p/w cough and hypoxia since yesterday. History limited from patient due to patient's current mental status. Family at bedside (daughter and two sons) able to provide collateral. Since yesterday, pt has had cough, increased secretions, general malaise, and worsening mental status (minimally verbal; typically able to say few words at baseline). This AM, pt noted to be tachycardic and hypoxic to 80's%, prompting ED evaluation. Of note, patient known to have dysphagia, but able to tolerate PO. No reported fevers, chills, CP, palpitations, abdominal pain, n/v/d, dysuria, or LE swelling per family.     In ED, pt febrile (T 101.8F), tachycardic (), and tachypneic (RR 20's-30's) on NRB, but saturating in mid-90's%. Labs demonstrated WBC 24.45. VBG showed pH 7.41, pCO2 37, and Lactate 3.9. UA concerning for UTI. CXR concerning for right-sided PNA. S/P 1.7L LR bolus and IV abx (Vanc/Zosyn). Admitted to SDU for severe sepsis i/s/o aspiration pneumonia vs UTI. (09 May 2023 21:41)    Currently admitted to medicine with the primary diagnosis of Sepsis       Today is hospital day 7d.     INTERVAL HPI / OVERNIGHT EVENTS:  Patient was examined and seen at bedside. This morning she is resting comfortably in bed, overnight patient persistently in RVR, restarted on cardizem ggt. Hemodynamically stable now on room air, patient tolerating oral diet, voiding appropriately with chronic hill, having regular BMs.    ROS: Otherwise unremarkable     PAST MEDICAL & SURGICAL HISTORY  Anxiety    Arthritis    LBD (Lewy body dementia)    Ulcer of sacral region, stage 4    Chronic indwelling Hill catheter    Chronic atrial fibrillation    H/O hernia repair      ALLERGIES  No Known Allergies    MEDICATIONS  STANDING MEDICATIONS  busPIRone 30 milliGRAM(s) Oral two times a day  cefepime   IVPB 1000 milliGRAM(s) IV Intermittent every 12 hours  chlorhexidine 2% Cloths 1 Application(s) Topical <User Schedule>  clonazePAM  Tablet 0.5 milliGRAM(s) Oral at bedtime  dexAMETHasone  Injectable 6 milliGRAM(s) IV Push daily  diltiazem    Tablet 90 milliGRAM(s) Oral every 6 hours  diltiazem Infusion 5 mG/Hr IV Continuous <Continuous>  enoxaparin Injectable 50 milliGRAM(s) SubCutaneous every 12 hours  gabapentin 300 milliGRAM(s) Oral at bedtime  lactated ringers. 1000 milliLiter(s) IV Continuous <Continuous>  lactobacillus acidophilus 1 Tablet(s) Oral daily  melatonin 5 milliGRAM(s) Oral at bedtime  metoprolol tartrate 50 milliGRAM(s) Oral every 8 hours  potassium chloride   Powder 20 milliEquivalent(s) Oral once    PRN MEDICATIONS  acetaminophen     Tablet .. 650 milliGRAM(s) Oral every 6 hours PRN    VITALS:  T(F): 97  HR: 135  BP: 111/57  RR: 18  SpO2: 99%    PHYSICAL EXAM  General: NAD, ill appearance, frail   Lungs:  decrease breath sound b/l , no wheezing, rales, rhonchi, not in respiratory distress   Heart: Afib w/ RVR, S1/S2 present, no heaves, thrills, murmurs  Abdomen: soft, non tender non distended, BS +  Ext: no edema, follows commands, decreased strength    LABS                        14.8   15.51 )-----------( 373      ( 16 May 2023 06:50 )             44.5     05-16    140  |  105  |  12  ----------------------------<  97  4.3   |  25  |  <0.5<L>    Ca    8.3<L>      16 May 2023 06:50  Mg     1.9     05-16    TPro  5.3<L>  /  Alb  2.6<L>  /  TBili  0.5  /  DBili  x   /  AST  10  /  ALT  <5  /  AlkPhos  86  05-15                    RADIOLOGY  CTH 5/12:  1.  No evidence of acute intracranial hemorrhage. Stable exam since   7/20/2022.  2.  Stable extensive chronic microvascular changes and chronic infarcts.    CXR 5/15:  Low lung volumes.  Unchanged bibasilar opacities/effusion.

## 2023-05-16 NOTE — PROGRESS NOTE ADULT - PROBLEM SELECTOR PLAN 3
: -DNR/DNI  - Met with son Fernando [phone], he is requesting [facility] - spoke to .  case d/w primary team today they will update son on [facility] overnight

## 2023-05-16 NOTE — PROGRESS NOTE ADULT - SUBJECTIVE AND OBJECTIVE BOX
HPI:  82F w/ h/o Lewy Body Dementia (A&O 1-2 baseline), chronic Mcdaniel (persistent retention; last exchanged on 5/8), chronic afib (on Xarelto), and stage 4 sacral ulcer p/w cough and hypoxia since yesterday. History limited from patient due to patient's current mental status. Family at bedside (daughter and two sons) able to provide collateral. Since yesterday, pt has had cough, increased secretions, general malaise, and worsening mental status (minimally verbal; typically able to say few words at baseline). This AM, pt noted to be tachycardic and hypoxic to 80's%, prompting ED evaluation. Of note, patient known to have dysphagia, but able to tolerate PO. No reported fevers, chills, CP, palpitations, abdominal pain, n/v/d, dysuria, or LE swelling per family.     In ED, pt febrile (T 101.8F), tachycardic (), and tachypneic (RR 20's-30's) on NRB, but saturating in mid-90's%. Labs demonstrated WBC 24.45. VBG showed pH 7.41, pCO2 37, and Lactate 3.9. UA concerning for UTI. CXR concerning for right-sided PNA. S/P 1.7L LR bolus and IV abx (Vanc/Zosyn). Admitted to SDU for severe sepsis i/s/o aspiration pneumonia vs UTI. (09 May 2023 21:41)      Interval History  - pt on nasal cannula oxygen, son at bedside. eats w 1:1 assist, overnight re-started Cardizem gtt for HR in the 130s  ADVANCE DIRECTIVES:    [ ] Full Code [x ] DNR/DNI  MOLST  [ ]  Living Will  [ ]   DECISION MAKER(s):  [ ] Health Care Proxy(s)  [ x] Surrogate(s)  [ ] Guardian           Name(s): Phone Number(s):  Name(s): Phone Number(s):  HCP reportedly son Fernando per daughter - [phone]  BASELINE (I)ADL(s) (prior to admission):  [place]: [ ]Total  [ ] Moderate [ x]Dependent  Allergies    No Known Allergies    Intolerances    MEDICATIONS  (STANDING):  busPIRone 30 milliGRAM(s) Oral two times a day  cefepime   IVPB 1000 milliGRAM(s) IV Intermittent every 12 hours  chlorhexidine 2% Cloths 1 Application(s) Topical <User Schedule>  clonazePAM  Tablet 0.5 milliGRAM(s) Oral at bedtime  dexAMETHasone  Injectable 6 milliGRAM(s) IV Push daily  diltiazem    Tablet 90 milliGRAM(s) Oral every 6 hours  diltiazem Infusion 5 mG/Hr (5 mL/Hr) IV Continuous <Continuous>  enoxaparin Injectable 50 milliGRAM(s) SubCutaneous every 12 hours  gabapentin 300 milliGRAM(s) Oral at bedtime  lactated ringers. 1000 milliLiter(s) (100 mL/Hr) IV Continuous <Continuous>  lactobacillus acidophilus 1 Tablet(s) Oral daily  melatonin 5 milliGRAM(s) Oral at bedtime  metoprolol tartrate 50 milliGRAM(s) Oral every 8 hours  potassium chloride   Powder 20 milliEquivalent(s) Oral once    MEDICATIONS  (PRN):  acetaminophen     Tablet .. 650 milliGRAM(s) Oral every 6 hours PRN Temp greater or equal to 38C (100.4F), Mild Pain (1 - 3)    PRESENT SYMPTOMS: [ x]Unable to obtain due to poor mentation   Source if other than patient:  [ ]Family   [ ]Team           PAIN AD Score: 4  [email or web address]/[email or web address] (press ctrl +  left click to view)         Respiratory Distress Observation Scale (RDOS): 5  A score of 0 to 2 signifies little or no respiratory distress, 3 signifies mild distress, scores 4 to 6 indicate moderate distress, and scores greater than 7 signify severe distress  [email or web address] EXAM:  Vital Signs Last 24 Hrs  T(C): 36.1 (16 May 2023 12:02), Max: 36.6 (16 May 2023 08:52)  T(F): 97 (16 May 2023 12:02), Max: 97.9 (16 May 2023 08:52)  HR: 135 (16 May 2023 12:02) (122 - 136)  BP: 111/57 (16 May 2023 12:02) (104/62 - 126/64)  BP(mean): 78 (15 May 2023 20:00) (78 - 87)  RR: 18 (16 May 2023 12:02) (18 - 18)  SpO2: 99% (16 May 2023 12:02) (96% - 99%)    Parameters below as of 15 May 2023 20:00  Patient On (Oxygen Delivery Method): nasal cannula     I&O's Summary    15 May 2023 07:01  -  16 May 2023 07:00  --------------------------------------------------------  IN: 1645 mL / OUT: 1500 mL / NET: 145 mL      GENERAL:  [ ] No acute distress [ ]Lethargic  [ ]Unarousable  [ x]Verbal - minimal  [ ]Non-Verbal [ ]Cachexia    BEHAVIORAL/PSYCH:  [ ]Alert and Oriented [ ] Anxiety [ ] Delirium [ ] Agitation [x ] Calm   EYES: [ x] No scleral icterus [ ] Scleral icterus [ ] Closed  ENMT:  [ ]Dry mouth  [ x]No external oral lesions [ ] No external ear or nose lesions  CARDIOVASCULAR:  [ ]Regular [ ]Irregular [x ]Tachy [ ]Not Tachy  [ ]Surya [ ] Edema [ ] No edema  PULMONARY:  [x ]Tachypnea  [ ]Audible excessive secretions [ x] No labored breathing [ ] labored breathing  GASTROINTESTINAL: [ ]Soft  [ ]Distended  [x ]Not distended [ ]Non tender [ ]Tender  MUSCULOSKELETAL: [ ]No clubbing [ ] clubbing  [ ] No cyanosis [ ] cyanosis  NEUROLOGIC: [ ]No focal deficits  [ ]Follows some commands  [ ]Does not follow commands  [ ]Cognitive impairment  [ ]Dysphagia  [ ]Dysarthria  [ ]Paresis   SKIN: [ ] Jaundiced [ x] Non-jaundiced [ ]Rash [ ]No Rash [ ] Warm [ ] Dry  MISC/LINES: [ ] ET tube [ ] Trach [ ]NGT/OGT [ ]PEG [ ]Mcdaniel    CRITICAL CARE:  [ ] Shock Present  [ ]Septic [ ]Cardiogenic [ ]Neurologic [ ]Hypovolemic  [ ]  Vasopressors [ ]  Inotropes   [x ]Respiratory failure present [ ]Mechanical ventilation [ ]Non-invasive ventilatory support [ ]High flow  [ ]Acute  [ ]Chronic [ ]Hypoxic  [ ]Hypercarbic [ ]Other  [ ]Other organ alfredo      LABS: reviewed by me                        14.8   15.51 )-----------( 373      ( 16 May 2023 06:50 )             44.5   05-16    140  |  105  |  12  ----------------------------<  97  4.3   |  25  |  <0.5<L>    Ca    8.3<L>      16 May 2023 06:50  Mg     1.9     05-16    TPro  5.3<L>  /  Alb  2.6<L>  /  TBili  0.5  /  DBili  x   /  AST  10  /  ALT  <5  /  AlkPhos  86  05-15        RADIOLOGY & ADDITIONAL STUDIES: reviewed by me    EKG: reviewed by me      PROTEIN CALORIE MALNUTRITION PRESENT: [ ]mild [ ]moderate [ ]severe [ ]underweight [ ]morbid obesity  https://www.andeal.org/vault/2440/web/files/ONC/Table_Clinical%20Characteristics%20to%20Document%20Malnutrition-White%20JV%20et%20al%202012.pdf    Height (cm): 167.6 (05-09-23 @ 17:36), 172.7 (07-25-22 @ 19:08), 172.7 (07-20-22 @ 19:56)  Weight (kg): 54.4 (05-09-23 @ 17:36), 63.2 (07-29-22 @ 04:10), 68 (07-20-22 @ 19:56)  BMI (kg/m2): 19.4 (05-09-23 @ 17:36), 21.2 (07-29-22 @ 04:10), 22.8 (07-25-22 @ 19:08)    [ ]PPSV2 < or = to 30% [ ]significant weight loss  [ ]poor nutritional intake  [ ]anasarca      [ ]Artificial Nutrition      REFERRALS:   [ ]Chaplaincy  [ ]Hospice  [ ]Child Life  [ ]Social Work  [ ]Case management [ ]Holistic Therapy     Patient discussed with primary medical team MD  Palliative care education provided to patient and/or family    Goals of Care Document:

## 2023-05-16 NOTE — PROGRESS NOTE ADULT - ASSESSMENT
82y F pmh lewy body dementia, chronic hill, chronic afib on xarelto, stage 4 sacral ulcer presenting with cough and hypoxia admitted for COVID 19 infection with superimposed aspiration pnemonia c/b Afib RVR.     #Sepsis POA  #COVID 19 PNA  #Aspiration PNA   #UTI 2/2 chronic hill   - hypoxic, febrile on admission  - wbc 12k, COVID +ve, BCx -ve, UCx +ve E coli   - lactate wnl   - CTH -ve for acute intracranial pathology   - c/w cefepime 1g IV BID D8/10  - c/w RDV d4/5   - c/w decadron 6mg IV qD  - off NC on RA   - SSE - c/w pureed diet - pleasure feeding, patient still at risk of aspiration - family aware     #Chronic Afib now w/ RVR  - 2/2 sepsis   - restarted on cardizem ggt overnight   - c/w with cardizem 90mg q6hr and wean off ggt as tolerated   - increased metoprolol to 50mg q6hr   - c/w lovenox 50mg BID  - monitor on telemetry - adjust cardizem/metoprolol as needed for rate control   - spoke with Cardiology - patient goal is home hospice - would not recommend starting amio ggt, if needed can try digoxin load but suspected HR will improve as infection improves     #Lewy Body Dementia  - CTH -ve  - AxO 1-2 at baseline, can follow simple commands   - c/w buspirone 30mg po BID  - c/w klonopin 0.5mg qhs   - c/w gabapentin 300mg po qhs     #Stage IV sacral ulcer   - burn consulted - no signs of infection, no plan for surgical debridement   - c/w wound care recs BID   - morphine 1mg IV q6hr PRN, tylenol 650mg po q6hr, oxycodone 5mg po q8hr PRN for pain   - c/w frequent repositioning     # To follow up  - rate control   - complete abx course for UTI and RDV  - patient family aware of plan - wish to have patient stabilized so she can go home on hospice  - palliative following     # Misc  - DVT Prophylaxis: enoxaparin Injectable  - GI Prophylaxis: none  - Diet: Diet, Pureed  - Activity: Activity - Increase As Tolerated  - Code Status: Full Do Not Resuscitate      Denis Bone  PGY1, Internal Medicine   [facility].

## 2023-05-16 NOTE — PROGRESS NOTE ADULT - SUBJECTIVE AND OBJECTIVE BOX
HPI:  82F w/ h/o Lewy Body Dementia (A&O 1-2 baseline), chronic Mcdaniel (persistent retention; last exchanged on 5/8), chronic afib (on Xarelto), and stage 4 sacral ulcer p/w cough and hypoxia since yesterday. History limited from patient due to patient's current mental status. Family at bedside (daughter and two sons) able to provide collateral. Since yesterday, pt has had cough, increased secretions, general malaise, and worsening mental status (minimally verbal; typically able to say few words at baseline). This AM, pt noted to be tachycardic and hypoxic to 80's%, prompting ED evaluation. Of note, patient known to have dysphagia, but able to tolerate PO. No reported fevers, chills, CP, palpitations, abdominal pain, n/v/d, dysuria, or LE swelling per family.     In ED, pt febrile (T 101.8F), tachycardic (), and tachypneic (RR 20's-30's) on NRB, but saturating in mid-90's%. Labs demonstrated WBC 24.45. VBG showed pH 7.41, pCO2 37, and Lactate 3.9. UA concerning for UTI. CXR concerning for right-sided PNA. S/P 1.7L LR bolus and IV abx (Vanc/Zosyn). Admitted to SDU for severe sepsis i/s/o aspiration pneumonia vs UTI. (09 May 2023 21:41)      Interval History  - pt on nasal cannula oxygen, son at bedside. eats w 1:1 assist, overnight re-started Cardizem gtt for HR in the 130s  ADVANCE DIRECTIVES:    [ ] Full Code [x ] DNR/DNI  MOLST  [ ]  Living Will  [ ]   DECISION MAKER(s):  [ ] Health Care Proxy(s)  [ x] Surrogate(s)  [ ] Guardian           Name(s): Phone Number(s):  Name(s): Phone Number(s):  HCP reportedly son Fernando per daughter - [phone]  BASELINE (I)ADL(s) (prior to admission):  [place]: [ ]Total  [ ] Moderate [ x]Dependent    Allergies    No Known Allergies    Intolerances    MEDICATIONS  (STANDING):  busPIRone 30 milliGRAM(s) Oral two times a day  cefepime   IVPB 1000 milliGRAM(s) IV Intermittent every 12 hours  chlorhexidine 2% Cloths 1 Application(s) Topical <User Schedule>  clonazePAM  Tablet 0.5 milliGRAM(s) Oral at bedtime  dexAMETHasone  Injectable 6 milliGRAM(s) IV Push daily  diltiazem    Tablet 90 milliGRAM(s) Oral every 6 hours  diltiazem Infusion 5 mG/Hr (5 mL/Hr) IV Continuous <Continuous>  enoxaparin Injectable 50 milliGRAM(s) SubCutaneous every 12 hours  gabapentin 300 milliGRAM(s) Oral at bedtime  lactated ringers. 1000 milliLiter(s) (100 mL/Hr) IV Continuous <Continuous>  lactobacillus acidophilus 1 Tablet(s) Oral daily  melatonin 5 milliGRAM(s) Oral at bedtime  metoprolol tartrate 50 milliGRAM(s) Oral every 8 hours  potassium chloride   Powder 20 milliEquivalent(s) Oral once    MEDICATIONS  (PRN):  acetaminophen     Tablet .. 650 milliGRAM(s) Oral every 6 hours PRN Temp greater or equal to 38C (100.4F), Mild Pain (1 - 3)    PRESENT SYMPTOMS: [x ]Unable to obtain due to poor mentation   Source if other than patient:  [ ]Family   [ ]Team         PAIN AD Score: 4  [email or web address]/[email or web address].pdf (press ctrl +  left click to view)         Respiratory Distress Observation Scale (RDOS): 5  A score of 0 to 2 signifies little or no respiratory distress, 3 signifies mild distress, scores 4 to 6 indicate moderate distress, and scores greater than 7 signify severe distress  [email or web address]/[email or web address]  PHYSICAL EXAM:  Vital Signs Last 24 Hrs  T(C): 36.1 (16 May 2023 12:02), Max: 36.6 (16 May 2023 08:52)  T(F): 97 (16 May 2023 12:02), Max: 97.9 (16 May 2023 08:52)  HR: 135 (16 May 2023 12:02) (119 - 136)  BP: 111/57 (16 May 2023 12:02) (104/62 - 126/64)  BP(mean): 78 (15 May 2023 20:00) (78 - 87)  RR: 18 (16 May 2023 12:02) (18 - 18)  SpO2: 99% (16 May 2023 12:02) (96% - 99%)    Parameters below as of 15 May 2023 20:00  Patient On (Oxygen Delivery Method): nasal cannula     I&O's Summary    15 May 2023 07:01  -  16 May 2023 07:00  --------------------------------------------------------  IN: 1645 mL / OUT: 1500 mL / NET: 145 mL        GENERAL:  [x ] No acute distress [ ]Lethargic  [ ]Unarousable  [ x]Verbal - minimal  [ ]Non-Verbal [ ]Cachexia    BEHAVIORAL/PSYCH:  [ ]Alert and Oriented [ ] Anxiety [ ] Delirium [ ] Agitation [x ] Calm   EYES: [ x] No scleral icterus [ ] Scleral icterus [ ] Closed  ENMT:  [ ]Dry mouth  [ x]No external oral lesions [ ] No external ear or nose lesions  CARDIOVASCULAR:  [ ]Regular [ ]Irregular [x ]Tachy [ ]Not Tachy  [ ]Surya [ ] Edema [ ] No edema  PULMONARY:  [ ]Tachypnea  [ ]Audible excessive secretions [ x] No labored breathing [ ] labored breathing  GASTROINTESTINAL: [ ]Soft  [ ]Distended  [x ]Not distended [ ]Non tender [ ]Tender  MUSCULOSKELETAL: [ ]No clubbing [ ] clubbing  [ ] No cyanosis [ ] cyanosis  NEUROLOGIC: [ ]No focal deficits  [ ]Follows some commands  [ ]Does not follow commands  [ ]Cognitive impairment  [ ]Dysphagia  [ ]Dysarthria  [ ]Paresis   SKIN: [ ] Jaundiced [ x] Non-jaundiced [ ]Rash [ ]No Rash [ ] Warm [ ] Dry  MISC/LINES: [ ] ET tube [ ] Trach [ ]NGT/OGT [ ]PEG [ ]Mcdaniel    CRITICAL CARE:  [ ] Shock Present  [ ]Septic [ ]Cardiogenic [ ]Neurologic [ ]Hypovolemic  [ ]  Vasopressors [ ]  Inotropes   [x ]Respiratory failure present [ ]Mechanical ventilation [ ]Non-invasive ventilatory support [ ]High flow  [ ]Acute  [ ]Chronic [ ]Hypoxic  [ ]Hypercarbic [ ]Other  [ ]Other organ failure   LABS: reviewed by me                        14.8   15.51 )-----------( 373      ( 16 May 2023 06:50 )             44.5   05-16    140  |  105  |  12  ----------------------------<  97  4.3   |  25  |  <0.5<L>    Ca    8.3<L>      16 May 2023 06:50  Mg     1.9     05-16    TPro  5.3<L>  /  Alb  2.6<L>  /  TBili  0.5  /  DBili  x   /  AST  10  /  ALT  <5  /  AlkPhos  86  05-15        RADIOLOGY & ADDITIONAL STUDIES: reviewed by me    EKG: reviewed by me      PROTEIN CALORIE MALNUTRITION PRESENT: [ ]mild [ ]moderate [ ]severe [ ]underweight [ ]morbid obesity  https://www.andeal.org/vault/2440/web/files/ONC/Table_Clinical%20Characteristics%20to%20Document%20Malnutrition-White%20JV%20et%20al%202012.pdf    Height (cm): 167.6 (05-09-23 @ 17:36), 172.7 (07-25-22 @ 19:08), 172.7 (07-20-22 @ 19:56)  Weight (kg): 54.4 (05-09-23 @ 17:36), 63.2 (07-29-22 @ 04:10), 68 (07-20-22 @ 19:56)  BMI (kg/m2): 19.4 (05-09-23 @ 17:36), 21.2 (07-29-22 @ 04:10), 22.8 (07-25-22 @ 19:08)    [ ]PPSV2 < or = to 30% [ ]significant weight loss  [ ]poor nutritional intake  [ ]anasarca      [ ]Artificial Nutrition      REFERRALS:   [ ]Chaplaincy  [ ]Hospice  [ ]Child Life  [ ]Social Work  [ ]Case management [ ]Holistic Therapy     Patient discussed with primary medical team MD  Palliative care education provided to patient and/or family    Goals of Care Document:

## 2023-05-16 NOTE — PROGRESS NOTE ADULT - PROBLEM SELECTOR PLAN 1
cefepime   IVPB 1000 milliGRAM(s) IV Intermittent every 12 hours  Lactated Ringers 100cc/hr IVF   Still requiring step down

## 2023-05-16 NOTE — PROGRESS NOTE ADULT - PROBLEM SELECTOR PROBLEM 3
Lewy body dementia
Lewy body dementia
Palliative care by specialist

## 2023-05-16 NOTE — PHYSICAL THERAPY INITIAL EVALUATION ADULT - GENERAL OBSERVATIONS, REHAB EVAL
2:30-2:50 pt encountered in bed in NAD, +hill.  Pt is not able to follow commands, has diminished response to stimuli.  She is not an appropriate candidate for PT intervention.  Recommend nursing care to prevent skin breakdown and contractures

## 2023-05-16 NOTE — PROGRESS NOTE ADULT - PROBLEM SELECTOR PLAN 6
Klonopin 0.5mg PO Q HS  Gabapentin 300mg PO QHS.  above ordered by primary team
Klonopin 0.5mg PO Q HS  Gabapentin 300mg PO QHS.  - above ordered by primary team
Klonopin 0.5mg PO Q HS  Gabapentin 300mg PO QHS

## 2023-05-16 NOTE — PROGRESS NOTE ADULT - ASSESSMENT
82F w/ h/o Lewy Body Dementia (A&O 1-2 baseline), chronic Hill (persistent retention; last exchanged on 5/8), chronic afib (on Xarelto), and stage 4 sacral ulcer p/w cough and hypoxia. Admitted to stepdown unit for severe sepsis i/s/o aspiration pneumonia vs UTI.    Sepsis POA secondary to COVID-19 Pneumonia with possible Aspiration Pneumonia   UTI, in setting of chronic hill   Sacral Ulcer   Hx of Lewy Body Dementia  Hx  Chronic AFIB on Xarelto    ID input appreciated  c/W -Cefepime 1 gm iv q12h would complete 10 days    s/p RDV   C/W dexamethasone as per pulm cc   f/u cultures , Urine with E coli    on NC   S/S eval appreciated - started puree diet   appreciate palliative care  lactate trended down   c/w gentle hydration   neurochecks   CT head negative     []AFib with RVR  suspect driven by sepsis  Lovenox  therapeutic for now   was on Cardizem ggt as she was NPO  , taperred  off 5/15 and started po   but was resumed overnight for RVR , and po meds held- resume po meds ( metoprolol and added cardizem)  and taper off cardizem ggt   cardiolgoy f/u requested   f/u echo   monitor and correct electrolytes     Hypoakelmemia   replete and monitor   patient did not tolerate IV K ( pain and burning)  , So given po     episode of bleeding from R nostril when cleaning the nose as per her HHA ,  on exam no active bleeding , one small dot of bleeding evidence over th upper lip   give humidified o2 and monitor for now       []Sacral/skin  wounds:  burn team input appreciated  no burn surgical intervention, - continue local wound care BID  - wash with soap and water, apply hydrogel, cover with allevyn pad/ - burn signing off  - consult wound care nursing for further wound care recs - called wound care team they will see the patient tomorrow   frequent turning, off loading, and positioning and skin care as per protocol, Maintain pressure injury prevention, Keep skin clean, Offload heels, Monitor wound for changes and notify provider if any.   Heal off  for R heal pain and monitor  - discussed with RN       Hx of Lewy Body Dementia  CT head no acute findings   resume po meds buspar, klonopin and oxy, gabapentin     pending: Hear rate , might need home o2 , cardiology , K level     poor prognosis , DNR/DNI appreciated palliative and hospice input    palliative f/u appreciated [-son Fernando [phone], he is requesting [facility] - spoke to ] - discussed with palliatve NP  , I called the patient son Fernando Higgins [phone] but no answer  and left vm.   Team updated  the family earlier today

## 2023-05-16 NOTE — PROGRESS NOTE ADULT - PROBLEM SELECTOR PLAN 5
Recommend non-pharmacological interventions to prevent/treat delirium  - maintain day/night light cycles  - optimize sleep-wake cycle, minimize environmental noise  - reorientation frequently  - use verbal redirection as first line  - minimize restraints and lines  - ensure good bladder/bowel function  - ensure adequate pain control.

## 2023-05-16 NOTE — PROGRESS NOTE ADULT - ASSESSMENT
IMPRESSION:    Sepsis POA  COVID-19 Pneumonia   Aspiration Pneumonia   E. Coli UTI, in setting of chronic hill   Sacral Ulcer   AFIB with RVR   HO Lewy Body Dementia  HO Chronic AFIB on Xarelto    PLAN:    CNS: avoid depressants     HEENT: Oral care    PULMONARY:  HOB @ 45 degrees.  Aspiration precautions. wean oxygen as tolerated, target SaO2 92 TO 96%    CARDIOVASCULAR: Avoid volume overload, Resume PO metoprolol 50mg Q8H, PO Cardizem 90mg Q6H, wean off drip, Cardiology follow up, check BNP, 2D-Echo    GI: GI prophylaxis.  Feeding per speech and swallow.  Bowel regimen, family declining PEG    RENAL:  Follow up lytes.  Correct as needed    INFECTIOUS DISEASE: Continue Cefepime per ID, SP Remdesivir, procalcitonin 0.64, f/u repeat     HEMATOLOGICAL: on therapeutic Lovenox for AFIB     ENDOCRINE:  Follow up FS.  Insulin protocol if needed.    MUSCULOSKELETAL: activity as tolerated, PT/OT    Palliative care following    DNR/DNI    Downgrade to Telemetry  IMPRESSION:    Sepsis POA  COVID-19 Pneumonia   Aspiration Pneumonia   E. Coli UTI, in setting of chronic Mcdaniel   Sacral Ulcer   AFIB with RVR   HO Lewy Body Dementia  HO Chronic AFIB on Xarelto    PLAN:    CNS: avoid depressants     HEENT: Oral care    PULMONARY:  HOB @ 45 degrees.  Aspiration precautions. wean oxygen as tolerated, target SaO2 92 TO 96%    CARDIOVASCULAR: Avoid volume overload, Resume PO metoprolol 50mg Q8H, PO Cardizem 90mg Q6H, wean off drip, Cardiology follow up, check BNP, 2D-Echo    GI: GI prophylaxis.  Feeding per speech and swallow.  Bowel regimen, family declining PEG    RENAL:  Follow up lytes.  Correct as needed    INFECTIOUS DISEASE: Continue Cefepime per ID, SP Remdesivir, procalcitonin 0.64, f/u repeat     HEMATOLOGICAL: on therapeutic Lovenox for AFIB     ENDOCRINE:  Follow up FS.  Insulin protocol if needed.    MUSCULOSKELETAL: activity as tolerated, PT/OT    Palliative care following    DNR/DNI    Downgrade to Telemetry

## 2023-05-16 NOTE — PROGRESS NOTE ADULT - PROBLEM SELECTOR PROBLEM 5
Palliative care by specialist
Lewy body dementia
Palliative care by specialist
Lewy body dementia
Lewy body dementia

## 2023-05-17 LAB
ANION GAP SERPL CALC-SCNC: 10 MMOL/L — SIGNIFICANT CHANGE UP (ref 7–14)
BASOPHILS # BLD AUTO: 0.06 K/UL — SIGNIFICANT CHANGE UP (ref 0–0.2)
BASOPHILS NFR BLD AUTO: 0.4 % — SIGNIFICANT CHANGE UP (ref 0–1)
BUN SERPL-MCNC: 14 MG/DL — SIGNIFICANT CHANGE UP (ref 10–20)
CALCIUM SERPL-MCNC: 7.8 MG/DL — LOW (ref 8.4–10.5)
CHLORIDE SERPL-SCNC: 101 MMOL/L — SIGNIFICANT CHANGE UP (ref 98–110)
CO2 SERPL-SCNC: 25 MMOL/L — SIGNIFICANT CHANGE UP (ref 17–32)
CREAT SERPL-MCNC: <0.5 MG/DL — LOW (ref 0.7–1.5)
EGFR: 99 ML/MIN/1.73M2 — SIGNIFICANT CHANGE UP
EOSINOPHIL # BLD AUTO: 0.01 K/UL — SIGNIFICANT CHANGE UP (ref 0–0.7)
EOSINOPHIL NFR BLD AUTO: 0.1 % — SIGNIFICANT CHANGE UP (ref 0–8)
GLUCOSE SERPL-MCNC: 110 MG/DL — HIGH (ref 70–99)
HCT VFR BLD CALC: 45 % — SIGNIFICANT CHANGE UP (ref 37–47)
HGB BLD-MCNC: 15 G/DL — SIGNIFICANT CHANGE UP (ref 12–16)
IMM GRANULOCYTES NFR BLD AUTO: 1.8 % — HIGH (ref 0.1–0.3)
LYMPHOCYTES # BLD AUTO: 1.25 K/UL — SIGNIFICANT CHANGE UP (ref 1.2–3.4)
LYMPHOCYTES # BLD AUTO: 7.4 % — LOW (ref 20.5–51.1)
MAGNESIUM SERPL-MCNC: 1.8 MG/DL — SIGNIFICANT CHANGE UP (ref 1.8–2.4)
MCHC RBC-ENTMCNC: 29.8 PG — SIGNIFICANT CHANGE UP (ref 27–31)
MCHC RBC-ENTMCNC: 33.3 G/DL — SIGNIFICANT CHANGE UP (ref 32–37)
MCV RBC AUTO: 89.3 FL — SIGNIFICANT CHANGE UP (ref 81–99)
MONOCYTES # BLD AUTO: 1.11 K/UL — HIGH (ref 0.1–0.6)
MONOCYTES NFR BLD AUTO: 6.6 % — SIGNIFICANT CHANGE UP (ref 1.7–9.3)
NEUTROPHILS # BLD AUTO: 14.21 K/UL — HIGH (ref 1.4–6.5)
NEUTROPHILS NFR BLD AUTO: 83.7 % — HIGH (ref 42.2–75.2)
NRBC # BLD: 0 /100 WBCS — SIGNIFICANT CHANGE UP (ref 0–0)
PLATELET # BLD AUTO: 358 K/UL — SIGNIFICANT CHANGE UP (ref 130–400)
PMV BLD: 10.5 FL — HIGH (ref 7.4–10.4)
POTASSIUM SERPL-MCNC: 4.2 MMOL/L — SIGNIFICANT CHANGE UP (ref 3.5–5)
POTASSIUM SERPL-SCNC: 4.2 MMOL/L — SIGNIFICANT CHANGE UP (ref 3.5–5)
RBC # BLD: 5.04 M/UL — SIGNIFICANT CHANGE UP (ref 4.2–5.4)
RBC # FLD: 16.2 % — HIGH (ref 11.5–14.5)
SODIUM SERPL-SCNC: 136 MMOL/L — SIGNIFICANT CHANGE UP (ref 135–146)
WBC # BLD: 16.94 K/UL — HIGH (ref 4.8–10.8)
WBC # FLD AUTO: 16.94 K/UL — HIGH (ref 4.8–10.8)

## 2023-05-17 PROCEDURE — 99233 SBSQ HOSP IP/OBS HIGH 50: CPT

## 2023-05-17 PROCEDURE — 99221 1ST HOSP IP/OBS SF/LOW 40: CPT

## 2023-05-17 RX ORDER — METOPROLOL TARTRATE 50 MG
50 TABLET ORAL EVERY 6 HOURS
Refills: 0 | Status: DISCONTINUED | OUTPATIENT
Start: 2023-05-17 | End: 2023-05-20

## 2023-05-17 RX ORDER — DILTIAZEM HCL 120 MG
10 CAPSULE, EXT RELEASE 24 HR ORAL
Qty: 125 | Refills: 0 | Status: DISCONTINUED | OUTPATIENT
Start: 2023-05-17 | End: 2023-05-18

## 2023-05-17 RX ORDER — MAGNESIUM SULFATE 500 MG/ML
2 VIAL (ML) INJECTION ONCE
Refills: 0 | Status: COMPLETED | OUTPATIENT
Start: 2023-05-17 | End: 2023-05-17

## 2023-05-17 RX ADMIN — ENOXAPARIN SODIUM 50 MILLIGRAM(S): 100 INJECTION SUBCUTANEOUS at 17:20

## 2023-05-17 RX ADMIN — GABAPENTIN 300 MILLIGRAM(S): 400 CAPSULE ORAL at 22:06

## 2023-05-17 RX ADMIN — Medication 30 MILLIGRAM(S): at 17:20

## 2023-05-17 RX ADMIN — Medication 50 MILLIGRAM(S): at 05:08

## 2023-05-17 RX ADMIN — Medication 30 MILLIGRAM(S): at 05:07

## 2023-05-17 RX ADMIN — CHLORHEXIDINE GLUCONATE 1 APPLICATION(S): 213 SOLUTION TOPICAL at 05:12

## 2023-05-17 RX ADMIN — Medication 50 MILLIGRAM(S): at 22:06

## 2023-05-17 RX ADMIN — Medication 90 MILLIGRAM(S): at 05:09

## 2023-05-17 RX ADMIN — Medication 50 MILLIGRAM(S): at 10:41

## 2023-05-17 RX ADMIN — Medication 10 MG/HR: at 16:30

## 2023-05-17 RX ADMIN — Medication 6 MILLIGRAM(S): at 05:08

## 2023-05-17 RX ADMIN — Medication 50 MILLIGRAM(S): at 17:19

## 2023-05-17 RX ADMIN — Medication 90 MILLIGRAM(S): at 00:18

## 2023-05-17 RX ADMIN — Medication 5 MILLIGRAM(S): at 22:06

## 2023-05-17 RX ADMIN — Medication 90 MILLIGRAM(S): at 11:18

## 2023-05-17 RX ADMIN — Medication 650 MILLIGRAM(S): at 13:39

## 2023-05-17 RX ADMIN — ENOXAPARIN SODIUM 50 MILLIGRAM(S): 100 INJECTION SUBCUTANEOUS at 05:08

## 2023-05-17 RX ADMIN — CEFEPIME 100 MILLIGRAM(S): 1 INJECTION, POWDER, FOR SOLUTION INTRAMUSCULAR; INTRAVENOUS at 17:22

## 2023-05-17 RX ADMIN — CEFEPIME 100 MILLIGRAM(S): 1 INJECTION, POWDER, FOR SOLUTION INTRAMUSCULAR; INTRAVENOUS at 05:11

## 2023-05-17 RX ADMIN — Medication 90 MILLIGRAM(S): at 17:19

## 2023-05-17 RX ADMIN — Medication 25 GRAM(S): at 10:41

## 2023-05-17 RX ADMIN — Medication 0.5 MILLIGRAM(S): at 22:06

## 2023-05-17 RX ADMIN — Medication 1 TABLET(S): at 11:18

## 2023-05-17 NOTE — CONSULT NOTE ADULT - SUBJECTIVE AND OBJECTIVE BOX
HPI:   Patient is a 82F w/ h/o Lewy Body Dementia (A&O 1-2 baseline), chronic Mcdaniel (persistent retention; last exchanged on 5/8), chronic afib (on Xarelto), and stage 4 sacral ulcer p/w cough and hypoxia since yesterday. History limited from patient due to patient's current mental status. Family at bedside (daughter and two sons) able to provide collateral. Since yesterday, pt has had cough, increased secretions, general malaise, and worsening mental status (minimally verbal; typically able to say few words at baseline). This AM, pt noted to be tachycardic and hypoxic to 80's%, prompting ED evaluation. Of note, patient known to have dysphagia, but able to tolerate PO. No reported fevers, chills, CP, palpitations, abdominal pain, n/v/d, dysuria, or LE swelling per family.   In ED, pt febrile (T 101.8F), tachycardic (), and tachypneic (RR 20's-30's) on NRB, but saturating in mid-90's%. Labs demonstrated WBC 24.45. VBG showed pH 7.41, pCO2 37, and Lactate 3.9. UA concerning for UTI. CXR concerning for right-sided PNA. S/P 1.7L LR bolus and IV abx (Vanc/Zosyn). Admitted to SDU for severe sepsis i/s/o aspiration pneumonia vs UTI.       PAST MEDICAL & SURGICAL HISTORY:  Anxiety  Arthritis  LBD (Lewy body dementia)  Ulcer of sacral region, stage 4  Chronic indwelling Mcdaniel catheter  Chronic atrial fibrillation  H/O hernia repair    REVIEW OF SYSTEMS: Pt unable to offer    MEDICATIONS  (STANDING):  busPIRone 30 milliGRAM(s) Oral two times a day  cefepime   IVPB 1000 milliGRAM(s) IV Intermittent every 12 hours  chlorhexidine 2% Cloths 1 Application(s) Topical <User Schedule>  clonazePAM  Tablet 0.5 milliGRAM(s) Oral at bedtime  dexAMETHasone  Injectable 6 milliGRAM(s) IV Push daily  diltiazem    Tablet 90 milliGRAM(s) Oral every 6 hours  diltiazem Infusion 5 mG/Hr (5 mL/Hr) IV Continuous <Continuous>  enoxaparin Injectable 50 milliGRAM(s) SubCutaneous every 12 hours  gabapentin 300 milliGRAM(s) Oral at bedtime  lactated ringers. 1000 milliLiter(s) (100 mL/Hr) IV Continuous <Continuous>  lactobacillus acidophilus 1 Tablet(s) Oral daily  melatonin 5 milliGRAM(s) Oral at bedtime  metoprolol tartrate 50 milliGRAM(s) Oral every 6 hours  potassium chloride   Powder 20 milliEquivalent(s) Oral once    MEDICATIONS  (PRN):  acetaminophen     Tablet .. 650 milliGRAM(s) Oral every 6 hours PRN Temp greater or equal to 38C (100.4F), Mild Pain (1 - 3)      Allergies  No Known Allergies  Intolerances    SOCIAL HISTORY:   Lives at home with family     FAMILY HISTORY:  No pertinent family history in first degree relatives         PHYSICAL EXAM:  Vital Signs Last 24 Hrs  T(C): 36.1 (17 May 2023 07:50), Max: 36.2 (17 May 2023 04:00)  T(F): 96.9 (17 May 2023 07:50), Max: 97.1 (17 May 2023 04:00)  HR: 122 (17 May 2023 07:50) (104 - 135)  BP: 103/73 (17 May 2023 07:50) (103/73 - 111/74)  BP(mean): 84 (17 May 2023 07:50) (71 - 89)  RR: 20 (17 May 2023 07:50) (18 - 20)  SpO2: 94% (17 May 2023 07:50) (94% - 99%)    Parameters below as of 17 May 2023 07:50  Patient On (Oxygen Delivery Method): room air        General : NAD , Confused   HEENT:  NC/AT, PERRL, EOMI, sclera clear, mucosa moist, throat clear, trachea midline, neck supple  Cardiovascular: RRR   Respiratory:Equal chest rise  Gastrointestinal Soft NT/ND (+)BS , Incontinence   Neurology:  Weakened strength & sensation  Psych: Calm  Musculoskeletal:  limited   Vascular: B/L LE equally warm  Skin:   Pressure Injury     LABS/ CULTURES/ RADIOLOGY:                        15.0   16.94 )-----------( 358      ( 17 May 2023 06:29 )             45.0       136  |  101  |  14  ----------------------------<  110      [05-17-23 @ 06:29]  4.2   |  25  |  <0.5        Ca     7.8     [05-17-23 @ 06:29]      Mg     1.8     [05-17-23 @ 06:29]                Culture - Blood (collected 05-11-23 @ 06:42)  Source: .Blood None  Final Report (05-16-23 @ 13:00):    No Growth Final

## 2023-05-17 NOTE — CONSULT NOTE ADULT - CONSULT REASON
sacral wound
Hypoxia
Pressure injury assessment  and Treament recommendation
poor po intake
Afib with RVR
pna
GOC

## 2023-05-17 NOTE — CONSULT NOTE ADULT - CONSULT REQUESTED DATE/TIME
17-May-2023 10:45
12-May-2023 14:15
15-May-2023 17:10
10-May-2023 12:23
11-May-2023 08:42
10-May-2023 10:14
10-May-2023 14:12
Clindamycin Counseling: I counseled the patient regarding use of clindamycin as an antibiotic for prophylactic and/or therapeutic purposes. Clindamycin is active against numerous classes of bacteria, including skin bacteria. Side effects may include nausea, diarrhea, gastrointestinal upset, rash, hives, yeast infections, and in rare cases, colitis.

## 2023-05-17 NOTE — PROGRESS NOTE ADULT - SUBJECTIVE AND OBJECTIVE BOX
YOAN TAI  82y Female    CHIEF COMPLAINT:    Patient is a 82y old  Female who presents with a chief complaint of Cough; Hypoxia (17 May 2023 10:45)    INTERVAL HPI/OVERNIGHT EVENTS:    Patient seen and examined. No acute events overnight. REmains on Cardizem drip, -130s    ROS: All other systems are negative.    Vital Signs:    T(F): 96.9 (23 @ 07:50), Max: 97.1 (23 @ 04:00)  HR: 122 (23 @ 07:50) (104 - 122)  BP: 103/73 (23 @ 07:50) (103/73 - 111/74)  RR: 20 (23 @ 07:50) (18 - 20)  SpO2: 94% (23 @ 07:50) (94% - 96%)    16 May 2023 07:01  -  17 May 2023 07:00  --------------------------------------------------------  IN: 360 mL / OUT: 1300 mL / NET: -940 mL    17 May 2023 07:01  -  17 May 2023 16:26  --------------------------------------------------------  IN: 30 mL / OUT: 200 mL / NET: -170 mL    Daily Weight in k.7 (17 May 2023 00:00)    PHYSICAL EXAM:    GENERAL:  NAD chronically ill appearing   SKIN: No rashes or lesions  HEENT: Atraumatic. Normocephalic.    NECK: Supple, No JVD.   PULMONARY: CTA B/L. No wheezing. No rales  CVS: Normal S1, S2. Rate and Rhythm are regular.    ABDOMEN/GI: Soft, Nontender, Nondistended   MSK:  No clubbing or cyanosis   NEUROLOGIC: moves all extremities   PSYCH: Alert & oriented x 2  Consultant(s) Notes Reviewed:  [x ] YES  [ ] NO  Care Discussed with Consultants/Other Providers [ x] YES  [ ] NO    LABS:                        15.0   16.94 )-----------( 358      ( 17 May 2023 06:29 )             45.0     136  |  101  |  14  ----------------------------<  110<H>  4.2   |  25  |  <0.5<L>    Ca    7.8<L>      17 May 2023 06:29  Mg     1.8         RADIOLOGY & ADDITIONAL TESTS:  Imaging or report Personally Reviewed:  [x] YES  [ ] NO  EKG reviewed: [x] YES  [ ] NO    Medications:  Standing  busPIRone 30 milliGRAM(s) Oral two times a day  cefepime   IVPB 1000 milliGRAM(s) IV Intermittent every 12 hours  chlorhexidine 2% Cloths 1 Application(s) Topical <User Schedule>  clonazePAM  Tablet 0.5 milliGRAM(s) Oral at bedtime  dexAMETHasone  Injectable 6 milliGRAM(s) IV Push daily  diltiazem    Tablet 90 milliGRAM(s) Oral every 6 hours  diltiazem Infusion 10 mG/Hr IV Continuous <Continuous>  enoxaparin Injectable 50 milliGRAM(s) SubCutaneous every 12 hours  gabapentin 300 milliGRAM(s) Oral at bedtime  lactated ringers. 1000 milliLiter(s) IV Continuous <Continuous>  lactobacillus acidophilus 1 Tablet(s) Oral daily  melatonin 5 milliGRAM(s) Oral at bedtime  metoprolol tartrate 50 milliGRAM(s) Oral every 6 hours  potassium chloride   Powder 20 milliEquivalent(s) Oral once    PRN Meds  acetaminophen     Tablet .. 650 milliGRAM(s) Oral every 6 hours PRN  oxycodone    5 mG/acetaminophen 325 mG 1 Tablet(s) Oral every 6 hours PRN

## 2023-05-17 NOTE — CONSULT NOTE ADULT - ASSESSMENT
Assessment:  Patient received in bed, Currently being treated for  Sepsis POA COVID-19 Pneumonia Aspiration Pneumonia                             High risk for pressure injury development or progression                          Skin assessed- B/L heel dry and Intact                         B/L extremity    frail skin with  multiple skin tears, and bruising   Wound #1  Type and location :  Stage four pressure injury to sacrococcygeal   Size: ~2x1.5x.5  Tissue Description :  Pink   No odor, erythema, increased warmth, tenderness, induration, fluctuance, nor crepitus  Wound Exudate : Scant with dressing removal    Wound Edge:    Undermining noted at 7-9 o'clock   Periwound Condition :  Dry     Plan:  Wound and Skin Care Recs.   Agreeable with burn team recommendation - continue with hydrogel with moist saline dressing as ordered   Clean  B/L l extremity skin tear with saline, pat dry then apply xeroform with non adhering and Kerlix    Pressure  injury  preventive  measures  Skin and incontinence care   Assess wound and inform primary provider of any changes   Case discussed with primary Rn  Wound/ ostomy specialist  to f/u as needed     Offloading: [ x] Frequent position changes [ x] Devices/Equipment  Cleansing: [x ] Saline [ ] Soap/Water [ ] Other: ______  Topicals: [ ] Barrier Cream [ ] Antimicrobial [ x] Moist wound bed   Dressings: [ ] Dry, sterile [ ] Allevyn  Foam [ ] Absorbant Pads [ ] Collagenase    Other Recs.   Per Primary team      Total time for bedside assessment , review of medical records  and  discussion of plan of care with primary team greater than 35 min

## 2023-05-17 NOTE — CHART NOTE - NSCHARTNOTEFT_GEN_A_CORE
CEU Transfer Note    Transfer from: CEU    Transfer to: (  ) Medicine    ( X) Telemetry    (  ) RCU                               (  ) Palliative    (  ) Stroke Unit    (  ) MICU    (  ) __________________    HPI / CCU COURSE:    82F w/ h/o Lewy Body Dementia (A&O 1-2 baseline), chronic Hill (persistent retention; last exchanged on 5/8), chronic afib (on Xarelto), and stage 4 sacral ulcer p/w cough and hypoxia started 5/8. History limited from patient due to patient's current mental status. Family at bedside (daughter and two sons) able to provide collateral. Since 5/8 pt has had cough, increased secretions, general malaise, and worsening mental status (minimally verbal; typically able to say few words at baseline). AM 5/9, pt noted to be tachycardic and hypoxic to 80's%, prompting ED evaluation. Of note, patient known to have dysphagia, but able to tolerate PO. No reported fevers, chills, CP, palpitations, abdominal pain, n/v/d, dysuria, or LE swelling per family.     In ED, pt febrile (T 101.8F), tachycardic (), and tachypneic (RR 20's-30's) on NRB, but saturating in mid-90's%. Labs demonstrated WBC 24.45. VBG showed pH 7.41, pCO2 37, and Lactate 3.9. UA concerning for UTI. CXR concerning for right-sided PNA. S/P 1.7L LR bolus and IV abx (Vanc/Zosyn). Admitted to SDU for severe sepsis i/s/o aspiration pneumonia vs UTI.    CEU Course:   - Patient admitted to CEU, CXR revealed multilobar PNA 2/2 suspected aspiration  - UCx +ve E coli, BCx -ve - started on abx COVID +ve s/p RVR and decadron for hypoxia   - Patient RVR during hospital stay, started on cardizem ggt - rate controlled switched to cardizem 90mg q6hr with lopressor 50mg q6hr, AC lovenox 50mg BID  - SSE eval on admission recommended NPO but after improvement now tolerating po diet - still has risk for aspiration but family understand that feeding is for comfort   - Burn team consulted for sacral wound - no intervention - wound care recs given, pain control w/ tylenol and percocet PRN   - lactate wnl, rate controlled, now afebrile, WBC stable medically stable for telemetry   - plan follows below       Vital Signs Last 24 Hrs  T(C): 36.1 (15 May 2023 12:53), Max: 36.1 (15 May 2023 08:11)  T(F): 97 (15 May 2023 12:53), Max: 97 (15 May 2023 12:53)  HR: 119 (15 May 2023 12:53) (48 - 119)  BP: 105/64 (15 May 2023 12:53) (105/64 - 142/63)  BP(mean): --  RR: 18 (15 May 2023 12:53) (18 - 18)  SpO2: 98% (15 May 2023 12:53) (98% - 99%)    Parameters below as of 15 May 2023 04:00  Patient On (Oxygen Delivery Method): nasal cannula        I&O's Summary    14 May 2023 07:01  -  15 May 2023 07:00  --------------------------------------------------------  IN: 950 mL / OUT: 2300 mL / NET: -1350 mL    15 May 2023 07:01  -  15 May 2023 15:15  --------------------------------------------------------  IN: 55 mL / OUT: 0 mL / NET: 55 mL        Physical Exam:   General: NAD, ill appearance, frail   Lungs:  decrease breath sound b/l , no wheezing, rales, rhonchi, not in respiratory distress   Heart: Afib, rate controlled, S1/S2 present, no heaves, thrills, murmurs  Abdomen: soft, non tender non distended, BS +  Ext: no edema, follows commands, decreased strength, stage IV sacral ulcer       LABS:                               15.3   12.61 )-----------( 365      ( 15 May 2023 07:10 )             45.6       05-15    143  |  104  |  9<L>  ----------------------------<  160<H>  2.9<L>   |  28  |  <0.5<L>    Ca    8.3<L>      15 May 2023 07:10    TPro  5.3<L>  /  Alb  2.6<L>  /  TBili  0.5  /  DBili  x   /  AST  10  /  ALT  <5  /  AlkPhos  86  05-15                ECG: Atrial fibrillation     Telemetry: no events    Imaging:  CTH 5/12:  1.  No evidence of acute intracranial hemorrhage. Stable exam since   7/20/2022.  2.  Stable extensive chronic microvascular changes and chronic infarcts.    CXR 5/15:  Low lung volumes.  Unchanged bibasilar opacities/effusion.        ASSESSMENT & PLAN:   82y F pmh lewy body dementia, chronic hill, chronic afib on xarelto, stage 4 sacral ulcer presenting with cough and hypoxia admitted for COVID 19 infection with superimposed aspiration pnemonia c/b Afib RVR.     #Sepsis POA  #COVID 19 PNA  #Aspiration PNA   #UTI 2/2 chronic hill   - hypoxic, febrile on admission  - wbc 12k, COVID +ve, BCx -ve, UCx +ve E coli   - lactate wnl   - CTH -ve for acute intracranial pathology   - c/w cefepime 1g IV BID d9/10 - complete 5/18  - c/w RDV d5/5   - c/w decadron 6mg IV qD D8/10  - off NC on room air   - SSE - c/w pureed diet - risk for aspiration but ok to continue with comfort feeds     #Chronic Afib now w/ RVR  - 2/2 sepsis   - on cardizem ggt @ 10 + 90mg q6hr PO   - c/w metoprolol 50mg q6hr   - c/w lovenox 50mg BID  - monitor on telemetry - adjust cardizem/metoprolol as needed for rate control   - if hypotensive on cardizem or not improving can switch to esmolol ggt    #Lewy Body Dementia  - CTH -ve  - AxO 1-2 at baseline, can follow simple commands   - c/w buspirone 30mg po BID  - c/w klonopin 0.5mg qhs   - c/w gabapentin 300mg po qhs     #Stage IV sacral ulcer   - burn consulted - no signs of infection, no plan for surgical debridement   - c/w wound care recs BID   - tylenol 650mg po q6hr, oxycodone 5mg po q8hr PRN for pain   - c/w frequent repositioning     # To follow up  - rate control   - complete abx course for UTI and RDV  - patient family aware of plan - wish to have patient stabilized so she can go home on hospice  - palliative following     # Misc  - DVT Prophylaxis: enoxaparin Injectable  - GI Prophylaxis: none  - Diet: Diet, Pureed  - Activity: Activity - Increase As Tolerated  - Code Status: Full Do Not Resuscitate      Denis Bone  PGY1, Internal Medicine   [facility].

## 2023-05-17 NOTE — PROGRESS NOTE ADULT - SUBJECTIVE AND OBJECTIVE BOX
Patient is a 82y old  Female who presents with a chief complaint of Cough; Hypoxia (16 May 2023 16:36)        Over Night Events: remains on Cardizem drip      Vital Signs Last 24 Hrs  T(C): 36.1 (17 May 2023 07:50), Max: 36.2 (17 May 2023 04:00)  T(F): 96.9 (17 May 2023 07:50), Max: 97.1 (17 May 2023 04:00)  HR: 122 (17 May 2023 07:50) (104 - 135)  BP: 103/73 (17 May 2023 07:50) (103/73 - 111/74)  BP(mean): 84 (17 May 2023 07:50) (71 - 89)  RR: 20 (17 May 2023 07:50) (18 - 20)  SpO2: 94% (17 May 2023 07:50) (94% - 99%)    O2 Parameters below as of 17 May 2023 07:50  Patient On (Oxygen Delivery Method): room air            CONSTITUTIONAL:  Chronically Ill appearing.  NAD    ENT:   Airway patent,   Mouth with normal mucosa.   No thrush    EYES:   Pupils equal,   Round and reactive to light.    CARDIAC:   Normal rate,   irregular rhythm.        RESPIRATORY:   No wheezing  Bilateral BS  Normal chest expansion  Not tachypneic,  No use of accessory muscles    GASTROINTESTINAL:  Abdomen soft,   Non-tender,   No guarding,   + BS      MUSCULOSKELETAL:   Range of motion is not limited      NEUROLOGICAL:   Alert and oriented x2    SKIN:   Skin normal color for race,   Warm and dry  sacral ulcer     PSYCHIATRIC:   No apparent risk to self or others.        05-16-23 @ 07:01  -  05-17-23 @ 07:00  --------------------------------------------------------  IN:    Diltiazem: 120 mL    Oral Fluid: 240 mL  Total IN: 360 mL    OUT:    Indwelling Catheter - Urethral (mL): 1300 mL  Total OUT: 1300 mL    Total NET: -940 mL          LABS:                            15.0   16.94 )-----------( 358      ( 17 May 2023 06:29 )             45.0                                               05-17    136  |  101  |  14  ----------------------------<  110<H>  4.2   |  25  |  <0.5<L>    Ca    7.8<L>      17 May 2023 06:29  Mg     1.8     05-17                                                                                                                                                                                          MEDICATIONS  (STANDING):  busPIRone 30 milliGRAM(s) Oral two times a day  cefepime   IVPB 1000 milliGRAM(s) IV Intermittent every 12 hours  chlorhexidine 2% Cloths 1 Application(s) Topical <User Schedule>  clonazePAM  Tablet 0.5 milliGRAM(s) Oral at bedtime  dexAMETHasone  Injectable 6 milliGRAM(s) IV Push daily  diltiazem    Tablet 90 milliGRAM(s) Oral every 6 hours  diltiazem Infusion 5 mG/Hr (5 mL/Hr) IV Continuous <Continuous>  enoxaparin Injectable 50 milliGRAM(s) SubCutaneous every 12 hours  gabapentin 300 milliGRAM(s) Oral at bedtime  lactated ringers. 1000 milliLiter(s) (100 mL/Hr) IV Continuous <Continuous>  lactobacillus acidophilus 1 Tablet(s) Oral daily  melatonin 5 milliGRAM(s) Oral at bedtime  metoprolol tartrate 50 milliGRAM(s) Oral every 6 hours  potassium chloride   Powder 20 milliEquivalent(s) Oral once    MEDICATIONS  (PRN):  acetaminophen     Tablet .. 650 milliGRAM(s) Oral every 6 hours PRN Temp greater or equal to 38C (100.4F), Mild Pain (1 - 3)        CXR reviewed

## 2023-05-17 NOTE — PROGRESS NOTE ADULT - ASSESSMENT
82y F pmh lewy body dementia, chronic hill, chronic afib on xarelto, stage 4 sacral ulcer presenting with cough and hypoxia admitted for COVID 19 infection with superimposed aspiration pnemonia c/b Afib RVR.     Sepsis POA due to suspected Multilobar PNA   COVID 19 infection  on RDV   + UA with Chronic hill since 10/22  - hypoxic, febrile on admission  - wbc 12k, COVID +ve, BCx -ve, UCx +ve E coli   - CTH -ve for acute intracranial pathology   - c/w cefepime 1g IV BID, day 9/10 today   - completed RDV today, c/w Dexamethasone, can switch to oral for 10 day course    - SSE - c/w pureed diet - pleasure feeding, patient still at risk of aspiration - family aware, ultimately will be discharged with hospice     Chronic Afib now w/ RVR, rate stillk uncontrolled on Cardizem GTT  - restarted on cardizem ggt overnight, increase to 10ml/hr  - c/w with cardizem 90mg q6hr and wean off ggt as tolerated, can increase Cardizem to 480/daily   - c/w metoprolol to 50mg q6hr   - if BP decreased on cardizem GTT, switch to esmolol drip   - c/w lovenox 50mg BID  - cardiology fu     Lewy Body Dementia  - CTH -ve, AxO 1-2 at baseline, can follow simple commands   - c/w buspirone 30mg po BID  - c/w klonopin 0.5mg qhs   - c/w gabapentin 300mg po qhs     Stage IV sacral ulcer   - burn consulted - no signs of infection, no plan for surgical debridement, c/w local wound care  - morphine 1mg IV q6hr PRN, tylenol 650mg po q6hr, oxycodone 5mg po q8hr PRN for pain   - c/w frequent repositioning     DNR/DNI  Pending: rate control, cardio fu,  then dc planning home with hospice

## 2023-05-17 NOTE — PROGRESS NOTE ADULT - ASSESSMENT
IMPRESSION:    Sepsis POA  COVID-19 Pneumonia   Aspiration Pneumonia   E. Coli UTI, in setting of chronic Mcdaniel   Sacral Ulcer   AFIB with RVR   HO Lewy Body Dementia  HO Chronic AFIB on Xarelto    PLAN:    CNS: avoid depressants     HEENT: Oral care    PULMONARY:  HOB @ 45 degrees.  Aspiration precautions. on room air. on Dexamethasone     CARDIOVASCULAR: Avoid volume overload, Increased metoprolol 50mg to Q6H, Cardizem 90mg Q6H, DC Cardizem drip, Cardiology follow up, BNP, f/u 2D-Echo    GI: GI prophylaxis.  Feeding per speech and swallow.  Bowel regimen, family declining PEG    RENAL:  Follow up lytes.  Correct as needed    INFECTIOUS DISEASE: Continue Cefepime per ID, SP Remdesivir, procalcitonin 0.64, f/u repeat     HEMATOLOGICAL: on therapeutic Lovenox for AFIB     ENDOCRINE:  Follow up FS.  Insulin protocol if needed.    MUSCULOSKELETAL: activity as tolerated, PT/OT    Palliative care following    DNR/DNI, plan for DC with Home Hospice    Downgrade to Telemetry

## 2023-05-18 LAB
ANION GAP SERPL CALC-SCNC: 11 MMOL/L — SIGNIFICANT CHANGE UP (ref 7–14)
BASOPHILS # BLD AUTO: 0.04 K/UL — SIGNIFICANT CHANGE UP (ref 0–0.2)
BASOPHILS NFR BLD AUTO: 0.3 % — SIGNIFICANT CHANGE UP (ref 0–1)
BUN SERPL-MCNC: 12 MG/DL — SIGNIFICANT CHANGE UP (ref 10–20)
CALCIUM SERPL-MCNC: 8.1 MG/DL — LOW (ref 8.4–10.5)
CHLORIDE SERPL-SCNC: 105 MMOL/L — SIGNIFICANT CHANGE UP (ref 98–110)
CO2 SERPL-SCNC: 26 MMOL/L — SIGNIFICANT CHANGE UP (ref 17–32)
CREAT SERPL-MCNC: <0.5 MG/DL — LOW (ref 0.7–1.5)
EGFR: 99 ML/MIN/1.73M2 — SIGNIFICANT CHANGE UP
EOSINOPHIL # BLD AUTO: 0 K/UL — SIGNIFICANT CHANGE UP (ref 0–0.7)
EOSINOPHIL NFR BLD AUTO: 0 % — SIGNIFICANT CHANGE UP (ref 0–8)
GLUCOSE SERPL-MCNC: 133 MG/DL — HIGH (ref 70–99)
HCT VFR BLD CALC: 45.3 % — SIGNIFICANT CHANGE UP (ref 37–47)
HGB BLD-MCNC: 14.8 G/DL — SIGNIFICANT CHANGE UP (ref 12–16)
IMM GRANULOCYTES NFR BLD AUTO: 1.8 % — HIGH (ref 0.1–0.3)
LYMPHOCYTES # BLD AUTO: 0.6 K/UL — LOW (ref 1.2–3.4)
LYMPHOCYTES # BLD AUTO: 4.7 % — LOW (ref 20.5–51.1)
MAGNESIUM SERPL-MCNC: 2.4 MG/DL — SIGNIFICANT CHANGE UP (ref 1.8–2.4)
MCHC RBC-ENTMCNC: 29.4 PG — SIGNIFICANT CHANGE UP (ref 27–31)
MCHC RBC-ENTMCNC: 32.7 G/DL — SIGNIFICANT CHANGE UP (ref 32–37)
MCV RBC AUTO: 90.1 FL — SIGNIFICANT CHANGE UP (ref 81–99)
MONOCYTES # BLD AUTO: 0.56 K/UL — SIGNIFICANT CHANGE UP (ref 0.1–0.6)
MONOCYTES NFR BLD AUTO: 4.3 % — SIGNIFICANT CHANGE UP (ref 1.7–9.3)
NEUTROPHILS # BLD AUTO: 11.47 K/UL — HIGH (ref 1.4–6.5)
NEUTROPHILS NFR BLD AUTO: 88.9 % — HIGH (ref 42.2–75.2)
NRBC # BLD: 0 /100 WBCS — SIGNIFICANT CHANGE UP (ref 0–0)
PLATELET # BLD AUTO: 331 K/UL — SIGNIFICANT CHANGE UP (ref 130–400)
PMV BLD: 10.9 FL — HIGH (ref 7.4–10.4)
POTASSIUM SERPL-MCNC: 4 MMOL/L — SIGNIFICANT CHANGE UP (ref 3.5–5)
POTASSIUM SERPL-SCNC: 4 MMOL/L — SIGNIFICANT CHANGE UP (ref 3.5–5)
PROCALCITONIN SERPL-MCNC: 0.04 NG/ML — SIGNIFICANT CHANGE UP (ref 0.02–0.1)
RBC # BLD: 5.03 M/UL — SIGNIFICANT CHANGE UP (ref 4.2–5.4)
RBC # FLD: 15.9 % — HIGH (ref 11.5–14.5)
SODIUM SERPL-SCNC: 142 MMOL/L — SIGNIFICANT CHANGE UP (ref 135–146)
WBC # BLD: 12.9 K/UL — HIGH (ref 4.8–10.8)
WBC # FLD AUTO: 12.9 K/UL — HIGH (ref 4.8–10.8)

## 2023-05-18 PROCEDURE — 99233 SBSQ HOSP IP/OBS HIGH 50: CPT

## 2023-05-18 PROCEDURE — 71045 X-RAY EXAM CHEST 1 VIEW: CPT | Mod: 26

## 2023-05-18 PROCEDURE — 99232 SBSQ HOSP IP/OBS MODERATE 35: CPT

## 2023-05-18 PROCEDURE — 93306 TTE W/DOPPLER COMPLETE: CPT | Mod: 26

## 2023-05-18 RX ORDER — METOPROLOL TARTRATE 50 MG
50 TABLET ORAL ONCE
Refills: 0 | Status: COMPLETED | OUTPATIENT
Start: 2023-05-18 | End: 2023-05-18

## 2023-05-18 RX ORDER — DILTIAZEM HCL 120 MG
5 CAPSULE, EXT RELEASE 24 HR ORAL
Qty: 125 | Refills: 0 | Status: DISCONTINUED | OUTPATIENT
Start: 2023-05-18 | End: 2023-05-18

## 2023-05-18 RX ORDER — DILTIAZEM HCL 120 MG
90 CAPSULE, EXT RELEASE 24 HR ORAL ONCE
Refills: 0 | Status: COMPLETED | OUTPATIENT
Start: 2023-05-18 | End: 2023-05-18

## 2023-05-18 RX ADMIN — Medication 5 MILLIGRAM(S): at 21:53

## 2023-05-18 RX ADMIN — Medication 90 MILLIGRAM(S): at 11:49

## 2023-05-18 RX ADMIN — Medication 50 MILLIGRAM(S): at 10:10

## 2023-05-18 RX ADMIN — Medication 90 MILLIGRAM(S): at 23:10

## 2023-05-18 RX ADMIN — CEFEPIME 100 MILLIGRAM(S): 1 INJECTION, POWDER, FOR SOLUTION INTRAMUSCULAR; INTRAVENOUS at 06:24

## 2023-05-18 RX ADMIN — Medication 90 MILLIGRAM(S): at 17:42

## 2023-05-18 RX ADMIN — Medication 6 MILLIGRAM(S): at 06:25

## 2023-05-18 RX ADMIN — Medication 90 MILLIGRAM(S): at 00:00

## 2023-05-18 RX ADMIN — Medication 30 MILLIGRAM(S): at 17:41

## 2023-05-18 RX ADMIN — Medication 650 MILLIGRAM(S): at 13:29

## 2023-05-18 RX ADMIN — CHLORHEXIDINE GLUCONATE 1 APPLICATION(S): 213 SOLUTION TOPICAL at 06:26

## 2023-05-18 RX ADMIN — Medication 0.5 MILLIGRAM(S): at 21:57

## 2023-05-18 RX ADMIN — Medication 50 MILLIGRAM(S): at 11:49

## 2023-05-18 RX ADMIN — Medication 1 TABLET(S): at 11:49

## 2023-05-18 RX ADMIN — CEFEPIME 100 MILLIGRAM(S): 1 INJECTION, POWDER, FOR SOLUTION INTRAMUSCULAR; INTRAVENOUS at 16:38

## 2023-05-18 RX ADMIN — Medication 90 MILLIGRAM(S): at 10:10

## 2023-05-18 RX ADMIN — Medication 50 MILLIGRAM(S): at 17:41

## 2023-05-18 RX ADMIN — ENOXAPARIN SODIUM 50 MILLIGRAM(S): 100 INJECTION SUBCUTANEOUS at 17:41

## 2023-05-18 RX ADMIN — GABAPENTIN 300 MILLIGRAM(S): 400 CAPSULE ORAL at 21:54

## 2023-05-18 RX ADMIN — ENOXAPARIN SODIUM 50 MILLIGRAM(S): 100 INJECTION SUBCUTANEOUS at 06:24

## 2023-05-18 NOTE — PROGRESS NOTE ADULT - ASSESSMENT
82y F pmh lewy body dementia, chronic hill, chronic afib on xarelto, stage 4 sacral ulcer presenting with cough and hypoxia admitted for COVID 19 infection with superimposed aspiration pnemonia c/b Afib RVR.     #Sepsis POA  #COVID 19 PNA  #Aspiration PNA   #UTI 2/2 chronic hill   - hypoxic, febrile on admission  - wbc 12k, COVID +ve, BCx -ve, UCx +ve E coli   - lactate wnl   - CTH -ve for acute intracranial pathology   - s/p cefepime 1g IV BID d10/10 - complete 5/18  - s/p RDV d5/5   - c/w decadron 6mg IV qD D9/10  - off NC on room air   - SSE - c/w pureed diet - risk for aspiration but ok to continue with comfort feeds     #Chronic Afib now w/ RVR  - 2/2 sepsis   - on cardizem ggt @ 5 + 90mg q6hr PO - wean as tolerated   - c/w metoprolol 50mg q6hr   - c/w lovenox 50mg BID  - monitor on telemetry   - if uncontrolled can start digoxin 0.25x2 doses q6hr as load then 0.125mg qD  - cardiology following - recs above    #Lewy Body Dementia  - CTH -ve  - AxO 1-2 at baseline, can follow simple commands   - c/w buspirone 30mg po BID  - c/w klonopin 0.5mg qhs   - c/w gabapentin 300mg po qhs     #Stage IV sacral ulcer   #Multiple skin breakdowns   - burn consulted - no signs of infection, no plan for surgical debridement   - c/w wound care recs BID   - tylenol 650mg po q6hr, oxycodone 5mg po q8hr PRN for pain   - c/w frequent repositioning     # To follow up  - rate control   - patient family aware of plan - wish to have patient stabilized so she can go home on hospice  - palliative following     # Misc  - DVT Prophylaxis: enoxaparin Injectable  - GI Prophylaxis: none  - Diet: Diet, Pureed  - Activity: Activity - Increase As Tolerated  - Code Status: Full Do Not Resuscitate      Denis Bone  PGY1, Internal Medicine   [facility].

## 2023-05-18 NOTE — PROGRESS NOTE ADULT - SUBJECTIVE AND OBJECTIVE BOX
Patient is a 82y old  Female who presents with a chief complaint of Cough; Hypoxia (17 May 2023 16:25)        Over Night Events: no events overnight, remains on Cardizem       Vital Signs Last 24 Hrs  T(C): 36.3 (18 May 2023 07:00), Max: 36.4 (17 May 2023 21:00)  T(F): 97.4 (18 May 2023 07:00), Max: 97.5 (17 May 2023 21:00)  HR: 76 (18 May 2023 07:00) (76 - 109)  BP: 101/62 (18 May 2023 07:00) (90/56 - 137/82)  BP(mean): 76 (18 May 2023 07:00) (67 - 83)  RR: 10 (18 May 2023 07:00) (10 - 22)  SpO2: 98% (18 May 2023 07:00) (96% - 99%)    O2 Parameters below as of 18 May 2023 04:00  Patient On (Oxygen Delivery Method): room air      CONSTITUTIONAL:  Chronically Ill appearing.  NAD    ENT:   Airway patent,   Mouth with normal mucosa.   No thrush    EYES:   Pupils equal,   Round and reactive to light.    CARDIAC:   Mild Tachycardic   Irregular rhythm.      RESPIRATORY:   No wheezing  Bilateral BS  Normal chest expansion  Not tachypneic,  No use of accessory muscles    GASTROINTESTINAL:  Abdomen soft,   Non-tender,   No guarding,   + BS    MUSCULOSKELETAL:   Range of motion is limited      NEUROLOGICAL:   somnolent       SKIN:   Skin normal color for race,   Warm and dry  stage IV sacral ulcer     PSYCHIATRIC:   No apparent risk to self or others.        05-17-23 @ 07:01  -  05-18-23 @ 07:00  --------------------------------------------------------  IN:    Diltiazem: 45 mL    Diltiazem: 150 mL    IV PiggyBack: 100 mL    Oral Fluid: 290 mL  Total IN: 585 mL    OUT:    Indwelling Catheter - Urethral (mL): 1200 mL  Total OUT: 1200 mL    Total NET: -615 mL          LABS:                            14.8   12.90 )-----------( 331      ( 18 May 2023 07:06 )             45.3                                               05-17    136  |  101  |  14  ----------------------------<  110<H>  4.2   |  25  |  <0.5<L>    Ca    7.8<L>      17 May 2023 06:29  Mg     1.8     05-17                                                                                                                                                                                                                           MEDICATIONS  (STANDING):  busPIRone 30 milliGRAM(s) Oral two times a day  cefepime   IVPB 1000 milliGRAM(s) IV Intermittent every 12 hours  chlorhexidine 2% Cloths 1 Application(s) Topical <User Schedule>  clonazePAM  Tablet 0.5 milliGRAM(s) Oral at bedtime  dexAMETHasone  Injectable 6 milliGRAM(s) IV Push daily  diltiazem    Tablet 90 milliGRAM(s) Oral every 6 hours  diltiazem Infusion 10 mG/Hr (10 mL/Hr) IV Continuous <Continuous>  enoxaparin Injectable 50 milliGRAM(s) SubCutaneous every 12 hours  gabapentin 300 milliGRAM(s) Oral at bedtime  lactated ringers. 1000 milliLiter(s) (100 mL/Hr) IV Continuous <Continuous>  lactobacillus acidophilus 1 Tablet(s) Oral daily  melatonin 5 milliGRAM(s) Oral at bedtime  metoprolol tartrate 50 milliGRAM(s) Oral every 6 hours  potassium chloride   Powder 20 milliEquivalent(s) Oral once    MEDICATIONS  (PRN):  acetaminophen     Tablet .. 650 milliGRAM(s) Oral every 6 hours PRN Temp greater or equal to 38C (100.4F), Mild Pain (1 - 3)  oxycodone    5 mG/acetaminophen 325 mG 1 Tablet(s) Oral every 6 hours PRN Severe Pain (7 - 10)      CXR reviewed

## 2023-05-18 NOTE — PROGRESS NOTE ADULT - SUBJECTIVE AND OBJECTIVE BOX
YOAN TAI  82y Female    CHIEF COMPLAINT:    Patient is a 82y old  Female who presents with a chief complaint of Cough; Hypoxia (18 May 2023 09:12)    INTERVAL HPI/OVERNIGHT EVENTS:  Patient seen and examined. No acute events overnight. rate better controlled, remains on cardizem drip     ROS: All other systems are negative.    Vital Signs:    T(F): 97.2 (23 @ 12:00), Max: 97.5 (23 @ 21:00)  HR: 80 (23 @ 12:00) (76 - 109)  BP: 114/66 (23 @ 12:00) (90/56 - 137/82)  RR: 10 (23 @ 07:00) (10 - 22)  SpO2: 99% (23 @ 12:00) (96% - 99%)    17 May 2023 07:01  -  18 May 2023 07:00  --------------------------------------------------------  IN: 585 mL / OUT: 1200 mL / NET: -615 mL    18 May 2023 07:01  -  18 May 2023 15:44  --------------------------------------------------------  IN: 50 mL / OUT: 0 mL / NET: 50 mL    Daily Weight in k.7 (18 May 2023 00:27)    PHYSICAL EXAM:    GENERAL:  NAD chronically ill appearing  SKIN: No rashes or lesions  HEENT: Atraumatic. Normocephalic.    NECK: Supple, No JVD. No lymphadenopathy.  PULMONARY: CTA B/L. No wheezing. No rales  CVS: Normal S1, S2. Rate and Rhythm are irregular. tachycardic  ABDOMEN/GI: Soft, Nontender, Nondistended   MSK:  No clubbing or cyanosis   NEUROLOGIC: intermittently following simple commands   PSYCH: Alert & oriented x1    Consultant(s) Notes Reviewed:  [x ] YES  [ ] NO  Care Discussed with Consultants/Other Providers [ x] YES  [ ] NO    LABS:                        14.8   12.90 )-----------( 331      ( 18 May 2023 07:06 )             45.3     142  |  105  |  12  ----------------------------<  133<H>  4.0   |  26  |  <0.5<L>    Ca    8.1<L>      18 May 2023 07:06  Mg     2.4     -    RADIOLOGY & ADDITIONAL TESTS:  Imaging or report Personally Reviewed:  [x] YES  [ ] NO  EKG reviewed: [x] YES  [ ] NO    Medications:  Standing  busPIRone 30 milliGRAM(s) Oral two times a day  cefepime   IVPB 1000 milliGRAM(s) IV Intermittent every 12 hours  chlorhexidine 2% Cloths 1 Application(s) Topical <User Schedule>  clonazePAM  Tablet 0.5 milliGRAM(s) Oral at bedtime  dexAMETHasone  Injectable 6 milliGRAM(s) IV Push daily  diltiazem    Tablet 90 milliGRAM(s) Oral every 6 hours  diltiazem Infusion 5 mG/Hr IV Continuous <Continuous>  enoxaparin Injectable 50 milliGRAM(s) SubCutaneous every 12 hours  gabapentin 300 milliGRAM(s) Oral at bedtime  lactated ringers. 1000 milliLiter(s) IV Continuous <Continuous>  lactobacillus acidophilus 1 Tablet(s) Oral daily  melatonin 5 milliGRAM(s) Oral at bedtime  metoprolol tartrate 50 milliGRAM(s) Oral every 6 hours  potassium chloride   Powder 20 milliEquivalent(s) Oral once    PRN Meds  acetaminophen     Tablet .. 650 milliGRAM(s) Oral every 6 hours PRN  oxycodone    5 mG/acetaminophen 325 mG 1 Tablet(s) Oral every 6 hours PRN

## 2023-05-18 NOTE — PROGRESS NOTE ADULT - TIME BILLING
Review of data, prior work-up, patient evaluation and care.
Counseled patient's daughter at the bedside about diagnostic testing and treatment plan. All questions answered. Abnormal lab/radiographical/microbiological data reviewed.

## 2023-05-18 NOTE — PROGRESS NOTE ADULT - SUBJECTIVE AND OBJECTIVE BOX
HPI:  82F w/ h/o Lewy Body Dementia (A&O 1-2 baseline), chronic Hill (persistent retention; last exchanged on 5/8), chronic afib (on Xarelto), and stage 4 sacral ulcer p/w cough and hypoxia since yesterday. History limited from patient due to patient's current mental status. Family at bedside (daughter and two sons) able to provide collateral. Since yesterday, pt has had cough, increased secretions, general malaise, and worsening mental status (minimally verbal; typically able to say few words at baseline). This AM, pt noted to be tachycardic and hypoxic to 80's%, prompting ED evaluation. Of note, patient known to have dysphagia, but able to tolerate PO. No reported fevers, chills, CP, palpitations, abdominal pain, n/v/d, dysuria, or LE swelling per family.     In ED, pt febrile (T 101.8F), tachycardic (), and tachypneic (RR 20's-30's) on NRB, but saturating in mid-90's%. Labs demonstrated WBC 24.45. VBG showed pH 7.41, pCO2 37, and Lactate 3.9. UA concerning for UTI. CXR concerning for right-sided PNA. S/P 1.7L LR bolus and IV abx (Vanc/Zosyn). Admitted to SDU for severe sepsis i/s/o aspiration pneumonia vs UTI. (09 May 2023 21:41)    Currently admitted to medicine with the primary diagnosis of Sepsis       Today is hospital day 9d.     INTERVAL HPI / OVERNIGHT EVENTS:  Patient was examined and seen at bedside. This morning she is resting comfortably in bed, no events overnight. Hemodynamically stable weaning cardizem ggt, patient tolerating oral diet, voiding appropriately with chronic hill, having regular BMs.     ROS: Otherwise unremarkable     PAST MEDICAL & SURGICAL HISTORY  Anxiety    Arthritis    LBD (Lewy body dementia)    Ulcer of sacral region, stage 4    Chronic indwelling Hill catheter    Chronic atrial fibrillation    H/O hernia repair      ALLERGIES  No Known Allergies    MEDICATIONS  STANDING MEDICATIONS  busPIRone 30 milliGRAM(s) Oral two times a day  cefepime   IVPB 1000 milliGRAM(s) IV Intermittent every 12 hours  chlorhexidine 2% Cloths 1 Application(s) Topical <User Schedule>  clonazePAM  Tablet 0.5 milliGRAM(s) Oral at bedtime  dexAMETHasone  Injectable 6 milliGRAM(s) IV Push daily  diltiazem    Tablet 90 milliGRAM(s) Oral every 6 hours  diltiazem Infusion 5 mG/Hr IV Continuous <Continuous>  enoxaparin Injectable 50 milliGRAM(s) SubCutaneous every 12 hours  gabapentin 300 milliGRAM(s) Oral at bedtime  lactated ringers. 1000 milliLiter(s) IV Continuous <Continuous>  lactobacillus acidophilus 1 Tablet(s) Oral daily  melatonin 5 milliGRAM(s) Oral at bedtime  metoprolol tartrate 50 milliGRAM(s) Oral every 6 hours  potassium chloride   Powder 20 milliEquivalent(s) Oral once    PRN MEDICATIONS  acetaminophen     Tablet .. 650 milliGRAM(s) Oral every 6 hours PRN  oxycodone    5 mG/acetaminophen 325 mG 1 Tablet(s) Oral every 6 hours PRN    VITALS:  T(F): 97.2  HR: 80  BP: 114/66  RR: 10  SpO2: 99%    PHYSICAL EXAM  General: NAD, ill appearance, frail   Lungs:  decrease breath sound b/l , no wheezing, rales, rhonchi, not in respiratory distress   Heart: Afib, rate controlled, S1/S2 present, no heaves, thrills, murmurs  Abdomen: soft, non tender non distended, BS +  Ext: no edema, follows commands, decreased strength, stage IV sacral ulcer, multiple skin breakdowns U/L extremities       LABS                        14.8   12.90 )-----------( 331      ( 18 May 2023 07:06 )             45.3     05-18    142  |  105  |  12  ----------------------------<  133<H>  4.0   |  26  |  <0.5<L>    Ca    8.1<L>      18 May 2023 07:06  Mg     2.4     05-18                      RADIOLOGY  CTH 5/12:  1.  No evidence of acute intracranial hemorrhage. Stable exam since   7/20/2022.  2.  Stable extensive chronic microvascular changes and chronic infarcts.    CXR 5/15:  Low lung volumes.  Unchanged bibasilar opacities/effusion.

## 2023-05-18 NOTE — PROGRESS NOTE ADULT - SUBJECTIVE AND OBJECTIVE BOX
HPI:  82F w/ h/o Lewy Body Dementia (A&O 1-2 baseline), chronic Mcdaniel (persistent retention; last exchanged on ), chronic afib (on Xarelto), and stage 4 sacral ulcer p/w cough and hypoxia since yesterday. History limited from patient due to patient's current mental status. Family at bedside (daughter and two sons) able to provide collateral. Since yesterday, pt has had cough, increased secretions, general malaise, and worsening mental status (minimally verbal; typically able to say few words at baseline). This AM, pt noted to be tachycardic and hypoxic to 80's%, prompting ED evaluation. Of note, patient known to have dysphagia, but able to tolerate PO. No reported fevers, chills, CP, palpitations, abdominal pain, n/v/d, dysuria, or LE swelling per family.     In ED, pt febrile (T 101.8F), tachycardic (), and tachypneic (RR 20's-30's) on NRB, but saturating in mid-90's%. Labs demonstrated WBC 24.45. VBG showed pH 7.41, pCO2 37, and Lactate 3.9. UA concerning for UTI. CXR concerning for right-sided PNA. S/P 1.7L LR bolus and IV abx (Vanc/Zosyn). Admitted to SDU for severe sepsis i/s/o aspiration pneumonia vs UTI. (09 May 2023 21:41)    CARDIOLOGY PROGRESS NOTE  Cardiology initially consulted for afib/aflutter w/ RVR that has been difficult to control i/s/o poor PO intake and hypotension. Currently on diltiazem gtt @ 10 mg/hr, diltiazem 90 q6h, and metoprolol tartrate 50mg q6h. Cardiology recalled for inability to wean off IV to PO meds for rate control. HR  since 7PM after most recent regimen change (cardizem gtt rate increased from 5 to 10; metoprolol frequency increased from Q8H to Q6H).    PAST MEDICAL & SURGICAL HISTORY  Ulcer of sacral region, stage 4  LBD (Lewy body dementia)  Anxiety  Arthritis  Chronic indwelling Mcdaniel catheter  Chronic atrial fibrillation  H/O hernia repair    FAMILY HISTORY:  FAMILY HISTORY:  No pertinent family history in first degree relatives    SOCIAL HISTORY:  Lives w/ son. Has had progressively worsening functional status since recent hospitalization in 2022. 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 Requires 24 HHA. (09 May 2023 21:41)    ALLERGIES:  No Known Allergies    MEDICATIONS:  MEDICATIONS  (STANDING):  busPIRone 30 milliGRAM(s) Oral two times a day  cefepime   IVPB 1000 milliGRAM(s) IV Intermittent every 12 hours  chlorhexidine 2% Cloths 1 Application(s) Topical <User Schedule>  clonazePAM  Tablet 0.5 milliGRAM(s) Oral at bedtime  dexAMETHasone  Injectable 6 milliGRAM(s) IV Push daily  diltiazem    Tablet 90 milliGRAM(s) Oral every 6 hours  diltiazem    Tablet 90 milliGRAM(s) Oral once  diltiazem Infusion 10 mG/Hr (10 mL/Hr) IV Continuous <Continuous>  enoxaparin Injectable 50 milliGRAM(s) SubCutaneous every 12 hours  gabapentin 300 milliGRAM(s) Oral at bedtime  lactated ringers. 1000 milliLiter(s) (100 mL/Hr) IV Continuous <Continuous>  lactobacillus acidophilus 1 Tablet(s) Oral daily  melatonin 5 milliGRAM(s) Oral at bedtime  metoprolol tartrate 50 milliGRAM(s) Oral every 6 hours  metoprolol tartrate 50 milliGRAM(s) Oral once  potassium chloride   Powder 20 milliEquivalent(s) Oral once    MEDICATIONS  (PRN):  acetaminophen     Tablet .. 650 milliGRAM(s) Oral every 6 hours PRN Temp greater or equal to 38C (100.4F), Mild Pain (1 - 3)  oxycodone    5 mG/acetaminophen 325 mG 1 Tablet(s) Oral every 6 hours PRN Severe Pain (7 - 10)      HOME MEDICATIONS:  Home Medications:  Acidophilus oral tablet: 0.5 milligram(s) orally once a day (09 May 2023 21:40)  busPIRone 30 mg oral tablet: 1 tab(s) orally 2 times a day (09 May 2023 21:40)  cholestyramine 4 g/4.8 g oral powder for reconstitution: 4 gram(s) orally once a day (09 May 2023 21:40)  clonazePAM 0.5 mg oral tablet: 1 tab(s) orally once a day (at bedtime) (09 May 2023 21:39)  gabapentin 300 mg oral capsule: 1 cap(s) orally once a day (at bedtime) (09 May 2023 21:41)  melatonin 5 mg oral tablet: 1 tab(s) orally once a day (at bedtime) (09 May 2023 21:41)  metoprolol tartrate 25 mg oral tablet: orally 2 tabs in AM, 1 tab at noon, 2 tabs in PM (09 May 2023 21:41)  oxycodone-acetaminophen 5 mg-325 mg oral tablet: 1 tab(s) orally every 8 hours as needed for  severe pain (09 May 2023 21:41)  Xarelto 15 mg oral tablet: 1 tab(s) orally once a day with dinner (09 May 2023 21:41)    VITALS:   T(F): 97.4 (05-18 @ 07:00), Max: 97.9 (05-16 @ 08:52)  HR: 76 (18 @ 07:00) (48 - 136)  BP: 101/62 (- @ 07:00) (90/56 - 142/63)  BP(mean): 76 (-18 @ 07:00) (67 - 89)  RR: 10 ( @ 07:00) (10 - 22)  SpO2: 98% ( @ 07:00) (94% - 99%)    I&O's Summary    17 May 2023 07:01  -  18 May 2023 07:00  --------------------------------------------------------  IN: 585 mL / OUT: 1200 mL / NET: -615 mL      REVIEW OF SYSTEMS:  ROS limited. Unable to interview due to patient's baseline mental status. No acute overnight events reported by clinical staff this AM. No reported fevers, chills, worsening respiratory distress, vomiting, or diarrhea.    PHYSICAL EXAM:  NEURO: patient is awake, lethargic, A&O 1-2  GEN: chronically ill-appearing  NECK: no thyroid enlargement  LUNGS: decreased BS b/l bases  CARDIOVASCULAR: irregular, tachycardic; S1/S2 present, no murmurs or rubs, no carotid bruits,  + PP bilaterally  ABD: Soft, non-tender, non-distended, +BS  EXT: No DAVID  SKIN: stage 4 sacral ulcer    LABS:             14.8   12.90 )-----------( 331      ( 18 May 2023 07:06 )             45.3     142  |  105  |  12  ----------------------------<  133<H>  4.0   |  26  |  <0.5<L>    Ca    8.1<L>      18 May 2023 07:06  Mg     2.4           RADIOLOGY:    Xray Chest 1 View- PORTABLE-Urgent (05.15.23 @ 11:27)  1. Low lung volumes  2. Unchanged bibasilar opacities/effusion    TTE Echo Complete w/o Contrast w/ Doppler (22 @ 08:28)  1. Normal global left ventricular systolic function.  2. Normal left atrial size.  3. Normal right atrial size.  4. Mild thickening and calcification of the anterior mitral valve leaflet.  5. Moderate mitral annular calcification.  6. Trace mitral valve regurgitation.  7. Mild tricuspid regurgitation.  8. PSAP 35.    EC Lead ECG (23 @ 22:21)  1. Atrial flutter with variable AV block  2. Nonspecific ST and T wave abnormality  3. Abnormal ECG    TELEMETRY EVENTS:  aflutter w/ variable AV block (HR 80's-150's over past 24 hrs) HPI:    82F w/ h/o Lewy Body Dementia (A&O 1-2 baseline), chronic Mcdaniel (persistent retention; last exchanged on ), chronic afib (on Xarelto), and stage 4 sacral ulcer p/w cough and hypoxia since yesterday. History limited from patient due to patient's current mental status. Family at bedside (daughter and two sons) able to provide collateral. Since yesterday, pt has had cough, increased secretions, general malaise, and worsening mental status (minimally verbal; typically able to say few words at baseline). This AM, pt noted to be tachycardic and hypoxic to 80's%, prompting ED evaluation. Of note, patient known to have dysphagia, but able to tolerate PO. No reported fevers, chills, CP, palpitations, abdominal pain, n/v/d, dysuria, or LE swelling per family.     In ED, pt febrile (T 101.8F), tachycardic (), and tachypneic (RR 20's-30's) on NRB, but saturating in mid-90's%. Labs demonstrated WBC 24.45. VBG showed pH 7.41, pCO2 37, and Lactate 3.9. UA concerning for UTI. CXR concerning for right-sided PNA. S/P 1.7L LR bolus and IV abx (Vanc/Zosyn). Admitted to SDU for severe sepsis i/s/o aspiration pneumonia vs UTI. (09 May 2023 21:41)    CARDIOLOGY PROGRESS NOTE:  Cardiology initially consulted for afib/aflutter w/ RVR that has been difficult to control i/s/o poor PO intake and hypotension. Currently on diltiazem gtt @ 10 mg/hr, diltiazem 90 q6h, and metoprolol tartrate 50mg q6h. Cardiology recalled for inability to wean off IV to PO meds for rate control. HR  since 7PM after most recent regimen change (cardizem gtt rate increased from 5 to 10; metoprolol frequency increased from Q8H to Q6H).    PAST MEDICAL & SURGICAL HISTORY  Ulcer of sacral region, stage 4  LBD (Lewy body dementia)  Anxiety  Arthritis  Chronic indwelling Mcdaniel catheter  Chronic atrial fibrillation  H/O hernia repair    FAMILY HISTORY:  FAMILY HISTORY:  No pertinent family history in first degree relatives    SOCIAL HISTORY:  Lives w/ son. Has had progressively worsening functional status since recent hospitalization in 2022. 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 Requires 24 HHA. (09 May 2023 21:41)    ALLERGIES:  No Known Allergies    MEDICATIONS:  MEDICATIONS  (STANDING):  busPIRone 30 milliGRAM(s) Oral two times a day  cefepime   IVPB 1000 milliGRAM(s) IV Intermittent every 12 hours  chlorhexidine 2% Cloths 1 Application(s) Topical <User Schedule>  clonazePAM  Tablet 0.5 milliGRAM(s) Oral at bedtime  dexAMETHasone  Injectable 6 milliGRAM(s) IV Push daily  diltiazem    Tablet 90 milliGRAM(s) Oral every 6 hours  diltiazem    Tablet 90 milliGRAM(s) Oral once  diltiazem Infusion 10 mG/Hr (10 mL/Hr) IV Continuous <Continuous>  enoxaparin Injectable 50 milliGRAM(s) SubCutaneous every 12 hours  gabapentin 300 milliGRAM(s) Oral at bedtime  lactated ringers. 1000 milliLiter(s) (100 mL/Hr) IV Continuous <Continuous>  lactobacillus acidophilus 1 Tablet(s) Oral daily  melatonin 5 milliGRAM(s) Oral at bedtime  metoprolol tartrate 50 milliGRAM(s) Oral every 6 hours  metoprolol tartrate 50 milliGRAM(s) Oral once  potassium chloride   Powder 20 milliEquivalent(s) Oral once    MEDICATIONS  (PRN):  acetaminophen     Tablet .. 650 milliGRAM(s) Oral every 6 hours PRN Temp greater or equal to 38C (100.4F), Mild Pain (1 - 3)  oxycodone    5 mG/acetaminophen 325 mG 1 Tablet(s) Oral every 6 hours PRN Severe Pain (7 - 10)      HOME MEDICATIONS:  Home Medications:  Acidophilus oral tablet: 0.5 milligram(s) orally once a day (09 May 2023 21:40)  busPIRone 30 mg oral tablet: 1 tab(s) orally 2 times a day (09 May 2023 21:40)  cholestyramine 4 g/4.8 g oral powder for reconstitution: 4 gram(s) orally once a day (09 May 2023 21:40)  clonazePAM 0.5 mg oral tablet: 1 tab(s) orally once a day (at bedtime) (09 May 2023 21:39)  gabapentin 300 mg oral capsule: 1 cap(s) orally once a day (at bedtime) (09 May 2023 21:41)  melatonin 5 mg oral tablet: 1 tab(s) orally once a day (at bedtime) (09 May 2023 21:41)  metoprolol tartrate 25 mg oral tablet: orally 2 tabs in AM, 1 tab at noon, 2 tabs in PM (09 May 2023 21:41)  oxycodone-acetaminophen 5 mg-325 mg oral tablet: 1 tab(s) orally every 8 hours as needed for  severe pain (09 May 2023 21:41)  Xarelto 15 mg oral tablet: 1 tab(s) orally once a day with dinner (09 May 2023 21:41)    VITALS:   T(F): 97.4 (05-18 @ 07:00), Max: 97.9 (05-16 @ 08:52)  HR: 76 ( @ 07:00) (48 - 136)  BP: 101/62 ( @ 07:00) (90/56 - 142/63)  BP(mean): 76 (-18 @ 07:00) (67 - 89)  RR: 10 ( @ 07:00) (10 - 22)  SpO2: 98% ( @ 07:00) (94% - 99%)    I&O's Summary    17 May 2023 07:01  -  18 May 2023 07:00  --------------------------------------------------------  IN: 585 mL / OUT: 1200 mL / NET: -615 mL      REVIEW OF SYSTEMS:  ROS limited. Unable to interview due to patient's baseline mental status. No acute overnight events reported by clinical staff this AM. No reported fevers, chills, worsening respiratory distress, vomiting, or diarrhea.    PHYSICAL EXAM:  NEURO: patient is awake, lethargic, A&O 1  GEN: chronically ill-appearing  NECK: no thyroid enlargement  LUNGS: decreased BS b/l bases  CARDIOVASCULAR: irregular, tachycardic; S1/S2 present, no murmurs or rubs, no carotid bruits,  + PP bilaterally  ABD: Soft, non-tender, non-distended, +BS  EXT: No DAVID  SKIN: stage 4 sacral ulcer    LABS:             14.8   12.90 )-----------( 331      ( 18 May 2023 07:06 )             45.3     142  |  105  |  12  ----------------------------<  133<H>  4.0   |  26  |  <0.5<L>    Ca    8.1<L>      18 May 2023 07:06  Mg     2.4           RADIOLOGY:    Xray Chest 1 View- PORTABLE-Urgent (05.15.23 @ 11:27)  1. Low lung volumes  2. Unchanged bibasilar opacities/effusion    TTE Echo Complete w/o Contrast w/ Doppler (22 @ 08:28)  1. Normal global left ventricular systolic function.  2. Normal left atrial size.  3. Normal right atrial size.  4. Mild thickening and calcification of the anterior mitral valve leaflet.  5. Moderate mitral annular calcification.  6. Trace mitral valve regurgitation.  7. Mild tricuspid regurgitation.  8. PSAP 35.    EC Lead ECG (23 @ 22:21)  1. Atrial flutter with variable AV block  2. Nonspecific ST and T wave abnormality  3. Abnormal ECG    TELEMETRY EVENTS:  aflutter w/ variable AV block (HR 80's-150's over past 24 hrs)

## 2023-05-18 NOTE — PROGRESS NOTE ADULT - ASSESSMENT
IMPRESSION:    Sepsis POA  COVID-19 Pneumonia   Aspiration Pneumonia   E. Coli UTI, in setting of chronic Mcdaniel   Sacral Ulcer   AFIB with RVR   HO Lewy Body Dementia  HO Chronic AFIB on Xarelto    PLAN:    CNS: avoid depressants     HEENT: Oral care    PULMONARY:  HOB @ 45 degrees.  Aspiration precautions. on room air. on Dexamethasone day 9    CARDIOVASCULAR: Avoid volume overload, on metoprolol 50mg to Q6H, Cardizem 90mg Q6H, (patient refused PO medications today) wean off Cardizem drip, Cardiology follow up, BNP , f/u 2D-Echo     GI: GI prophylaxis.  Feeding per speech and swallow.  Bowel regimen, family declining PEG    RENAL:  Follow up lytes.  Correct as needed    INFECTIOUS DISEASE: SP Cefepime and Remdesivir, procalcitonin 0.64 now 0.04    HEMATOLOGICAL: on therapeutic Lovenox for AFIB     ENDOCRINE:  Follow up FS.  Insulin protocol if needed.    MUSCULOSKELETAL: activity as tolerated, PT/OT    Palliative care following    DNR/DNI, plan for DC with Home Hosp[facility]    Downgrade to Telemetry

## 2023-05-18 NOTE — PROGRESS NOTE ADULT - ASSESSMENT
82y F pmh lewy body dementia, chronic hill, chronic afib on xarelto, stage 4 sacral ulcer presenting with cough and hypoxia admitted for COVID 19 infection with superimposed aspiration pnemonia c/b Afib RVR.     Sepsis POA due to suspected Multilobar PNA   COVID 19 infection  on RDV   + UA with Chronic hill since 10/22  - hypoxic, febrile on admission  - wbc 12k, COVID +ve, BCx -ve, UCx +ve E coli   - CTH -ve for acute intracranial pathology   - c/w cefepime 1g IV BID, day 10/10 today. Dc after today's dose   - completed RDV, c/w Dexamethasone for 10 day course    - SSE - c/w pureed diet - pleasure feeding, patient still at risk of aspiration - family aware, ultimately will be discharged with hospice     Chronic Afib now w/ RVR, rate still uncontrolled on Cardizem GTT  - remains on cardizem ggt   - c/w with cardizem 90mg q6hr and wean off ggt as tolerated, if unable to, then add Digoxin 0.25 x 2 doses q 6 (load), then 0.125 QD .   - c/w metoprolol to 50mg q6hr   - c/w lovenox 50mg BID  - cardiology fu appreciated    Lewy Body Dementia  - CTH -ve, AxO 1-2 at baseline, can follow simple commands   - c/w buspirone 30mg po BID  - c/w klonopin 0.5mg qhs   - c/w gabapentin 300mg po qhs     Stage IV sacral ulcer   - burn consulted - no signs of infection, no plan for surgical debridement, c/w local wound care  - morphine 1mg IV q6hr PRN, tylenol 650mg po q6hr, oxycodone 5mg po q8hr PRN for pain   - c/w frequent repositioning     DNR/DNI  Pending: rate control, complete abx course today, taper supplemental 02,   then dc planning home with hospice

## 2023-05-18 NOTE — PROGRESS NOTE ADULT - ASSESSMENT
82F w/ h/o Lewy Body Dementia (A&O 1-2 baseline), chronic Mcdaniel (persistent retention; last exchanged on 5/8), chronic afib (on Xarelto), and stage 4 sacral ulcer p/w cough and hypoxia. Admitted to stepdown unit for severe sepsis secondary to COVID-19 vs aspiration pneumonia. Cardiology consulted for difficult to control afib w/ RVR.    INCOMPLETE NOTE. FINAL RECOMMENDATIONS PENDING DISCUSSION WITH ATTENDING.    #Atrial Flutter w/ Variable AV Block  #Chronic Atrial Fibrillation  #Rapid Ventricular Rate  CHADS-VASC 3+. Previously managed via Xarelto 15 qhs and Lopressor 50(AM)/25(noon)/50(PM). Currently in aflutter w/ variable AV block and RVR. Likely provoked i/s/o underlying severe sepsis.  - not a candidate for cath, ablation, or cardioversion  - c/w diltiazem gtt; wean as tolerated  - c/w diltiazem 90mg PO Q6H  - c/w Lopressor 50mg PO Q6H  - c/w Lovenox 50mg SQ BID

## 2023-05-18 NOTE — PROGRESS NOTE ADULT - ATTENDING COMMENTS
82F w/ h/o Lewy Body Dementia (A&O 1-2 baseline), chronic Mcdaniel (persistent retention; last exchanged on 5/8), chronic afib (on Xarelto), and stage 4 sacral ulcer p/w cough and hypoxia. Admitted to stepdown unit for severe sepsis secondary to COVID-19 vs aspiration pneumonia. Cardiology consulted for difficult to control afib w/ RVR.    #Atrial Flutter w/ Variable AV Block  #Chronic Atrial Fibrillation  #Rapid Ventricular Rate    CHADS-VASC 3+. Previously managed via Xarelto 15 qhs and Lopressor 50(AM)/25(noon)/50(PM). Currently in aflutter w/ variable AV block and RVR. Likely provoked i/s/o underlying severe sepsis.    - not a candidate for cath, ablation, or cardioversion  - c/w diltiazem gtt; wean as tolerated  - c/w diltiazem 90mg PO Q6H  - c/w Lopressor 50mg PO Q6H  - c/w Lovenox 50mg SQ BID    Wean off the drip as tolerated. If heart rate is still uncontrolled, add Digoxin 0.25 x 2 doses q 6 (load), then 0.125 QD

## 2023-05-19 LAB
ANION GAP SERPL CALC-SCNC: 4 MMOL/L — LOW (ref 7–14)
BASOPHILS # BLD AUTO: 0.04 K/UL — SIGNIFICANT CHANGE UP (ref 0–0.2)
BASOPHILS NFR BLD AUTO: 0.1 % — SIGNIFICANT CHANGE UP (ref 0–1)
BUN SERPL-MCNC: 14 MG/DL — SIGNIFICANT CHANGE UP (ref 10–20)
CALCIUM SERPL-MCNC: 6.8 MG/DL — LOW (ref 8.4–10.5)
CHLORIDE SERPL-SCNC: 104 MMOL/L — SIGNIFICANT CHANGE UP (ref 98–110)
CO2 SERPL-SCNC: 30 MMOL/L — SIGNIFICANT CHANGE UP (ref 17–32)
CREAT SERPL-MCNC: <0.5 MG/DL — LOW (ref 0.7–1.5)
EGFR: 99 ML/MIN/1.73M2 — SIGNIFICANT CHANGE UP
EOSINOPHIL # BLD AUTO: 0 K/UL — SIGNIFICANT CHANGE UP (ref 0–0.7)
EOSINOPHIL NFR BLD AUTO: 0 % — SIGNIFICANT CHANGE UP (ref 0–8)
GLUCOSE SERPL-MCNC: 130 MG/DL — HIGH (ref 70–99)
HCT VFR BLD CALC: 47.2 % — HIGH (ref 37–47)
HGB BLD-MCNC: 15.5 G/DL — SIGNIFICANT CHANGE UP (ref 12–16)
IMM GRANULOCYTES NFR BLD AUTO: 1 % — HIGH (ref 0.1–0.3)
LYMPHOCYTES # BLD AUTO: 0.31 K/UL — LOW (ref 1.2–3.4)
LYMPHOCYTES # BLD AUTO: 1 % — LOW (ref 20.5–51.1)
MAGNESIUM SERPL-MCNC: 2 MG/DL — SIGNIFICANT CHANGE UP (ref 1.8–2.4)
MCHC RBC-ENTMCNC: 29.2 PG — SIGNIFICANT CHANGE UP (ref 27–31)
MCHC RBC-ENTMCNC: 32.8 G/DL — SIGNIFICANT CHANGE UP (ref 32–37)
MCV RBC AUTO: 89.1 FL — SIGNIFICANT CHANGE UP (ref 81–99)
MONOCYTES # BLD AUTO: 0.46 K/UL — SIGNIFICANT CHANGE UP (ref 0.1–0.6)
MONOCYTES NFR BLD AUTO: 1.5 % — LOW (ref 1.7–9.3)
NEUTROPHILS # BLD AUTO: 29.04 K/UL — HIGH (ref 1.4–6.5)
NEUTROPHILS NFR BLD AUTO: 96.4 % — HIGH (ref 42.2–75.2)
NRBC # BLD: 0 /100 WBCS — SIGNIFICANT CHANGE UP (ref 0–0)
PLATELET # BLD AUTO: 380 K/UL — SIGNIFICANT CHANGE UP (ref 130–400)
PMV BLD: 10.5 FL — HIGH (ref 7.4–10.4)
POTASSIUM SERPL-MCNC: 4.2 MMOL/L — SIGNIFICANT CHANGE UP (ref 3.5–5)
POTASSIUM SERPL-SCNC: 4.2 MMOL/L — SIGNIFICANT CHANGE UP (ref 3.5–5)
RBC # BLD: 5.3 M/UL — SIGNIFICANT CHANGE UP (ref 4.2–5.4)
RBC # FLD: 16.2 % — HIGH (ref 11.5–14.5)
SODIUM SERPL-SCNC: 138 MMOL/L — SIGNIFICANT CHANGE UP (ref 135–146)
WBC # BLD: 30.16 K/UL — HIGH (ref 4.8–10.8)
WBC # FLD AUTO: 30.16 K/UL — HIGH (ref 4.8–10.8)

## 2023-05-19 PROCEDURE — 99233 SBSQ HOSP IP/OBS HIGH 50: CPT

## 2023-05-19 RX ORDER — DIGOXIN 250 MCG
125 TABLET ORAL DAILY
Refills: 0 | Status: DISCONTINUED | OUTPATIENT
Start: 2023-05-20 | End: 2023-05-20

## 2023-05-19 RX ORDER — FUROSEMIDE 40 MG
20 TABLET ORAL ONCE
Refills: 0 | Status: COMPLETED | OUTPATIENT
Start: 2023-05-19 | End: 2023-05-19

## 2023-05-19 RX ORDER — DIGOXIN 250 MCG
250 TABLET ORAL EVERY 6 HOURS
Refills: 0 | Status: COMPLETED | OUTPATIENT
Start: 2023-05-19 | End: 2023-05-19

## 2023-05-19 RX ADMIN — Medication 50 MILLIGRAM(S): at 22:11

## 2023-05-19 RX ADMIN — Medication 30 MILLIGRAM(S): at 17:26

## 2023-05-19 RX ADMIN — GABAPENTIN 300 MILLIGRAM(S): 400 CAPSULE ORAL at 22:11

## 2023-05-19 RX ADMIN — CHLORHEXIDINE GLUCONATE 1 APPLICATION(S): 213 SOLUTION TOPICAL at 05:34

## 2023-05-19 RX ADMIN — Medication 30 MILLIGRAM(S): at 05:33

## 2023-05-19 RX ADMIN — Medication 250 MICROGRAM(S): at 11:21

## 2023-05-19 RX ADMIN — Medication 250 MICROGRAM(S): at 17:26

## 2023-05-19 RX ADMIN — Medication 20 MILLIGRAM(S): at 13:21

## 2023-05-19 RX ADMIN — Medication 90 MILLIGRAM(S): at 11:00

## 2023-05-19 RX ADMIN — Medication 6 MILLIGRAM(S): at 05:33

## 2023-05-19 RX ADMIN — Medication 90 MILLIGRAM(S): at 22:11

## 2023-05-19 RX ADMIN — Medication 50 MILLIGRAM(S): at 16:24

## 2023-05-19 RX ADMIN — Medication 50 MILLIGRAM(S): at 11:00

## 2023-05-19 RX ADMIN — Medication 0.5 MILLIGRAM(S): at 22:14

## 2023-05-19 RX ADMIN — Medication 90 MILLIGRAM(S): at 17:27

## 2023-05-19 RX ADMIN — Medication 5 MILLIGRAM(S): at 22:11

## 2023-05-19 RX ADMIN — ENOXAPARIN SODIUM 50 MILLIGRAM(S): 100 INJECTION SUBCUTANEOUS at 17:26

## 2023-05-19 RX ADMIN — Medication 90 MILLIGRAM(S): at 05:33

## 2023-05-19 RX ADMIN — Medication 1 TABLET(S): at 11:01

## 2023-05-19 RX ADMIN — ENOXAPARIN SODIUM 50 MILLIGRAM(S): 100 INJECTION SUBCUTANEOUS at 05:32

## 2023-05-19 NOTE — PROGRESS NOTE ADULT - ASSESSMENT
82y F pmh lewy body dementia, chronic hill, chronic afib on xarelto, stage 4 sacral ulcer presenting with cough and hypoxia admitted for COVID 19 infection with superimposed aspiration pnemonia c/b Afib RVR.     #Sepsis POA  #COVID 19 PNA  #Aspiration PNA   #UTI 2/2 chronic hill   - hypoxic, febrile on admission  - wbc 12k, COVID +ve, BCx -ve, UCx +ve E coli   - lactate wnl   - CTH -ve for acute intracranial pathology   - s/p cefepime 1g IV BID d10/10 - complete 5/18  - s/p RDV d5/5   - s/p decadron 6mg IV qD D10/10  - on 3L NC - continue to wean   - SSE - c/w pureed diet - risk for aspiration but ok to continue with comfort feeds     #Chronic Afib now w/ RVR - improving   - TTE 5/18 - EF 55, mild pulm htn, mod MR, mild TR  - cardizem ggt d/c  - c/w  90mg q6hr PO   - c/w metoprolol 50mg q6hr   - started digoxin - load 250mcg IV q6 x2 -> 125mcg IVqD  - c/w lovenox 50mg BID  - monitor on telemetry   - cardiology following     #Lewy Body Dementia  - CTH -ve  - AxO 1-2 at baseline, can follow simple commands   - c/w buspirone 30mg po BID  - c/w klonopin 0.5mg qhs   - c/w gabapentin 300mg po qhs     #Stage IV sacral ulcer   #Multiple skin breakdowns   - burn consulted - no signs of infection, no plan for surgical debridement   - c/w wound care recs BID   - tylenol 650mg po q6hr, oxycodone 5mg po q8hr PRN for pain   - c/w frequent repositioning     # To follow up  - rate control   - patient family aware of plan - wish to have patient stabilized so she can go home on hospice  - palliative following     # Misc  - DVT Prophylaxis: enoxaparin Injectable  - GI Prophylaxis: none  - Diet: Diet, Pureed  - Activity: Activity - Increase As Tolerated  - Code Status: Full Do Not Resuscitate      Denis Bone  PGY1, Internal Medicine   [facility].

## 2023-05-19 NOTE — PROGRESS NOTE ADULT - ATTENDING COMMENTS
IMPRESSION:    Sepsis POA, resolved   COVID-19 Pneumonia   Aspiration Pneumonia   E. Coli UTI, in setting of chronic Mcdaniel   Sacral Ulcer   A. Flutter/AFIB with RVR   HO Lewy Body Dementia  HO Chronic AFIB on Xarelto    Plan as outlined above

## 2023-05-19 NOTE — PROGRESS NOTE ADULT - SUBJECTIVE AND OBJECTIVE BOX
YOAN TAI  82y Female    CHIEF COMPLAINT:    Patient is a 82y old  Female who presents with a chief complaint of Cough; Hypoxia (19 May 2023 14:15)    INTERVAL HPI/OVERNIGHT EVENTS:    Patient seen and examined. No acute events overnight. rate remains uncontrolled     ROS: All other systems are negative.    Vital Signs:    T(F): 98.3 (23 @ 05:00), Max: 98.3 (23 @ 05:00)  HR: 135 (23 @ 12:40) (92 - 147)  BP: 105/67 (23 @ 12:40) (104/76 - 150/108)  RR: 20 (23 @ 12:40) (18 - 20)  SpO2: 96% (23 @ 12:40) (85% - 97%)    18 May 2023 07:01  -  19 May 2023 07:00  --------------------------------------------------------  IN: 50 mL / OUT: 450 mL / NET: -400 mL    19 May 2023 07:01  -  19 May 2023 15:47  --------------------------------------------------------  IN: 240 mL / OUT: 0 mL / NET: 240 mL     Daily Weight in k.5 (19 May 2023 02:00)    PHYSICAL EXAM:    GENERAL:  NAD chronically ill appearing   SKIN: No rashes or lesions  HEENT: Atraumatic. Normocephalic   NECK: Supple, No JVD.    PULMONARY: decreased breath sounds B/L. No wheezing.   CVS: Normal S1, S2. Rate and Rhythm are regular.   ABDOMEN/GI: Soft, Nontender, Nondistended   MSK:  No clubbing or cyanosis   NEUROLOGIC: moves all extremities   PSYCH: AAO x1    Consultant(s) Notes Reviewed:  [x ] YES  [ ] NO  Care Discussed with Consultants/Other Providers [ x] YES  [ ] NO    LABS:                        15.5   30.16 )-----------( 380      ( 19 May 2023 07:57 )             47.2     138  |  104  |  14  ----------------------------<  130<H>  4.2   |  30  |  <0.5<L>    Ca    6.8<L>      19 May 2023 07:57  Mg     2.0     05-19    RADIOLOGY & ADDITIONAL TESTS:  Imaging or report Personally Reviewed:  [x] YES  [ ] NO  EKG reviewed: [x] YES  [ ] NO    Medications:  Standing  busPIRone 30 milliGRAM(s) Oral two times a day  chlorhexidine 2% Cloths 1 Application(s) Topical <User Schedule>  clonazePAM  Tablet 0.5 milliGRAM(s) Oral at bedtime  digoxin  Injectable 250 MICROGram(s) IV Push every 6 hours  diltiazem    Tablet 90 milliGRAM(s) Oral every 6 hours  enoxaparin Injectable 50 milliGRAM(s) SubCutaneous every 12 hours  gabapentin 300 milliGRAM(s) Oral at bedtime  lactated ringers. 1000 milliLiter(s) IV Continuous <Continuous>  lactobacillus acidophilus 1 Tablet(s) Oral daily  melatonin 5 milliGRAM(s) Oral at bedtime  metoprolol tartrate 50 milliGRAM(s) Oral every 6 hours  potassium chloride   Powder 20 milliEquivalent(s) Oral once    PRN Meds  acetaminophen     Tablet .. 650 milliGRAM(s) Oral every 6 hours PRN  oxycodone    5 mG/acetaminophen 325 mG 1 Tablet(s) Oral every 6 hours PRN

## 2023-05-19 NOTE — PROGRESS NOTE ADULT - ASSESSMENT
82y F pmh lewy body dementia, chronic hill, chronic afib on xarelto, stage 4 sacral ulcer presenting with cough and hypoxia admitted for COVID 19 infection with superimposed aspiration pnemonia c/b Afib RVR.     Sepsis POA due to suspected Multilobar PNA   COVID 19 infection  on RDV   + UA with Chronic hill since 10/22  - hypoxic, febrile on admission  -  COVID +ve, BCx -ve, UCx +ve E coli, s/p course of Cefepime, procal downtrending   - CTH -ve for acute intracranial pathology   - completed RDV and dexamethasone course  - SSE - c/w pureed diet - pleasure feeding, patient still at risk of aspiration - family aware, ultimately will be discharged with hospice   - WBC up to 30K today, clinically unchanged, recently completed cefepime, repeat blood cx and procal. if diarrhea, send Cdiff     Chronic Afib now w/ RVR, rate still uncontrolled on Cardizem GTT  - c/w with cardizem 90mg q6hr and Digoxin 0.25 x 2 doses q 6 (load), then 0.125 QD . Off cardizem drip now  - c/w metoprolol to 50mg q6hr   - c/w lovenox 50mg BID  - cardiology fu appreciated    Lewy Body Dementia  - CTH -ve, AxO 1-2 at baseline, can follow simple commands   - c/w buspirone 30mg po BID  - c/w klonopin 0.5mg qhs   - c/w gabapentin 300mg po qhs     Stage IV sacral ulcer   - burn consulted - no signs of infection, no plan for surgical debridement, c/w local wound care  - morphine 1mg IV q6hr PRN, tylenol 650mg po q6hr, oxycodone 5mg po q8hr PRN for pain   - c/w frequent repositioning     DNR/DNI  Pending: rate control, taper supplemental 02,   then dc planning home with hospice. Team updated family

## 2023-05-19 NOTE — PROGRESS NOTE ADULT - ASSESSMENT
IMPRESSION:    Sepsis POA, resolved   COVID-19 Pneumonia   Aspiration Pneumonia   E. Coli UTI, in setting of chronic Mcdaniel   Sacral Ulcer   A. Flutter/AFIB with RVR   HO Lewy Body Dementia  HO Chronic AFIB on Xarelto    PLAN:    CNS: avoid depressants     HEENT: Oral care    PULMONARY:  HOB @ 45 degrees.  Aspiration precautions. on room air. SP Dexamethasone course    CARDIOVASCULAR: Avoid volume overload, on metoprolol 50mg to Q6H, Cardizem 90mg Q6H, started on Digoxin, Cardiology noted, 2D-Echo noted EF 55%, Moderate MVR     GI: GI prophylaxis.  Feeding per speech and swallow.  Bowel regimen, family declining PEG    RENAL:  Follow up lytes.  Correct as needed    INFECTIOUS DISEASE: SP Cefepime and Remdesivir, procalcitonin 0.64 now 0.04    HEMATOLOGICAL: on therapeutic Lovenox for AFIB     ENDOCRINE:  Follow up FS.  Insulin protocol if needed.    MUSCULOSKELETAL: activity as tolerated, PT/OT    Palliative care following    DNR/DNI, plan for DC with Home Hospice    Downgrade to Telemetry

## 2023-05-19 NOTE — PROGRESS NOTE ADULT - SUBJECTIVE AND OBJECTIVE BOX
Patient is a 82y old  Female who presents with a chief complaint of Cough; Hypoxia (18 May 2023 16:34)        Over Night Events: off Cardizem drip, no acute events overnight      Vital Signs Last 24 Hrs  T(C): 36.8 (19 May 2023 05:00), Max: 36.8 (19 May 2023 00:00)  T(F): 98.3 (19 May 2023 05:00), Max: 98.3 (19 May 2023 05:00)  HR: 134 (19 May 2023 07:00) (80 - 147)  BP: 112/77 (19 May 2023 07:00) (104/76 - 150/108)  BP(mean): 87 (19 May 2023 07:00) (83 - 120)  RR: 20 (19 May 2023 07:00) (18 - 20)  SpO2: 94% (19 May 2023 07:00) (85% - 99%)    O2 Parameters below as of 18 May 2023 20:30  Patient On (Oxygen Delivery Method): venti mask  O2 Flow (L/min): 50        CONSTITUTIONAL:  Chronically Ill appearing.  NAD    ENT:   Airway patent,   Mouth with normal mucosa.   No thrush    EYES:   Pupils equal,   Round and reactive to light.    CARDIAC:   Mild Tachycardic   Irregular rhythm.      RESPIRATORY:   No wheezing  Bilateral BS  Normal chest expansion  Not tachypneic,  No use of accessory muscles    GASTROINTESTINAL:  Abdomen soft,   Non-tender,   No guarding,   + BS    MUSCULOSKELETAL:   Range of motion is limited      NEUROLOGICAL:   more alert today        05-18-23 @ 07:01  -  05-19-23 @ 07:00  --------------------------------------------------------  IN:    Diltiazem: 50 mL  Total IN: 50 mL    OUT:    Indwelling Catheter - Urethral (mL): 450 mL  Total OUT: 450 mL    Total NET: -400 mL          LABS:                            14.8   12.90 )-----------( 331      ( 18 May 2023 07:06 )             45.3                                               05-18    142  |  105  |  12  ----------------------------<  133<H>  4.0   |  26  |  <0.5<L>    Ca    8.1<L>      18 May 2023 07:06  Mg     2.4     05-18                                                                                                                                                                                                                     MEDICATIONS  (STANDING):  busPIRone 30 milliGRAM(s) Oral two times a day  chlorhexidine 2% Cloths 1 Application(s) Topical <User Schedule>  clonazePAM  Tablet 0.5 milliGRAM(s) Oral at bedtime  dexAMETHasone  Injectable 6 milliGRAM(s) IV Push daily  diltiazem    Tablet 90 milliGRAM(s) Oral every 6 hours  enoxaparin Injectable 50 milliGRAM(s) SubCutaneous every 12 hours  gabapentin 300 milliGRAM(s) Oral at bedtime  lactated ringers. 1000 milliLiter(s) (100 mL/Hr) IV Continuous <Continuous>  lactobacillus acidophilus 1 Tablet(s) Oral daily  melatonin 5 milliGRAM(s) Oral at bedtime  metoprolol tartrate 50 milliGRAM(s) Oral every 6 hours  potassium chloride   Powder 20 milliEquivalent(s) Oral once    MEDICATIONS  (PRN):  acetaminophen     Tablet .. 650 milliGRAM(s) Oral every 6 hours PRN Temp greater or equal to 38C (100.4F), Mild Pain (1 - 3)  oxycodone    5 mG/acetaminophen 325 mG 1 Tablet(s) Oral every 6 hours PRN Severe Pain (7 - 10)      CXR reviewed

## 2023-05-19 NOTE — PROGRESS NOTE ADULT - SUBJECTIVE AND OBJECTIVE BOX
HPI:  82F w/ h/o Lewy Body Dementia (A&O 1-2 baseline), chronic Mcdaniel (persistent retention; last exchanged on 5/8), chronic afib (on Xarelto), and stage 4 sacral ulcer p/w cough and hypoxia since yesterday. History limited from patient due to patient's current mental status. Family at bedside (daughter and two sons) able to provide collateral. Since yesterday, pt has had cough, increased secretions, general malaise, and worsening mental status (minimally verbal; typically able to say few words at baseline). This AM, pt noted to be tachycardic and hypoxic to 80's%, prompting ED evaluation. Of note, patient known to have dysphagia, but able to tolerate PO. No reported fevers, chills, CP, palpitations, abdominal pain, n/v/d, dysuria, or LE swelling per family.     In ED, pt febrile (T 101.8F), tachycardic (), and tachypneic (RR 20's-30's) on NRB, but saturating in mid-90's%. Labs demonstrated WBC 24.45. VBG showed pH 7.41, pCO2 37, and Lactate 3.9. UA concerning for UTI. CXR concerning for right-sided PNA. S/P 1.7L LR bolus and IV abx (Vanc/Zosyn). Admitted to SDU for severe sepsis i/s/o aspiration pneumonia vs UTI. (09 May 2023 21:41)    Currently admitted to medicine with the primary diagnosis of Sepsis       Today is hospital day 10d.     INTERVAL HPI / OVERNIGHT EVENTS:  Patient was examined and seen at bedside. This morning she is resting comfortably in bed and reports no new issues or overnight events. Vitals stable, patient tolerating oral diet, voiding appropriately, having regular BMs. Will continue to monitor.     ROS: Otherwise unremarkable     PAST MEDICAL & SURGICAL HISTORY  Anxiety    Arthritis    LBD (Lewy body dementia)    Ulcer of sacral region, stage 4    Chronic indwelling Mcdaniel catheter    Chronic atrial fibrillation    H/O hernia repair      ALLERGIES  No Known Allergies    MEDICATIONS  STANDING MEDICATIONS  busPIRone 30 milliGRAM(s) Oral two times a day  chlorhexidine 2% Cloths 1 Application(s) Topical <User Schedule>  clonazePAM  Tablet 0.5 milliGRAM(s) Oral at bedtime  dexAMETHasone  Injectable 6 milliGRAM(s) IV Push daily  digoxin  Injectable 250 MICROGram(s) IV Push every 6 hours  diltiazem    Tablet 90 milliGRAM(s) Oral every 6 hours  enoxaparin Injectable 50 milliGRAM(s) SubCutaneous every 12 hours  gabapentin 300 milliGRAM(s) Oral at bedtime  lactated ringers. 1000 milliLiter(s) IV Continuous <Continuous>  lactobacillus acidophilus 1 Tablet(s) Oral daily  melatonin 5 milliGRAM(s) Oral at bedtime  metoprolol tartrate 50 milliGRAM(s) Oral every 6 hours  potassium chloride   Powder 20 milliEquivalent(s) Oral once    PRN MEDICATIONS  acetaminophen     Tablet .. 650 milliGRAM(s) Oral every 6 hours PRN  oxycodone    5 mG/acetaminophen 325 mG 1 Tablet(s) Oral every 6 hours PRN    VITALS:  T(F): 98.3  HR: 135  BP: 105/67  RR: 20  SpO2: 96%    PHYSICAL EXAM  General: NAD, ill appearance, frail   Lungs:  decrease breath sound b/l , no wheezing, rales, rhonchi, not in respiratory distress   Heart: Afib, rate controlled, S1/S2 present, no heaves, thrills, murmurs  Abdomen: soft, non tender non distended, BS +  Ext: no edema, follows commands, decreased strength, stage IV sacral ulcer, multiple skin breakdowns U/L extremities     LABS                        15.5   30.16 )-----------( 380      ( 19 May 2023 07:57 )             47.2     05-19    138  |  104  |  14  ----------------------------<  130<H>  4.2   |  30  |  <0.5<L>    Ca    6.8<L>      19 May 2023 07:57  Mg     2.0     05-19                      RADIOLOGY  TTE 5/18    1. Normal global left ventricular systolic function.   2. Moderate to severe left atrial enlargement.   3. LV Ejection Fraction by Sullivan's Method with a biplane EF of 55 %.   4. The mitral in-flow pattern reveals no discernable A-wave, therefore   no comment on diastolic function can be made.   5. Mildly reduced RV systolic function.   6. Mildly enlarged right atrium.   7. Degenerative mitral valve.   8. Mild thickening of the anterior and posterior mitral valve leaflets.   9. Moderate mitral valve regurgitation.  10. Mild tricuspid regurgitation.  11. Estimated pulmonary artery systolic pressure is 44.8 mmHg assuming a   right atrial pressure of 20 mmHg, which is consistent with mild pulmonary   hypertension.    CTH 5/12:  1.  No evidence of acute intracranial hemorrhage. Stable exam since   7/20/2022.  2.  Stable extensive chronic microvascular changes and chronic infarcts.    CXR 5/15:  Low lung volumes.  Unchanged bibasilar opacities/effusion.

## 2023-05-20 ENCOUNTER — TRANSCRIPTION ENCOUNTER (OUTPATIENT)
Age: 83
End: 2023-05-20

## 2023-05-20 VITALS
RESPIRATION RATE: 18 BRPM | OXYGEN SATURATION: 96 % | DIASTOLIC BLOOD PRESSURE: 58 MMHG | TEMPERATURE: 97 F | SYSTOLIC BLOOD PRESSURE: 119 MMHG | HEART RATE: 116 BPM

## 2023-05-20 PROCEDURE — 99232 SBSQ HOSP IP/OBS MODERATE 35: CPT | Mod: GC

## 2023-05-20 PROCEDURE — 99239 HOSP IP/OBS DSCHRG MGMT >30: CPT

## 2023-05-20 PROCEDURE — 99497 ADVNCD CARE PLAN 30 MIN: CPT | Mod: 25

## 2023-05-20 RX ORDER — PIPERACILLIN AND TAZOBACTAM 4; .5 G/20ML; G/20ML
3.38 INJECTION, POWDER, LYOPHILIZED, FOR SOLUTION INTRAVENOUS EVERY 6 HOURS
Refills: 0 | Status: DISCONTINUED | OUTPATIENT
Start: 2023-05-21 | End: 2023-05-20

## 2023-05-20 RX ORDER — PIPERACILLIN AND TAZOBACTAM 4; .5 G/20ML; G/20ML
3.38 INJECTION, POWDER, LYOPHILIZED, FOR SOLUTION INTRAVENOUS ONCE
Refills: 0 | Status: DISCONTINUED | OUTPATIENT
Start: 2023-05-20 | End: 2023-05-20

## 2023-05-20 RX ORDER — PIPERACILLIN AND TAZOBACTAM 4; .5 G/20ML; G/20ML
3.38 INJECTION, POWDER, LYOPHILIZED, FOR SOLUTION INTRAVENOUS ONCE
Refills: 0 | Status: COMPLETED | OUTPATIENT
Start: 2023-05-20 | End: 2023-05-20

## 2023-05-20 RX ORDER — MORPHINE SULFATE 50 MG/1
1 CAPSULE, EXTENDED RELEASE ORAL
Refills: 0 | Status: DISCONTINUED | OUTPATIENT
Start: 2023-05-20 | End: 2023-05-20

## 2023-05-20 RX ORDER — DILTIAZEM HCL 120 MG
1 CAPSULE, EXT RELEASE 24 HR ORAL
Qty: 30 | Refills: 0
Start: 2023-05-20 | End: 2023-06-18

## 2023-05-20 RX ORDER — METOPROLOL TARTRATE 50 MG
1 TABLET ORAL
Qty: 120 | Refills: 0
Start: 2023-05-20 | End: 2023-06-18

## 2023-05-20 RX ORDER — METOPROLOL TARTRATE 50 MG
0 TABLET ORAL
Refills: 0 | DISCHARGE

## 2023-05-20 RX ORDER — DIGOXIN 250 MCG
1 TABLET ORAL
Qty: 30 | Refills: 0
Start: 2023-05-20 | End: 2023-06-18

## 2023-05-20 RX ORDER — SCOPALAMINE 1 MG/3D
1 PATCH, EXTENDED RELEASE TRANSDERMAL ONCE
Refills: 0 | Status: COMPLETED | OUTPATIENT
Start: 2023-05-20 | End: 2023-05-20

## 2023-05-20 RX ORDER — CHLORHEXIDINE GLUCONATE 213 G/1000ML
1 SOLUTION TOPICAL DAILY
Refills: 0 | Status: DISCONTINUED | OUTPATIENT
Start: 2023-05-20 | End: 2023-05-20

## 2023-05-20 RX ORDER — METOPROLOL TARTRATE 50 MG
5 TABLET ORAL ONCE
Refills: 0 | Status: COMPLETED | OUTPATIENT
Start: 2023-05-20 | End: 2023-05-20

## 2023-05-20 RX ORDER — MORPHINE SULFATE 50 MG/1
2 CAPSULE, EXTENDED RELEASE ORAL EVERY 4 HOURS
Refills: 0 | Status: DISCONTINUED | OUTPATIENT
Start: 2023-05-20 | End: 2023-05-20

## 2023-05-20 RX ADMIN — PIPERACILLIN AND TAZOBACTAM 25 GRAM(S): 4; .5 INJECTION, POWDER, LYOPHILIZED, FOR SOLUTION INTRAVENOUS at 16:15

## 2023-05-20 RX ADMIN — Medication 125 MICROGRAM(S): at 11:42

## 2023-05-20 RX ADMIN — Medication 90 MILLIGRAM(S): at 06:17

## 2023-05-20 RX ADMIN — Medication 50 MILLIGRAM(S): at 06:17

## 2023-05-20 RX ADMIN — MORPHINE SULFATE 1 MILLIGRAM(S): 50 CAPSULE, EXTENDED RELEASE ORAL at 20:15

## 2023-05-20 RX ADMIN — MORPHINE SULFATE 1 MILLIGRAM(S): 50 CAPSULE, EXTENDED RELEASE ORAL at 14:34

## 2023-05-20 RX ADMIN — Medication 5 MILLIGRAM(S): at 14:07

## 2023-05-20 RX ADMIN — SCOPALAMINE 1 PATCH: 1 PATCH, EXTENDED RELEASE TRANSDERMAL at 21:18

## 2023-05-20 RX ADMIN — ENOXAPARIN SODIUM 50 MILLIGRAM(S): 100 INJECTION SUBCUTANEOUS at 17:06

## 2023-05-20 RX ADMIN — CHLORHEXIDINE GLUCONATE 1 APPLICATION(S): 213 SOLUTION TOPICAL at 11:35

## 2023-05-20 RX ADMIN — MORPHINE SULFATE 1 MILLIGRAM(S): 50 CAPSULE, EXTENDED RELEASE ORAL at 14:49

## 2023-05-20 RX ADMIN — PIPERACILLIN AND TAZOBACTAM 200 GRAM(S): 4; .5 INJECTION, POWDER, LYOPHILIZED, FOR SOLUTION INTRAVENOUS at 11:41

## 2023-05-20 RX ADMIN — Medication 30 MILLIGRAM(S): at 06:17

## 2023-05-20 RX ADMIN — CHLORHEXIDINE GLUCONATE 1 APPLICATION(S): 213 SOLUTION TOPICAL at 06:18

## 2023-05-20 RX ADMIN — ENOXAPARIN SODIUM 50 MILLIGRAM(S): 100 INJECTION SUBCUTANEOUS at 06:17

## 2023-05-20 NOTE — DISCHARGE NOTE PROVIDER - ATTENDING DISCHARGE PHYSICAL EXAMINATION:
PHYSICAL EXAM:  GENERAL: NAD,   no signs of respiratory distress  HEAD:  Atraumatic, Normocephalic  EYES: EOMI,  conjunctiva and sclera clear  NECK: Supple, No JVD  CHEST/LUNG: decreased breath sounds b/l; No wheeze; No crackles   HEART: irregular rate and rhythm; No murmurs; tachycardic at times   ABDOMEN: Soft, Nontender, Nondistended; Bowel sounds present   EXTREMITIES: No cyanosis or edema  PSYCH: AAOx1, mental status waxes and wanes  NEUROLOGY: non-focal  SKIN: No rashes or lesions

## 2023-05-20 NOTE — GOALS OF CARE CONVERSATION - ADVANCED CARE PLANNING - CONVERSATION DETAILS
[facility] addressed with patient's son Fernando at bedside. I explained to him that given patient Underlying advanced dementia patient will not make a full recovery, especially given recurrent aspiration and overall functional decline. He voices understanding and wants to focus on patient's comfort and quality of life. He would like to take patient home today and will contact [facility] hospice on Monday. Discussed with bird NAVARRETE planning in progress, all meds sent to pharmacy and once home 02 gets delivered the patient will be discharged home today.

## 2023-05-20 NOTE — PROGRESS NOTE ADULT - REASON FOR ADMISSION
Cough; Hypoxia

## 2023-05-20 NOTE — CHART NOTE - NSCHARTNOTEFT_GEN_A_CORE
RN called DENNISE for patient tachycardia to 150's   Plan     Chart review remarkable family wanting to tale patient [place] to home hospice.   -ecg   -will consider IV metoprolol since she gets metoprolol 50mg q6 and has not had any recent oral meds.

## 2023-05-20 NOTE — DISCHARGE NOTE PROVIDER - HOSPITAL COURSE
82y F pmh lewy body dementia, chronic hill, chronic afib on xarelto, stage 4 sacral ulcer presenting with cough and hypoxia admitted for COVID 19 infection with superimposed aspiration pneumonia c/b Afib RVR. She was admitted with severe sepsis secondary to multilobar PNA, s/p course of Cefepime, COVID infection s/p remdesevir and 10 day course of dexamethasone. Hospital course complicated by Afib with RVR which is somewhat improved on Cardizem, metoprolol and Digoxin.   I discussed GOC at length with son shorty today and he wants to focus more on comfort and quality of life as opposed to medical management. Shorty wishes to take patient home today, has hospital bed, pending 02 delivery and is in touch with [facility] who will be following patient in the community.  Course of antibiotics to be sent to pharmacy, son in agreement with discharging patient today.    Retention Suture Text: Retention sutures were placed to support the closure and prevent dehiscence.

## 2023-05-20 NOTE — DISCHARGE NOTE PROVIDER - NSDCMRMEDTOKEN_GEN_ALL_CORE_FT
Acidophilus oral tablet: 0.5 milligram(s) orally once a day  amoxicillin-clavulanate 875 mg-125 mg oral tablet: 1 tab(s) orally 2 times a day  busPIRone 30 mg oral tablet: 1 tab(s) orally 2 times a day  Cardizem  mg/24 hours oral capsule, extended release: 1 cap(s) orally once a day  cholestyramine 4 g/4.8 g oral powder for reconstitution: 4 gram(s) orally once a day  clonazePAM 0.5 mg oral tablet: 1 tab(s) orally once a day (at bedtime)  digoxin 125 mcg (0.125 mg) oral tablet: 1 tab(s) orally once a day  gabapentin 300 mg oral capsule: 1 cap(s) orally once a day (at bedtime)  melatonin 5 mg oral tablet: 1 tab(s) orally once a day (at bedtime)  metoprolol tartrate 50 mg oral tablet: 1 tab(s) orally every 6 hours  oxycodone-acetaminophen 5 mg-325 mg oral tablet: 1 tab(s) orally every 8 hours as needed for  severe pain  Xarelto 15 mg oral tablet: 1 tab(s) orally once a day with dinner

## 2023-05-20 NOTE — DISCHARGE NOTE NURSING/CASE MANAGEMENT/SOCIAL WORK - NSDCPEFALRISK_GEN_ALL_CORE
For information on Fall & Injury Prevention, visit: [email or web address] OR  [email or web address] OR  https://www.cdc.gov/steadi/patient.html

## 2023-05-20 NOTE — PROGRESS NOTE ADULT - ATTENDING COMMENTS
Ms. Joseph was seen and examined at bedside today, the patient had initially presented for acute hypoxia in the setting of COVID-19 infection with pneumonia complicated by A-fib with RVR.  The patient has been on a course of antibiotics for the bacterial infection, however at this time the patient is to be taken home to focus on quality of life, and likely hospice transition.  Agree with home care plan for hospice.  I agree with the fellow note, with the exceptions listed in my attestation above.  The remainder of impression and plan per fellow note.

## 2023-05-20 NOTE — DISCHARGE NOTE PROVIDER - NSDCCPCAREPLAN_GEN_ALL_CORE_FT
PRINCIPAL DISCHARGE DIAGNOSIS  Diagnosis: Sepsis  Assessment and Plan of Treatment: secondary to Pneumonia, please complete course of antibiotcs      SECONDARY DISCHARGE DIAGNOSES  Diagnosis: Sacral ulcer  Assessment and Plan of Treatment:     Diagnosis: Pneumonia  Assessment and Plan of Treatment:

## 2023-05-20 NOTE — DISCHARGE NOTE NURSING/CASE MANAGEMENT/SOCIAL WORK - NSDCVIVACCINE_GEN_ALL_CORE_FT
Td (adult) preservative free; 15-Feb-2022 14:49; Purvi Haas (RN); [employer]; [id number] (Exp. Date: 15-Oct-2022); IntraMuscular; Deltoid Right.; 0.5 milliLiter(s); VIS (VIS Published: 07-Oct-2022, VIS Presented: 15-Feb-2022);

## 2023-05-20 NOTE — DISCHARGE NOTE NURSING/CASE MANAGEMENT/SOCIAL WORK - PATIENT PORTAL LINK FT
You can access the FollowMyHealth Patient Portal offered by [facility] by registering at the following website: [email or web address]. By joining [employer]’s FollowMyHealth portal, you will also be able to view your health information using other applications (apps) compatible with our system.

## 2023-05-20 NOTE — PROGRESS NOTE ADULT - ASSESSMENT
IMPRESSION:    AHRF on 4 L NC  Sepsis POA  COVID-19 Pneumonia   Aspiration Pneumonia  E. Coli UTI, in setting of chronic Mcdaniel   Sacral Ulcer   A. Flutter/AFIB with RVR   HO Lewy Body Dementia  HO Chronic AFIB on Xarelto    PLAN:    CNS: avoid depressants     HEENT: Oral care    PULMONARY:  HOB @ 45 degrees.  Aspiration precautions. SP Dexamethasone course    CARDIOVASCULAR: Avoid volume overload, on metoprolol 50mg to Q6H, Cardizem 90mg Q6H, started on Digoxin, Cardiology noted, 2D-Echo noted EF 55%, Moderate MVR     GI: GI prophylaxis.  Feeding per speech and swallow.  Bowel regimen, family declining PEG    RENAL:  Follow up lytes.  Correct as needed.    INFECTIOUS DISEASE: SP Cefepime and Finished Remdesivir. procalcitonin 0.64 now 0.04. Start Zosyn    HEMATOLOGICAL: on lovenoc for AFIB     ENDOCRINE:  Follow up FS.  Insulin protocol if needed.    MUSCULOSKELETAL: activity as tolerated, PT/OT    Palliative care following    DNR/DNI, plan for DC with Home Hospice    SDU

## 2023-05-20 NOTE — PROGRESS NOTE ADULT - PROVIDER SPECIALTY LIST ADULT
Critical Care
Critical Care
Hospitalist
Pulmonology
Cardiology
Critical Care
Hospitalist
Hospitalist
Internal Medicine
Internal Medicine
Pulmonology
Internal Medicine
Critical Care
Hospitalist
Internal Medicine
Infectious Disease
Infectious Disease
Palliative Care

## 2023-05-20 NOTE — PROGRESS NOTE ADULT - SUBJECTIVE AND OBJECTIVE BOX
Patient is a 82y old  Female who presents with a chief complaint of Cough; Hypoxia (19 May 2023 15:47)        Over Night Events:        ROS:     All ROS are negative except HPI         PHYSICAL EXAM    ICU Vital Signs Last 24 Hrs  T(C): 36 (20 May 2023 07:45), Max: 36 (20 May 2023 07:45)  T(F): 96.8 (20 May 2023 07:45), Max: 96.8 (20 May 2023 07:45)  HR: 112 (20 May 2023 07:45) (100 - 138)  BP: 101/61 (20 May 2023 07:45) (100/56 - 155/77)  BP(mean): 77 (20 May 2023 07:45) (77 - 89)  ABP: --  ABP(mean): --  RR: 18 (20 May 2023 07:45) (18 - 20)  SpO2: 94% (20 May 2023 07:45) (87% - 96%)    O2 Parameters below as of 20 May 2023 07:45  Patient On (Oxygen Delivery Method): nasal cannula      ENT: Airway patent,  EYES: Pupils equal, Round and reactive to light.  CARDIAC: Normal rate, Regular rhythm.    RESPIRATORY: No wheezing, Bilateral BS, Not tachypneic, No use of accessory muscles  GASTROINTESTINAL: Abdomen soft, Non-tender, No guarding,   NEUROLOGICAL: Alert and awake  SKIN: Skin normal color for race, Warm and dry and intact.         05-19-23 @ 07:01  -  05-20-23 @ 07:00  --------------------------------------------------------  IN:    Oral Fluid: 290 mL  Total IN: 290 mL    OUT:    Indwelling Catheter - Urethral (mL): 1400 mL    Voided (mL): 300 mL  Total OUT: 1700 mL    Total NET: -1410 mL          LABS:                            15.5   30.16 )-----------( 380      ( 19 May 2023 07:57 )             47.2                                               05-19    138  |  104  |  14  ----------------------------<  130<H>  4.2   |  30  |  <0.5<L>    Ca    6.8<L>      19 May 2023 07:57  Mg     2.0     05-19                MEDICATIONS  (STANDING):  busPIRone 30 milliGRAM(s) Oral two times a day  chlorhexidine 2% Cloths 1 Application(s) Topical daily  chlorhexidine 2% Cloths 1 Application(s) Topical <User Schedule>  digoxin  Injectable 125 MICROGram(s) IV Push daily  diltiazem    Tablet 90 milliGRAM(s) Oral every 6 hours  enoxaparin Injectable 50 milliGRAM(s) SubCutaneous every 12 hours  gabapentin 300 milliGRAM(s) Oral at bedtime  lactated ringers. 1000 milliLiter(s) (100 mL/Hr) IV Continuous <Continuous>  lactobacillus acidophilus 1 Tablet(s) Oral daily  melatonin 5 milliGRAM(s) Oral at bedtime  metoprolol tartrate 50 milliGRAM(s) Oral every 6 hours  potassium chloride   Powder 20 milliEquivalent(s) Oral once    MEDICATIONS  (PRN):  acetaminophen     Tablet .. 650 milliGRAM(s) Oral every 6 hours PRN Temp greater or equal to 38C (100.4F), Mild Pain (1 - 3)  oxycodone    5 mG/acetaminophen 325 mG 1 Tablet(s) Oral every 6 hours PRN Severe Pain (7 - 10)

## 2023-05-20 NOTE — DISCHARGE NOTE PROVIDER - CARE PROVIDER_API CALL
Deborah Barnett (DO)  Family Medicine  [address] [place] [address] [id number]  [place] [address]  Phone: [phone]  Fax: [phone]  Follow Up Time:

## 2023-05-21 ENCOUNTER — TRANSCRIPTION ENCOUNTER (OUTPATIENT)
Age: 83
End: 2023-05-21

## 2023-05-21 RX ORDER — MORPHINE SULFATE 50 MG/1
5 CAPSULE, EXTENDED RELEASE ORAL
Qty: 30 | Refills: 0
Start: 2023-05-21 | End: 2023-05-23

## 2023-05-21 RX ORDER — SCOPALAMINE 1 MG/3D
1 PATCH, EXTENDED RELEASE TRANSDERMAL
Qty: 5 | Refills: 0
Start: 2023-05-21 | End: 2023-06-03

## 2023-05-21 RX ORDER — MORPHINE SULFATE 50 MG/1
2.5 CAPSULE, EXTENDED RELEASE ORAL
Qty: 30 | Refills: 0
Start: 2023-05-21 | End: 2023-05-23

## 2023-05-25 DIAGNOSIS — R65.20 SEVERE SEPSIS WITHOUT SEPTIC SHOCK: ICD-10-CM

## 2023-05-25 DIAGNOSIS — R04.0 EPISTAXIS: ICD-10-CM

## 2023-05-25 DIAGNOSIS — G31.83 NEUROCOGNITIVE DISORDER WITH LEWY BODIES: ICD-10-CM

## 2023-05-25 DIAGNOSIS — I48.92 UNSPECIFIED ATRIAL FLUTTER: ICD-10-CM

## 2023-05-25 DIAGNOSIS — U07.1 COVID-19: ICD-10-CM

## 2023-05-25 DIAGNOSIS — J69.0 PNEUMONITIS DUE TO INHALATION OF FOOD AND VOMIT: ICD-10-CM

## 2023-05-25 DIAGNOSIS — Z66 DO NOT RESUSCITATE: ICD-10-CM

## 2023-05-25 DIAGNOSIS — I48.20 CHRONIC ATRIAL FIBRILLATION, UNSPECIFIED: ICD-10-CM

## 2023-05-25 DIAGNOSIS — F02.80 DEMENTIA IN OTHER DISEASES CLASSIFIED ELSEWHERE, UNSPECIFIED SEVERITY, WITHOUT BEHAVIORAL DISTURBANCE, PSYCHOTIC DISTURBANCE, MOOD DISTURBANCE, AND ANXIETY: ICD-10-CM

## 2023-05-25 DIAGNOSIS — E87.6 HYPOKALEMIA: ICD-10-CM

## 2023-05-25 DIAGNOSIS — J96.01 ACUTE RESPIRATORY FAILURE WITH HYPOXIA: ICD-10-CM

## 2023-05-25 DIAGNOSIS — A41.89 OTHER SPECIFIED SEPSIS: ICD-10-CM

## 2023-05-25 DIAGNOSIS — R13.10 DYSPHAGIA, UNSPECIFIED: ICD-10-CM

## 2023-05-25 DIAGNOSIS — B96.20 UNSPECIFIED ESCHERICHIA COLI [E. COLI] AS THE CAUSE OF DISEASES CLASSIFIED ELSEWHERE: ICD-10-CM
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
## 2023-05-25 DIAGNOSIS — T83.511A INFECTION AND INFLAMMATORY REACTION DUE TO INDWELLING URETHRAL CATHETER, INITIAL ENCOUNTER: ICD-10-CM

## 2023-05-25 DIAGNOSIS — F41.9 ANXIETY DISORDER, UNSPECIFIED: ICD-10-CM

## 2023-05-25 DIAGNOSIS — E43 UNSPECIFIED SEVERE PROTEIN-CALORIE MALNUTRITION: ICD-10-CM

## 2023-05-25 DIAGNOSIS — N39.0 URINARY TRACT INFECTION, SITE NOT SPECIFIED: ICD-10-CM
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
## 2023-05-25 DIAGNOSIS — L89.154 PRESSURE ULCER OF SACRAL REGION, STAGE 4: ICD-10-CM

## 2023-05-25 LAB
CULTURE RESULTS: SIGNIFICANT CHANGE UP
SPECIMEN SOURCE: SIGNIFICANT CHANGE UP

## 2023-09-21 NOTE — DIETITIAN INITIAL EVALUATION ADULT - LITERATURE/VIDEOS GIVEN
Cardiology Cardiology Cardiology Cardiology Cardiology Internal Medicine Internal Medicine Internal Medicine Internal Medicine Internal Medicine Nephrology Nephrology Nephrology Nephrology Cardiology Cardiology Cardiology Cardiology Internal Medicine Internal Medicine Internal Medicine Cardiology Internal Medicine Internal Medicine Nephrology Nephrology Nephrology Internal Medicine Internal Medicine Internal Medicine Internal Medicine education deferred for reasons mentioned above

## 2023-10-09 NOTE — ED ADULT NURSE NOTE - NS ED NURSE RECORD ANOTHER HT AND WT
October 9, 2023     Patient: Bree Bojorquez   YOB: 1964   Date of Visit: 10/9/2023       To Whom It May Concern:    It is my medical opinion that Bree Bojorquez {Work release (duty restriction):46721}.    If you have any questions or concerns, please don't hesitate to call.         Sincerely,        Vidya Sánchez, PT    CC: No Recipients
October 9, 2023     Patient: Bree Bojorquez   YOB: 1964   Date of Visit: 10/9/2023       To Whom it May Concern:    Bree Bojorquez was seen in my clinic on 10/9/2023. She {Return to school/sport:58027}.    If you have any questions or concerns, please don't hesitate to call.         Sincerely,          Vidya Sánchez, PT        CC: No Recipients
Yes

## 2023-10-19 NOTE — ED ADULT NURSE NOTE - NSFALLRSKHARMRISK_ED_ALL_ED
Surgical Oncology Follow Up       [facility] [address] [facility] [address] [place] [address]  [address]  [facility] [id number] [facility]  1947  [id number]  [facility] [place]  [facility] [address]  [address]  [phone]    Diagnoses and all orders for this visit:    Adenocarcinoma of gallbladder [facility])  -     Ambulatory Referral to Interventional Radiology; Future        Chief Complaint   Patient presents with    Follow-up     1 wk f/u [place] of gallbladder - MRI 10/16         Return in about 2 weeks (around 11/2/2023) for Office Visit, Labs - See Treatment Plan. Oncology History   Adenocarcinoma of gallbladder ([address])   9/2/2023 Surgery    Laparoscopic cholecystectomy:  Gallbladder, Gallbladder and contents , cholecystectomy:  -Moderately differentiated adenocarcinoma arising in a background of erosive chronic cholecystitis with high grade surface dysplasia and dense serosal adhesions.  -Tumor is present in perimuscular fibrous tissue (AJCC 8th edition pT2.) The surgical margins are uninvolved. Perineural invasion identified. No vascular invasion identified.   -Cholelithiasis. 9/28/2023 Initial Diagnosis    Adenocarcinoma of gallbladder ([address])     10/13/2023 -  Cancer Staged    Staging form: Gallbladder, AJCC 8th Edition  - Clinical: cT2, cN0, cM0 - Signed by Aidan Luciano MD on 10/13/2023  Total positive nodes: 0       10/19/2023 -  Cancer Staged    Staging form: Gallbladder, AJCC 8th Edition  - Pathologic stage from 10/19/2023: Stage IVB (pT2, pN0, cM1) - Signed by Aidan Luciano MD on 10/19/2023  Stage prefix: Initial diagnosis  Total positive nodes: 0               History of Present Illness: Patient returns in follow-up. She is still having some right-sided abdominal discomfort.   MRI shows multiple hepatic lesions that are suspicious for metastasis. I personally reviewed the films. Review of Systems  Complete ROS Surg Onc:   Complete ROS Surg Onc:   Constitutional: The patient denies new or recent history of general fatigue, no recent weight loss, no change in appetite. Eyes: No complaints of visual problems, no scleral icterus. ENT: no complaints of ear pain, no hoarseness, no difficulty swallowing,  no tinnitus and no new masses in head, oral cavity, or neck. Cardiovascular: No complaints of chest pain, no palpitations, no ankle edema. Respiratory: No complaints of shortness of breath, no cough. Gastrointestinal: No complaints of jaundice, no bloody stools, no pale stools. Genitourinary: No complaints of dysuria, no hematuria, no nocturia, no frequent urination, no urethral discharge. Musculoskeletal: No complaints of weakness, paralysis, joint stiffness or arthralgias. Integumentary: No complaints of rash, no new lesions. Neurological: No complaints of convulsions, no seizures, no dizziness. Hematologic/Lymphatic: No complaints of easy bruising. Endocrine:  No hot or cold intolerance. No polydipsia, polyphagia, or polyuria. Allergy/immunology:  No environmental allergies. No food allergies. Not immunocompromised. Skin:  No pallor or rash. No wound. Patient Active Problem List   Diagnosis    Acute cholecystitis    Near syncope    Diabetes (HCC)    Hypertension    Elevated TSH    Hyperglycemia    Lightheadedness    Elevated d-dimer    Sepsis ([address])    Acute blood loss anemia    Adenocarcinoma of gallbladder ([address])     Past Medical History:   Diagnosis Date    Diabetes mellitus ([address])     Hypertension      Past Surgical History:   Procedure Laterality Date    CHOLECYSTECTOMY LAPAROSCOPIC N/A 9/2/2023    Procedure: CHOLECYSTECTOMY LAPAROSCOPIC;  Surgeon: Andres Caceres DO;  Location: [facility];  Service: General     No family history on file.   Social History     Socioeconomic History    Marital status: /Civil Union     Spouse name: Not on file    Number of children: Not on file    Years of education: Not on file    Highest education level: Not on file   Occupational History    Not on file   Tobacco Use    Smoking status: Never     Passive exposure: Never    Smokeless tobacco: Never   Vaping Use    Vaping Use: Never used   Substance and Sexual Activity    Alcohol use: Never    Drug use: Not on file    Sexual activity: Not on file   Other Topics Concern    Not on file   Social History Narrative    Not on file     Social Determinants of Health     Financial Resource Strain: Not on file   Food Insecurity: No Food Insecurity (8/31/2023)    Hunger Vital Sign     Worried About Running Out of Food in the Last Year: Never true     Ran Out of Food in the Last Year: Never true   Transportation Needs: No Transportation Needs (8/31/2023)    PRAPARE - Transportation     Lack of Transportation (Medical): No     Lack of Transportation (Non-Medical): No   Physical Activity: Not on file   Stress: Not on file   Social Connections: Not on file   Intimate Partner Violence: Not on file   Housing Stability: Low Risk  (8/31/2023)    Housing Stability Vital Sign     Unable to Pay for Housing in the Last Year: No     Number of Places Lived in the Last Year: 2     Unstable Housing in the Last Year: No       Current Outpatient Medications:     acetaminophen (TYLENOL) 325 mg tablet, Take 2 tablets (650 mg total) by mouth every 6 (six) hours as needed for mild pain, headaches or fever, Disp: , Rfl: 0    ALPRAZolam (XANAX) 1 mg tablet, Take 1 tablet (1 mg total) by mouth see administration instructions Take 1 tablet 30 minutes prior to MRI, take second tablet immediately before MRI if needed. , Disp: 2 tablet, Rfl: 0    amLODIPine (NORVASC) 5 mg tablet, Take 5 mg by mouth daily at bedtime, Disp: , Rfl:     aspirin (ECOTRIN LOW STRENGTH) 81 mg EC tablet, Take 81 mg by mouth daily, Disp: , Rfl:     glipiZIDE (GLUCOTROL) 5 mg tablet, Take 10 mg by mouth 2 (two) times a day, Disp: , Rfl:     metoprolol tartrate (LOPRESSOR) 50 mg tablet, Take 50 mg by mouth daily, Disp: , Rfl:     sitaGLIPtin-metFORMIN (Janumet)  MG per tablet, Take 1 tablet by mouth 2 (two) times a day with meals, Disp: , Rfl:     temazepam (RESTORIL) 22.5 MG capsule, Take 30 mg by mouth daily at bedtime as needed for sleep, Disp: , Rfl:   Allergies   Allergen Reactions    Cortisone Chest Pain    Dye [Iodinated Contrast Media] Chest Pain    Trulicity [Dulaglutide] Bradycardia     Vitals:    10/19/23 1336   BP: 110/82   Pulse: 74   SpO2: 98%       Physical Exam  Constitutional: General appearance: The Patient is well-developed and well-nourished who appears the stated age in no acute distress. Patient is pleasant and talkative. HEENT:  Normocephalic. Sclerae are anicteric. Mucous membranes are moist. Neck is supple without adenopathy. No JVD. Abdomen: Abdomen is soft, tender on the right mid abdomen, non-distended and without masses. Extremities: There is no clubbing or cyanosis. There is no edema. Symmetric. Neuro: Grossly nonfocal. Gait is normal.     Lymphatic: No evidence of cervical adenopathy bilaterally. Skin: Warm, anicteric. Psych:  Patient is pleasant and talkative. Breasts:        Pathology:  [unfilled]    Labs:      Imaging  MRI abdomen wo contrast    Result Date: 10/18/2023  Narrative: MRI - ABDOMEN - WITHOUT CONTRAST INDICATION: 77 years / Female  C23: Malignant neoplasm of gallbladder. COMPARISON: TECHNIQUE:  Multiplanar/multisequence MRI of the abdomen was performed without the administration of contrast. IV Contrast: FINDINGS: LOWER CHEST:   Unremarkable. LIVER: Normal in size and configuration. Multiple hepatic lesions measuring up to 2.3 cm are identified which demonstrate T2 hyperintensity and restricted diffusion suspicious for metastases.  Limited evaluation of the hepatic veins and portal veins on this non-contrast MR is unremarkable. BILE DUCTS: No intrahepatic or extrahepatic bile duct dilation. GALLBLADDER: Patient is status post cholecystectomy. PANCREAS:  Unremarkable. ADRENAL GLANDS:  Unremarkable. SPLEEN:  Normal. KIDNEYS/PROXIMAL URETERS: No hydroureteronephrosis. No suspicious renal mass. BOWEL:   No dilated loops of bowel. PERITONEUM/RETROPERITONEUM: No mass. No ascites. LYMPH NODES: No abdominal lymphadenopathy. VASCULAR STRUCTURES:  No aneurysm. ABDOMINAL WALL:  Unremarkable. OSSEOUS STRUCTURES:  No suspicious osseous lesion. Impression: Interval cholecystectomy. Multiple hepatic mass lesions suspicious for metastases. Workstation performed: [id number]     I personally reviewed and interpreted the above laboratory and imaging data. Discussion/Summary: 81-year-old female with a history of a T2 gallbladder carcinoma. Her MRI is certainly concerning of liver metastasis. Despite the negative portal adenopathy, I would recommend that she undergo liver biopsy. We discussed that if this is malignant, the mainstay of treatment would be chemotherapy. If this is benign, we would proceed with either laparoscopic liver biopsy to ensure there was no sampling error or proceed with segment 4B/5 liver resection with portal lymph node dissection. I will see her back once we have the results of the IR biopsy. I have already reached out to IR to contact her son for an expedited appointment. All of her and her family's questions were answered. yes

## 2023-12-28 NOTE — ED ADULT TRIAGE NOTE - NS ED NURSE BANDS TYPE
[Well Developed] : well developed [Well Nourished] : well nourished [No Acute Distress] : no acute distress [Normal Conjunctiva] : normal conjunctiva [Normal Venous Pressure] : normal venous pressure [No Carotid Bruit] : no carotid bruit [Normal S1, S2] : normal S1, S2 [No Murmur] : no murmur Name band; [No Rub] : no rub [No Gallop] : no gallop [Soft] : abdomen soft [Non Tender] : non-tender [No Masses/organomegaly] : no masses/organomegaly [Normal Bowel Sounds] : normal bowel sounds [Normal Gait] : normal gait [No Rash] : no rash [No Skin Lesions] : no skin lesions [Moves all extremities] : moves all extremities [No Focal Deficits] : no focal deficits [Normal Speech] : normal speech [Alert and Oriented] : alert and oriented [Normal memory] : normal memory [de-identified] : Clear today, basilar crackles resolved [de-identified] : Improved, 1+ pitting edema encompassing the distal third of the right shin, 1+ left ankle edema

## 2025-06-04 NOTE — PROGRESS NOTE ADULT - TIME-BASED BILLING (NON-CRITICAL CARE)
M Health Gray 
Time-based billing (NON-critical care)
11/13/2024    11:14 AM 1/3/2025     8:21 AM 1/29/2025     8:27 AM 4/11/2025    10:10 AM 4/14/2025    12:58 PM 5/20/2025     8:41 PM 6/4/4/6/2020     8:27 AM 10/19/2020     7:30 AM 10/29/2021    10:55 AM 4/22/2022     9:37 AM 11/13/2024    11:15 AM 1/29/2025     8:28 AM 4/11/2025    10:11 AM   BARRY-7 SCORE   Total Score   8 (mild anxiety) 7 (mild anxiety) 8 (mild anxiety) 14 (moderate anxiety) 11 (moderate anxiety)   Total Score 8 16 8 7 8  14  11        Patient-reported    Proxy-reported     [place] Suicide Severity Rating Scale (Lifetime/Recent)      4/11/2024     1:36 AM 1/29/2025     8:14 AM 3/17/2025     6:49 AM 4/14/2025     1:10 PM   [place] Suicide Severity Rating (Lifetime/Recent)   Q1 Wished to be Dead (Past Month) 0-->no  0-->no    Q2 Suicidal Thoughts (Past Month) 0-->no  0-->no    Q6 Suicide Behavior (Lifetime) 0-->no  0-->no    Level of Risk per Screen no risks indicated  no risks indicated    1. Wish to be Dead (Lifetime)  N  Y   Wish to be Dead Description (Lifetime)    After 's death Teena Le  YOB: 1974    Date of Creation: May 7, 
Time-based billing (NON-critical care)